# Patient Record
Sex: MALE | Race: WHITE | NOT HISPANIC OR LATINO | Employment: UNEMPLOYED | URBAN - METROPOLITAN AREA
[De-identification: names, ages, dates, MRNs, and addresses within clinical notes are randomized per-mention and may not be internally consistent; named-entity substitution may affect disease eponyms.]

---

## 2017-01-01 ENCOUNTER — HOSPITAL ENCOUNTER (EMERGENCY)
Facility: CLINIC | Age: 32
Discharge: HOME | End: 2017-01-01
Attending: EMERGENCY MEDICINE | Admitting: EMERGENCY MEDICINE
Payer: COMMERCIAL

## 2017-01-01 VITALS
WEIGHT: 250 LBS | SYSTOLIC BLOOD PRESSURE: 143 MMHG | BODY MASS INDEX: 33.86 KG/M2 | HEIGHT: 72 IN | DIASTOLIC BLOOD PRESSURE: 81 MMHG | TEMPERATURE: 97.9 F | OXYGEN SATURATION: 95 %

## 2017-01-01 VITALS
RESPIRATION RATE: 16 BRPM | TEMPERATURE: 98.6 F | OXYGEN SATURATION: 96 % | SYSTOLIC BLOOD PRESSURE: 121 MMHG | HEART RATE: 71 BPM | DIASTOLIC BLOOD PRESSURE: 79 MMHG

## 2017-01-01 DIAGNOSIS — F25.0 SCHIZOAFFECTIVE DISORDER, BIPOLAR TYPE (H): ICD-10-CM

## 2017-01-01 DIAGNOSIS — Z00.00 ROUTINE GENERAL MEDICAL EXAMINATION AT A HEALTH CARE FACILITY: ICD-10-CM

## 2017-01-01 PROCEDURE — 99283 EMERGENCY DEPT VISIT LOW MDM: CPT | Mod: Z6 | Performed by: EMERGENCY MEDICINE

## 2017-01-01 PROCEDURE — 99283 EMERGENCY DEPT VISIT LOW MDM: CPT | Mod: 27

## 2017-01-01 PROCEDURE — 25000132 ZZH RX MED GY IP 250 OP 250 PS 637: Performed by: EMERGENCY MEDICINE

## 2017-01-01 PROCEDURE — 99207 ZZC APP CREDIT; MD BILLING SHARED VISIT: CPT | Mod: Z6 | Performed by: EMERGENCY MEDICINE

## 2017-01-01 RX ORDER — ACETAMINOPHEN 500 MG
1000 TABLET ORAL ONCE
Status: COMPLETED | OUTPATIENT
Start: 2017-01-01 | End: 2017-01-01

## 2017-01-01 RX ORDER — MULTIVITAMIN WITH FOLIC ACID 400 MCG
1 TABLET ORAL
COMMUNITY
Start: 2016-10-25 | End: 2017-12-31

## 2017-01-01 RX ORDER — HALOPERIDOL 10 MG/1
10 TABLET ORAL
COMMUNITY
Start: 2016-12-31 | End: 2017-12-31

## 2017-01-01 RX ADMIN — Medication 1000 MG: at 16:27

## 2017-01-01 ASSESSMENT — ENCOUNTER SYMPTOMS
SHORTNESS OF BREATH: 0
ABDOMINAL PAIN: 0
HEADACHES: 0
HALLUCINATIONS: 0
FEVER: 0
CONFUSION: 0
DIFFICULTY URINATING: 0
EYE REDNESS: 0
ARTHRALGIAS: 0
COLOR CHANGE: 0
NECK STIFFNESS: 0

## 2017-01-01 NOTE — ED AVS SNAPSHOT
[facility], Emergency Department    [address] [facility] [address]    Phone:  [phone]                                       Carlos Alberto Garcia   MRN: [id number]    Department:  [facility], Emergency Department   Date of Visit:  1/1/2017           Patient Information     Date Of Birth          1985        Your diagnoses for this visit were:     Routine general medical examination at a health care facility        You were seen by Wilder Whitman MD.        Discharge Instructions       Please make an appointment to follow up with your psychiatrist and Your Primary Care Provider this week.    Return to the emergency Department for any problems.      24 Hour Appointment Hotline       To make an appointment at any [facility] clinic, call [phone] ([phone]). If you don't have a family doctor or clinic, we will help you find one. [facility] clinics are conveniently located to serve the needs of you and your family.             Review of your medicines      Our records show that you are taking the medicines listed below. If these are incorrect, please call your family doctor or clinic.        Dose / Directions Last dose taken    INVEGA SUSTENNA IM   Dose:  156 mg        Inject 156 mg into the muscle Qmonthly injection.   Refills:  0        MELATONIN PO   Dose:  5 mg   Indication:  pt unsure of dose        Take 5 mg by mouth nightly as needed   Refills:  0                Orders Needing Specimen Collection     None      Pending Results     No orders found from 12/31/2016 to 1/2/2017.            Thank you for choosing [facility]       Thank you for choosing [facility] for your care. Our goal is always to provide you with excellent care. Hearing back from our patients is one way we can continue to improve our services. Please take a few minutes to complete the written survey that you may receive in the mail after you visit with us. Thank you!        MyChart Information     [employer] lets you  "send messages to your doctor, view your test results, renew your prescriptions, schedule appointments and more. To sign up, go to [email or web address] . Click on \"Log in\" on the left side of the screen, which will take you to the Welcome page. Then click on \"Sign up Now\" on the right side of the page.     You will be asked to enter the access code listed below, as well as some personal information. Please follow the directions to create your username and password.     Your access code is: [email or web address]  Expires: 2017  4:20 PM     Your access code will  in 90 days. If you need help or a new code, please call your [place] clinic or [phone].        After Visit Summary       This is your record. Keep this with you and show to your community pharmacist(s) and doctor(s) at your next visit.                  "

## 2017-01-01 NOTE — ED AVS SNAPSHOT
[facility], Emergency Department    [address]    [place] [address]    Phone:  [phone]    Fax:  [phone]                                       Carlos Alberto Garcia   MRN: [id number]    Department:  [facility], Emergency Department   Date of Visit:  1/1/2017           Patient Information     Date Of Birth          1985        Your diagnoses for this visit were:     Schizoaffective disorder, bipolar type (H)        You were seen by Sandra Damon MD.        Discharge Instructions       Follow up with your outpatient providers.     Please make an appointment to follow up with [facility] (phone: [phone])    Resources offered and declined.       24 Hour Appointment Hotline       To make an appointment at any [facility] clinic, call [phone] ([phone]). If you don't have a family doctor or clinic, we will help you find one. [facility] clinics are conveniently located to serve the needs of you and your family.             Review of your medicines      Our records show that you are taking the medicines listed below. If these are incorrect, please call your family doctor or clinic.        Dose / Directions Last dose taken    haloperidol 10 MG tablet   Commonly known as:  HALDOL   Dose:  10 mg        Take 10 mg by mouth   Refills:  0        INVEGA SUSTENNA IM   Dose:  156 mg        Inject 156 mg into the muscle Qmonthly injection.   Refills:  0        MELATONIN PO   Dose:  5 mg   Indication:  pt unsure of dose        Take 5 mg by mouth nightly as needed   Refills:  0        TAB-A-FAZAL Tabs   Dose:  1 tablet        Take 1 tablet by mouth   Refills:  0        VITAMIN D-1000 MAX ST 1000 UNITS Tabs   Dose:  1000 Units   Generic drug:  cholecalciferol        Take 1,000 Units by mouth   Refills:  0                Orders Needing Specimen Collection     Ordered          01/01/17 1908  Drug abuse screen 6 urine (chem dep) - STAT, [facility]: STAT, Needs to be Collected     Scheduled Task Status  "  17 1909 Collect Drug abuse screen 6 urine (chem dep) Open   Order Class:  PCU Collect                  Pending Results     No orders found from 2016 to 2017.            Thank you for choosing [facility]       Thank you for choosing [facility] for your care. Our goal is always to provide you with excellent care. Hearing back from our patients is one way we can continue to improve our services. Please take a few minutes to complete the written survey that you may receive in the mail after you visit with us. Thank you!        [employer] Information     [employer] lets you send messages to your doctor, view your test results, renew your prescriptions, schedule appointments and more. To sign up, go to [email or web address] . Click on \"Log in\" on the left side of the screen, which will take you to the Welcome page. Then click on \"Sign up Now\" on the right side of the page.     You will be asked to enter the access code listed below, as well as some personal information. Please follow the directions to create your username and password.     Your access code is: [email or web address]  Expires: 2017  4:20 PM     Your access code will  in 90 days. If you need help or a new code, please call your [facility] clinic or [phone].        After Visit Summary       This is your record. Keep this with you and show to your community pharmacist(s) and doctor(s) at your next visit.                  "

## 2017-01-01 NOTE — ED AVS SNAPSHOT
[facility], [place], Emergency Department    [address] [place] [address]    Phone:  [phone]                                       Carlos Alberto Garcia   MRN: [id number]    Department:  [facility], Emergency Department   Date of Visit:  1/1/2017           After Visit Summary Signature Page     I have received my discharge instructions, and my questions have been answered. I have discussed any challenges I see with this plan with the nurse or doctor.    ..........................................................................................................................................  Patient/Patient Representative Signature      ..........................................................................................................................................  Patient Representative Print Name and Relationship to Patient    ..................................................               ................................................  Date                                            Time    ..........................................................................................................................................  Reviewed by Signature/Title    ...................................................              ..............................................  Date                                                            Time

## 2017-01-01 NOTE — ED PROVIDER NOTES
"  History     Chief Complaint   Patient presents with     Altered Mental Status     HPI  Carlos Alberto Garcia is a 31 year old male with a history of schizophrenia, bipolar 2 disorder, hypertension, hypothyroidism, substance abuse, recurrent headaches and hepatitis C who presents for evaluation following a medication injection. Patient reports he received an Invega injection approximately one hour prior to arrival at [facility], and came here to the emergency department to \"make sure everything is going okay\" as he is concerned whether it is working appropriately because \"I'm hellen serious about the brain\". No other symptoms or complaints at this time, however the patient did request a Tylenol.     I have reviewed the Medications, Allergies, Past Medical and Surgical History, and Social History in the [facility] system.  Past Medical History   Diagnosis Date     Schizoaffective disorder      Bipolar 2 disorder (H)      Unspecified essential hypertension      Unspecified hypothyroidism      Chemical abuse      cocaine, meth, cambis     Dyslipidemia      Patient overweight      Hep C w/ coma, chronic (H)        Past Surgical History   Procedure Laterality Date     Hand surgery       L       No family history on file.    Social History   Substance Use Topics     Smoking status: Current Every Day Smoker -- 1.00 packs/day     Types: Cigarettes     Smokeless tobacco: Former User     Alcohol Use: Yes      Comment: rarely. Last drink about 4 month ago     Current Facility-Administered Medications   Medication     acetaminophen (TYLENOL) tablet 1,000 mg     Current Outpatient Prescriptions   Medication     MELATONIN PO     Paliperidone Palmitate (INVEGA SUSTENNA IM)     Facility-Administered Medications Ordered in Other Encounters   Medication     ibuprofen (ADVIL,MOTRIN) 600 MG tablet      No Known Allergies    Review of Systems   Constitutional: Negative for fever.   HENT: Negative for congestion.    Eyes: Negative for " redness.   Respiratory: Negative for shortness of breath.    Cardiovascular: Negative for chest pain.   Gastrointestinal: Negative for abdominal pain.   Genitourinary: Negative for difficulty urinating.   Musculoskeletal: Negative for arthralgias and neck stiffness.   Skin: Negative for color change.   Neurological: Negative for headaches.   Psychiatric/Behavioral: Negative for confusion.       Physical Exam   BP: 143/81 mmHg  Heart Rate: 93  Temp: 97.9  F (36.6  C)  Height: 182.9 cm (6')  Weight: 113.399 kg (250 lb)  SpO2: 95 %  Physical Exam   Constitutional: He is oriented to person, place, and time. He appears well-developed and well-nourished.  Non-toxic appearance. He does not appear ill. No distress.   Patient is awake and alert, lying comfortably in the bed, no acute distress.   HENT:   Head: Normocephalic and atraumatic.   Eyes: EOM are normal. Pupils are equal, round, and reactive to light. No scleral icterus.   Neck: Normal range of motion. Neck supple.   Cardiovascular: Normal rate, regular rhythm and normal heart sounds.    Pulmonary/Chest: Effort normal and breath sounds normal. No respiratory distress.   Neurological: He is alert and oriented to person, place, and time. He has normal strength. No sensory deficit.   No pronator drift bilaterally.  Normal finger to nose bilaterally.  No tremor noted.  Normal muscle tone without clonus bilaterally.  Normal rapid alternating movements bilaterally.  No aphasia or dysarthria.   Skin: Skin is warm and dry. No rash noted. No pallor.   Psychiatric: He has a normal mood and affect. His behavior is normal.   Nursing note and vitals reviewed.      ED Course   Procedures       3:48 PM  The patient was seen and examined by Dr. Whitman in Room 14.         Assessments & Plan (with Medical Decision Making)   This patient presented to the emergency department to make sure that his medication was not affecting his brain.  Patient has a history of schizophrenia and does  have a somewhat odd affect but does not appear to be a danger to himself or others.  There is no evidence to suggest neuroleptic malignant syndrome or severe extrapyramidal symptoms.  At this point I am comfortable discharging him home.    I have reviewed the nursing notes.    I have reviewed the findings, diagnosis, plan and need for follow up with the patient.    New Prescriptions    No medications on file       Final diagnoses:   Routine general medical examination at a [facility] care facility   I, Pastora Hammer, am serving as a trained medical scribe to document services personally performed by Ld Whitman MD, based on the provider's statements to me.   ILd MD, was physically present and have reviewed and verified the accuracy of this note documented by Pastora Hammer.      1/1/2017   [facility], [place], EMERGENCY DEPARTMENT      Wilder Whitman MD  01/01/17 1731

## 2017-01-01 NOTE — DISCHARGE INSTRUCTIONS
Please make an appointment to follow up with your psychiatrist and Your Primary Care Provider this week.    Return to the emergency Department for any problems.

## 2017-01-01 NOTE — ED AVS SNAPSHOT
[facility], Emergency Department    [address]    [facility] [address]    Phone:  [phone]    Fax:  [phone]                                       Carlos Alberto Garcia   MRN: [id number]    Department:  [facility], Emergency Department   Date of Visit:  1/1/2017           After Visit Summary Signature Page     I have received my discharge instructions, and my questions have been answered. I have discussed any challenges I see with this plan with the nurse or doctor.    ..........................................................................................................................................  Patient/Patient Representative Signature      ..........................................................................................................................................  Patient Representative Print Name and Relationship to Patient    ..................................................               ................................................  Date                                            Time    ..........................................................................................................................................  Reviewed by Signature/Title    ...................................................              ..............................................  Date                                                            Time

## 2017-01-01 NOTE — ED NOTES
"Carlos Alberto is ambulatory to triage, he states he can't sleep and he \"can't see in his brain\". He wants to be seen to \"make sure everything is ok\". Carlos Alberto states he is currently homeless, but feels safe, he has been in a treatment center but had a cigarette in the bathroom, \"then they told me I was going to the hospital, very nicely, but I didn't know I was actually getting kicked out\". He is requesting social work involvement as well. He does c/o headache.   "

## 2017-01-02 ENCOUNTER — HOSPITAL ENCOUNTER (EMERGENCY)
Facility: CLINIC | Age: 32
Discharge: HOME | End: 2017-01-02
Attending: EMERGENCY MEDICINE | Admitting: EMERGENCY MEDICINE
Payer: COMMERCIAL

## 2017-01-02 VITALS
TEMPERATURE: 97.3 F | DIASTOLIC BLOOD PRESSURE: 79 MMHG | HEART RATE: 114 BPM | BODY MASS INDEX: 34.27 KG/M2 | RESPIRATION RATE: 16 BRPM | HEIGHT: 72 IN | OXYGEN SATURATION: 100 % | WEIGHT: 253 LBS | SYSTOLIC BLOOD PRESSURE: 128 MMHG

## 2017-01-02 DIAGNOSIS — R51.9 ACUTE NONINTRACTABLE HEADACHE, UNSPECIFIED HEADACHE TYPE: ICD-10-CM

## 2017-01-02 DIAGNOSIS — M94.0 COSTOCHONDRITIS: ICD-10-CM

## 2017-01-02 PROCEDURE — 99283 EMERGENCY DEPT VISIT LOW MDM: CPT

## 2017-01-02 PROCEDURE — 25000132 ZZH RX MED GY IP 250 OP 250 PS 637: Performed by: EMERGENCY MEDICINE

## 2017-01-02 PROCEDURE — 99283 EMERGENCY DEPT VISIT LOW MDM: CPT | Mod: Z6 | Performed by: EMERGENCY MEDICINE

## 2017-01-02 RX ORDER — ACETAMINOPHEN 325 MG/1
650 TABLET ORAL ONCE
Status: COMPLETED | OUTPATIENT
Start: 2017-01-02 | End: 2017-01-02

## 2017-01-02 RX ORDER — IBUPROFEN 200 MG
600 TABLET ORAL 3 TIMES DAILY
Qty: 63 TABLET | Refills: 0 | Status: SHIPPED | OUTPATIENT
Start: 2017-01-02 | End: 2017-01-09

## 2017-01-02 RX ADMIN — ACETAMINOPHEN 650 MG: 325 TABLET, FILM COATED ORAL at 15:25

## 2017-01-02 NOTE — ED AVS SNAPSHOT
[facility], Emergency Department    [address]    [place] [address]    Phone:  [phone]    Fax:  [phone]                                       Carlos Alberto Garcia   MRN: [id number]    Department:  [facility], Emergency Department   Date of Visit:  1/2/2017           Patient Information     Date Of Birth          1985        Your diagnoses for this visit were:     Acute nonintractable headache, unspecified headache type     Costochondritis        You were seen by Fabrice Fraser MD.        Discharge Instructions       No lifting more than 20 pounds for one week.    Please make an appointment to follow up with Your Primary Care Provider or our [facility] (phone: [phone]) in one week for recheck.    Discharge References/Attachments     COSTOCHONDRITIS (ENGLISH)    HEADACHE, UNSPECIFIED (ENGLISH)      24 Hour Appointment Hotline       To make an appointment at any [facility], call [phone] ([phone]). If you don't have a family doctor or clinic, we will help you find one. [facility] clinics are conveniently located to serve the needs of you and your family.             Review of your medicines      START taking        Dose / Directions Last dose taken    ibuprofen 200 MG tablet   Commonly known as:  ADVIL/MOTRIN   Dose:  600 mg   Quantity:  63 tablet        Take 3 tablets (600 mg) by mouth 3 times daily for 7 days   Refills:  0          Our records show that you are taking the medicines listed below. If these are incorrect, please call your family doctor or clinic.        Dose / Directions Last dose taken    haloperidol 10 MG tablet   Commonly known as:  HALDOL   Dose:  10 mg        Take 10 mg by mouth   Refills:  0        INVEGA SUSTENNA IM   Dose:  156 mg        Inject 156 mg into the muscle Qmonthly injection.   Refills:  0        MELATONIN PO   Dose:  5 mg   Indication:  pt unsure of dose        Take 5 mg by mouth nightly as needed  "  Refills:  0        TAB-A-FAZAL Tabs   Dose:  1 tablet        Take 1 tablet by mouth   Refills:  0        VITAMIN D-1000 MAX ST 1000 UNITS Tabs   Dose:  1000 Units   Generic drug:  cholecalciferol        Take 1,000 Units by mouth   Refills:  0                Prescriptions were sent or printed at these locations (1 Prescription)                   Other Prescriptions                Printed at Department/Unit printer (1 of 1)         ibuprofen (ADVIL/MOTRIN) 200 MG tablet                Orders Needing Specimen Collection     None      Pending Results     No orders found from 2017 to 1/3/2017.            Thank you for choosing [facility]       Thank you for choosing [facility] for your care. Our goal is always to provide you with excellent care. Hearing back from our patients is one way we can continue to improve our services. Please take a few minutes to complete the written survey that you may receive in the mail after you visit with us. Thank you!        [employer] Information     [employer] lets you send messages to your doctor, view your test results, renew your prescriptions, schedule appointments and more. To sign up, go to [email or web address] . Click on \"Log in\" on the left side of the screen, which will take you to the Welcome page. Then click on \"Sign up Now\" on the right side of the page.     You will be asked to enter the access code listed below, as well as some personal information. Please follow the directions to create your username and password.     Your access code is: [email or web address]  Expires: 2017  4:20 PM     Your access code will  in 90 days. If you need help or a new code, please call your [place] clinic or [phone].        After Visit Summary       This is your record. Keep this with you and show to your community pharmacist(s) and doctor(s) at your next visit.                  "

## 2017-01-02 NOTE — ED PROVIDER NOTES
History     Chief Complaint   Patient presents with     Medication Reaction     [facility] and not feeling right     HPI  Carlos Alberto Garcia is a 31 year old male with schizoaffective disorder, bipolar type who presents stating his invega shot isn't working.  He is homeless.  He was suppose to f/u with [facility] but missed that appointment.  He was seen earlier today and received invega but it hasn't started to work yet.  He was seen at [facility] yesterday and received haldol.  He says he has family in town but does not see them.  He has not been good about taking his meds as prescribed.  He is suppose to take invega montly.  He has not had adverse reactions to it in the past.  He tells the  that he is good and is ready to leave.     I have reviewed the Medications, Allergies, Past Medical and Surgical History, and Social History in the [facility] system.    Review of Systems   Psychiatric/Behavioral: Negative for suicidal ideas and hallucinations.   All other systems reviewed and are negative.      Physical Exam   BP: 136/88 mmHg  Pulse: 78  Temp: 98.6  F (37  C)  Resp: 16  SpO2: 96 %  Physical Exam   Constitutional: He is oriented to person, place, and time. He appears well-developed and well-nourished. No distress.   Disheveled appearance.     HENT:   Head: Normocephalic and atraumatic.   Right Ear: External ear normal.   Left Ear: External ear normal.   Nose: Nose normal.   Mouth/Throat: Oropharynx is clear and moist.   Eyes: EOM are normal. Pupils are equal, round, and reactive to light.   Neck: Normal range of motion. Neck supple.   Cardiovascular: Normal rate, regular rhythm and normal heart sounds.    Pulmonary/Chest: Effort normal and breath sounds normal.   Abdominal: Soft. There is no tenderness.   Musculoskeletal: Normal range of motion.   Neurological: He is alert and oriented to person, place, and time.   Skin: Skin is warm and dry. He is not diaphoretic.    Psychiatric: He has a normal mood and affect. His speech is normal and behavior is normal. Judgment and thought content normal. Cognition and memory are normal.   Nursing note and vitals reviewed.      ED Course   Procedures             Labs Ordered and Resulted from Time of ED Arrival Up to the Time of Departure from the ED - No data to display    Assessments & Plan (with Medical Decision Making)   Upon arrival, the the patient immediately turned on tv remote to watch tv and asked for a sandwich.  No distress by their evaluation.     He says he has not been taking his meds and got invega a few hours ago and it isn't working yet.  He also states he was suppose to follow up with [facility] but missed the appointment.  We discussed that it takes time for the shot to work.  He is stable.  Calm and cooperative.  He would like a sandwich which was given.  He was offered resources and declined.  No si/hi.  He was d/c.       I have reviewed the nursing notes.    I have reviewed the findings, diagnosis, plan and need for follow up with the patient.    New Prescriptions    No medications on file       Final diagnoses:   Schizoaffective disorder, bipolar type (H)       1/1/2017   [facility], [place], EMERGENCY DEPARTMENT      Sandra Damon MD  01/01/17 4879

## 2017-01-02 NOTE — ED NOTES
Patient arrives to [facility]. Psych Associate explains process, gives patient urine cup and questionnaire. Patient told about meeting with Mental Health  and Psychiatrist. Patient told about 2-5 hour time frame for complete evaluation.

## 2017-01-02 NOTE — ED NOTES
"Patient biba with c/o \"invega issues\".  Pt will not elaborate.  Per EMS he was at [facility] 3 hours ago and received an invega injection.  He called 911 and wanted to be transported to [facility] and became angry when they wouldn't take him there.  Pt is evasive and not answering questions.  Upon arrival he grabs remote and turns on TV, inattentive to questions being asked.  After leaving room he comes out and demands food.    "

## 2017-01-02 NOTE — DISCHARGE INSTRUCTIONS
Follow up with your outpatient providers.     Please make an appointment to follow up with [facility] (phone: [phone])    Resources offered and declined.

## 2017-01-02 NOTE — ED NOTES
Bed: ED07  Expected date: 1/1/17  Expected time: 6:21 PM  Means of arrival: Ambulance  Comments:  kwadwo [id number] not feeling well

## 2017-01-02 NOTE — DISCHARGE INSTRUCTIONS
No lifting more than 20 pounds for one week.    Please make an appointment to follow up with Your Primary Care Provider or our [facility] (phone: [phone]) in one week for recheck.

## 2017-01-02 NOTE — ED AVS SNAPSHOT
[facility], Emergency Department    [address]    [facility] [address]    Phone:  [phone]    Fax:  [phone]                                       Carlos Alberto Garcia   MRN: [id number]    Department:  [facility], Emergency Department   Date of Visit:  1/2/2017           After Visit Summary Signature Page     I have received my discharge instructions, and my questions have been answered. I have discussed any challenges I see with this plan with the nurse or doctor.    ..........................................................................................................................................  Patient/Patient Representative Signature      ..........................................................................................................................................  Patient Representative Print Name and Relationship to Patient    ..................................................               ................................................  Date                                            Time    ..........................................................................................................................................  Reviewed by Signature/Title    ...................................................              ..............................................  Date                                                            Time

## 2017-01-02 NOTE — ED PROVIDER NOTES
"  History     Chief Complaint   Patient presents with     Headache     Headache off/on for past 3 mos. Pt reports it's \"not as bad today but I wanted to get it checked out.\" Reports he has not taken anything for the headache & here for tylenol     Chest Pain     Pt adds that he has R sided CP that started today     HPI  Carlos Alberto Garcia is a 31 year old male with a chemical abuse and psychiatric history including malingering who presents to the ER with complaints of a headache that he has had intermittently for the last 3 weeks.  Patient states that the headache is global in nature but does not cause him any fevers or visual complaints focal numbness or weakness or vomiting.  Patient states he has not taken anything for his headache over the last 3 weeks and that he came to the ER for some Tylenol.    Patient also complains of some right anterior non-radiating chest pain that hurts with movement and with palpation.  Patient states he's had no fevers cough and shortness of breath or diaphoresis.    I have reviewed the Medications, Allergies, Past Medical and Surgical History, and Social History in the [employer] system.  Past Medical History   Diagnosis Date     Schizoaffective disorder      Bipolar 2 disorder (H)      Unspecified essential hypertension      Unspecified hypothyroidism      Chemical abuse      cocaine, meth, cambis     Dyslipidemia      Patient overweight      Hep C w/ coma, chronic (H)      Past Surgical History   Procedure Laterality Date     Hand surgery       L        Social History     Social History     Marital Status: Single     Spouse Name: N/A     Number of Children: N/A     Years of Education: N/A     Occupational History     Not on file.     Social History Main Topics     Smoking status: Current Every Day Smoker -- 1.00 packs/day     Types: Cigarettes     Smokeless tobacco: Former User     Alcohol Use: Yes      Comment: rarely. Last drink about 4 month ago     Drug Use: 3.00 per week     " Special: Marijuana, Methamphetamines      Comment: Pt smokes meth and marijuana     Sexual Activity:     Partners: Female     Birth Control/ Protection: None     Other Topics Concern     Parent/Sibling W/ Cabg, Mi Or Angioplasty Before 65f 55m? No     Social History Narrative      No Known Allergies      Review of Systems    ROS: 10 point ROS neg other than the symptoms noted above in the HPI.    Physical Exam   BP: 128/79 mmHg  Pulse: 103  Temp: 97.3  F (36.3  C)  Resp: 16  Height: 182.9 cm (6')  Weight: 114.76 kg (253 lb)  SpO2: 96 %  Physical Exam   Constitutional: He is oriented to person, place, and time. No distress.   Awake alert and laying comfortably on the bed   HENT:   Head: Atraumatic.   Mouth/Throat: Oropharynx is clear and moist.   Eyes: EOM are normal. Pupils are equal, round, and reactive to light.   Neck: Neck supple.   Cardiovascular: Normal heart sounds.  Exam reveals no friction rub.    No murmur heard.  Pulmonary/Chest: Breath sounds normal. No stridor. He has no wheezes. He has no rales. He exhibits tenderness (reproducible right sternal border).   Abdominal: Soft. There is no tenderness.   Musculoskeletal: He exhibits no edema or tenderness.   Neurological: He is alert and oriented to person, place, and time. No cranial nerve deficit.   Grossly intact and symmetric   Skin: Skin is warm.   Psychiatric:   Slightly pressured speech and poor eye contact but no overt evidence of hallucinations or delusions.  Calm.  No agitation.       ED Course   Procedures            Assessments & Plan (with Medical Decision Making)     I have reviewed the nursing notes.    Patient will be given some Tylenol for his headache and will be sent home on ibuprofen.    I have reviewed the findings, diagnosis, plan and need for follow up with the patient.    New Prescriptions    IBUPROFEN (ADVIL/MOTRIN) 200 MG TABLET    Take 3 tablets (600 mg) by mouth 3 times daily for 7 days       Final diagnoses:   Acute  nonintractable headache, unspecified headache type   Costochondritis     No lifting more than 20 pounds for one week.    Please make an appointment to follow up with Your Primary Care Provider or our [facility] (phone: [phone]) in one week for recheck.    Fabrice Fraser MD    1/2/2017   [facility], EMERGENCY DEPARTMENT      Fabrice Fraser MD  01/02/17 1814

## 2017-01-02 NOTE — ED NOTES
Safety screen completed on patient. All belongings locked up in cabinet. Patient aware of policy.

## 2017-01-03 ENCOUNTER — HOSPITAL ENCOUNTER (EMERGENCY)
Facility: CLINIC | Age: 32
Discharge: LEFT BEFORE DISCHARGE | End: 2017-01-03
Payer: COMMERCIAL

## 2017-01-03 VITALS
DIASTOLIC BLOOD PRESSURE: 104 MMHG | HEART RATE: 102 BPM | WEIGHT: 250 LBS | BODY MASS INDEX: 33.86 KG/M2 | OXYGEN SATURATION: 99 % | SYSTOLIC BLOOD PRESSURE: 163 MMHG | HEIGHT: 72 IN

## 2017-01-03 NOTE — ED NOTES
Patient came in to the lobby with his shoes off for foot pain and a headache. He states he is sick and needs a tylenol in his triage form. He is alert, oriented. Vitally stable.

## 2017-01-03 NOTE — ED NOTES
Pt called x 1. Pt not in lobby. Security states pt left ED. Pt also not outside ED/hospital entrance.

## 2017-01-04 ENCOUNTER — HOSPITAL ENCOUNTER (EMERGENCY)
Facility: CLINIC | Age: 32
Discharge: LEFT BEFORE DISCHARGE | End: 2017-01-04
Admitting: EMERGENCY MEDICINE
Payer: COMMERCIAL

## 2017-01-04 ENCOUNTER — HOSPITAL ENCOUNTER (EMERGENCY)
Facility: CLINIC | Age: 32
Discharge: HOME | End: 2017-01-04
Attending: EMERGENCY MEDICINE | Admitting: EMERGENCY MEDICINE
Payer: COMMERCIAL

## 2017-01-04 VITALS
HEART RATE: 110 BPM | SYSTOLIC BLOOD PRESSURE: 150 MMHG | DIASTOLIC BLOOD PRESSURE: 83 MMHG | TEMPERATURE: 97.4 F | RESPIRATION RATE: 14 BRPM | OXYGEN SATURATION: 100 %

## 2017-01-04 VITALS
OXYGEN SATURATION: 100 % | SYSTOLIC BLOOD PRESSURE: 118 MMHG | DIASTOLIC BLOOD PRESSURE: 61 MMHG | HEIGHT: 72 IN | TEMPERATURE: 98.2 F

## 2017-01-04 DIAGNOSIS — Z59.00 HOMELESSNESS: ICD-10-CM

## 2017-01-04 DIAGNOSIS — F25.9 SCHIZOAFFECTIVE DISORDER, UNSPECIFIED TYPE (H): ICD-10-CM

## 2017-01-04 PROCEDURE — 99282 EMERGENCY DEPT VISIT SF MDM: CPT

## 2017-01-04 PROCEDURE — 25000132 ZZH RX MED GY IP 250 OP 250 PS 637

## 2017-01-04 PROCEDURE — 40000268 ZZH STATISTIC NO CHARGES

## 2017-01-04 RX ORDER — ACETAMINOPHEN 325 MG/1
650 TABLET ORAL ONCE
Status: COMPLETED | OUTPATIENT
Start: 2017-01-04 | End: 2017-01-04

## 2017-01-04 RX ORDER — ACETAMINOPHEN 325 MG/1
TABLET ORAL
Status: COMPLETED
Start: 2017-01-04 | End: 2017-01-04

## 2017-01-04 RX ADMIN — ACETAMINOPHEN 650 MG: 325 TABLET, FILM COATED ORAL at 06:17

## 2017-01-04 RX ADMIN — ACETAMINOPHEN 650 MG: 325 TABLET ORAL at 06:17

## 2017-01-04 SDOH — ECONOMIC STABILITY - HOUSING INSECURITY: HOMELESSNESS UNSPECIFIED: Z59.00

## 2017-01-04 ASSESSMENT — ENCOUNTER SYMPTOMS: HALLUCINATIONS: 1

## 2017-01-04 NOTE — ED NOTES
"Pt presents ambulatory to triage and states \"I can't get my mind activated.\" Pt states Tylenol may help  "

## 2017-01-04 NOTE — ED AVS SNAPSHOT
Emergency Department    [address] [place] [address]    Phone:  [phone]    Fax:  [phone]                                       Carlos Alberto Garcia   MRN: [id number]    Department:   Emergency Department   Date of Visit:  1/4/2017           Patient Information     Date Of Birth          1985        Your diagnoses for this visit were:     Schizoaffective disorder, unspecified type (H)     Homelessness        You were seen by Frandy Shelton MD.      Follow-up Information     Please follow up.    Why:  follow up with your doctors as needed        Discharge Instructions         Schizoaffective Disorder  Schizoaffective disorder is an illness in which a psychotic person also has symptoms of a mood disorder such as depression, or bipolar disorder.  Schizophrenia is a chronic, often disabling mental health disorder that makes functioning in work and society difficult. It is a type of psychosis, and involves perceiving reality differently from those around you. The difference been reality and what you think become blurred in your mind. The cause of schizophrenia is not yet known. It is believed to be a result of genetic and biological factors like brain chemistry and structure. Symptoms of schizophrenia include:    Hallucinations (seeing or hearing things that are not there)    Delusions (false beliefs)    Disorganized thinking and speech    Social withdrawal    Severe anxiety    Feeling unreal    Paranoia    Insomnia    Trouble thinking or concentrating clearly    Depression, feeling suicidal    Withdrawal from those around you  Depression is one type of mood disorder that is related to brain chemistry. It is not just a state of unhappiness or sadness but a true disease. You may feel a lack of interest in normal activities. Sometimes there is sadness or guilt without any clear reason. Thinking may become slow and there can be a lack energy or feeling of hopelessness. Some people  have thoughts of harming themselves at this stage. Thoughts can even turn to suicide.  Bipolar Disorder (also called  Manic Depression ) is the other major mood disorder. It is an illness that causes strong mood swings between depression and  dale.  In the manic phase you may think fast and do things quickly. It may seem like you are getting a lot done. At first, this may feel very good; but in the extreme this can lead to a life style that is disorganized, chaotic and includes risky behavior (spending sprees, sexual acting out or drug use). In later stages, it may affect eating (no interest in food) and sleeping (unable to sleep for days at a time). Speech may speed up and become difficult for others to understand. You may appear to others as if you are in your own world.  The exact cause of schizoaffective disorder is unknown. However, a person is more likely to get this illness if a family member has it. Use of drugs such as amphetamines (speed) and cocaine increase the risk of getting this disorder. People with this illness will generally have to treat it long-term. Medicine and psychotherapy can help.  Home care    >On-going care and support helps people manage this illness.  Find a health care provider and therapist who meet your needs.    Be sure to take your prescribed medicine as directed, even if you think you don t need it.    Get the required lab work to check youroverall health and certain you are getting the right amount of medication    Seek support from trusted friends or family by talking about your feelings and thoughts.Ask them to help you recognize behavior changes early so you can get help and, if needed,  medications can be adjusted.    Tell each of your healthcare providers about all of the prescription drugs, over-the-counter medicines, and supplements you take.   Certain supplements interact with medicines and can cause dangerous side effects.  Ask your pharmacist when you have questions  about drug interactions.    If you are having trouble managing workplace issues, or caring for yourself because of this illness, contact your local Americans with Disabilities (ADA) office to see if they can help.  The [place] Department  of Justice operates a toll-free ADA information line at: 661.215.2412 (Voice); or 771-523-3333 (TTY).  They can help you locate a local office.    Avoid the use of amphetamines, cocaine and related drugs. These will only make your condition worse.  Follow-up care  Follow up with your healthcare provider, or as advised.  Call 911  Call 911 if you:    Have suicidal thoughts, a suicide plan, and the means to carry out the plan    Have trouble breathing    Are very confused    Are very drowsy or have trouble awakening    Faint or lose of consciousness    Have a rapid heart rate, very low heart rate, or a new irregular heart rate    Have a seizure  When to seek medical advice  Call your healthcare provider right away if any of these occur:    Feeling like your symptoms are getting worse    Feeling out of control or that you are being controlled by others    Feeling like you want to harm yourself or another    Unable to care for yourself    Worsening hallucinations (hearing voices)    Worsening depression or anxiety    0315-9621 [employer] [address] [place] [address]. All rights reserved. This information is not intended as a substitute for professional medical care. Always follow your healthcare professional's instructions.          24 Hour Appointment Hotline       To make an appointment at any [facility], call [phone] ([phone]). If you don't have a family doctor or clinic, we will help you find one. [facility] [place] are conveniently located to serve the needs of you and your family.             Review of your medicines      Our records show that you are taking the medicines listed below. If these are incorrect, please call your family doctor  or clinic.        Dose / Directions Last dose taken    haloperidol 10 MG tablet   Commonly known as:  HALDOL   Dose:  10 mg        Take 10 mg by mouth   Refills:  0        ibuprofen 200 MG tablet   Commonly known as:  ADVIL/MOTRIN   Dose:  600 mg   Quantity:  63 tablet        Take 3 tablets (600 mg) by mouth 3 times daily for 7 days   Refills:  0        INVEGA SUSTENNA IM   Dose:  156 mg        Inject 156 mg into the muscle Qmonthly injection.   Refills:  0        MELATONIN PO   Dose:  5 mg   Indication:  pt unsure of dose        Take 5 mg by mouth nightly as needed   Refills:  0        TAB-A-FAZAL Tabs   Dose:  1 tablet        Take 1 tablet by mouth   Refills:  0        VITAMIN D-1000 MAX ST 1000 UNITS Tabs   Dose:  1000 Units   Generic drug:  cholecalciferol        Take 1,000 Units by mouth   Refills:  0                Orders Needing Specimen Collection     None      Pending Results     No orders found from 1/3/2017 to 1/5/2017.            Pending Culture Results     No orders found from 1/3/2017 to 1/5/2017.             Test Results from your hospital stay            Clinical Quality Measure: Blood Pressure Screening     Your blood pressure was checked while you were in the emergency department today. The last reading we obtained was  BP: 118/61 mmHg . Please read the guidelines below about what these numbers mean and what you should do about them.  If your systolic blood pressure (the top number) is less than 120 and your diastolic blood pressure (the bottom number) is less than 80, then your blood pressure is normal. There is nothing more that you need to do about it.  If your systolic blood pressure (the top number) is 120-139 or your diastolic blood pressure (the bottom number) is 80-89, your blood pressure may be higher than it should be. You should have your blood pressure rechecked within a year by a primary care provider.  If your systolic blood pressure (the top number) is 140 or greater or your diastolic  "blood pressure (the bottom number) is 90 or greater, you may have high blood pressure. High blood pressure is treatable, but if left untreated over time it can put you at risk for heart attack, stroke, or kidney failure. You should have your blood pressure rechecked by a primary care provider within the next 4 weeks.  If your provider in the emergency department today gave you specific instructions to follow-up with your doctor or provider even sooner than that, you should follow that instruction and not wait for up to 4 weeks for your follow-up visit.        Thank you for choosing [facility]       Thank you for choosing [facility] for your care. Our goal is always to provide you with excellent care. Hearing back from our patients is one way we can continue to improve our services. Please take a few minutes to complete the written survey that you may receive in the mail after you visit with us. Thank you!        [employer] Information     [employer] lets you send messages to your doctor, view your test results, renew your prescriptions, schedule appointments and more. To sign up, go to [email or web address] . Click on \"Log in\" on the left side of the screen, which will take you to the Welcome page. Then click on \"Sign up Now\" on the right side of the page.     You will be asked to enter the access code listed below, as well as some personal information. Please follow the directions to create your username and password.     Your access code is: [email or web address]  Expires: 2017  4:20 PM     Your access code will  in 90 days. If you need help or a new code, please call your [facility] clinic or [phone].        Care EveryWhere ID     This is your Care EveryWhere ID. This could be used by other organizations to access your [facility] medical records  [id number]        After Visit Summary       This is your record. Keep this with you and show to your community pharmacist(s) and doctor(s) at your next visit.                "

## 2017-01-04 NOTE — ED AVS SNAPSHOT
Emergency Department    [address] [place] [address]    Phone:  [phone]    Fax:  [phone]                                       Carlos Alberto Garcia   MRN: [id number]    Department:   Emergency Department   Date of Visit:  1/4/2017           After Visit Summary Signature Page     I have received my discharge instructions, and my questions have been answered. I have discussed any challenges I see with this plan with the nurse or doctor.    ..........................................................................................................................................  Patient/Patient Representative Signature      ..........................................................................................................................................  Patient Representative Print Name and Relationship to Patient    ..................................................               ................................................  Date                                            Time    ..........................................................................................................................................  Reviewed by Signature/Title    ...................................................              ..............................................  Date                                                            Time

## 2017-01-05 ENCOUNTER — HOSPITAL ENCOUNTER (EMERGENCY)
Facility: CLINIC | Age: 32
Discharge: HOME | End: 2017-01-05
Attending: EMERGENCY MEDICINE | Admitting: EMERGENCY MEDICINE
Payer: COMMERCIAL

## 2017-01-05 VITALS — WEIGHT: 238 LBS | HEIGHT: 72 IN | BODY MASS INDEX: 32.23 KG/M2

## 2017-01-05 DIAGNOSIS — Z76.5 MALINGERING: ICD-10-CM

## 2017-01-05 DIAGNOSIS — E78.5 HYPERLIPIDEMIA LDL GOAL <130: ICD-10-CM

## 2017-01-05 PROCEDURE — 99282 EMERGENCY DEPT VISIT SF MDM: CPT

## 2017-01-05 ASSESSMENT — ENCOUNTER SYMPTOMS: HEADACHES: 1

## 2017-01-05 NOTE — ED PROVIDER NOTES
History     Chief Complaint:    Hallucinations      HPI   Carlos Alberto Garcia is a 31 year old male with a history of schizoaffective disorder and auditory hallucinations for the past several years who presents via EMS with auditory hallucinations. The patient was at [facility] and asked someone to call EMS. He states he has been hearing voices for years and that they talk about his family. He denies suicidal or homicidal ideation. He reports he gets Invega injections and last received this in the ER at [facility] 5 days ago. The patient is homeless. He states his mother lives nearby but they have a poor relationship so he does not stay with her. He does not have a psychiatrist. He admits to smoking marijuana a few days ago and denies other drug or alcohol use.     Allergies:  No known drug allergies.       Medications:    Ibuprofen  Melatonin  Haldol    Past Medical History:    Chemical dependency  Schizoaffective disorder  Depression  Hyperlipidemia  Hypertension  GERD  Tobacco dependency  Chemical abuse (meth, cocaine, cannabis)  Bipolar 2      Past Surgical History:    Left hand      Family History:    History reviewed. No pertinent family history.      Social History:  Marital Status: single  Presents to the ED via EMS  Tobacco Use: Positive(1.00 pack/day)  Alcohol Use: Rarely     Review of Systems   Psychiatric/Behavioral: Positive for hallucinations.   All other systems reviewed and are negative.      Physical Exam   First Vitals:  BP: 118/61 mmHg  Heart Rate: 76  Temp: 98.2  F (36.8  C)  Height: 182.9 cm (6')  SpO2: 100 %    Physical Exam  SKIN:  Warm, dry.  HEMATOLOGIC/IMMUNOLOGIC/LYMPHATIC:  No pallor.  EYES:  Conjunctivae normal.  CARDIOVASCULAR:  Regular rate and rhythm.  RESPIRATORY:  No respiratory distress.  GASTROINTESTINAL:  Soft, nontender abdomen.  MUSCULOSKELETAL:  Comfortably lying on left side.  NEUROLOGIC:  Alert, conversant.  PSYCHIATRIC:  Normal mood and bland affect.  No psychotic  features.  Poor eye contact.  Reticent/taciturn.    Emergency Department Course     Emergency Department Course:  Nursing notes and vitals reviewed.  I performed an exam of the patient as documented above.   Findings and plan explained to the patient. Patient discharged home with instructions regarding supportive care, medications, and reasons to return. The importance of close follow-up was reviewed.     Impression & Plan      Medical Decision Making:  This patient presents complaining of chronic auditory hallucinations but after my evaluation of the patient I did not think he required further mental health investigation, certainly not admission. He is homeless and I think part of the impetus for his visit given the very cold weather outside is to escape the weather and find a place to stay. We organized a shelter for the patient and he is quite happy with that plan. Advised follow up with his physicians and clinics as needed.     Diagnosis:    ICD-10-CM    1. Schizoaffective disorder, unspecified type (H) F25.9    2. Homelessness Z59.0        Disposition:  Discharge to shelter.     Monik Mann  1/4/2017    EMERGENCY DEPARTMENT    I, Monik Mann, am serving as a scribe on 1/4/2017 at 9:02 PM to personally document services performed by Dr. Shelton based on my observations and the provider's statements to me.      Frandy Shelton MD  01/06/17 1145

## 2017-01-05 NOTE — ED NOTES
Bed: BH3  Expected date: 1/4/17  Expected time: 8:29 PM  Means of arrival: Ambulance  Comments:  [age] male /hallucination

## 2017-01-05 NOTE — ED NOTES
"Pt states he is hearing voices. Not telling him to harm self or others. States, \"They're just talking.\" Had friend call from [place]. Voluntary.   "

## 2017-01-05 NOTE — DISCHARGE INSTRUCTIONS
Schizoaffective Disorder  Schizoaffective disorder is an illness in which a psychotic person also has symptoms of a mood disorder such as depression, or bipolar disorder.  Schizophrenia is a chronic, often disabling mental health disorder that makes functioning in work and society difficult. It is a type of psychosis, and involves perceiving reality differently from those around you. The difference been reality and what you think become blurred in your mind. The cause of schizophrenia is not yet known. It is believed to be a result of genetic and biological factors like brain chemistry and structure. Symptoms of schizophrenia include:    Hallucinations (seeing or hearing things that are not there)    Delusions (false beliefs)    Disorganized thinking and speech    Social withdrawal    Severe anxiety    Feeling unreal    Paranoia    Insomnia    Trouble thinking or concentrating clearly    Depression, feeling suicidal    Withdrawal from those around you  Depression is one type of mood disorder that is related to brain chemistry. It is not just a state of unhappiness or sadness but a true disease. You may feel a lack of interest in normal activities. Sometimes there is sadness or guilt without any clear reason. Thinking may become slow and there can be a lack energy or feeling of hopelessness. Some people have thoughts of harming themselves at this stage. Thoughts can even turn to suicide.  Bipolar Disorder (also called  Manic Depression ) is the other major mood disorder. It is an illness that causes strong mood swings between depression and  dale.  In the manic phase you may think fast and do things quickly. It may seem like you are getting a lot done. At first, this may feel very good; but in the extreme this can lead to a life style that is disorganized, chaotic and includes risky behavior (spending sprees, sexual acting out or drug use). In later stages, it may affect eating (no interest in food) and sleeping  (unable to sleep for days at a time). Speech may speed up and become difficult for others to understand. You may appear to others as if you are in your own world.  The exact cause of schizoaffective disorder is unknown. However, a person is more likely to get this illness if a family member has it. Use of drugs such as amphetamines (speed) and cocaine increase the risk of getting this disorder. People with this illness will generally have to treat it long-term. Medicine and psychotherapy can help.  Home care    >On-going care and support helps people manage this illness.  Find a health care provider and therapist who meet your needs.    Be sure to take your prescribed medicine as directed, even if you think you don t need it.    Get the required lab work to check youroverall health and certain you are getting the right amount of medication    Seek support from trusted friends or family by talking about your feelings and thoughts.Ask them to help you recognize behavior changes early so you can get help and, if needed,  medications can be adjusted.    Tell each of your healthcare providers about all of the prescription drugs, over-the-counter medicines, and supplements you take.   Certain supplements interact with medicines and can cause dangerous side effects.  Ask your pharmacist when you have questions about drug interactions.    If you are having trouble managing workplace issues, or caring for yourself because of this illness, contact your local Americans with Disabilities (ADA) office to see if they can help.  The [place] Department  of Justice operates a toll-free ADA information line at: 215.452.6899 (Voice); or 382-735-7266 (TTY).  They can help you locate a local office.    Avoid the use of amphetamines, cocaine and related drugs. These will only make your condition worse.  Follow-up care  Follow up with your healthcare provider, or as advised.  Call 911  Call 911 if you:    Have suicidal thoughts, a suicide  plan, and the means to carry out the plan    Have trouble breathing    Are very confused    Are very drowsy or have trouble awakening    Faint or lose of consciousness    Have a rapid heart rate, very low heart rate, or a new irregular heart rate    Have a seizure  When to seek medical advice  Call your healthcare provider right away if any of these occur:    Feeling like your symptoms are getting worse    Feeling out of control or that you are being controlled by others    Feeling like you want to harm yourself or another    Unable to care for yourself    Worsening hallucinations (hearing voices)    Worsening depression or anxiety    8627-4969 [employer]. [address] [place] [address]. All rights reserved. This information is not intended as a substitute for professional medical care. Always follow your healthcare professional's instructions.

## 2017-01-05 NOTE — ED PROVIDER NOTES
"  History   Chief Complaint:  Headache     HPI   Carlos Alberto Garcia is a 31 year old male with a history of schizoaffective disorder and auditory hallucinations for the past several years who seen here in the ED last night and discharged presents back to the ED for evaluation of headaches. The patient was given a bus token when he was discharged and was supposed to go to a [employer] to sleep but the patient did not want to go and slept in the stairway of the hospital. Here he is requesting saline and states, \"I have hepatitis C and need saline\". He denies any suicidal or homicidal ideations. Denies new hallucinations.     Allergies:  NKDA    Medications:    Ibuprofen  Melatonin  Multiple vitamin  Haldol  Vitamin D  Invega      Past Medical History:    Schizoaffective disorder  Bipolar 2 disorder  Unspecified essential hypertension  Unspecified hypothyroidism  Chemical abuse  Dyslipidemia  Overnight  Hep C with coma      Past Surgical History:    Left hand surgery     Family History:    History reviewed. No pertinent family history.    Social History:  Marital Status:  Single   Smoking status: 1.00 packs/day  Alcohol use: Yes     Review of Systems   Neurological: Positive for headaches.   Psychiatric/Behavioral: Negative for suicidal ideas.   All other systems reviewed and are negative.    Physical Exam   First Vitals:  Height: 182.9 cm (6')  Weight: 107.956 kg (238 lb)     Physical Exam  General: Resting comfortably on the gurney  Eyes:  The pupils are equal and round  ENT:    Atraumatic  Neck:  Normal range of motion  CV:  Skin warm and well perfused   Resp:  Non labored breathing  MS:  Normal muscular tone    Ambulatory with steady gait  Skin:  No rash or acute skin lesions noted  Neuro:   Awake, alert.      Speech is normal and fluent.    Face is symmetric.     Moves all extremities equally    Gait stable  Psych:  Flat affect.  Appropriate interactions.    Emergency Department Course   Emergency Department " Course:  Nursing notes and vitals reviewed. I performed an exam of the patient as documented above.     Patient left the emergency department before discharge paperwork could be provided to him.     Impression & Plan    Medical Decision Making:  Carlos Alberto Garcia is a 31 year old male who presents to the ED with headache. Patient was just here and discharged and supposed to go a [employer] as he is homeless. He did not go to this place and said he fell asleep. Was seen in hospital sleeping shortly before arrival to ED again. He does not talk about any headache or confusion when he is talking to me. He is clearly alert, oriented and ambulatory in the room. He is requesting normal saline which I declined to give him. He does not have any active hallucinations and does not appear to be responding to internal stimuli and denies suicidal or homicidal ideations. I do not think he needs psychiatric admission or assessment at this time. I was going to check if [facility] had a bed still available for him but before this could be done or before vital signs could be done he left the emergency department and wanted to leave. Discharge paperwork were not given to him because he left before these things could be done.     Diagnosis:    ICD-10-CM    1. Malingering Z76.5    2. Hyperlipidemia LDL goal <130 E78.5        IBrandy Said, am serving as a scribe on 1/5/2017 at 2:38 AM to personally document services performed by Karen Salgado, based on my observations and the provider's statements to me.         Karen Salgado MD  01/05/17 0717

## 2017-01-15 ENCOUNTER — HOSPITAL ENCOUNTER (EMERGENCY)
Facility: CLINIC | Age: 32
Discharge: HOME | End: 2017-01-15
Attending: EMERGENCY MEDICINE | Admitting: EMERGENCY MEDICINE
Payer: COMMERCIAL

## 2017-01-15 VITALS
OXYGEN SATURATION: 99 % | HEART RATE: 95 BPM | DIASTOLIC BLOOD PRESSURE: 89 MMHG | SYSTOLIC BLOOD PRESSURE: 136 MMHG | TEMPERATURE: 98 F | RESPIRATION RATE: 16 BRPM

## 2017-01-15 DIAGNOSIS — S09.90XA CLOSED HEAD INJURY, INITIAL ENCOUNTER: ICD-10-CM

## 2017-01-15 PROCEDURE — 99282 EMERGENCY DEPT VISIT SF MDM: CPT | Performed by: EMERGENCY MEDICINE

## 2017-01-15 PROCEDURE — 99283 EMERGENCY DEPT VISIT LOW MDM: CPT | Mod: Z6 | Performed by: EMERGENCY MEDICINE

## 2017-01-15 NOTE — ED AVS SNAPSHOT
[facility], Emergency Department    [address]    [place] [address]    Phone:  [phone]    Fax:  [phone]                                       Carlos Alberto Garcia   MRN: [id number]    Department:  [facility], Emergency Department   Date of Visit:  1/15/2017           Patient Information     Date Of Birth          1985        Your diagnoses for this visit were:     Closed head injury, initial encounter        You were seen by Merrill Peña MD.        Discharge Instructions          * HEAD INJURY, no wake-up (Adult)    You have had a head injury. It does not appear serious at this time. Sometimes symptoms of a more serious problem (concussion, bruising or bleeding in the brain) may appear later. Therefore, watch for the warning signs listed below.  HOME CARE:    During the next 24 hours someone must stay with you to check for the signs below. It is not necessary to stay awake or be awakened during the night.    If you have swelling of the face or scalp, apply an ice pack (ice cubes in a plastic bag, wrapped in a towel) for 20 minutes. Do this every 1-2 hours until the swelling starts to go down.    You may use acetaminophen (Tylenol) 650-1000 mg every 6 hours or ibuprofen (Motrin, Advil) 600 mg every 6-8 hours with food to control pain, if you are able to take these medicines. [NOTE: If you have chronic liver or kidney disease or ever had a stomach ulcer or GI bleeding, talk with your doctor before using these medicines.] Do not take aspirin after a head injury.    For the next 24 hours:    Do not take alcohol, sedatives or medicines that make you sleepy.    Do not drive or operate machinery.    Avoid strenuous activities. No lifting or straining.    If you have had any symptoms of a concussion today (nausea, vomiting, dizziness, confusion, headache, memory loss or if you were knocked out), do not return to sports or any activity that could result in another head injury until 2  full weeks after all symptoms are gone and you have been cleared by your doctor. A second head injury before fully recovering from the first one can lead to serious brain injury.  FOLLOW UP with your doctor if symptoms are not improving after 24 hours, or as directed.  GET PROMPT MEDICAL ATTENTION if any of the following warning signs occur:    Repeated vomiting    Severe or worsening headache or dizziness    Unusual drowsiness, or unable to awaken as usual    Confusion or change in behavior or speech, memory loss, blurred vision    Convulsion (seizure)    Increasing scalp or face swelling    Redness, warmth or pus from the swollen area    Fluid drainage or bleeding from the nose or ears    6977-9615 [facility], [address] [place] [address], [place] [address]. All rights reserved. This information is not intended as a substitute for professional medical care. Always follow your healthcare professional's instructions.      24 Hour Appointment Hotline       To make an appointment at any [facility], call [phone] ([phone]). If you don't have a family doctor or clinic, we will help you find one. [facility] are conveniently located to serve the needs of you and your family.             Review of your medicines      Our records show that you are taking the medicines listed below. If these are incorrect, please call your family doctor or clinic.        Dose / Directions Last dose taken    haloperidol 10 MG tablet   Commonly known as:  HALDOL   Dose:  10 mg        Take 10 mg by mouth   Refills:  0        INVEGA SUSTENNA IM   Dose:  156 mg        Inject 156 mg into the muscle Qmonthly injection.   Refills:  0        MELATONIN PO   Dose:  5 mg   Indication:  pt unsure of dose        Take 5 mg by mouth nightly as needed   Refills:  0        TAB-A-FAZAL Tabs   Dose:  1 tablet        Take 1 tablet by mouth   Refills:  0        VITAMIN D-1000 MAX ST 1000 UNITS Tabs   Dose:  1000 Units   Generic drug:   "cholecalciferol        Take 1,000 Units by mouth   Refills:  0                Orders Needing Specimen Collection     None      Pending Results     No orders found from 2017 to 2017.            Pending Culture Results     No orders found from 2017 to 2017.            Thank you for choosing [facility]       Thank you for choosing [facility] for your care. Our goal is always to provide you with excellent care. Hearing back from our patients is one way we can continue to improve our services. Please take a few minutes to complete the written survey that you may receive in the mail after you visit with us. Thank you!        [employer] Information     [employer] lets you send messages to your doctor, view your test results, renew your prescriptions, schedule appointments and more. To sign up, go to [email or web address] . Click on \"Log in\" on the left side of the screen, which will take you to the Welcome page. Then click on \"Sign up Now\" on the right side of the page.     You will be asked to enter the access code listed below, as well as some personal information. Please follow the directions to create your username and password.     Your access code is: [email or web address]  Expires: 2017  4:20 PM     Your access code will  in 90 days. If you need help or a new code, please call your [facility] clinic or [phone].        Care EveryWhere ID     This is your Care EveryWhere ID. This could be used by other organizations to access your [facility] medical records  [id number]        After Visit Summary       This is your record. Keep this with you and show to your community pharmacist(s) and doctor(s) at your next visit.                  "

## 2017-01-15 NOTE — ED AVS SNAPSHOT
[facility], Emergency Department    [address]    [facility] [address]    Phone:  [phone]    Fax:  [phone]                                       Carlos Alberto Garcia   MRN: [id number]    Department:  [facility], Emergency Department   Date of Visit:  1/15/2017           After Visit Summary Signature Page     I have received my discharge instructions, and my questions have been answered. I have discussed any challenges I see with this plan with the nurse or doctor.    ..........................................................................................................................................  Patient/Patient Representative Signature      ..........................................................................................................................................  Patient Representative Print Name and Relationship to Patient    ..................................................               ................................................  Date                                            Time    ..........................................................................................................................................  Reviewed by Signature/Title    ...................................................              ..............................................  Date                                                            Time

## 2017-01-15 NOTE — DISCHARGE INSTRUCTIONS
* HEAD INJURY, no wake-up (Adult)    You have had a head injury. It does not appear serious at this time. Sometimes symptoms of a more serious problem (concussion, bruising or bleeding in the brain) may appear later. Therefore, watch for the warning signs listed below.  HOME CARE:    During the next 24 hours someone must stay with you to check for the signs below. It is not necessary to stay awake or be awakened during the night.    If you have swelling of the face or scalp, apply an ice pack (ice cubes in a plastic bag, wrapped in a towel) for 20 minutes. Do this every 1-2 hours until the swelling starts to go down.    You may use acetaminophen (Tylenol) 650-1000 mg every 6 hours or ibuprofen (Motrin, Advil) 600 mg every 6-8 hours with food to control pain, if you are able to take these medicines. [NOTE: If you have chronic liver or kidney disease or ever had a stomach ulcer or GI bleeding, talk with your doctor before using these medicines.] Do not take aspirin after a head injury.    For the next 24 hours:    Do not take alcohol, sedatives or medicines that make you sleepy.    Do not drive or operate machinery.    Avoid strenuous activities. No lifting or straining.    If you have had any symptoms of a concussion today (nausea, vomiting, dizziness, confusion, headache, memory loss or if you were knocked out), do not return to sports or any activity that could result in another head injury until 2 full weeks after all symptoms are gone and you have been cleared by your doctor. A second head injury before fully recovering from the first one can lead to serious brain injury.  FOLLOW UP with your doctor if symptoms are not improving after 24 hours, or as directed.  GET PROMPT MEDICAL ATTENTION if any of the following warning signs occur:    Repeated vomiting    Severe or worsening headache or dizziness    Unusual drowsiness, or unable to awaken as usual    Confusion or change in behavior or speech, memory loss,  blurred vision    Convulsion (seizure)    Increasing scalp or face swelling    Redness, warmth or pus from the swollen area    Fluid drainage or bleeding from the nose or ears    1814-8680 [facility], [address] [place] [address], [place] [address]. All rights reserved. This information is not intended as a substitute for professional medical care. Always follow your healthcare professional's instructions.
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
## 2017-01-18 ENCOUNTER — HOSPITAL ENCOUNTER (EMERGENCY)
Facility: CLINIC | Age: 32
Discharge: HOME | End: 2017-01-18
Attending: EMERGENCY MEDICINE | Admitting: EMERGENCY MEDICINE
Payer: COMMERCIAL

## 2017-01-18 VITALS
DIASTOLIC BLOOD PRESSURE: 88 MMHG | RESPIRATION RATE: 14 BRPM | HEIGHT: 72 IN | SYSTOLIC BLOOD PRESSURE: 126 MMHG | HEART RATE: 73 BPM | TEMPERATURE: 97.5 F | OXYGEN SATURATION: 100 %

## 2017-01-18 VITALS
SYSTOLIC BLOOD PRESSURE: 139 MMHG | RESPIRATION RATE: 16 BRPM | TEMPERATURE: 97.9 F | DIASTOLIC BLOOD PRESSURE: 76 MMHG | HEART RATE: 88 BPM | OXYGEN SATURATION: 98 %

## 2017-01-18 DIAGNOSIS — G44.219 EPISODIC TENSION-TYPE HEADACHE, NOT INTRACTABLE: ICD-10-CM

## 2017-01-18 DIAGNOSIS — R51.9 NONINTRACTABLE EPISODIC HEADACHE, UNSPECIFIED HEADACHE TYPE: ICD-10-CM

## 2017-01-18 PROCEDURE — 99283 EMERGENCY DEPT VISIT LOW MDM: CPT | Mod: Z6 | Performed by: EMERGENCY MEDICINE

## 2017-01-18 PROCEDURE — 99283 EMERGENCY DEPT VISIT LOW MDM: CPT | Performed by: EMERGENCY MEDICINE

## 2017-01-18 PROCEDURE — 99207 ZZC APP CREDIT; MD BILLING SHARED VISIT: CPT | Mod: Z6 | Performed by: EMERGENCY MEDICINE

## 2017-01-18 PROCEDURE — 25000132 ZZH RX MED GY IP 250 OP 250 PS 637: Performed by: EMERGENCY MEDICINE

## 2017-01-18 RX ORDER — ACETAMINOPHEN 500 MG
1000 TABLET ORAL ONCE
Status: COMPLETED | OUTPATIENT
Start: 2017-01-18 | End: 2017-01-18

## 2017-01-18 RX ORDER — ACETAMINOPHEN 325 MG/1
TABLET ORAL
Status: DISCONTINUED
Start: 2017-01-18 | End: 2017-01-18 | Stop reason: HOSPADM

## 2017-01-18 RX ORDER — ACETAMINOPHEN 325 MG/1
650 TABLET ORAL ONCE
Status: COMPLETED | OUTPATIENT
Start: 2017-01-18 | End: 2017-01-18

## 2017-01-18 RX ORDER — ACETAMINOPHEN 500 MG
500-1000 TABLET ORAL EVERY 6 HOURS PRN
Qty: 20 TABLET | Refills: 0 | Status: SHIPPED | OUTPATIENT
Start: 2017-01-18 | End: 2017-12-31

## 2017-01-18 RX ADMIN — ACETAMINOPHEN 1000 MG: 500 TABLET, FILM COATED ORAL at 18:41

## 2017-01-18 RX ADMIN — ACETAMINOPHEN 650 MG: 325 TABLET, FILM COATED ORAL at 04:59

## 2017-01-18 ASSESSMENT — ENCOUNTER SYMPTOMS
NAUSEA: 0
RHINORRHEA: 1
WEAKNESS: 0
ABDOMINAL PAIN: 0
NUMBNESS: 0
DIARRHEA: 0
HEADACHES: 1
HEADACHES: 1
HALLUCINATIONS: 0
SORE THROAT: 1
SHORTNESS OF BREATH: 0
VOMITING: 0

## 2017-01-18 NOTE — ED PROVIDER NOTES
History     Chief Complaint   Patient presents with     Headache     HPI  Carlos Alberto Garcia is a 31 year old male with a history of schizoaffective disorder and substance abuse who presents here for headache.  Patient reports that he's had headache since earlier tonight.  He states is similar to others hours feels that his head is somewhat empty.  He denies any hallucinations, no delusions, no suicidal or homicidal ideation.  He reports no blurry vision, noted the walking, no difficulty with speech, no neck pain, no fevers or chills, no nausea vomiting.    I have reviewed the Medications, Allergies, Past Medical and Surgical History, and Social History in the [employer] system.  Past Medical History   Diagnosis Date     Schizoaffective disorder      Bipolar 2 disorder (H)      Unspecified essential hypertension      Unspecified hypothyroidism      Chemical abuse      cocaine, meth, cambis     Dyslipidemia      Patient overweight      Hep C w/ coma, chronic (H)        Past Surgical History   Procedure Laterality Date     Hand surgery       L       No family history on file.    Social History   Substance Use Topics     Smoking status: Current Every Day Smoker -- 1.00 packs/day     Types: Cigarettes     Smokeless tobacco: Former User     Alcohol Use: Yes      Comment: pt states no alcohol tonight      No Known Allergies  No current facility-administered medications for this encounter.     Current Outpatient Prescriptions   Medication     MELATONIN PO     Multiple Vitamin (TAB-A-FAZAL) TABS     haloperidol (HALDOL) 10 MG tablet     cholecalciferol (VITAMIN D-1000 MAX ST) 1000 UNITS TABS     Paliperidone Palmitate (INVEGA SUSTENNA IM)     Facility-Administered Medications Ordered in Other Encounters   Medication     ibuprofen (ADVIL,MOTRIN) 600 MG tablet     Review of Systems   Neurological: Positive for headaches.   All other systems reviewed and are negative.      Physical Exam   BP: 132/80 mmHg  Pulse: 73  Temp: 97.5  F  (36.4  C)  Resp: 14  Height: 182.9 cm (6')  SpO2: 98 %  Physical Exam   Constitutional: He appears well-developed and well-nourished. No distress.   HENT:   Head: Normocephalic and atraumatic.   Eyes: Conjunctivae and EOM are normal. Pupils are equal, round, and reactive to light.   Neck: Normal range of motion. Neck supple.   Cardiovascular: Normal rate, regular rhythm and normal heart sounds.    Pulmonary/Chest: Effort normal and breath sounds normal. No respiratory distress.   Musculoskeletal: Normal range of motion.   Neurological: He is alert. No cranial nerve deficit. He exhibits normal muscle tone. Coordination normal.   Skin: Skin is warm and dry. He is not diaphoretic.   Psychiatric: His speech is normal. His affect is blunt. He is withdrawn. He is not agitated, not aggressive, not hyperactive and not combative. Thought content is not paranoid and not delusional. He expresses no homicidal and no suicidal ideation.   Nursing note and vitals reviewed.    ED Course     [unfilled]  Procedures             Critical Care time:  none               Labs Ordered and Resulted from Time of ED Arrival Up to the Time of Departure from the ED - No data to display    Assessments & Plan (with Medical Decision Making)   I was physically present and have reviewed and verified the accuracy of this note documented by (myself).     Disclaimer: This note consists of symbols derived from keyboarding, dictation, and/or voice recognition software. As a result, there may be errors in the script that have gone undetected.  Please consider this when interpreting information found in the chart.These sections of the chart were reviewed for accuracy to the best of my knowledge and ability.    Patient was clinically assessed and consented to treatment. After assessment, medical decision making and workup were discussed with the patient. The patient was agreeable to plan for testing, workup, and treatment.  Carlos Alberto Garcia is a 31 year  old male who presents today for headache.  Patient well-known to the ER for frequent presentations in the middle and especially for headache.  He states is similar to previous headaches.  Patient often comes here with headaches and after receiving food walks out without being seen or after evaluation before discharge.  He was given Tylenol from triage for the headache and after my discussion wished something knee.  This was provided and shortly after which patient wished to leave.  Patient had no neurologic signs or trauma noted on exam and I do not worry about intracranial hemorrhage or traumatic injury at this time.  Patient likely with a stress headache.  He does not wish any discussion of his mental health and denies any suicidal or homicidal ideations.  Patient has plan to care for him to be discharged at this time.      I have reviewed the nursing notes.    I have reviewed the findings, diagnosis, plan and need for follow up with the patient.    Discharge Medication List as of 1/18/2017  5:39 AM          Final diagnoses:   Episodic tension-type headache, not intractable       1/18/2017   [facility], [place], EMERGENCY DEPARTMENT      Alex Velázquez MD  01/18/17 0610

## 2017-01-18 NOTE — ED AVS SNAPSHOT
[facility], [place], Emergency Department    [address] [place] [address]    Phone:  [phone]                                       Carols Alberto Garcia   MRN: [id number]    Department:  [facility], Emergency Department   Date of Visit:  1/18/2017           After Visit Summary Signature Page     I have received my discharge instructions, and my questions have been answered. I have discussed any challenges I see with this plan with the nurse or doctor.    ..........................................................................................................................................  Patient/Patient Representative Signature      ..........................................................................................................................................  Patient Representative Print Name and Relationship to Patient    ..................................................               ................................................  Date                                            Time    ..........................................................................................................................................  Reviewed by Signature/Title    ...................................................              ..............................................  Date                                                            Time

## 2017-01-18 NOTE — ED AVS SNAPSHOT
[facility], Emergency Department    [address] [facility] [address]    Phone:  [phone]                                       Carlos Alberto Garcia   MRN: [id number]    Department:  [facility], Emergency Department   Date of Visit:  1/18/2017           Patient Information     Date Of Birth          1985        Your diagnoses for this visit were:     Nonintractable episodic headache, unspecified headache type        You were seen by Morenita Acuna MD.        Discharge Instructions         Self-Care for Headaches  Most headaches aren't serious and can be relieved with self-care. But some headaches may be a sign of another health problem like eye trouble or high blood pressure. To find the best treatment, learn what kind of headaches you get. For tension headaches, self-care will usually help. To treat migraines, ask your healthcare provider for advice. It is also possible to get both tension and migraine headaches. Self-care involves relieving the pain and avoiding headache  triggers  if you can.    Ways to reduce pain and tension  Try these steps:    Apply a cold compress or ice pack to the pain site.    Drink fluids. If nausea makes it hard to drink, try sucking on ice.    Rest. Protect yourself from bright light and loud noises.    Calm your emotions by imagining a peaceful scene.    Massage tight neck, shoulder, and head muscles.    To relax muscles, soak in a hot bath or use a hot shower.  Use medicines  Aspirin or aspirin substitutes, such as ibuprofen and acetaminophen, can relieve headache. Remember: Never give aspirin to anyone 18 years old or younger because of the risk of developing Reye syndrome. Use pain medicines only when necessary.  Track your headaches  Keeping a headache diary can help you and your healthcare provider identify what's causing your headaches:    Note when each headache happens.    Identify your activities and the foods you've eaten 6 to 8 hours before the  "headache began.    Look for any trends or \"triggers.\"  Signs of tension headache  Any of the following can be signs:    Dull pain or feeling of pressure in a tight band around your head    Pain in your neck or shoulders    Headache without a definite beginning or end    Headache after an activity such as driving or working on a computer  Signs of migraine  Any of the following can be signs:    Throbbing pain on one or both sides of your head    Nausea or vomiting    Extreme sensitivity to light, sound, and smells    Bright spots, flashes, or other visual changes    Pain or nausea so severe that you can't continue your daily activities  Call your healthcare provider   If you have any of the following symptoms, contact your healthcare provider:    A headache that lingers after a recent injury or bump to the head.    A fever with a stiff neck or pain when you bend your head toward your chest.    A headache along with slurred speech, changes in your vision, or numbness or weakness in your arms or legs.    A headache for longer than 3 days.    Frequent headaches, especially in the morning.    Headaches with seizures     Seek immediate medical attention if you have a headache that you would call \"the worst headache you have ever had.\"     0136-4128 [employer]. [address] [place] [address]. All rights reserved. This information is not intended as a substitute for professional medical care. Always follow your healthcare professional's instructions.      Please make an appointment to follow up with Your Primary Care Provider in 2 days if not improving.      24 Hour Appointment Hotline       To make an appointment at any [facility], call [phone] ([phone]). If you don't have a family doctor or clinic, we will help you find one. [facility] are conveniently located to serve the needs of you and your family.             Review of your medicines      START taking        Dose / " Directions Last dose taken    acetaminophen 500 MG tablet   Commonly known as:  TYLENOL   Dose:  500-1000 mg   Quantity:  20 tablet        Take 1-2 tablets (500-1,000 mg) by mouth every 6 hours as needed for mild pain   Refills:  0        melatonin 1 MG Caps   Dose:  2 mg   Quantity:  40 capsule        Take 2 mg by mouth nightly as needed   Refills:  0          Our records show that you are taking the medicines listed below. If these are incorrect, please call your family doctor or clinic.        Dose / Directions Last dose taken    haloperidol 10 MG tablet   Commonly known as:  HALDOL   Dose:  10 mg        Take 10 mg by mouth   Refills:  0        INVEGA SUSTENNA IM   Dose:  156 mg        Inject 156 mg into the muscle Qmonthly injection.   Refills:  0        MELATONIN PO   Dose:  5 mg   Indication:  pt unsure of dose        Take 5 mg by mouth nightly as needed   Refills:  0        TAB-A-FAZAL Tabs   Dose:  1 tablet        Take 1 tablet by mouth   Refills:  0        VITAMIN D-1000 MAX ST 1000 UNITS Tabs   Dose:  1000 Units   Generic drug:  cholecalciferol        Take 1,000 Units by mouth   Refills:  0                Prescriptions were sent or printed at these locations (2 Prescriptions)                   Other Prescriptions                Printed at Department/Unit printer (2 of 2)         melatonin 1 MG CAPS               acetaminophen (TYLENOL) 500 MG tablet                Orders Needing Specimen Collection     None      Pending Results     No orders found from 1/17/2017 to 1/19/2017.            Pending Culture Results     No orders found from 1/17/2017 to 1/19/2017.            Thank you for choosing [facility]       Thank you for choosing [facility] for your care. Our goal is always to provide you with excellent care. Hearing back from our patients is one way we can continue to improve our services. Please take a few minutes to complete the written survey that you may receive in the mail after you visit with us. Thank  "you!        [employer] Information     [employer] lets you send messages to your doctor, view your test results, renew your prescriptions, schedule appointments and more. To sign up, go to [email or web address] . Click on \"Log in\" on the left side of the screen, which will take you to the Welcome page. Then click on \"Sign up Now\" on the right side of the page.     You will be asked to enter the access code listed below, as well as some personal information. Please follow the directions to create your username and password.     Your access code is: [email or web address]  Expires: 2017  4:20 PM     Your access code will  in 90 days. If you need help or a new code, please call your [facility] clinic or [phone].        Care EveryWhere ID     This is your Care EveryWhere ID. This could be used by other organizations to access your [facility] medical records  [id number]        After Visit Summary       This is your record. Keep this with you and show to your community pharmacist(s) and doctor(s) at your next visit.                  "

## 2017-01-18 NOTE — DISCHARGE INSTRUCTIONS
-Please make an appointment to follow up with Your Primary Care Provider as soon as possible for further care.       * HEADACHE [unspecified]    The cause of your headache today is not clear, but it does not appear to be the sign of any serious illness.  Under stress, some people tense the muscles of their shoulder, neck and scalp without knowing it. If this condition lasts long enough, a TENSION HEADACHE can occur.  A MIGRAINE HEADACHE is caused by changes in blood flow to the brain. It can be mild or severe. A migraine attack may be triggered by emotional stress, hormone changes during the menstrual cycle, oral contraceptives, alcohol use, certain foods containing tyramine, eye strain, weather changes, missing meals, lack of sleep or oversleeping.  Other causes of headache include a viral illness, sinus, ear or throat infection, dental pain and TMJ (jaw joint) pain.  HOME CARE:    If you were given pain medicine for this headache, do not drive yourself home. Arrange for a ride, instead. When you get home, try to sleep. You should feel much better when you wake up.    If you are having nausea or vomiting, follow a light diet until your headache is relieved.    If you have a migraine type headache, use sunglasses when in the daylight or around bright indoor lighting until symptoms improve. Bright glaring light can worsen this kind of headache.  FOLLOW UP with your doctor if the headache is not better within the next 24 hours. If you have frequent headaches you should discuss a treatment plan with your primary care doctor. By being aware of the earliest signs of headache, and starting treatment right away, you may be able to stop the pain yourself.  GET PROMPT MEDICAL ATTENTION if any of the following occur:    Worsening of your head pain or no improvement within 24 hours    Repeated vomiting (unable to keep liquids down)    Fever over 101 F (38.3 C)    Stiff neck    Extreme drowsiness, confusion or  fainting    Weakness of an arm or leg or one side of the face    Difficulty with speech or vision    7836-2920 [facility], [address] [place] [address], [place] [address]. All rights reserved. This information is not intended as a substitute for professional medical care. Always follow your healthcare professional's instructions.

## 2017-01-18 NOTE — ED NOTES
"Pt arrived in the ED with reports of wanting \"headache medicine\" and a \"programming error.\"  Pt states he just needs a prescription for tylenol due to an insurance issue.  Pt is awake, alert, and oriented x4; pt is ambulatory with a steady gait.  Pt denies SI/HI.  "

## 2017-01-19 ENCOUNTER — HOSPITAL ENCOUNTER (EMERGENCY)
Facility: CLINIC | Age: 32
Discharge: HOME | End: 2017-01-20
Attending: EMERGENCY MEDICINE | Admitting: EMERGENCY MEDICINE
Payer: COMMERCIAL

## 2017-01-19 VITALS
BODY MASS INDEX: 33.86 KG/M2 | HEART RATE: 98 BPM | HEIGHT: 72 IN | WEIGHT: 250 LBS | TEMPERATURE: 97.9 F | SYSTOLIC BLOOD PRESSURE: 130 MMHG | OXYGEN SATURATION: 98 % | DIASTOLIC BLOOD PRESSURE: 84 MMHG | RESPIRATION RATE: 17 BRPM

## 2017-01-19 DIAGNOSIS — J06.9 VIRAL UPPER RESPIRATORY TRACT INFECTION: ICD-10-CM

## 2017-01-19 PROCEDURE — 99282 EMERGENCY DEPT VISIT SF MDM: CPT | Mod: Z6 | Performed by: EMERGENCY MEDICINE

## 2017-01-19 PROCEDURE — 99281 EMR DPT VST MAYX REQ PHY/QHP: CPT | Performed by: EMERGENCY MEDICINE

## 2017-01-19 NOTE — DISCHARGE INSTRUCTIONS
"  Self-Care for Headaches  Most headaches aren't serious and can be relieved with self-care. But some headaches may be a sign of another health problem like eye trouble or high blood pressure. To find the best treatment, learn what kind of headaches you get. For tension headaches, self-care will usually help. To treat migraines, ask your healthcare provider for advice. It is also possible to get both tension and migraine headaches. Self-care involves relieving the pain and avoiding headache  triggers  if you can.    Ways to reduce pain and tension  Try these steps:    Apply a cold compress or ice pack to the pain site.    Drink fluids. If nausea makes it hard to drink, try sucking on ice.    Rest. Protect yourself from bright light and loud noises.    Calm your emotions by imagining a peaceful scene.    Massage tight neck, shoulder, and head muscles.    To relax muscles, soak in a hot bath or use a hot shower.  Use medicines  Aspirin or aspirin substitutes, such as ibuprofen and acetaminophen, can relieve headache. Remember: Never give aspirin to anyone 18 years old or younger because of the risk of developing Reye syndrome. Use pain medicines only when necessary.  Track your headaches  Keeping a headache diary can help you and your healthcare provider identify what's causing your headaches:    Note when each headache happens.    Identify your activities and the foods you've eaten 6 to 8 hours before the headache began.    Look for any trends or \"triggers.\"  Signs of tension headache  Any of the following can be signs:    Dull pain or feeling of pressure in a tight band around your head    Pain in your neck or shoulders    Headache without a definite beginning or end    Headache after an activity such as driving or working on a computer  Signs of migraine  Any of the following can be signs:    Throbbing pain on one or both sides of your head    Nausea or vomiting    Extreme sensitivity to light, sound, and " "smells    Bright spots, flashes, or other visual changes    Pain or nausea so severe that you can't continue your daily activities  Call your healthcare provider   If you have any of the following symptoms, contact your healthcare provider:    A headache that lingers after a recent injury or bump to the head.    A fever with a stiff neck or pain when you bend your head toward your chest.    A headache along with slurred speech, changes in your vision, or numbness or weakness in your arms or legs.    A headache for longer than 3 days.    Frequent headaches, especially in the morning.    Headaches with seizures     Seek immediate medical attention if you have a headache that you would call \"the worst headache you have ever had.\"     4159-0961 [employer]. [address] [place] [address], [place] [address]. All rights reserved. This information is not intended as a substitute for professional medical care. Always follow your healthcare professional's instructions.      Please make an appointment to follow up with Your Primary Care Provider in 2 days if not improving.    "

## 2017-01-19 NOTE — ED AVS SNAPSHOT
[facility], Emergency Department    [address] [facility] [address]    Phone:  [phone]                                       Carlos Alberto Garcia   MRN: [id number]    Department:  [facility], Emergency Department   Date of Visit:  1/19/2017           Patient Information     Date Of Birth          1985        Your diagnoses for this visit were:     Viral upper respiratory tract infection        You were seen by Alex Velázquez MD.        Discharge Instructions         Viral Upper Respiratory Illness (Adult)  You have a viral upper respiratory illness (URI), which is another term for the common cold. This illness is contagious during the first few days. It is spread through the air by coughing and sneezing. It may also be spread by direct contact (touching the sick person and then touching your own eyes, nose, or mouth). Frequent handwashing will decrease risk of spread. Most viral illnesses go away within 7 to 10 days with rest and simple home remedies. Sometimes the illness may last for several weeks. Antibiotics will not kill a virus, and they are generally not prescribed for this condition.    Home care    If symptoms are severe, rest at home for the first 2 to 3 days. When you resume activity, don't let yourself get too tired.    Avoid being exposed to cigarette smoke (yours or others ).    You may use acetaminophen or ibuprofen to control pain and fever, unless another medicine was prescribed. (Note: If you have chronic liver or kidney disease, have ever had a stomach ulcer or gastrointestinal bleeding, or are taking blood-thinning medicines, talk with your healthcare provider before using these medicines.) Aspirin should never be given to anyone under 18 years of age who is ill with a viral infection or fever. It may cause severe liver or brain damage.    Your appetite may be poor, so a light diet is fine. Avoid dehydration by drinking 6 to 8 glasses of fluids per day (water,  soft drinks, juices, tea, or soup). Extra fluids will help loosen secretions in the nose and lungs.    Over-the-counter cold medicines will not shorten the length of time you re sick, but they may be helpful for the following symptoms: cough, sore throat, and nasal and sinus congestion. (Note: Do not use decongestants if you have high blood pressure.)  Follow-up care  Follow up with your healthcare provider, or as advised.  When to seek medical advice  Call your healthcare provider right away if any of these occur:    Cough with lots of colored sputum (mucus)    Severe headache; face, neck, or ear pain    Difficulty swallowing due to throat pain    Fever of 100.4 F (38 C)  Call 911, or get immediate medical care  Call emergency services right away if any of these occur:    Chest pain, shortness of breath, wheezing, or difficulty breathing    Coughing up blood    Inability to swallow due to throat pain    4449-0139 [employer]. [address] [place] [address]. All rights reserved. This information is not intended as a substitute for professional medical care. Always follow your healthcare professional's instructions.          24 Hour Appointment Hotline       To make an appointment at any [facility], call [phone] ([phone]). If you don't have a family doctor or clinic, we will help you find one. [facility] clinics are conveniently located to serve the needs of you and your family.             Review of your medicines      Our records show that you are taking the medicines listed below. If these are incorrect, please call your family doctor or clinic.        Dose / Directions Last dose taken    acetaminophen 500 MG tablet   Commonly known as:  TYLENOL   Dose:  500-1000 mg   Quantity:  20 tablet        Take 1-2 tablets (500-1,000 mg) by mouth every 6 hours as needed for mild pain   Refills:  0        haloperidol 10 MG tablet   Commonly known as:  HALDOL   Dose:  10 mg        Take  "10 mg by mouth   Refills:  0        INVEGA SUSTENNA IM   Dose:  156 mg        Inject 156 mg into the muscle Qmonthly injection.   Refills:  0        melatonin 1 MG Caps   Dose:  2 mg   Quantity:  40 capsule        Take 2 mg by mouth nightly as needed   Refills:  0        MELATONIN PO   Dose:  5 mg   Indication:  pt unsure of dose        Take 5 mg by mouth nightly as needed   Refills:  0        TAB-A-FAZAL Tabs   Dose:  1 tablet        Take 1 tablet by mouth   Refills:  0        VITAMIN D-1000 MAX ST 1000 UNITS Tabs   Dose:  1000 Units   Generic drug:  cholecalciferol        Take 1,000 Units by mouth   Refills:  0                Orders Needing Specimen Collection     None      Pending Results     No orders found for last 2 day(s).            Pending Culture Results     No orders found for last 2 day(s).            Thank you for choosing [facility]       Thank you for choosing [facility] for your care. Our goal is always to provide you with excellent care. Hearing back from our patients is one way we can continue to improve our services. Please take a few minutes to complete the written survey that you may receive in the mail after you visit with us. Thank you!        [email or web address] Information     [email or web address] lets you send messages to your doctor, view your test results, renew your prescriptions, schedule appointments and more. To sign up, go to [email or web address] . Click on \"Log in\" on the left side of the screen, which will take you to the Welcome page. Then click on \"Sign up Now\" on the right side of the page.     You will be asked to enter the access code listed below, as well as some personal information. Please follow the directions to create your username and password.     Your access code is: [email or web address]  Expires: 2017  4:20 PM     Your access code will  in 90 days. If you need help or a new code, please call your [facility] clinic or [phone].        Care EveryWhere ID     This is your Care EveryWhere ID. " This could be used by other organizations to access your [facility] medical records  [id number]        After Visit Summary       This is your record. Keep this with you and show to your community pharmacist(s) and doctor(s) at your next visit.

## 2017-01-19 NOTE — ED AVS SNAPSHOT
[facility], [place], Emergency Department    [address] [place] [address]    Phone:  [phone]                                       Carlos Alberto Garcia   MRN: [id number]    Department:  [facility], Emergency Department   Date of Visit:  1/19/2017           After Visit Summary Signature Page     I have received my discharge instructions, and my questions have been answered. I have discussed any challenges I see with this plan with the nurse or doctor.    ..........................................................................................................................................  Patient/Patient Representative Signature      ..........................................................................................................................................  Patient Representative Print Name and Relationship to Patient    ..................................................               ................................................  Date                                            Time    ..........................................................................................................................................  Reviewed by Signature/Title    ...................................................              ..............................................  Date                                                            Time

## 2017-01-20 VITALS
OXYGEN SATURATION: 97 % | SYSTOLIC BLOOD PRESSURE: 126 MMHG | RESPIRATION RATE: 16 BRPM | WEIGHT: 250 LBS | DIASTOLIC BLOOD PRESSURE: 73 MMHG | HEIGHT: 72 IN | TEMPERATURE: 97.7 F | BODY MASS INDEX: 33.86 KG/M2 | HEART RATE: 81 BPM

## 2017-01-20 DIAGNOSIS — J06.9 UPPER RESPIRATORY TRACT INFECTION, UNSPECIFIED TYPE: ICD-10-CM

## 2017-01-20 LAB
FLUAV+FLUBV AG SPEC QL: NEGATIVE
FLUAV+FLUBV AG SPEC QL: NORMAL
SPECIMEN SOURCE: NORMAL

## 2017-01-20 PROCEDURE — 99283 EMERGENCY DEPT VISIT LOW MDM: CPT | Mod: Z6 | Performed by: EMERGENCY MEDICINE

## 2017-01-20 RX ORDER — ACETAMINOPHEN 325 MG/1
650 TABLET ORAL ONCE
Status: COMPLETED | OUTPATIENT
Start: 2017-01-20 | End: 2017-01-20

## 2017-01-20 RX ADMIN — ACETAMINOPHEN 650 MG: 325 TABLET ORAL at 19:58

## 2017-01-20 ASSESSMENT — ENCOUNTER SYMPTOMS
ABDOMINAL PAIN: 0
HEADACHES: 0
ARTHRALGIAS: 0
COUGH: 1
DIFFICULTY URINATING: 0
COLOR CHANGE: 0
RHINORRHEA: 1
COUGH: 1
SHORTNESS OF BREATH: 0
SORE THROAT: 1
FEVER: 0
NECK STIFFNESS: 0
EYE REDNESS: 0
MYALGIAS: 1
CONFUSION: 0

## 2017-01-20 NOTE — ED PROVIDER NOTES
History     Chief Complaint   Patient presents with     Cold Symptoms     HPI  Carlos Alberto Garcia is a 31 year old male with a history of hypertension, hyperlipidemia, bipolar 2 disorder, and depression who presents to the Emergency Department with congestion, cough, cold symptoms.  Patient reports yesterday with a range he has developed cold-like symptoms with congestion, cough, and aches.  He was previously seen in the ER for headache and reports this is better but does feel some aches related to his cold.  He denies any sore throat, no chest pain, no lightheadedness, no fevers or chills.  Patient requests something to eat and states he just wants some Tylenol.  This had been offered from triage.    I have reviewed the Medications, Allergies, Past Medical and Surgical History, and Social History in the [employer] system.  Past Medical History   Diagnosis Date     Schizoaffective disorder      Bipolar 2 disorder (H)      Unspecified essential hypertension      Unspecified hypothyroidism      Chemical abuse      cocaine, meth, cambis     Dyslipidemia      Patient overweight      Hep C w/ coma, chronic (H)        Past Surgical History   Procedure Laterality Date     Hand surgery       L       No family history on file.    Social History   Substance Use Topics     Smoking status: Current Every Day Smoker -- 1.00 packs/day     Types: Cigarettes     Smokeless tobacco: Former User     Alcohol Use: No      No Known Allergies  No current facility-administered medications for this encounter.     Current Outpatient Prescriptions   Medication     melatonin 1 MG CAPS     acetaminophen (TYLENOL) 500 MG tablet     MELATONIN PO     Multiple Vitamin (TAB-A-FAZAL) TABS     haloperidol (HALDOL) 10 MG tablet     cholecalciferol (VITAMIN D-1000 MAX ST) 1000 UNITS TABS     Paliperidone Palmitate (INVEGA SUSTENNA IM)     Facility-Administered Medications Ordered in Other Encounters   Medication     ibuprofen (ADVIL,MOTRIN) 600 MG tablet      Review of Systems   HENT: Positive for congestion.    Respiratory: Positive for cough.    Musculoskeletal: Positive for myalgias.   All other systems reviewed and are negative.      Physical Exam   BP: 130/84 mmHg  Pulse: 98  Temp: 97.9  F (36.6  C)  Resp: 17  Height: 182.9 cm (6')  Weight: 113.399 kg (250 lb)  SpO2: 98 %  Physical Exam   Constitutional: He appears well-developed and well-nourished. No distress.   HENT:   Head: Normocephalic and atraumatic.   Right Ear: Tympanic membrane and external ear normal.   Left Ear: Tympanic membrane and external ear normal.   Nose: Mucosal edema and rhinorrhea present.   Mouth/Throat: No oropharyngeal exudate, posterior oropharyngeal edema, posterior oropharyngeal erythema or tonsillar abscesses.   Eyes: Conjunctivae and EOM are normal. Pupils are equal, round, and reactive to light.   Cardiovascular: Normal rate, regular rhythm and normal heart sounds.    Pulmonary/Chest: Effort normal and breath sounds normal. No respiratory distress. He has no wheezes. He has no rales.   Musculoskeletal: Normal range of motion.   Lymphadenopathy:     He has cervical adenopathy.   Neurological: He is alert.   Skin: Skin is warm and dry. He is not diaphoretic.   Psychiatric: His affect is blunt. Thought content is not paranoid. He expresses no homicidal and no suicidal ideation.   Nursing note and vitals reviewed.    ED Course     [unfilled]  Procedures             Critical Care time:  none               Labs Ordered and Resulted from Time of ED Arrival Up to the Time of Departure from the ED - No data to display    Assessments & Plan (with Medical Decision Making)   I was physically present and have reviewed and verified the accuracy of this note documented by (myself).     Disclaimer: This note consists of symbols derived from keyboarding, dictation, and/or voice recognition software. As a result, there may be errors in the script that have gone undetected.  Please consider this when  interpreting information found in the chart.These sections of the chart were reviewed for accuracy to the best of my knowledge and ability.    Patient was clinically assessed and consented to treatment. After assessment, medical decision making and workup were discussed with the patient. The patient was agreeable to plan for testing, workup, and treatment.  Carlos Alberto Garcia is a 31 year old male who presents today for cold-like symptoms.  Patient's findings are consistent with cold like symptoms and he is requesting Tylenol and something to eat.  Patient was given oral hydration wants in the ER.  Patient fell asleep and was monitored overnight.  Patient is homeless and does not have a shelter to go to tonight.  After observing the patient had no unstable vital signs or other complaints.  I discussed the patient going home and he will be discharged the morning now following treatment for his cold.  He is recommended for Over-the-counter treatments and will be discharged.      I have reviewed the nursing notes.    I have reviewed the findings, diagnosis, plan and need for follow up with the patient.    Discharge Medication List as of 1/20/2017  4:39 AM          Final diagnoses:   Viral upper respiratory tract infection       1/19/2017   [facility], [place], EMERGENCY DEPARTMENT      Alex Velázquez MD  01/20/17 0932

## 2017-01-20 NOTE — DISCHARGE INSTRUCTIONS

## 2017-01-20 NOTE — ED AVS SNAPSHOT
[facility], [place], Emergency Department    [address] [place] [address]    Phone:  [phone]                                       Carlos Alberto Garcia   MRN: [id number]    Department:  [facility], Emergency Department   Date of Visit:  1/20/2017           After Visit Summary Signature Page     I have received my discharge instructions, and my questions have been answered. I have discussed any challenges I see with this plan with the nurse or doctor.    ..........................................................................................................................................  Patient/Patient Representative Signature      ..........................................................................................................................................  Patient Representative Print Name and Relationship to Patient    ..................................................               ................................................  Date                                            Time    ..........................................................................................................................................  Reviewed by Signature/Title    ...................................................              ..............................................  Date                                                            Time

## 2017-01-20 NOTE — ED AVS SNAPSHOT
[facility], Emergency Department    [address] [facility] [address]    Phone:  [phone]                                       Carlos Alberto Garcia   MRN: [id number]    Department:  [facility], Emergency Department   Date of Visit:  1/20/2017           Patient Information     Date Of Birth          1985        Your diagnoses for this visit were:     Upper respiratory tract infection, unspecified type        You were seen by Nelson Pyle MD.      Follow-up Information     Follow up with Your doctor.        Discharge Instructions         Viral Upper Respiratory Illness (Adult)  You have a viral upper respiratory illness (URI), which is another term for the common cold. This illness is contagious during the first few days. It is spread through the air by coughing and sneezing. It may also be spread by direct contact (touching the sick person and then touching your own eyes, nose, or mouth). Frequent handwashing will decrease risk of spread. Most viral illnesses go away within 7 to 10 days with rest and simple home remedies. Sometimes the illness may last for several weeks. Antibiotics will not kill a virus, and they are generally not prescribed for this condition.    Home care    If symptoms are severe, rest at home for the first 2 to 3 days. When you resume activity, don't let yourself get too tired.    Avoid being exposed to cigarette smoke (yours or others ).    You may use acetaminophen or ibuprofen to control pain and fever, unless another medicine was prescribed. (Note: If you have chronic liver or kidney disease, have ever had a stomach ulcer or gastrointestinal bleeding, or are taking blood-thinning medicines, talk with your healthcare provider before using these medicines.) Aspirin should never be given to anyone under 18 years of age who is ill with a viral infection or fever. It may cause severe liver or brain damage.    Your appetite may be poor, so a light diet is fine. Avoid  dehydration by drinking 6 to 8 glasses of fluids per day (water, soft drinks, juices, tea, or soup). Extra fluids will help loosen secretions in the nose and lungs.    Over-the-counter cold medicines will not shorten the length of time you re sick, but they may be helpful for the following symptoms: cough, sore throat, and nasal and sinus congestion. (Note: Do not use decongestants if you have high blood pressure.)  Follow-up care  Follow up with your healthcare provider, or as advised.  When to seek medical advice  Call your healthcare provider right away if any of these occur:    Cough with lots of colored sputum (mucus)    Severe headache; face, neck, or ear pain    Difficulty swallowing due to throat pain    Fever of 100.4 F (38 C)  Call 911, or get immediate medical care  Call emergency services right away if any of these occur:    Chest pain, shortness of breath, wheezing, or difficulty breathing    Coughing up blood    Inability to swallow due to throat pain    9896-5914 [employer]. [address] [place] [address]. All rights reserved. This information is not intended as a substitute for professional medical care. Always follow your healthcare professional's instructions.          Discharge References/Attachments     SMOKING CESSATION (ENGLISH)      24 Hour Appointment Hotline       To make an appointment at any [facility] clinic, call [phone] ([phone]). If you don't have a family doctor or clinic, we will help you find one. [facility] clinics are conveniently located to serve the needs of you and your family.             Review of your medicines      Our records show that you are taking the medicines listed below. If these are incorrect, please call your family doctor or clinic.        Dose / Directions Last dose taken    acetaminophen 500 MG tablet   Commonly known as:  TYLENOL   Dose:  500-1000 mg   Quantity:  20 tablet        Take 1-2 tablets (500-1,000 mg) by mouth  "every 6 hours as needed for mild pain   Refills:  0        haloperidol 10 MG tablet   Commonly known as:  HALDOL   Dose:  10 mg        Take 10 mg by mouth   Refills:  0        INVEGA SUSTENNA IM   Dose:  156 mg        Inject 156 mg into the muscle Qmonthly injection.   Refills:  0        melatonin 1 MG Caps   Dose:  2 mg   Quantity:  40 capsule        Take 2 mg by mouth nightly as needed   Refills:  0        MELATONIN PO   Dose:  5 mg   Indication:  pt unsure of dose        Take 5 mg by mouth nightly as needed   Refills:  0        TAB-A-FAZAL Tabs   Dose:  1 tablet        Take 1 tablet by mouth   Refills:  0        VITAMIN D-1000 MAX ST 1000 UNITS Tabs   Dose:  1000 Units   Generic drug:  cholecalciferol        Take 1,000 Units by mouth   Refills:  0                Procedures and tests performed during your visit     Influenza A/B antigen      Orders Needing Specimen Collection     None      Pending Results     No orders found from 1/19/2017 to 1/21/2017.            Pending Culture Results     No orders found from 1/19/2017 to 1/21/2017.            Thank you for choosing [facility]       Thank you for choosing [facility] for your care. Our goal is always to provide you with excellent care. Hearing back from our patients is one way we can continue to improve our services. Please take a few minutes to complete the written survey that you may receive in the mail after you visit with us. Thank you!        [employer] Information     [employer] lets you send messages to your doctor, view your test results, renew your prescriptions, schedule appointments and more. To sign up, go to [email or web address] . Click on \"Log in\" on the left side of the screen, which will take you to the Welcome page. Then click on \"Sign up Now\" on the right side of the page.     You will be asked to enter the access code listed below, as well as some personal information. Please follow the directions to create your username and password.     Your access " code is: [email or web address]  Expires: 2017  4:20 PM     Your access code will  in 90 days. If you need help or a new code, please call your [facility] clinic or [phone].        Care EveryWhere ID     This is your Care EveryWhere ID. This could be used by other organizations to access your [facility] medical records  [id number]        After Visit Summary       This is your record. Keep this with you and show to your community pharmacist(s) and doctor(s) at your next visit.

## 2017-01-21 NOTE — DISCHARGE INSTRUCTIONS

## 2017-01-21 NOTE — ED PROVIDER NOTES
History     Chief Complaint   Patient presents with     Flu Symptoms     HPI  Carlos Alberto Garcia is a 31 year old male With a one-week history of runny nose, nonproductive cough, sneezing and mild sore throat.  The patient has been seen for similar conditions in the past week several times.  He denies any acute change.    I have reviewed the Medications, Allergies, Past Medical and Surgical History, and Social History in the [employer] system.    Review of Systems   Constitutional: Negative for fever.   HENT: Positive for rhinorrhea and sore throat. Negative for congestion.    Eyes: Negative for redness.   Respiratory: Positive for cough. Negative for shortness of breath.    Cardiovascular: Negative for chest pain.   Gastrointestinal: Negative for abdominal pain.   Genitourinary: Negative for difficulty urinating.   Musculoskeletal: Negative for arthralgias and neck stiffness.   Skin: Negative for color change.   Neurological: Negative for headaches.   Psychiatric/Behavioral: Negative for confusion.       Physical Exam   BP: 138/75 mmHg  Heart Rate: 88  Temp: 98.2  F (36.8  C)  Height: 182.9 cm (6')  Weight: 113.399 kg (250 lb)  SpO2: 98 %  Physical Exam   Constitutional: No distress.   HENT:   Head: Atraumatic.   Nose: Rhinorrhea present.   Mouth/Throat: Oropharynx is clear and moist. No oropharyngeal exudate.   Eyes: Pupils are equal, round, and reactive to light. No scleral icterus.   Cardiovascular: Normal heart sounds and intact distal pulses.    Pulmonary/Chest: Breath sounds normal. No respiratory distress.   Abdominal: Soft. Bowel sounds are normal. There is no tenderness.   Musculoskeletal: He exhibits no edema or tenderness.   Skin: Skin is warm. No rash noted. He is not diaphoretic.       ED Course     [unfilled]  Procedures               Labs Ordered and Resulted from Time of ED Arrival Up to the Time of Departure from the ED   INFLUENZA A/B ANTIGEN       Assessments & Plan (with Medical Decision Making)    31-year-old male presents with one-week history of URI-type symptoms.  Exam reveals rhinorrhea but is otherwise unremarkable.  He was given a number of suggestions on symptomatic treatment of his presumed viral upper respiratory infection.  I've also recommended smoking cessation as well.    I have reviewed the nursing notes.    I have reviewed the findings, diagnosis, plan and need for follow up with the patient.    New Prescriptions    No medications on file       Final diagnoses:   Upper respiratory tract infection, unspecified type       1/20/2017   [facility], [place], EMERGENCY DEPARTMENT      Nelson Pyle MD  01/20/17 1958
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
## 2017-01-24 ENCOUNTER — HOSPITAL ENCOUNTER (EMERGENCY)
Facility: CLINIC | Age: 32
Discharge: HOME | End: 2017-01-24
Attending: EMERGENCY MEDICINE | Admitting: EMERGENCY MEDICINE
Payer: COMMERCIAL

## 2017-01-24 VITALS
OXYGEN SATURATION: 96 % | HEIGHT: 72 IN | BODY MASS INDEX: 31.56 KG/M2 | RESPIRATION RATE: 16 BRPM | TEMPERATURE: 97.8 F | HEART RATE: 93 BPM | WEIGHT: 233 LBS | SYSTOLIC BLOOD PRESSURE: 131 MMHG | DIASTOLIC BLOOD PRESSURE: 74 MMHG

## 2017-01-24 DIAGNOSIS — S81.801A LEG WOUND, RIGHT, INITIAL ENCOUNTER: ICD-10-CM

## 2017-01-24 PROCEDURE — 99283 EMERGENCY DEPT VISIT LOW MDM: CPT | Mod: Z6 | Performed by: EMERGENCY MEDICINE

## 2017-01-24 PROCEDURE — 99283 EMERGENCY DEPT VISIT LOW MDM: CPT | Performed by: EMERGENCY MEDICINE

## 2017-01-24 ASSESSMENT — ENCOUNTER SYMPTOMS
WOUND: 1
CHILLS: 0
FEVER: 0
WEAKNESS: 0
NUMBNESS: 0
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
## 2017-01-24 NOTE — PROGRESS NOTES
"  Care Coordinator Progress Note     Admission Date/Time:  1/24/2017  Attending MD:  Madelaine Blank MD     Data  Chart reviewed, discussed with interdisciplinary team.   Patient was admitted for: Leg wound, right, initial encounter.    Concerns with insurance coverage for discharge needs: unable to meet basic needs.  Current Living Situation: Patient is homeless.  Support System: Uninvolved  Services Involved:   Transportation: None  Barriers to Discharge: homeless    Coordination of Care and Referrals: Provided patient/family with options for appointment to establish primary care.        Assessment   At bedside to talk with pt about frequent ER visits per MD Blank request. Pt states he is currently homeless and does not have a primary care provider. [facility] called and appointment made for pt on Jan 30 at 320 pm with MD Jasbir Irizarry. Pt informed of this appointment; encouraged pt to have phone on to receive phone calls and reminders of this appointment. He states his phone # is [phone]; clinic informed of his phone number. Pt also given bus token and [employer] \"[employer]\" pamphlet.      Plan  Anticipated Discharge Date: 01/24/2017  Anticipated Discharge Plan: DC with provider recommendations     Suzie Portillo RN [facility] ED/6D Obs  [phone]          "

## 2017-01-25 ENCOUNTER — HOSPITAL ENCOUNTER (EMERGENCY)
Facility: CLINIC | Age: 32
Discharge: HOME | End: 2017-01-25
Attending: EMERGENCY MEDICINE | Admitting: EMERGENCY MEDICINE
Payer: COMMERCIAL

## 2017-01-25 VITALS
DIASTOLIC BLOOD PRESSURE: 80 MMHG | HEART RATE: 85 BPM | TEMPERATURE: 98 F | RESPIRATION RATE: 18 BRPM | SYSTOLIC BLOOD PRESSURE: 132 MMHG

## 2017-01-25 DIAGNOSIS — M79.605 PAIN IN BOTH LOWER EXTREMITIES: ICD-10-CM

## 2017-01-25 DIAGNOSIS — M79.604 PAIN IN BOTH LOWER EXTREMITIES: ICD-10-CM

## 2017-01-25 PROCEDURE — 99282 EMERGENCY DEPT VISIT SF MDM: CPT | Mod: Z6 | Performed by: EMERGENCY MEDICINE

## 2017-01-25 PROCEDURE — 99281 EMR DPT VST MAYX REQ PHY/QHP: CPT | Performed by: EMERGENCY MEDICINE

## 2017-01-25 ASSESSMENT — ENCOUNTER SYMPTOMS
NECK STIFFNESS: 0
HEADACHES: 0
ARTHRALGIAS: 0
CONFUSION: 0
ABDOMINAL PAIN: 0
SHORTNESS OF BREATH: 0
EYE REDNESS: 0
FEVER: 0
DIFFICULTY URINATING: 0
COLOR CHANGE: 0

## 2017-01-25 NOTE — ED AVS SNAPSHOT
[facility], Emergency Department    [address] [address]    Phone:  [phone]                                       Carlos Alberto Garcia   MRN: [id number]    Department:  [facility], Emergency Department   Date of Visit:  1/25/2017           Patient Information     Date Of Birth          1985        Your diagnoses for this visit were:     Pain in both lower extremities        You were seen by Nelson Pyle MD.        Discharge Instructions       Attempt to keep your feet warm and dry and followup at your next appointment.     Future Appointments        Provider Department Dept Phone Center    1/30/2017 3:20 PM Jasbir Irizarry MD [facility] [phone] [facility]      24 Hour Appointment Hotline       To make an appointment at any [facility], call [phone] ([phone]). If you don't have a family doctor or clinic, we will help you find one. [facility] clinics are conveniently located to serve the needs of you and your family.             Review of your medicines      Our records show that you are taking the medicines listed below. If these are incorrect, please call your family doctor or clinic.        Dose / Directions Last dose taken    acetaminophen 500 MG tablet   Commonly known as:  TYLENOL   Dose:  500-1000 mg   Quantity:  20 tablet        Take 1-2 tablets (500-1,000 mg) by mouth every 6 hours as needed for mild pain   Refills:  0        haloperidol 10 MG tablet   Commonly known as:  HALDOL   Dose:  10 mg        Take 10 mg by mouth   Refills:  0        INVEGA SUSTENNA IM   Dose:  156 mg        Inject 156 mg into the muscle Qmonthly injection.   Refills:  0        melatonin 1 MG Caps   Dose:  2 mg   Quantity:  40 capsule        Take 2 mg by mouth nightly as needed   Refills:  0        MELATONIN PO   Dose:  5 mg   Indication:  pt unsure of dose        Take 5 mg by mouth nightly as needed   Refills:  0        TAB-A-FAZAL Tabs   Dose:  1 tablet         "Take 1 tablet by mouth   Refills:  0        VITAMIN D-1000 MAX ST 1000 UNITS Tabs   Dose:  1000 Units   Generic drug:  cholecalciferol        Take 1,000 Units by mouth   Refills:  0                Orders Needing Specimen Collection     None      Pending Results     No orders found from 2017 to 2017.            Pending Culture Results     No orders found from 2017 to 2017.            Thank you for choosing [facility]       Thank you for choosing [facility] for your care. Our goal is always to provide you with excellent care. Hearing back from our patients is one way we can continue to improve our services. Please take a few minutes to complete the written survey that you may receive in the mail after you visit with us. Thank you!        [facility] Information     [facility] lets you send messages to your doctor, view your test results, renew your prescriptions, schedule appointments and more. To sign up, go to [email or web address] . Click on \"Log in\" on the left side of the screen, which will take you to the Welcome page. Then click on \"Sign up Now\" on the right side of the page.     You will be asked to enter the access code listed below, as well as some personal information. Please follow the directions to create your username and password.     Your access code is: [email or web address]  Expires: 2017  4:20 PM     Your access code will  in 90 days. If you need help or a new code, please call your [facility] clinic or [phone].        Care EveryWhere ID     This is your Care EveryWhere ID. This could be used by other organizations to access your [facility] medical records  [id number]        After Visit Summary       This is your record. Keep this with you and show to your community pharmacist(s) and doctor(s) at your next visit.                  "

## 2017-01-25 NOTE — ED PROVIDER NOTES
"  History     Chief Complaint   Patient presents with     Frostbite     HPI  Carlos Alberto Garcia is a 31 year old male with a history of schizoaffective disorder, bipolar 2 disorder, chemical abuse and chronic hepatitis C as well as frequent emergency room visits who presents with concerns regarding possible frostbite to his feet.  He states that since it started snowing approximately 4-5 hours ago, his feet have been wet and cold.  It should be noted that the patient is homeless and he states that his shoes\" are in tatters\".  She is concerned regarding walking outside without appropriate footwear.  I have reviewed the Medications, Allergies, Past Medical and Surgical History, and Social History in the [facility] system.    Review of Systems   Constitutional: Negative for fever.   HENT: Negative for congestion.    Eyes: Negative for redness.   Respiratory: Negative for shortness of breath.    Cardiovascular: Negative for chest pain.   Gastrointestinal: Negative for abdominal pain.   Genitourinary: Negative for difficulty urinating.   Musculoskeletal: Negative for arthralgias and neck stiffness.   Skin: Negative for color change.   Neurological: Negative for headaches.   Psychiatric/Behavioral: Negative for confusion.       Physical Exam   BP: 132/80 mmHg  Pulse: 85  Temp: 98  F (36.7  C)  Resp: 18  Physical Exam   Constitutional: No distress.   HENT:   Head: Atraumatic.   Mouth/Throat: Oropharynx is clear and moist. No oropharyngeal exudate.   Eyes: Pupils are equal, round, and reactive to light. No scleral icterus.   Cardiovascular: Normal heart sounds and intact distal pulses.    Pulmonary/Chest: Breath sounds normal. No respiratory distress.   Abdominal: Soft. Bowel sounds are normal. There is no tenderness.   Musculoskeletal: He exhibits no edema or tenderness.        Right foot: There is normal range of motion and no tenderness.        Left foot: There is normal range of motion and no tenderness.   Skin: Skin is warm. " No rash noted. He is not diaphoretic.       ED Course     Procedures               Labs Ordered and Resulted from Time of ED Arrival Up to the Time of Departure from the ED - No data to display    Assessments & Plan (with Medical Decision Making)   31-year-old male with known mental illness and multiple emergency room visits-this is his 12th visit this month -  who presents for evaluation of cold, painful and wet feet with concerns regarding frostbite.  His exam is unremarkable other than scattered areas of superficial erythema.  We helped the client problem solve a way to keep his feet dry using Saran wrap.  He is well versed in the area shelters.  He will be discharged to follow-up with previously scheduled appointment in primary care on January 30.    I have reviewed the nursing notes.    I have reviewed the findings, diagnosis, plan and need for follow up with the patient.    Discharge Medication List as of 1/25/2017  8:27 AM          Final diagnoses:   Pain in both lower extremities       1/25/2017   [facility], [place], EMERGENCY DEPARTMENT      Nelson Pyle MD  01/25/17 6473

## 2017-01-25 NOTE — ED NOTES
Feet placed in warm, soapy water for foot soak and warming.  Patient given courtesy meal.  No other needs or concerns at this time.

## 2017-01-25 NOTE — ED AVS SNAPSHOT
[facility], [place], Emergency Department    [address] [place] [address]    Phone:  [phone]                                       Carlos Alberto Garcia   MRN: [id number]    Department:  [facility], Emergency Department   Date of Visit:  1/25/2017           After Visit Summary Signature Page     I have received my discharge instructions, and my questions have been answered. I have discussed any challenges I see with this plan with the nurse or doctor.    ..........................................................................................................................................  Patient/Patient Representative Signature      ..........................................................................................................................................  Patient Representative Print Name and Relationship to Patient    ..................................................               ................................................  Date                                            Time    ..........................................................................................................................................  Reviewed by Signature/Title    ...................................................              ..............................................  Date                                                            Time

## 2017-01-26 ENCOUNTER — HOSPITAL ENCOUNTER (EMERGENCY)
Facility: CLINIC | Age: 32
Discharge: HOME | End: 2017-01-26
Attending: EMERGENCY MEDICINE | Admitting: EMERGENCY MEDICINE
Payer: COMMERCIAL

## 2017-01-26 VITALS
HEART RATE: 68 BPM | HEIGHT: 72 IN | OXYGEN SATURATION: 99 % | TEMPERATURE: 97.2 F | WEIGHT: 250 LBS | RESPIRATION RATE: 18 BRPM | BODY MASS INDEX: 33.86 KG/M2 | SYSTOLIC BLOOD PRESSURE: 105 MMHG | DIASTOLIC BLOOD PRESSURE: 66 MMHG

## 2017-01-26 DIAGNOSIS — R51.9 NONINTRACTABLE EPISODIC HEADACHE, UNSPECIFIED HEADACHE TYPE: ICD-10-CM

## 2017-01-26 PROCEDURE — 99283 EMERGENCY DEPT VISIT LOW MDM: CPT | Performed by: EMERGENCY MEDICINE

## 2017-01-26 PROCEDURE — 99283 EMERGENCY DEPT VISIT LOW MDM: CPT | Mod: Z6 | Performed by: EMERGENCY MEDICINE

## 2017-01-26 PROCEDURE — 25000132 ZZH RX MED GY IP 250 OP 250 PS 637: Performed by: EMERGENCY MEDICINE

## 2017-01-26 RX ORDER — IBUPROFEN 600 MG/1
600 TABLET, FILM COATED ORAL ONCE
Status: COMPLETED | OUTPATIENT
Start: 2017-01-26 | End: 2017-01-26

## 2017-01-26 RX ADMIN — IBUPROFEN 600 MG: 600 TABLET ORAL at 03:29

## 2017-01-26 ASSESSMENT — ENCOUNTER SYMPTOMS
SHORTNESS OF BREATH: 0
HEADACHES: 1
WEAKNESS: 0
FEVER: 0
NUMBNESS: 0
ABDOMINAL PAIN: 0
ARTHRALGIAS: 0

## 2017-01-26 NOTE — ED PROVIDER NOTES
History     Chief Complaint   Patient presents with     Headache     HPI  Carlos Alberto Garcia is a 31 year old male with history of schizoaffective disorder who presents with headache.  States this started today.  This is his typical headache.  His throat is had.  No weakness or numbness.  Has had similar headaches in the past.  Patient is homeless.  Denies any recent drug use.  Has been getting his Invega shots.  Patient was seen here previously for similar symptoms.  No fevers or chills.    I have reviewed the Medications, Allergies, Past Medical and Surgical History, and Social History in the [facility] system.  Past Medical History   Diagnosis Date     Schizoaffective disorder      Bipolar 2 disorder (H)      Unspecified essential hypertension      Unspecified hypothyroidism      Chemical abuse      cocaine, meth, cambis     Dyslipidemia      Patient overweight      Hep C w/ coma, chronic (H)        Past Surgical History   Procedure Laterality Date     Hand surgery       L       No family history on file.    Social History   Substance Use Topics     Smoking status: Current Every Day Smoker -- 1.00 packs/day     Types: Cigarettes     Smokeless tobacco: Current User     Alcohol Use: No       Review of Systems   Constitutional: Negative for fever.   Respiratory: Negative for shortness of breath.    Cardiovascular: Negative for chest pain.   Gastrointestinal: Negative for abdominal pain.   Neurological: Positive for headaches. Negative for weakness and numbness.   All other systems reviewed and are negative.      Physical Exam   BP: 105/66 mmHg  Pulse: 68  Temp: 97.2  F (36.2  C)  Resp: 16  Height: 182.9 cm (6')  Weight: 113.399 kg (250 lb)  SpO2: 99 %  Physical Exam   Vital Signs and Nursing Notes Reviewed.  General:  NAD  HEENT: Oropharynx clear.  No obvious signs of trauma.  PERRL.  EOMI  Neck: Supple.  No lymphadenopathy.  Cardiac: RRR.  No murmurs, rubs or gallops  Lungs: Clear bilaterally.  Normal respiratory rate.     Abdomen:  Soft, Nontender, no rebound or guarding.  Back: No CVA tenderness.  Skin:  No rash.  No diaphoresis  Extremities:  No cyanosis, clubbing, or edema.  Neurological:  CN II-XII intact, Strength intact, Sensation intact, No pronator drift, Normal gait.  Pulse:  Symmetric in bilateral upper and lower extremities.      ED Course     Procedures             Critical Care time:  none               Labs Ordered and Resulted from Time of ED Arrival Up to the Time of Departure from the ED - No data to display    Assessments & Plan (with Medical Decision Making)  31 year old who presents with headache.  Seems like his typical tension headache.  No focal deficits.  Has been seen for this multiple times.  Given ibuprofen with improvement.  No signs of intracranial hemorrhage or other need for imaging.  She did sleep in the ER for a couple of hours.  After this he was feeling better and is discharged home.  He will return for worsening symptoms.         I have reviewed the nursing notes.    I have reviewed the findings, diagnosis, plan and need for follow up with the patient.    New Prescriptions    No medications on file       Final diagnoses:   Nonintractable episodic headache, unspecified headache type       1/26/2017   [facility], [place], EMERGENCY DEPARTMENT      Alphonse Lilly MD  01/26/17 0421

## 2017-01-26 NOTE — ED AVS SNAPSHOT
[facility], [place], Emergency Department    [address] [place] [address]    Phone:  [phone]                                       Carlos Alberto Garcia   MRN: [id number]    Department:  [facility], Emergency Department   Date of Visit:  1/26/2017           After Visit Summary Signature Page     I have received my discharge instructions, and my questions have been answered. I have discussed any challenges I see with this plan with the nurse or doctor.    ..........................................................................................................................................  Patient/Patient Representative Signature      ..........................................................................................................................................  Patient Representative Print Name and Relationship to Patient    ..................................................               ................................................  Date                                            Time    ..........................................................................................................................................  Reviewed by Signature/Title    ...................................................              ..............................................  Date                                                            Time

## 2017-01-26 NOTE — DISCHARGE INSTRUCTIONS
"  Self-Care for Headaches  Most headaches aren't serious and can be relieved with self-care. But some headaches may be a sign of another health problem like eye trouble or high blood pressure. To find the best treatment, learn what kind of headaches you get. For tension headaches, self-care will usually help. To treat migraines, ask your healthcare provider for advice. It is also possible to get both tension and migraine headaches. Self-care involves relieving the pain and avoiding headache  triggers  if you can.    Ways to reduce pain and tension  Try these steps:    Apply a cold compress or ice pack to the pain site.    Drink fluids. If nausea makes it hard to drink, try sucking on ice.    Rest. Protect yourself from bright light and loud noises.    Calm your emotions by imagining a peaceful scene.    Massage tight neck, shoulder, and head muscles.    To relax muscles, soak in a hot bath or use a hot shower.  Use medicines  Aspirin or aspirin substitutes, such as ibuprofen and acetaminophen, can relieve headache. Remember: Never give aspirin to anyone 18 years old or younger because of the risk of developing Reye syndrome. Use pain medicines only when necessary.  Track your headaches  Keeping a headache diary can help you and your healthcare provider identify what's causing your headaches:    Note when each headache happens.    Identify your activities and the foods you've eaten 6 to 8 hours before the headache began.    Look for any trends or \"triggers.\"  Signs of tension headache  Any of the following can be signs:    Dull pain or feeling of pressure in a tight band around your head    Pain in your neck or shoulders    Headache without a definite beginning or end    Headache after an activity such as driving or working on a computer  Signs of migraine  Any of the following can be signs:    Throbbing pain on one or both sides of your head    Nausea or vomiting    Extreme sensitivity to light, sound, and " "smells    Bright spots, flashes, or other visual changes    Pain or nausea so severe that you can't continue your daily activities  Call your healthcare provider   If you have any of the following symptoms, contact your healthcare provider:    A headache that lingers after a recent injury or bump to the head.    A fever with a stiff neck or pain when you bend your head toward your chest.    A headache along with slurred speech, changes in your vision, or numbness or weakness in your arms or legs.    A headache for longer than 3 days.    Frequent headaches, especially in the morning.    Headaches with seizures     Seek immediate medical attention if you have a headache that you would call \"the worst headache you have ever had.\"     1892-8745 [employer]. [address] [place] [address], [place] [address]. All rights reserved. This information is not intended as a substitute for professional medical care. Always follow your healthcare professional's instructions.        "

## 2017-01-26 NOTE — ED AVS SNAPSHOT
[facility], Emergency Department    [address] [address]    Phone:  [phone]                                       Carlos Alberto Garcia   MRN: [id number]    Department:  [facility], Emergency Department   Date of Visit:  1/26/2017           Patient Information     Date Of Birth          1985        Your diagnoses for this visit were:     Nonintractable episodic headache, unspecified headache type        You were seen by Alphonse Lilly MD.        Discharge Instructions         Self-Care for Headaches  Most headaches aren't serious and can be relieved with self-care. But some headaches may be a sign of another health problem like eye trouble or high blood pressure. To find the best treatment, learn what kind of headaches you get. For tension headaches, self-care will usually help. To treat migraines, ask your healthcare provider for advice. It is also possible to get both tension and migraine headaches. Self-care involves relieving the pain and avoiding headache  triggers  if you can.    Ways to reduce pain and tension  Try these steps:    Apply a cold compress or ice pack to the pain site.    Drink fluids. If nausea makes it hard to drink, try sucking on ice.    Rest. Protect yourself from bright light and loud noises.    Calm your emotions by imagining a peaceful scene.    Massage tight neck, shoulder, and head muscles.    To relax muscles, soak in a hot bath or use a hot shower.  Use medicines  Aspirin or aspirin substitutes, such as ibuprofen and acetaminophen, can relieve headache. Remember: Never give aspirin to anyone 18 years old or younger because of the risk of developing Reye syndrome. Use pain medicines only when necessary.  Track your headaches  Keeping a headache diary can help you and your healthcare provider identify what's causing your headaches:    Note when each headache happens.    Identify your activities and the foods you've eaten 6 to 8 hours before the  "headache began.    Look for any trends or \"triggers.\"  Signs of tension headache  Any of the following can be signs:    Dull pain or feeling of pressure in a tight band around your head    Pain in your neck or shoulders    Headache without a definite beginning or end    Headache after an activity such as driving or working on a computer  Signs of migraine  Any of the following can be signs:    Throbbing pain on one or both sides of your head    Nausea or vomiting    Extreme sensitivity to light, sound, and smells    Bright spots, flashes, or other visual changes    Pain or nausea so severe that you can't continue your daily activities  Call your healthcare provider   If you have any of the following symptoms, contact your healthcare provider:    A headache that lingers after a recent injury or bump to the head.    A fever with a stiff neck or pain when you bend your head toward your chest.    A headache along with slurred speech, changes in your vision, or numbness or weakness in your arms or legs.    A headache for longer than 3 days.    Frequent headaches, especially in the morning.    Headaches with seizures     Seek immediate medical attention if you have a headache that you would call \"the worst headache you have ever had.\"     7914-5940 [employer]. [address] [place] [address]. All rights reserved. This information is not intended as a substitute for professional medical care. Always follow your healthcare professional's instructions.          Future Appointments        Provider Department Dept Phone Center    1/30/2017 3:20 PM Jasbir Irizarry MD [facility] [phone] [facility]      24 Hour Appointment Hotline       To make an appointment at any [facility], call [phone] ([phone]). If you don't have a family doctor or clinic, we will help you find one. [facility] clinics are conveniently located to serve the needs of you and your family.           " "  Review of your medicines      Our records show that you are taking the medicines listed below. If these are incorrect, please call your family doctor or clinic.        Dose / Directions Last dose taken    acetaminophen 500 MG tablet   Commonly known as:  TYLENOL   Dose:  500-1000 mg   Quantity:  20 tablet        Take 1-2 tablets (500-1,000 mg) by mouth every 6 hours as needed for mild pain   Refills:  0        haloperidol 10 MG tablet   Commonly known as:  HALDOL   Dose:  10 mg        Take 10 mg by mouth   Refills:  0        INVEGA SUSTENNA IM   Dose:  156 mg        Inject 156 mg into the muscle Qmonthly injection.   Refills:  0        melatonin 1 MG Caps   Dose:  2 mg   Quantity:  40 capsule        Take 2 mg by mouth nightly as needed   Refills:  0        MELATONIN PO   Dose:  5 mg   Indication:  pt unsure of dose        Take 5 mg by mouth nightly as needed   Refills:  0        TAB-A-FAZAL Tabs   Dose:  1 tablet        Take 1 tablet by mouth   Refills:  0        VITAMIN D-1000 MAX ST 1000 UNITS Tabs   Dose:  1000 Units   Generic drug:  cholecalciferol        Take 1,000 Units by mouth   Refills:  0                Orders Needing Specimen Collection     None      Pending Results     No orders found from 1/25/2017 to 1/27/2017.            Pending Culture Results     No orders found from 1/25/2017 to 1/27/2017.            Thank you for choosing [facility]       Thank you for choosing [facility] for your care. Our goal is always to provide you with excellent care. Hearing back from our patients is one way we can continue to improve our services. Please take a few minutes to complete the written survey that you may receive in the mail after you visit with us. Thank you!        [employer] Information     [employer] lets you send messages to your doctor, view your test results, renew your prescriptions, schedule appointments and more. To sign up, go to [email or web address] . Click on \"Log in\" on the left side of the screen, " "which will take you to the Welcome page. Then click on \"Sign up Now\" on the right side of the page.     You will be asked to enter the access code listed below, as well as some personal information. Please follow the directions to create your username and password.     Your access code is: [email or web address]  Expires: 2017  4:20 PM     Your access code will  in 90 days. If you need help or a new code, please call your [facility] or [phone].        Care EveryWhere ID     This is your Care EveryWhere ID. This could be used by other organizations to access your [facility] medical records  [id number]        After Visit Summary       This is your record. Keep this with you and show to your community pharmacist(s) and doctor(s) at your next visit.                  "

## 2017-01-27 ENCOUNTER — HOSPITAL ENCOUNTER (EMERGENCY)
Facility: CLINIC | Age: 32
Discharge: LEFT BEFORE DISCHARGE | End: 2017-01-27
Payer: COMMERCIAL

## 2017-01-27 ENCOUNTER — HOSPITAL ENCOUNTER (EMERGENCY)
Facility: CLINIC | Age: 32
Discharge: HOME | End: 2017-01-27
Attending: EMERGENCY MEDICINE | Admitting: EMERGENCY MEDICINE
Payer: COMMERCIAL

## 2017-01-27 VITALS
BODY MASS INDEX: 33.86 KG/M2 | SYSTOLIC BLOOD PRESSURE: 147 MMHG | TEMPERATURE: 97.7 F | HEART RATE: 94 BPM | DIASTOLIC BLOOD PRESSURE: 90 MMHG | RESPIRATION RATE: 16 BRPM | HEIGHT: 72 IN | WEIGHT: 250 LBS | OXYGEN SATURATION: 99 %

## 2017-01-27 VITALS
WEIGHT: 253 LBS | DIASTOLIC BLOOD PRESSURE: 76 MMHG | SYSTOLIC BLOOD PRESSURE: 136 MMHG | TEMPERATURE: 97.8 F | BODY MASS INDEX: 34.27 KG/M2 | RESPIRATION RATE: 18 BRPM | OXYGEN SATURATION: 98 % | HEIGHT: 72 IN | HEART RATE: 70 BPM

## 2017-01-27 DIAGNOSIS — G44.219 EPISODIC TENSION-TYPE HEADACHE, NOT INTRACTABLE: ICD-10-CM

## 2017-01-27 PROCEDURE — 99284 EMERGENCY DEPT VISIT MOD MDM: CPT | Mod: Z6 | Performed by: EMERGENCY MEDICINE

## 2017-01-27 PROCEDURE — 25000132 ZZH RX MED GY IP 250 OP 250 PS 637: Performed by: EMERGENCY MEDICINE

## 2017-01-27 PROCEDURE — 99283 EMERGENCY DEPT VISIT LOW MDM: CPT | Performed by: EMERGENCY MEDICINE

## 2017-01-27 RX ORDER — IBUPROFEN 600 MG/1
600 TABLET, FILM COATED ORAL ONCE
Status: COMPLETED | OUTPATIENT
Start: 2017-01-27 | End: 2017-01-27

## 2017-01-27 RX ADMIN — IBUPROFEN 600 MG: 600 TABLET ORAL at 02:29

## 2017-01-27 ASSESSMENT — ENCOUNTER SYMPTOMS
WEAKNESS: 0
SHORTNESS OF BREATH: 0
NUMBNESS: 0
FEVER: 0
HEADACHES: 1
ABDOMINAL PAIN: 0

## 2017-01-27 NOTE — ED AVS SNAPSHOT
[facility], Emergency Department    [address] [facility] [address]    Phone:  [phone]                                       Carlos Alberto Garcia   MRN: [id number]    Department:  [facility], Emergency Department   Date of Visit:  1/27/2017           Patient Information     Date Of Birth          1985        Your diagnoses for this visit were:     Episodic tension-type headache, not intractable        You were seen by Alphonse Lilly MD.        Discharge Instructions          * HEADACHE [unspecified]    The cause of your headache today is not clear, but it does not appear to be the sign of any serious illness.  Under stress, some people tense the muscles of their shoulder, neck and scalp without knowing it. If this condition lasts long enough, a TENSION HEADACHE can occur.  A MIGRAINE HEADACHE is caused by changes in blood flow to the brain. It can be mild or severe. A migraine attack may be triggered by emotional stress, hormone changes during the menstrual cycle, oral contraceptives, alcohol use, certain foods containing tyramine, eye strain, weather changes, missing meals, lack of sleep or oversleeping.  Other causes of headache include a viral illness, sinus, ear or throat infection, dental pain and TMJ (jaw joint) pain.  HOME CARE:    If you were given pain medicine for this headache, do not drive yourself home. Arrange for a ride, instead. When you get home, try to sleep. You should feel much better when you wake up.    If you are having nausea or vomiting, follow a light diet until your headache is relieved.    If you have a migraine type headache, use sunglasses when in the daylight or around bright indoor lighting until symptoms improve. Bright glaring light can worsen this kind of headache.  FOLLOW UP with your doctor if the headache is not better within the next 24 hours. If you have frequent headaches you should discuss a treatment plan with your primary care doctor. By being  aware of the earliest signs of headache, and starting treatment right away, you may be able to stop the pain yourself.  GET PROMPT MEDICAL ATTENTION if any of the following occur:    Worsening of your head pain or no improvement within 24 hours    Repeated vomiting (unable to keep liquids down)    Fever over 101 F (38.3 C)    Stiff neck    Extreme drowsiness, confusion or fainting    Weakness of an arm or leg or one side of the face    Difficulty with speech or vision    9183-2798 [employer], [address] [place] [address]. All rights reserved. This information is not intended as a substitute for professional medical care. Always follow your healthcare professional's instructions.      Future Appointments        Provider Department Dept Phone Center    1/30/2017 3:20 PM Jasbir Irizarry MD [facility] [phone] [facility]      24 Hour Appointment Hotline       To make an appointment at any [facility], call [phone] ([phone]). If you don't have a family doctor or clinic, we will help you find one. [facility] clinics are conveniently located to serve the needs of you and your family.             Review of your medicines      Our records show that you are taking the medicines listed below. If these are incorrect, please call your family doctor or clinic.        Dose / Directions Last dose taken    acetaminophen 500 MG tablet   Commonly known as:  TYLENOL   Dose:  500-1000 mg   Quantity:  20 tablet        Take 1-2 tablets (500-1,000 mg) by mouth every 6 hours as needed for mild pain   Refills:  0        haloperidol 10 MG tablet   Commonly known as:  HALDOL   Dose:  10 mg        Take 10 mg by mouth   Refills:  0        INVEGA SUSTENNA IM   Dose:  156 mg        Inject 156 mg into the muscle Qmonthly injection.   Refills:  0        melatonin 1 MG Caps   Dose:  2 mg   Quantity:  40 capsule        Take 2 mg by mouth nightly as needed   Refills:  0        MELATONIN PO   Dose:   "5 mg   Indication:  pt unsure of dose        Take 5 mg by mouth nightly as needed   Refills:  0        TAB-A-FAZAL Tabs   Dose:  1 tablet        Take 1 tablet by mouth   Refills:  0        VITAMIN D-1000 MAX ST 1000 UNITS Tabs   Dose:  1000 Units   Generic drug:  cholecalciferol        Take 1,000 Units by mouth   Refills:  0                Orders Needing Specimen Collection     None      Pending Results     No orders found from 2017 to 2017.            Pending Culture Results     No orders found from 2017 to 2017.            Thank you for choosing [facility]       Thank you for choosing [facility] for your care. Our goal is always to provide you with excellent care. Hearing back from our patients is one way we can continue to improve our services. Please take a few minutes to complete the written survey that you may receive in the mail after you visit with us. Thank you!        [employer] Information     [employer] lets you send messages to your doctor, view your test results, renew your prescriptions, schedule appointments and more. To sign up, go to [email or web address] . Click on \"Log in\" on the left side of the screen, which will take you to the Welcome page. Then click on \"Sign up Now\" on the right side of the page.     You will be asked to enter the access code listed below, as well as some personal information. Please follow the directions to create your username and password.     Your access code is: [email or web address]  Expires: 2017  4:20 PM     Your access code will  in 90 days. If you need help or a new code, please call your [facility] clinic or [phone].        Care EveryWhere ID     This is your Care EveryWhere ID. This could be used by other organizations to access your [facility] medical records  [id number]        After Visit Summary       This is your record. Keep this with you and show to your community pharmacist(s) and doctor(s) at your next visit.                  "

## 2017-01-27 NOTE — DISCHARGE INSTRUCTIONS
* HEADACHE [unspecified]    The cause of your headache today is not clear, but it does not appear to be the sign of any serious illness.  Under stress, some people tense the muscles of their shoulder, neck and scalp without knowing it. If this condition lasts long enough, a TENSION HEADACHE can occur.  A MIGRAINE HEADACHE is caused by changes in blood flow to the brain. It can be mild or severe. A migraine attack may be triggered by emotional stress, hormone changes during the menstrual cycle, oral contraceptives, alcohol use, certain foods containing tyramine, eye strain, weather changes, missing meals, lack of sleep or oversleeping.  Other causes of headache include a viral illness, sinus, ear or throat infection, dental pain and TMJ (jaw joint) pain.  HOME CARE:    If you were given pain medicine for this headache, do not drive yourself home. Arrange for a ride, instead. When you get home, try to sleep. You should feel much better when you wake up.    If you are having nausea or vomiting, follow a light diet until your headache is relieved.    If you have a migraine type headache, use sunglasses when in the daylight or around bright indoor lighting until symptoms improve. Bright glaring light can worsen this kind of headache.  FOLLOW UP with your doctor if the headache is not better within the next 24 hours. If you have frequent headaches you should discuss a treatment plan with your primary care doctor. By being aware of the earliest signs of headache, and starting treatment right away, you may be able to stop the pain yourself.  GET PROMPT MEDICAL ATTENTION if any of the following occur:    Worsening of your head pain or no improvement within 24 hours    Repeated vomiting (unable to keep liquids down)    Fever over 101 F (38.3 C)    Stiff neck    Extreme drowsiness, confusion or fainting    Weakness of an arm or leg or one side of the face    Difficulty with speech or vision    8947-6565 [employer], [address]  [place] [address]. All rights reserved. This information is not intended as a substitute for professional medical care. Always follow your healthcare professional's instructions.

## 2017-01-27 NOTE — ED PROVIDER NOTES
History     Chief Complaint   Patient presents with     Headache     HPI  Carlos Alberto Garcia is a 31 year old male with history of schizoaffective disorder who presents with headache.  States this started earlier today.  Was seen here yesterday for similar symptoms.  Denies any focal deficits.  Patient is homeless.  Does get Invega injections.   Denies any other mental health concerns currently.  Patient denies drug use.  No fevers or chills.  No recent trauma.    I have reviewed the Medications, Allergies, Past Medical and Surgical History, and Social History in the [employer] system.  Past Medical History   Diagnosis Date     Schizoaffective disorder      Bipolar 2 disorder (H)      Unspecified essential hypertension      Unspecified hypothyroidism      Chemical abuse      cocaine, meth, cambis     Dyslipidemia      Patient overweight      Hep C w/ coma, chronic (H)        Past Surgical History   Procedure Laterality Date     Hand surgery       L       No family history on file.    Social History   Substance Use Topics     Smoking status: Current Every Day Smoker -- 1.00 packs/day     Types: Cigarettes     Smokeless tobacco: Current User     Alcohol Use: No      Review of Systems   Constitutional: Negative for fever.   Respiratory: Negative for shortness of breath.    Cardiovascular: Negative for chest pain.   Gastrointestinal: Negative for abdominal pain.   Neurological: Positive for headaches. Negative for weakness and numbness.   All other systems reviewed and are negative.      Physical Exam   BP: 124/67 mmHg  Pulse: 70  Temp: 97.8  F (36.6  C)  Resp: 18  Height: 182.9 cm (6')  Weight: 114.76 kg (253 lb)  SpO2: 100 %  Physical Exam   Vital Signs and Nursing Notes Reviewed.  General:  NAD  HEENT: Oropharynx clear.  No obvious signs of trauma.  PERRL.  EOMI  Neck: Supple.  No lymphadenopathy.  Cardiac: RRR.  No murmurs, rubs or gallops  Lungs: Clear bilaterally.  Normal respiratory rate.    Abdomen:  Soft,  Nontender, no rebound or guarding.  Back: No CVA tenderness.  Skin:  No rash.  No diaphoresis  Extremities:  No cyanosis, clubbing, or edema.  Neurological:  CN II-XII intact, Strength intact, Sensation intact, No pronator drift, Normal gait.  Pulse:  Symmetric in bilateral upper and lower extremities.      ED Course     Procedures             Critical Care time:  none               Labs Ordered and Resulted from Time of ED Arrival Up to the Time of Departure from the ED - No data to display    Assessments & Plan (with Medical Decision Making)  31 year old who presents with frequent headaches.  This is his typical presentation.   No focal deficits.  Headache improved with ibuprofen.   Patient feeling better.  Will discharge.         I have reviewed the nursing notes.    I have reviewed the findings, diagnosis, plan and need for follow up with the patient.    New Prescriptions    No medications on file       Final diagnoses:   Episodic tension-type headache, not intractable       1/27/2017   [facility], [place], EMERGENCY DEPARTMENT      Alphonse Lilly MD  01/27/17 0425

## 2017-01-27 NOTE — ED AVS SNAPSHOT
[facility], [place], Emergency Department    [address] [place] [address]    Phone:  [phone]                                       Carlos Alberto Garcia   MRN: [id number]    Department:  [facility], Emergency Department   Date of Visit:  1/27/2017           After Visit Summary Signature Page     I have received my discharge instructions, and my questions have been answered. I have discussed any challenges I see with this plan with the nurse or doctor.    ..........................................................................................................................................  Patient/Patient Representative Signature      ..........................................................................................................................................  Patient Representative Print Name and Relationship to Patient    ..................................................               ................................................  Date                                            Time    ..........................................................................................................................................  Reviewed by Signature/Title    ...................................................              ..............................................  Date                                                            Time

## 2017-01-27 NOTE — ED NOTES
Pt is here with a severe headache and  would like a pain medication. ( Tylenol). Has also expressed an interest in taking a Psychiatric medication.

## 2017-01-28 ENCOUNTER — HOSPITAL ENCOUNTER (EMERGENCY)
Facility: CLINIC | Age: 32
Discharge: HOME | End: 2017-01-29
Attending: EMERGENCY MEDICINE | Admitting: EMERGENCY MEDICINE
Payer: COMMERCIAL

## 2017-01-28 ENCOUNTER — HOSPITAL ENCOUNTER (EMERGENCY)
Facility: CLINIC | Age: 32
Discharge: HOME | End: 2017-01-28
Attending: EMERGENCY MEDICINE | Admitting: EMERGENCY MEDICINE
Payer: COMMERCIAL

## 2017-01-28 VITALS
OXYGEN SATURATION: 100 % | TEMPERATURE: 98.1 F | DIASTOLIC BLOOD PRESSURE: 75 MMHG | RESPIRATION RATE: 18 BRPM | SYSTOLIC BLOOD PRESSURE: 125 MMHG | BODY MASS INDEX: 33.9 KG/M2 | WEIGHT: 250 LBS

## 2017-01-28 DIAGNOSIS — G44.229 CHRONIC TENSION-TYPE HEADACHE, NOT INTRACTABLE: ICD-10-CM

## 2017-01-28 DIAGNOSIS — F25.9 SCHIZOAFFECTIVE DISORDER, UNSPECIFIED TYPE (H): ICD-10-CM

## 2017-01-28 DIAGNOSIS — Z59.00 HOMELESS: ICD-10-CM

## 2017-01-28 PROCEDURE — 99282 EMERGENCY DEPT VISIT SF MDM: CPT | Mod: Z6 | Performed by: EMERGENCY MEDICINE

## 2017-01-28 PROCEDURE — 25000132 ZZH RX MED GY IP 250 OP 250 PS 637: Performed by: EMERGENCY MEDICINE

## 2017-01-28 PROCEDURE — 99283 EMERGENCY DEPT VISIT LOW MDM: CPT | Performed by: EMERGENCY MEDICINE

## 2017-01-28 PROCEDURE — 99283 EMERGENCY DEPT VISIT LOW MDM: CPT | Mod: Z6 | Performed by: EMERGENCY MEDICINE

## 2017-01-28 RX ORDER — ACETAMINOPHEN 500 MG
1000 TABLET ORAL ONCE
Status: COMPLETED | OUTPATIENT
Start: 2017-01-28 | End: 2017-01-28

## 2017-01-28 RX ADMIN — ACETAMINOPHEN 1000 MG: 500 TABLET, FILM COATED ORAL at 10:27

## 2017-01-28 SDOH — ECONOMIC STABILITY - HOUSING INSECURITY: HOMELESSNESS UNSPECIFIED: Z59.00

## 2017-01-28 ASSESSMENT — ENCOUNTER SYMPTOMS
ABDOMINAL PAIN: 0
FEVER: 0
HEADACHES: 1
VOMITING: 0
SHORTNESS OF BREATH: 0

## 2017-01-28 NOTE — ED AVS SNAPSHOT
[facility], [place], Emergency Department    [address] [place] [address]    Phone:  [phone]                                       Carlos Alberto Garcia   MRN: [id number]    Department:  [facility], Emergency Department   Date of Visit:  1/28/2017           After Visit Summary Signature Page     I have received my discharge instructions, and my questions have been answered. I have discussed any challenges I see with this plan with the nurse or doctor.    ..........................................................................................................................................  Patient/Patient Representative Signature      ..........................................................................................................................................  Patient Representative Print Name and Relationship to Patient    ..................................................               ................................................  Date                                            Time    ..........................................................................................................................................  Reviewed by Signature/Title    ...................................................              ..............................................  Date                                                            Time

## 2017-01-28 NOTE — ED AVS SNAPSHOT
[facility], Emergency Department    [address]    [facility] [address]    Phone:  [phone]    Fax:  [phone]                                       Carlos Alberto Garcia   MRN: [id number]    Department:  [facility], Emergency Department   Date of Visit:  1/28/2017           After Visit Summary Signature Page     I have received my discharge instructions, and my questions have been answered. I have discussed any challenges I see with this plan with the nurse or doctor.    ..........................................................................................................................................  Patient/Patient Representative Signature      ..........................................................................................................................................  Patient Representative Print Name and Relationship to Patient    ..................................................               ................................................  Date                                            Time    ..........................................................................................................................................  Reviewed by Signature/Title    ...................................................              ..............................................  Date                                                            Time

## 2017-01-28 NOTE — ED NOTES
"Carlos Alberto presents with c/o a numb feeling in his head that has been going on for \"a while.\" He says this happens frequently and he is requesting tylenol and a refill of his haldol Rx. History of schizophrenia, Bipolar disorder and HTN. Denies other concerns today.   "

## 2017-01-28 NOTE — ED AVS SNAPSHOT
[facility], Emergency Department    [address]    [place] [address]    Phone:  [phone]    Fax:  [phone]                                       Carlos Alberto Garcia   MRN: [id number]    Department:  [facility], Emergency Department   Date of Visit:  1/28/2017           Patient Information     Date Of Birth          1985        Your diagnoses for this visit were:     Homeless        You were seen by Andreina Garrett MD.        Discharge Instructions       Please use the handbook given to you to find a shelter to stay.       Future Appointments        Provider Department Dept Phone Center    1/30/2017 3:20 PM Jasbir Irizarry MD [facility] [phone] [facility]      24 Hour Appointment Hotline       To make an appointment at any [facility], call [phone] ([phone]). If you don't have a family doctor or clinic, we will help you find one. [facility] clinics are conveniently located to serve the needs of you and your family.             Review of your medicines      Our records show that you are taking the medicines listed below. If these are incorrect, please call your family doctor or clinic.        Dose / Directions Last dose taken    acetaminophen 500 MG tablet   Commonly known as:  TYLENOL   Dose:  500-1000 mg   Quantity:  20 tablet        Take 1-2 tablets (500-1,000 mg) by mouth every 6 hours as needed for mild pain   Refills:  0        haloperidol 10 MG tablet   Commonly known as:  HALDOL   Dose:  10 mg        Take 10 mg by mouth   Refills:  0        INVEGA SUSTENNA IM   Dose:  156 mg        Inject 156 mg into the muscle Qmonthly injection.   Refills:  0        melatonin 1 MG Caps   Dose:  2 mg   Quantity:  40 capsule        Take 2 mg by mouth nightly as needed   Refills:  0        MELATONIN PO   Dose:  5 mg   Indication:  pt unsure of dose        Take 5 mg by mouth nightly as needed   Refills:  0        TAB-A-FAZAL Tabs   Dose:  1 tablet         "Take 1 tablet by mouth   Refills:  0        VITAMIN D-1000 MAX ST 1000 UNITS Tabs   Dose:  1000 Units   Generic drug:  cholecalciferol        Take 1,000 Units by mouth   Refills:  0                Orders Needing Specimen Collection     None      Pending Results     No orders found for last 2 day(s).            Pending Culture Results     No orders found for last 2 day(s).            Thank you for choosing [facility]       Thank you for choosing [facility] for your care. Our goal is always to provide you with excellent care. Hearing back from our patients is one way we can continue to improve our services. Please take a few minutes to complete the written survey that you may receive in the mail after you visit with us. Thank you!        [employer] Information     [email or web address] lets you send messages to your doctor, view your test results, renew your prescriptions, schedule appointments and more. To sign up, go to [email or web address] . Click on \"Log in\" on the left side of the screen, which will take you to the Welcome page. Then click on \"Sign up Now\" on the right side of the page.     You will be asked to enter the access code listed below, as well as some personal information. Please follow the directions to create your username and password.     Your access code is: [email or web address]  Expires: 2017  4:20 PM     Your access code will  in 90 days. If you need help or a new code, please call your [facility] clinic or [phone].        Care EveryWhere ID     This is your Care EveryWhere ID. This could be used by other organizations to access your [facility] medical records  [id number]        After Visit Summary       This is your record. Keep this with you and show to your community pharmacist(s) and doctor(s) at your next visit.                  "

## 2017-01-28 NOTE — ED PROVIDER NOTES
[facility]ERGENCY DEPARTMENT ([facility])  January 28, 2017  ED 23 10:17 AM   History     Chief Complaint   Patient presents with     Headache     The history is provided by the patient and medical records.     Carlos Alberto Garcia is a 31 year old male who presents with headache. He has a history of chronic headache, schizoaffective disorder, bipolar disorder, chemical abuse and malingering. He is well known to the ED for multiple visits. He presents with headache to top right side of head. He states he gets headaches that start and go away. He states he has difficulty getting prescriptions for 30 tablets of Tylenol to treat this. He denies fevers or vomiting. He was just seen in the ED 6 hours ago for these symptoms, was treated with ibuprofen and discharged. He states that Tylenol will help treat his headache. He denies weakness or paresthesias.     He is restricted to [facility].    SOCIAL HX: He states that he is staying at [facility]. He states he will have a place to live soon. He is looking for work.  Past notes state the patient is homeless    I have reviewed the Medications, Allergies, Past Medical and Surgical History, and Social History in the [employer] Monitoring system.  PAST MEDICAL HISTORY:   Past Medical History   Diagnosis Date     Schizoaffective disorder      Bipolar 2 disorder (H)      Unspecified essential hypertension      Unspecified hypothyroidism      Chemical abuse      cocaine, meth, cambis     Dyslipidemia      Patient overweight      Hep C w/ coma, chronic (H)        PAST SURGICAL HISTORY:   Past Surgical History   Procedure Laterality Date     Hand surgery       L       FAMILY HISTORY: No family history on file.    SOCIAL HISTORY:   Social History   Substance Use Topics     Smoking status: Current Every Day Smoker -- 1.00 packs/day     Types: Cigarettes     Smokeless tobacco: Current User     Alcohol Use: No       Patient's Medications   New Prescriptions    No medications on file    Previous Medications    ACETAMINOPHEN (TYLENOL) 500 MG TABLET    Take 1-2 tablets (500-1,000 mg) by mouth every 6 hours as needed for mild pain    CHOLECALCIFEROL (VITAMIN D-1000 MAX ST) 1000 UNITS TABS    Take 1,000 Units by mouth    HALOPERIDOL (HALDOL) 10 MG TABLET    Take 10 mg by mouth    MELATONIN 1 MG CAPS    Take 2 mg by mouth nightly as needed    MELATONIN PO    Take 5 mg by mouth nightly as needed    MULTIPLE VITAMIN (TAB-A-FAZAL) TABS    Take 1 tablet by mouth    PALIPERIDONE PALMITATE (INVEGA SUSTENNA IM)    Inject 156 mg into the muscle Qmonthly injection.   Modified Medications    No medications on file   Discontinued Medications    No medications on file        No Known Allergies       Review of Systems   Constitutional: Negative for fever.   Respiratory: Negative for shortness of breath.    Cardiovascular: Negative for chest pain.   Gastrointestinal: Negative for vomiting and abdominal pain.   Neurological: Positive for headaches.   All other systems reviewed and are negative.      Physical Exam   BP: 125/75 mmHg  Heart Rate: 83  Temp: 98.1  F (36.7  C)  Resp: 18  Weight: 113.399 kg (250 lb)  SpO2: 100 %  Physical Exam  Physical Exam   Constitutional:   well nourished, well developed, lying flat, rouses to voice, disheveled and unkempt  HENT:   Head: Normocephalic and atraumatic.   Eyes: Conjunctivae are normal. Pupils are equal, round, and reactive to light. EOMI   pharynx has no erythema or exudate, mucous membranes are moist  Neck:   no adenopathy, no bony tenderness  Cardiovascular: regular rate and rhythm without murmurs or gallops  Pulmonary/Chest: Clear to auscultation bilaterally, with no wheezes or retractions. No respiratory distress.  GI: Soft with good bowel sounds.  Non-tender, non-distended, with no guarding, no rebound  Back:  No bony or CVA tenderness   Musculoskeletal:  no edema or clubbing   Skin: Skin is warm and dry. No rash noted.   Neurological: alert and oriented to person,  place, and time. Nonfocal exam, GCS is 15, AGUILAR, ambulates without difficulty  Psychiatric:  normal mood and affect.   ED Course     Procedures             Critical Care time:  none               Labs Ordered and Resulted from Time of ED Arrival Up to the Time of Departure from the ED - No data to display    Assessments & Plan (with Medical Decision Making)       I have reviewed the nursing notes.    Emergency Department course:  The patient was seen and examined at 1015 am. Chart review shows that this is his 18th visit to the ED largely for headache complaints since 01/01/2017.  Today is January 28, so in 28 days, the patient has had 18 ED visits.  He has been treated variously with ibuprofen, Tylenol, and Haldol for his headache.  The patient states that Tylenol seems to work best for his headaches.    The patient has a history of chronic and recurrent headaches that appear to be consistent with tension or rebound headache.  He has no fever and has no neurologic deficits.  He did have a head CT done in 06/04/2015 that was unremarkable.    I treated him with Tylenol by mouth.    I was informed at 10:30 am that the patient wanted to leave.  Apparently all he wanted was to sleep a while and have some Tylenol.  He received the Tylenol and wanted to be discharged home.    The patient appears to have mental illness along with chronic tension-type headache.  I suggested he obtain a primary care physician.  I also referred him to neurology for further management of his headache.  He may  have some element of rebound in his headaches given his chronic use of Tylenol or ibuprofen.    This part of the document was transcribed by Kamla Herrera, Medical Scribe.    I have reviewed the findings, diagnosis, plan and need for follow up with the patient.    New Prescriptions    No medications on file       Final diagnoses:   Chronic tension-type headache, not intractable   Schizoaffective disorder, unspecified type (H)     Kamla PARNELL  OTIS Herrera, am serving as a trained medical scribe to document services personally performed by Constanza Haque MD, based on the provider's statements to me.  This document has been checked and approved by Dr. Haque.    I, Constanza Haque MD, personally performed the services described in this documentation, as scribed by Kamla Herrera in my presence, and have reviewed and approve of this documentation.       1/28/2017   [facility], [place], EMERGENCY DEPARTMENT  This note was created in part by the use of Dragon voice recognition dictation system. Inadvertent grammatical errors and typographical errors may still exist.  MD Garland Olea Alda L, MD  01/28/17 7628

## 2017-01-28 NOTE — ED AVS SNAPSHOT
[facility], Emergency Department    [address] [facility] [address]    Phone:  [phone]                                       Carlos Alberto Garcia   MRN: [id number]    Department:  [facility], Emergency Department   Date of Visit:  1/28/2017           Patient Information     Date Of Birth          1985        Your diagnoses for this visit were:     Chronic tension-type headache, not intractable     Schizoaffective disorder, unspecified type (H)        You were seen by Constanza Haque MD.        Discharge Instructions       Please make an appointment to follow up with Neurology Clinic (phone: [phone]) in 7-10 days.  Please obtain a primary care physician for follow-up as well. Please make an appointment to follow up with Primary Care Center (phone: [phone] or [facility] (phone: [phone]) in 7 days.      Future Appointments        Provider Department Dept Phone Center    1/30/2017 3:20 PM Jasbir Irizarry MD [facility] [phone] [facility]      24 Hour Appointment Hotline       To make an appointment at any [facility], call [phone] ([phone]). If you don't have a family doctor or clinic, we will help you find one. [place] clinics are conveniently located to serve the needs of you and your family.             Review of your medicines      Our records show that you are taking the medicines listed below. If these are incorrect, please call your family doctor or clinic.        Dose / Directions Last dose taken    acetaminophen 500 MG tablet   Commonly known as:  TYLENOL   Dose:  500-1000 mg   Quantity:  20 tablet        Take 1-2 tablets (500-1,000 mg) by mouth every 6 hours as needed for mild pain   Refills:  0        haloperidol 10 MG tablet   Commonly known as:  HALDOL   Dose:  10 mg        Take 10 mg by mouth   Refills:  0        INVEGA SUSTENNA IM   Dose:  156 mg        Inject 156 mg into the muscle Qmonthly injection.  "  Refills:  0        melatonin 1 MG Caps   Dose:  2 mg   Quantity:  40 capsule        Take 2 mg by mouth nightly as needed   Refills:  0        MELATONIN PO   Dose:  5 mg   Indication:  pt unsure of dose        Take 5 mg by mouth nightly as needed   Refills:  0        TAB-A-FAZAL Tabs   Dose:  1 tablet        Take 1 tablet by mouth   Refills:  0        VITAMIN D-1000 MAX ST 1000 UNITS Tabs   Dose:  1000 Units   Generic drug:  cholecalciferol        Take 1,000 Units by mouth   Refills:  0                Orders Needing Specimen Collection     None      Pending Results     No orders found from 2017 to 2017.            Pending Culture Results     No orders found from 2017 to 2017.            Thank you for choosing [facility]       Thank you for choosing [facility] for your care. Our goal is always to provide you with excellent care. Hearing back from our patients is one way we can continue to improve our services. Please take a few minutes to complete the written survey that you may receive in the mail after you visit with us. Thank you!        [employer] Information     [employer] lets you send messages to your doctor, view your test results, renew your prescriptions, schedule appointments and more. To sign up, go to [email or web address] . Click on \"Log in\" on the left side of the screen, which will take you to the Welcome page. Then click on \"Sign up Now\" on the right side of the page.     You will be asked to enter the access code listed below, as well as some personal information. Please follow the directions to create your username and password.     Your access code is: [email or web address]  Expires: 2017  4:20 PM     Your access code will  in 90 days. If you need help or a new code, please call your [facility] clinic or [phone].        Care EveryWhere ID     This is your Care EveryWhere ID. This could be used by other organizations to access your [facility] medical records  [id number]        After " Visit Summary       This is your record. Keep this with you and show to your community pharmacist(s) and doctor(s) at your next visit.

## 2017-01-28 NOTE — DISCHARGE INSTRUCTIONS
Please make an appointment to follow up with Neurology Clinic (phone: [phone]) in 7-10 days.  Please obtain a primary care physician for follow-up as well. Please make an appointment to follow up with Primary Care Center (phone: [phone] or [facility] (phone: [phone]) in 7 days.

## 2017-01-29 VITALS
WEIGHT: 215 LBS | HEART RATE: 77 BPM | HEIGHT: 72 IN | OXYGEN SATURATION: 100 % | BODY MASS INDEX: 29.12 KG/M2 | TEMPERATURE: 97.7 F | RESPIRATION RATE: 20 BRPM | SYSTOLIC BLOOD PRESSURE: 149 MMHG | DIASTOLIC BLOOD PRESSURE: 85 MMHG

## 2017-01-29 ASSESSMENT — ENCOUNTER SYMPTOMS
CONFUSION: 0
SHORTNESS OF BREATH: 0
CHILLS: 0
FATIGUE: 0
HALLUCINATIONS: 0
FEVER: 0

## 2017-01-29 NOTE — ED PROVIDER NOTES
History     Chief Complaint   Patient presents with     Psychiatric Evaluation     Pt wanted someone to call 911 from [employer], states he 'hasn't felt the same since'      HPI  Carlos Alberto Garcia is a 31 year old male with a history of schizoaffective disorder, bipolar 2 disorder, polysubstance abuse, chronic Hepatitis C and hypertension who was brought in by ambulance for psychiatric evaluation. Pt says that he came to the ER because he had no where to go and needs to sleep. Pt says that he his from [place] but has been living in the [place] for some time.  He denies any physical complaint.  Denies any suicide or homicidal ideations.  Says that he wants to sleep in the leave in the morning.    Of note, this is patient's 19th visit to a  emergency department within the past month.     I have reviewed the Medications, Allergies, Past Medical and Surgical History, and Social History in the Epic system.    Review of Systems   Constitutional: Negative for fever, chills and fatigue.   Respiratory: Negative for shortness of breath.    Psychiatric/Behavioral: Negative for suicidal ideas, hallucinations, confusion and self-injury.   All other systems reviewed and are negative.      Physical Exam   BP: 149/85 mmHg  Pulse: 77  Temp: 97.7  F (36.5  C)  Resp: 20  Height: 182.9 cm (6')  Weight: 97.523 kg (215 lb)  SpO2: 100 %  Physical Exam   Constitutional: He is oriented to person, place, and time. He appears well-developed and well-nourished.   HENT:   Head: Normocephalic and atraumatic.   Neck: Normal range of motion. Neck supple.   Cardiovascular: Normal rate, regular rhythm and normal heart sounds.    Pulmonary/Chest: Effort normal. No respiratory distress. He has no wheezes.   Abdominal: Soft. He exhibits no distension. There is no tenderness. There is no rebound.   Neurological: He is alert and oriented to person, place, and time.   Skin: Skin is warm.   Psychiatric: He has a normal mood and affect. His  behavior is normal. Thought content normal.       ED Course     Procedures             Critical Care time:  none               Labs Ordered and Resulted from Time of ED Arrival Up to the Time of Departure from the ED - No data to display    Assessments & Plan (with Medical Decision Making) patient presents to the ER due to being homeless.  Normal exam.  No signs of injury.  We will allow to sleep in the ER overnight and will discharge in the morning.         I have reviewed the nursing notes.    I have reviewed the findings, diagnosis, plan and need for follow up with the patient.    New Prescriptions    No medications on file       Final diagnoses:   Homeless       1/28/2017   [facility], [place], EMERGENCY DEPARTMENT      Andreina Garrett MD  01/29/17 0282

## 2017-01-29 NOTE — ED NOTES
Bed: HW01  Expected date: 1/28/17  Expected time: 11:17 PM  Means of arrival: Ambulance  Comments:  [id number]:  32yo M  [facility] Mental Health Eval

## 2017-02-02 ENCOUNTER — HOSPITAL ENCOUNTER (EMERGENCY)
Facility: CLINIC | Age: 32
Discharge: HOME | End: 2017-02-02
Attending: EMERGENCY MEDICINE | Admitting: EMERGENCY MEDICINE
Payer: COMMERCIAL

## 2017-02-02 VITALS
HEIGHT: 72 IN | WEIGHT: 250 LBS | DIASTOLIC BLOOD PRESSURE: 101 MMHG | SYSTOLIC BLOOD PRESSURE: 170 MMHG | TEMPERATURE: 99 F | RESPIRATION RATE: 18 BRPM | HEART RATE: 127 BPM | OXYGEN SATURATION: 95 % | BODY MASS INDEX: 33.86 KG/M2

## 2017-02-02 DIAGNOSIS — R51.9 NONINTRACTABLE EPISODIC HEADACHE, UNSPECIFIED HEADACHE TYPE: ICD-10-CM

## 2017-02-02 DIAGNOSIS — F19.20 CHEMICAL DEPENDENCY (H): ICD-10-CM

## 2017-02-02 DIAGNOSIS — Z51.89 VISIT FOR WOUND CHECK: ICD-10-CM

## 2017-02-02 PROCEDURE — 25000132 ZZH RX MED GY IP 250 OP 250 PS 637: Performed by: EMERGENCY MEDICINE

## 2017-02-02 PROCEDURE — 99283 EMERGENCY DEPT VISIT LOW MDM: CPT | Performed by: EMERGENCY MEDICINE

## 2017-02-02 PROCEDURE — 99283 EMERGENCY DEPT VISIT LOW MDM: CPT | Mod: Z6 | Performed by: EMERGENCY MEDICINE

## 2017-02-02 RX ORDER — ACETAMINOPHEN 325 MG/1
650 TABLET ORAL ONCE
Status: COMPLETED | OUTPATIENT
Start: 2017-02-02 | End: 2017-02-02

## 2017-02-02 RX ADMIN — ACETAMINOPHEN 650 MG: 325 TABLET, FILM COATED ORAL at 22:56

## 2017-02-02 ASSESSMENT — ENCOUNTER SYMPTOMS
HEADACHES: 1
ABDOMINAL PAIN: 0
FEVER: 0
WOUND: 1
SHORTNESS OF BREATH: 0

## 2017-02-02 NOTE — ED AVS SNAPSHOT
[facility], Emergency Department    [address] [address]    Phone:  [phone]                                       Carlos Alberto Garcia   MRN: [id number]    Department:  [facility], Emergency Department   Date of Visit:  2/2/2017           Patient Information     Date Of Birth          1985        Your diagnoses for this visit were:     Visit for wound check     Nonintractable episodic headache, unspecified headache type     Chemical dependency (HCC)        You were seen by Ángela Mann MD.        Discharge Instructions           Keep wound clean and dry. Follow up with your clinic doctor next week. Call [phone] to inquire about an intake interview for treatment.    Discharge References/Attachments     ADDICTION, RECOVERING (ENGLISH)      24 Hour Appointment Hotline       To make an appointment at any [facility] clinic, call [phone] ([phone]). If you don't have a family doctor or clinic, we will help you find one. [facility] clinics are conveniently located to serve the needs of you and your family.             Review of your medicines      Our records show that you are taking the medicines listed below. If these are incorrect, please call your family doctor or clinic.        Dose / Directions Last dose taken    acetaminophen 500 MG tablet   Commonly known as:  TYLENOL   Dose:  500-1000 mg   Quantity:  20 tablet        Take 1-2 tablets (500-1,000 mg) by mouth every 6 hours as needed for mild pain   Refills:  0        haloperidol 10 MG tablet   Commonly known as:  HALDOL   Dose:  10 mg        Take 10 mg by mouth   Refills:  0        INVEGA SUSTENNA IM   Dose:  156 mg        Inject 156 mg into the muscle Qmonthly injection.   Refills:  0        melatonin 1 MG Caps   Dose:  2 mg   Quantity:  40 capsule        Take 2 mg by mouth nightly as needed   Refills:  0        MELATONIN PO   Dose:  5 mg   Indication:  pt unsure of dose        Take 5 mg by mouth nightly as  "needed   Refills:  0        TAB-A-FAZAL Tabs   Dose:  1 tablet        Take 1 tablet by mouth   Refills:  0        VITAMIN D-1000 MAX ST 1000 UNITS Tabs   Dose:  1000 Units   Generic drug:  cholecalciferol        Take 1,000 Units by mouth   Refills:  0                Orders Needing Specimen Collection     None      Pending Results     No orders found from 2017 to 2/3/2017.            Pending Culture Results     No orders found from 2017 to 2/3/2017.            Thank you for choosing [facility]       Thank you for choosing [facility] for your care. Our goal is always to provide you with excellent care. Hearing back from our patients is one way we can continue to improve our services. Please take a few minutes to complete the written survey that you may receive in the mail after you visit with us. Thank you!        [employer] Information     [employer] lets you send messages to your doctor, view your test results, renew your prescriptions, schedule appointments and more. To sign up, go to [email or web address] . Click on \"Log in\" on the left side of the screen, which will take you to the Welcome page. Then click on \"Sign up Now\" on the right side of the page.     You will be asked to enter the access code listed below, as well as some personal information. Please follow the directions to create your username and password.     Your access code is: [email or web address]  Expires: 2017  4:20 PM     Your access code will  in 90 days. If you need help or a new code, please call your [facility] clinic or [phone].        Care EveryWhere ID     This is your Care EveryWhere ID. This could be used by other organizations to access your [facility] medical records  [id number]        After Visit Summary       This is your record. Keep this with you and show to your community pharmacist(s) and doctor(s) at your next visit.                  "

## 2017-02-02 NOTE — ED AVS SNAPSHOT
[facility], [place], Emergency Department    [address] [place] [address]    Phone:  [phone]                                       Carlos Alberto Garcia   MRN: [id number]    Department:  [facility], Emergency Department   Date of Visit:  2/2/2017           After Visit Summary Signature Page     I have received my discharge instructions, and my questions have been answered. I have discussed any challenges I see with this plan with the nurse or doctor.    ..........................................................................................................................................  Patient/Patient Representative Signature      ..........................................................................................................................................  Patient Representative Print Name and Relationship to Patient    ..................................................               ................................................  Date                                            Time    ..........................................................................................................................................  Reviewed by Signature/Title    ...................................................              ..............................................  Date                                                            Time

## 2017-02-03 NOTE — DISCHARGE INSTRUCTIONS
Keep wound clean and dry. Follow up with your clinic doctor next week. Call [phone] to inquire about an intake interview for treatment.
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
## 2017-02-04 ENCOUNTER — HOSPITAL ENCOUNTER (EMERGENCY)
Facility: CLINIC | Age: 32
Discharge: HOME | End: 2017-02-04
Attending: EMERGENCY MEDICINE | Admitting: EMERGENCY MEDICINE
Payer: COMMERCIAL

## 2017-02-04 VITALS
HEART RATE: 100 BPM | RESPIRATION RATE: 16 BRPM | SYSTOLIC BLOOD PRESSURE: 124 MMHG | DIASTOLIC BLOOD PRESSURE: 75 MMHG | HEIGHT: 72 IN | TEMPERATURE: 97.6 F | OXYGEN SATURATION: 95 %

## 2017-02-04 DIAGNOSIS — G44.219 EPISODIC TENSION-TYPE HEADACHE, NOT INTRACTABLE: ICD-10-CM

## 2017-02-04 PROCEDURE — 25000132 ZZH RX MED GY IP 250 OP 250 PS 637: Performed by: EMERGENCY MEDICINE

## 2017-02-04 PROCEDURE — 99283 EMERGENCY DEPT VISIT LOW MDM: CPT | Performed by: EMERGENCY MEDICINE

## 2017-02-04 PROCEDURE — 99283 EMERGENCY DEPT VISIT LOW MDM: CPT | Mod: Z6 | Performed by: EMERGENCY MEDICINE

## 2017-02-04 RX ORDER — OLANZAPINE 5 MG/1
10 TABLET, ORALLY DISINTEGRATING ORAL ONCE
Status: COMPLETED | OUTPATIENT
Start: 2017-02-04 | End: 2017-02-04

## 2017-02-04 RX ORDER — IBUPROFEN 600 MG/1
600 TABLET, FILM COATED ORAL ONCE
Status: COMPLETED | OUTPATIENT
Start: 2017-02-04 | End: 2017-02-04

## 2017-02-04 RX ADMIN — OLANZAPINE 10 MG: 5 TABLET, ORALLY DISINTEGRATING ORAL at 15:41

## 2017-02-04 RX ADMIN — IBUPROFEN 600 MG: 600 TABLET ORAL at 15:41

## 2017-02-04 NOTE — DISCHARGE INSTRUCTIONS
Take ibuprofen (motrin) every 6 hours as needed for pain.     You can also take acetaminophen (tylenol) every 6 hours as needed for pain.     It is safe to take acetaminophen and ibuprofen together to get better pain relief.      Make an appointment with Dr Irizarry for your headache.  We previously planned on having you seen by them for your chronic problems on 1/30 but you missed the appointment.

## 2017-02-04 NOTE — ED AVS SNAPSHOT
[facility], Emergency Department    [address] [address]    Phone:  [phone]                                       Carlos Alberto Garcia   MRN: [id number]    Department:  [facility], Emergency Department   Date of Visit:  2/4/2017           Patient Information     Date Of Birth          1985        Your diagnoses for this visit were:     Episodic tension-type headache, not intractable        You were seen by Jacinto Fernández MD.      Follow-up Information     Schedule an appointment as soon as possible for a visit with Jasbir Irizarry MD.    Specialty:  Family Practice    Contact information:     [facility]  [address]  [facility] [address] [phone]          Discharge Instructions       Take ibuprofen (motrin) every 6 hours as needed for pain.     You can also take acetaminophen (tylenol) every 6 hours as needed for pain.     It is safe to take acetaminophen and ibuprofen together to get better pain relief.      Make an appointment with Dr Irizarry for your headache.  We previously planned on having you seen by them for your chronic problems on 1/30 but you missed the appointment.      Discharge References/Attachments     HEADACHE, REBOUND (ENGLISH)      24 Hour Appointment Hotline       To make an appointment at any [facility], call [phone] ([phone]). If you don't have a family doctor or clinic, we will help you find one. [facility] clinics are conveniently located to serve the needs of you and your family.             Review of your medicines      Our records show that you are taking the medicines listed below. If these are incorrect, please call your family doctor or clinic.        Dose / Directions Last dose taken    acetaminophen 500 MG tablet   Commonly known as:  TYLENOL   Dose:  500-1000 mg   Quantity:  20 tablet        Take 1-2 tablets (500-1,000 mg) by mouth every 6 hours as needed for mild pain   Refills:  0        haloperidol 10 MG  "tablet   Commonly known as:  HALDOL   Dose:  10 mg        Take 10 mg by mouth   Refills:  0        INVEGA SUSTENNA IM   Dose:  156 mg        Inject 156 mg into the muscle Qmonthly injection.   Refills:  0        melatonin 1 MG Caps   Dose:  2 mg   Quantity:  40 capsule        Take 2 mg by mouth nightly as needed   Refills:  0        MELATONIN PO   Dose:  5 mg   Indication:  pt unsure of dose        Take 5 mg by mouth nightly as needed   Refills:  0        TAB-A-FAZAL Tabs   Dose:  1 tablet        Take 1 tablet by mouth   Refills:  0        VITAMIN D-1000 MAX ST 1000 UNITS Tabs   Dose:  1000 Units   Generic drug:  cholecalciferol        Take 1,000 Units by mouth   Refills:  0                Orders Needing Specimen Collection     None      Pending Results     No orders found from 2/3/2017 to 2017.            Pending Culture Results     No orders found from 2/3/2017 to 2017.            Thank you for choosing [facility]       Thank you for choosing [facility] for your care. Our goal is always to provide you with excellent care. Hearing back from our patients is one way we can continue to improve our services. Please take a few minutes to complete the written survey that you may receive in the mail after you visit with us. Thank you!        [employer] Information     [email or web address] lets you send messages to your doctor, view your test results, renew your prescriptions, schedule appointments and more. To sign up, go to [email or web address] . Click on \"Log in\" on the left side of the screen, which will take you to the Welcome page. Then click on \"Sign up Now\" on the right side of the page.     You will be asked to enter the access code listed below, as well as some personal information. Please follow the directions to create your username and password.     Your access code is: [email or web address]  Expires: 2017  4:20 PM     Your access code will  in 90 days. If you need help or a new code, please call your [facility] clinic " or [phone].        Care EveryWhere ID     This is your Care EveryWhere ID. This could be used by other organizations to access your [facility] medical records  [id number]        After Visit Summary       This is your record. Keep this with you and show to your community pharmacist(s) and doctor(s) at your next visit.

## 2017-02-04 NOTE — ED AVS SNAPSHOT
[facility], [place], Emergency Department    [address] [place] [address]    Phone:  [phone]                                       Carlos Alberto Garcia   MRN: [id number]    Department:  [facility], Emergency Department   Date of Visit:  2/4/2017           After Visit Summary Signature Page     I have received my discharge instructions, and my questions have been answered. I have discussed any challenges I see with this plan with the nurse or doctor.    ..........................................................................................................................................  Patient/Patient Representative Signature      ..........................................................................................................................................  Patient Representative Print Name and Relationship to Patient    ..................................................               ................................................  Date                                            Time    ..........................................................................................................................................  Reviewed by Signature/Title    ...................................................              ..............................................  Date                                                            Time

## 2017-02-04 NOTE — ED PROVIDER NOTES
[place] EMERGENCY DEPARTMENT ([facility])  February 4, 2017    History     Chief Complaint   Patient presents with     Headache     HPI   Carlos Alberto Garcia is a 31 year old male with a history of bipolar II disorder, schizophrenia, depression, chemical abuse (cocaine, meth and marijuana), HTN, hepatitis C with coma who presents to the Emergency Department for evaluation of a headache. For the past couple of months, the patient has been experiencing daily headaches that develop in the morning and last into the evening with associated photophobia. He has not been taking anything for his discomfort and has not been evaluated for his headaches. Additionally, the patient reports smoking methamphetamines. He denies nausea, vomiting, numbness, tingling, weakness, fever, chills, suicidal ideation or any other concerns or complaints at this time. Of note, the patient is currently homeless staying in shelters. No history of diabetes.    Past Medical History   Diagnosis Date     Schizoaffective disorder      Bipolar 2 disorder (H)      Unspecified essential hypertension      Unspecified hypothyroidism      Chemical abuse      cocaine, meth, cambis     Dyslipidemia      Patient overweight      Hep C w/ coma, chronic (H)        Past Surgical History   Procedure Laterality Date     Hand surgery       L       No family history on file.    Social History   Substance Use Topics     Smoking status: Current Every Day Smoker -- 1.00 packs/day     Types: Cigarettes     Smokeless tobacco: Current User     Alcohol Use: No       No current facility-administered medications for this encounter.     Current Outpatient Prescriptions   Medication     melatonin 1 MG CAPS     acetaminophen (TYLENOL) 500 MG tablet     MELATONIN PO     Multiple Vitamin (TAB-A-FAZAL) TABS     haloperidol (HALDOL) 10 MG tablet     cholecalciferol (VITAMIN D-1000 MAX ST) 1000 UNITS TABS     Paliperidone Palmitate (INVEGA SUSTENNA IM)      Facility-Administered Medications Ordered in Other Encounters   Medication     ibuprofen (ADVIL,MOTRIN) 600 MG tablet      No Known Allergies    I have reviewed the Medications, Allergies, Past Medical and Surgical History, and Social History in the Epic system.    Review of Systems   Constitutional: Negative for fever.   HENT: Negative for congestion.    Eyes: Positive for photophobia. Negative for redness and visual disturbance.   Respiratory: Negative for shortness of breath.    Cardiovascular: Negative for chest pain.   Gastrointestinal: Negative for abdominal pain.   Genitourinary: Negative for difficulty urinating.   Musculoskeletal: Negative for arthralgias and neck stiffness.   Skin: Negative for color change.   Neurological: Positive for headaches.   Psychiatric/Behavioral: Negative for suicidal ideas and confusion.     ROS: 10 point ROS neg other than the symptoms noted above in the HPI.    Physical Exam   BP: 135/78 mmHg  Heart Rate: 91  Temp: 97.6  F (36.4  C)  Resp: 18  Height: 182.9 cm (6')  SpO2: 96 %  Physical Exam   Constitutional: He is oriented to person, place, and time. He appears well-developed and well-nourished. No distress.   HENT:   Head: Normocephalic and atraumatic.   Mouth/Throat: Oropharynx is clear and moist. No oropharyngeal exudate.   Eyes: Conjunctivae and EOM are normal. Pupils are equal, round, and reactive to light.   Neck: Normal range of motion. Neck supple.   Cardiovascular: Normal rate, regular rhythm, normal heart sounds and intact distal pulses.    No murmur heard.  Pulmonary/Chest: Effort normal and breath sounds normal. No respiratory distress. He has no wheezes. He has no rales.   Abdominal: Soft. Bowel sounds are normal. He exhibits no distension. There is no tenderness. There is no rebound and no guarding.   Musculoskeletal: Normal range of motion. He exhibits no edema or tenderness.   Neurological: He is alert and oriented to person, place, and time. No cranial  nerve deficit. He exhibits normal muscle tone. Coordination normal.   No pronator drift, normal strength/sensation all extremities     Skin: Skin is warm and dry. He is not diaphoretic.   Psychiatric: He has a normal mood and affect. His speech is normal and behavior is normal. Judgment and thought content normal.   Flat affect   Nursing note and vitals reviewed.      ED Course     3:24 PM  The patient was seen and examined by Dr. Fernández in Room 12.     Procedures  Medications   OLANZapine zydis (zyPREXA) ODT tab 10 mg (10 mg Oral Given 2/4/17 1541)   ibuprofen (ADVIL/MOTRIN) tablet 600 mg (600 mg Oral Given 2/4/17 1541)      Assessments & Plan (with Medical Decision Making)   31 year old male with history of bipolar disorder, schizophrenia, depression, hypertension, hepatitis C, presents to the Emergency Department with a headache. He has normal neuro exam, appears awake, alert. He says he really just wants to sleep for a little while and also wants something for his headache. He s had over 20 Emergency Department visits to this Emergency Department within the past month. It looks like recently Emergency Department staff set him up with an appointment with Dr. Irizarry in the primary care clinic on January 30th and he was a no-show for that appointment. I have a low suspicion for subarachnoid hemorrhage or other concerning cause of his headache at this time. He either has tension headaches, headaches related to withdrawal from drug use, or rebound headache from Tylenol and Motrin use. He was given Zyprexa and ibuprofen for his headache with improvement. He was discharged in stable condition.     This part of the document was transcribed by Kamla Herrera, Medical Scribe.      I have reviewed the nursing notes.    I have reviewed the findings, diagnosis, plan and need for follow up with the patient.    Discharge Medication List as of 2/4/2017  5:07 PM          Final diagnoses:   Episodic tension-type headache, not  intractable     I, Sandra Medina, am serving as a trained medical scribe to document services personally performed by Jacinto Fernández MD, based on the provider's statements to me.      I, Jacinto Fernández MD, was physically present and have reviewed and verified the accuracy of this note documented by Sandra Medina.     2/4/2017   [facility], [place], EMERGENCY DEPARTMENT      Jacinto Fernández MD  02/05/17 0121

## 2017-02-05 ASSESSMENT — ENCOUNTER SYMPTOMS
EYE REDNESS: 0
HEADACHES: 1
CONFUSION: 0
COLOR CHANGE: 0
ABDOMINAL PAIN: 0
SHORTNESS OF BREATH: 0
FEVER: 0
NECK STIFFNESS: 0
PHOTOPHOBIA: 1
DIFFICULTY URINATING: 0
ARTHRALGIAS: 0

## 2017-02-18 ENCOUNTER — HOSPITAL ENCOUNTER (EMERGENCY)
Facility: CLINIC | Age: 32
Discharge: HOME | End: 2017-02-18
Admitting: EMERGENCY MEDICINE
Payer: COMMERCIAL

## 2017-02-18 VITALS
DIASTOLIC BLOOD PRESSURE: 104 MMHG | TEMPERATURE: 95.6 F | OXYGEN SATURATION: 97 % | HEIGHT: 72 IN | RESPIRATION RATE: 20 BRPM | WEIGHT: 250 LBS | HEART RATE: 77 BPM | BODY MASS INDEX: 33.86 KG/M2 | SYSTOLIC BLOOD PRESSURE: 155 MMHG

## 2017-02-18 PROCEDURE — 40000268 ZZH STATISTIC NO CHARGES

## 2017-02-18 PROCEDURE — 25000132 ZZH RX MED GY IP 250 OP 250 PS 637: Performed by: EMERGENCY MEDICINE

## 2017-02-18 RX ORDER — ACETAMINOPHEN 325 MG/1
650 TABLET ORAL EVERY 4 HOURS PRN
Status: DISCONTINUED | OUTPATIENT
Start: 2017-02-18 | End: 2017-02-18 | Stop reason: HOSPADM

## 2017-02-18 RX ADMIN — ACETAMINOPHEN 650 MG: 325 TABLET, FILM COATED ORAL at 04:55

## 2017-02-18 NOTE — ED NOTES
Discussed Pt's complaint with MD EARNEST familiar with Pt, approved tylenol order and RN only visit.

## 2017-03-01 ENCOUNTER — HOSPITAL ENCOUNTER (EMERGENCY)
Facility: CLINIC | Age: 32
Discharge: HOME | End: 2017-03-01
Attending: EMERGENCY MEDICINE | Admitting: EMERGENCY MEDICINE
Payer: COMMERCIAL

## 2017-03-01 VITALS
OXYGEN SATURATION: 99 % | DIASTOLIC BLOOD PRESSURE: 65 MMHG | HEART RATE: 70 BPM | TEMPERATURE: 97.7 F | SYSTOLIC BLOOD PRESSURE: 119 MMHG | RESPIRATION RATE: 12 BRPM

## 2017-03-01 DIAGNOSIS — F25.9 SCHIZOAFFECTIVE DISORDER, UNSPECIFIED TYPE (H): ICD-10-CM

## 2017-03-01 DIAGNOSIS — Z51.89 ENCOUNTER FOR WOUND RE-CHECK: ICD-10-CM

## 2017-03-01 DIAGNOSIS — R51.9 FACIAL PAIN: ICD-10-CM

## 2017-03-01 PROCEDURE — 25000132 ZZH RX MED GY IP 250 OP 250 PS 637: Performed by: EMERGENCY MEDICINE

## 2017-03-01 PROCEDURE — 99283 EMERGENCY DEPT VISIT LOW MDM: CPT | Mod: Z6 | Performed by: EMERGENCY MEDICINE

## 2017-03-01 PROCEDURE — 99283 EMERGENCY DEPT VISIT LOW MDM: CPT | Performed by: EMERGENCY MEDICINE

## 2017-03-01 RX ORDER — OLANZAPINE 10 MG/1
10 TABLET ORAL ONCE
Status: COMPLETED | OUTPATIENT
Start: 2017-03-01 | End: 2017-03-01

## 2017-03-01 RX ORDER — ACETAMINOPHEN 500 MG
1000 TABLET ORAL ONCE
Status: COMPLETED | OUTPATIENT
Start: 2017-03-01 | End: 2017-03-01

## 2017-03-01 RX ADMIN — OLANZAPINE 10 MG: 10 TABLET, FILM COATED ORAL at 06:50

## 2017-03-01 RX ADMIN — ACETAMINOPHEN 1000 MG: 500 TABLET, FILM COATED ORAL at 05:51

## 2017-03-01 ASSESSMENT — ENCOUNTER SYMPTOMS
HALLUCINATIONS: 0
BACK PAIN: 0
JOINT SWELLING: 0
RHINORRHEA: 0
SLEEP DISTURBANCE: 1
HEADACHES: 0
NECK PAIN: 0
MYALGIAS: 0
ACTIVITY CHANGE: 0
NAUSEA: 0
NERVOUS/ANXIOUS: 1
FATIGUE: 0
CONFUSION: 0
WEAKNESS: 0
ARTHRALGIAS: 0
DIARRHEA: 0
DIZZINESS: 0
BRUISES/BLEEDS EASILY: 0
NUMBNESS: 0
APPETITE CHANGE: 0
CHILLS: 0
WOUND: 1
CONSTITUTIONAL NEGATIVE: 1
DYSPHORIC MOOD: 1
FEVER: 0
ABDOMINAL PAIN: 0
VOMITING: 0
EYE PAIN: 0
LIGHT-HEADEDNESS: 0
BLOOD IN STOOL: 0
SORE THROAT: 0
DECREASED CONCENTRATION: 1

## 2017-03-01 NOTE — ED AVS SNAPSHOT
[facility], Emergency Department    [address]    [place] [address]    Phone:  [phone]                                       Carlos Alberto Garcia   MRN: [id number]    Department:  [facility], Emergency Department   Date of Visit:  3/1/2017           Patient Information     Date Of Birth          1985        Your diagnoses for this visit were:     Schizoaffective disorder, unspecified type (H)     Encounter for wound re-check        You were seen by Dashawn Bonilla MD.        Discharge Instructions       Apply topical antibiotic to wound daily-bacitracin.  Follow up with your primary care provider and therapist.  Return if persistent symptoms.    24 Hour Appointment Hotline       To make an appointment at any [facility] clinic, call [phone] ([phone]). If you don't have a family doctor or clinic, we will help you find one. [facility] clinics are conveniently located to serve the needs of you and your family.             Review of your medicines      Our records show that you are taking the medicines listed below. If these are incorrect, please call your family doctor or clinic.        Dose / Directions Last dose taken    acetaminophen 500 MG tablet   Commonly known as:  TYLENOL   Dose:  500-1000 mg   Quantity:  20 tablet        Take 1-2 tablets (500-1,000 mg) by mouth every 6 hours as needed for mild pain   Refills:  0        haloperidol 10 MG tablet   Commonly known as:  HALDOL   Dose:  10 mg        Take 10 mg by mouth   Refills:  0        INVEGA SUSTENNA IM   Dose:  156 mg        Inject 156 mg into the muscle Qmonthly injection.   Refills:  0        MELATONIN PO   Dose:  5 mg   Indication:  pt unsure of dose        Take 5 mg by mouth nightly as needed   Refills:  0        TAB-A-FAZAL Tabs   Dose:  1 tablet        Take 1 tablet by mouth   Refills:  0        VITAMIN D-1000 MAX ST 1000 UNITS Tabs   Dose:  1000 Units   Generic drug:  cholecalciferol        Take 1,000 Units by mouth  "  Refills:  0                Orders Needing Specimen Collection     None      Pending Results     No orders found from 2017 to 3/2/2017.            Pending Culture Results     No orders found from 2017 to 3/2/2017.            Thank you for choosing [facility]       Thank you for choosing [facility] for your care. Our goal is always to provide you with excellent care. Hearing back from our patients is one way we can continue to improve our services. Please take a few minutes to complete the written survey that you may receive in the mail after you visit with us. Thank you!        [email or web address] Information     [email or web address] lets you send messages to your doctor, view your test results, renew your prescriptions, schedule appointments and more. To sign up, go to [email or web address] . Click on \"Log in\" on the left side of the screen, which will take you to the Welcome page. Then click on \"Sign up Now\" on the right side of the page.     You will be asked to enter the access code listed below, as well as some personal information. Please follow the directions to create your username and password.     Your access code is: [email or web address]  Expires: 2017  4:20 PM     Your access code will  in 90 days. If you need help or a new code, please call your [facility] clinic or [phone].        Care EveryWhere ID     This is your Care EveryWhere ID. This could be used by other organizations to access your [facility] medical records  [id number]        After Visit Summary       This is your record. Keep this with you and show to your community pharmacist(s) and doctor(s) at your next visit.                  "
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
## 2017-03-01 NOTE — DISCHARGE INSTRUCTIONS
Apply topical antibiotic to wound daily-bacitracin.  Follow up with your primary care provider and therapist.  Return if persistent symptoms.
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
## 2017-03-01 NOTE — ED AVS SNAPSHOT
[facility], [place], Emergency Department    [address] [place] [address]    Phone:  [phone]                                       Carlos Alberto Garcia   MRN: [id number]    Department:  [facility], Emergency Department   Date of Visit:  3/1/2017           After Visit Summary Signature Page     I have received my discharge instructions, and my questions have been answered. I have discussed any challenges I see with this plan with the nurse or doctor.    ..........................................................................................................................................  Patient/Patient Representative Signature      ..........................................................................................................................................  Patient Representative Print Name and Relationship to Patient    ..................................................               ................................................  Date                                            Time    ..........................................................................................................................................  Reviewed by Signature/Title    ...................................................              ..............................................  Date                                                            Time

## 2017-03-02 ENCOUNTER — HOSPITAL ENCOUNTER (EMERGENCY)
Facility: CLINIC | Age: 32
Discharge: HOME | End: 2017-03-02
Attending: EMERGENCY MEDICINE | Admitting: EMERGENCY MEDICINE
Payer: COMMERCIAL

## 2017-03-02 VITALS
OXYGEN SATURATION: 95 % | WEIGHT: 250 LBS | BODY MASS INDEX: 33.86 KG/M2 | HEIGHT: 72 IN | TEMPERATURE: 97.6 F | DIASTOLIC BLOOD PRESSURE: 77 MMHG | RESPIRATION RATE: 18 BRPM | SYSTOLIC BLOOD PRESSURE: 129 MMHG

## 2017-03-02 DIAGNOSIS — G44.219 EPISODIC TENSION-TYPE HEADACHE, NOT INTRACTABLE: ICD-10-CM

## 2017-03-02 PROCEDURE — 99282 EMERGENCY DEPT VISIT SF MDM: CPT | Mod: Z6 | Performed by: EMERGENCY MEDICINE

## 2017-03-02 PROCEDURE — 25000132 ZZH RX MED GY IP 250 OP 250 PS 637: Performed by: EMERGENCY MEDICINE

## 2017-03-02 PROCEDURE — 99283 EMERGENCY DEPT VISIT LOW MDM: CPT | Performed by: EMERGENCY MEDICINE

## 2017-03-02 RX ORDER — ACETAMINOPHEN 325 MG/1
975 TABLET ORAL ONCE
Status: COMPLETED | OUTPATIENT
Start: 2017-03-02 | End: 2017-03-02

## 2017-03-02 RX ADMIN — ACETAMINOPHEN 975 MG: 325 TABLET ORAL at 10:55

## 2017-03-02 ASSESSMENT — ENCOUNTER SYMPTOMS
ABDOMINAL PAIN: 0
FEVER: 0
SHORTNESS OF BREATH: 0

## 2017-03-02 NOTE — ED NOTES
Pt arrives to the ED complaining of a headache and stating that he needs his Ainvega injection. Pt was seen last night for a similar complaint and states he got tylenol and zyprexa which helped.

## 2017-03-02 NOTE — ED PROVIDER NOTES
"  History     Chief Complaint   Patient presents with     Headache     HPI  Carlos Alberto Garcia is a 31 year old male with a history of chronic headaches, bipolar II disorder, schizophrenia, depression, polysubstance abuse (cocaine, meth and marijuana), hypertension and hepatitis C who presents with a headache.  Of note, the patient has been seen here in the ED 7 times this year with similar complaints of headache, most recently yesterday at which time he was given acetaminophen and olanzapine 10 mg PO. Today, the patient reports that his headache came back this morning prompting his return to the ED. He notes that the headache is diffuse and feels similar to his chronic recurrent headaches. He states that Zyprexa generally helps the headaches. He complains of associated lightheadedness. He has been eating and drinking normally. The patient believes his headaches are caused by his meth and marijuana use; he uses about twice per week. The patient also notes that he is suppoed to be on Invega injections, and has been receiving these at a clinic in [place]; though he states that he can no longer go to this clinic, as he was discharged from this clinic because \"someone lied and told them something about me.\" He denies any suicidal or homicidal ideations.       Past Medical History   Diagnosis Date     Bipolar 2 disorder (H)      Chemical abuse      cocaine, meth, cambis     Dyslipidemia      Hep C w/ coma, chronic (H)      Patient overweight      Schizoaffective disorder      Unspecified essential hypertension      Unspecified hypothyroidism        Past Surgical History   Procedure Laterality Date     Hand surgery       L       No family history on file.    Social History   Substance Use Topics     Smoking status: Current Every Day Smoker     Packs/day: 1.00     Types: Cigarettes     Smokeless tobacco: Current User     Alcohol use No       No current facility-administered medications for this encounter.      Current " Outpatient Prescriptions   Medication     acetaminophen (TYLENOL) 500 MG tablet     MELATONIN PO     Multiple Vitamin (TAB-A-FAZAL) TABS     haloperidol (HALDOL) 10 MG tablet     cholecalciferol (VITAMIN D-1000 MAX ST) 1000 UNITS TABS     Paliperidone Palmitate (INVEGA SUSTENNA IM)     Facility-Administered Medications Ordered in Other Encounters   Medication     ibuprofen (ADVIL,MOTRIN) 600 MG tablet      No Known Allergies     I have reviewed the Medications, Allergies, Past Medical and Surgical History, and Social History in the [employer] system.    Review of Systems   Constitutional: Negative for fever.   Respiratory: Negative for shortness of breath.    Cardiovascular: Negative for chest pain.   Gastrointestinal: Negative for abdominal pain.   All other systems reviewed and are negative.      Physical Exam   BP: 129/77  Heart Rate: 91  Temp: 97.6  F (36.4  C)  Resp: 18  Height: 182.9 cm (6')  Weight: 113.4 kg (250 lb)  SpO2: 95 %  Physical Exam   Constitutional: No distress.   HENT:   Head: Atraumatic.   Mouth/Throat: Oropharynx is clear and moist. No oropharyngeal exudate.   Eyes: Pupils are equal, round, and reactive to light. No scleral icterus.   Neck: Neck supple.   Cardiovascular: Normal heart sounds and intact distal pulses.    Pulmonary/Chest: Breath sounds normal. No respiratory distress.   Abdominal: Soft. Bowel sounds are normal. There is no tenderness.   Musculoskeletal: He exhibits no edema or tenderness.   Skin: Skin is warm. No rash noted. He is not diaphoretic.       ED Course       ED Course     Procedures     10:40 AM  The patient was seen and examined by Dr. Florentino in Room [facility].               Critical Care time:  none               Labs Ordered and Resulted from Time of ED Arrival Up to the Time of Departure from the ED - No data to display    Assessments & Plan (with Medical Decision Making)   The patient has his usual headache which was treated with Tylenol.  He was requesting Zyprexa as he  has not been able to get his monthly shot.  I consulted the  to help arrange for better follow-up than frequent ER visits however she eloped.  He denied any suicidal or homicidal thoughts.  He had mildly disorganized thinking but no signs of impairment that would lead to self-harm.  He is otherwise medically stable with a supple neck and no sign of meningismus.    I have reviewed the nursing notes.    I have reviewed the findings, diagnosis, plan and need for follow up with the patient.    Discharge Medication List as of 3/2/2017  1:31 PM          Final diagnoses:   Episodic tension-type headache, not intractable         I, Steffi Alvarenga, am serving as a trained medical scribe to document services personally performed by Bry Florentino MD, based on the provider's statements to me.   IBry MD, was physically present and have reviewed and verified the accuracy of this note documented by Steffi Alvarenga.      3/2/2017   [facility], [place], EMERGENCY DEPARTMENT     Bry Florentino MD  03/02/17 2608

## 2017-03-13 ENCOUNTER — HOSPITAL ENCOUNTER (EMERGENCY)
Facility: CLINIC | Age: 32
Discharge: HOME | End: 2017-03-14
Attending: EMERGENCY MEDICINE | Admitting: EMERGENCY MEDICINE
Payer: COMMERCIAL

## 2017-03-13 VITALS
RESPIRATION RATE: 18 BRPM | OXYGEN SATURATION: 97 % | SYSTOLIC BLOOD PRESSURE: 147 MMHG | HEART RATE: 72 BPM | TEMPERATURE: 98.7 F | DIASTOLIC BLOOD PRESSURE: 88 MMHG

## 2017-03-13 DIAGNOSIS — Z59.00 LACK OF HOUSING: ICD-10-CM

## 2017-03-13 DIAGNOSIS — F25.9 SCHIZOAFFECTIVE DISORDER, UNSPECIFIED TYPE (H): ICD-10-CM

## 2017-03-13 DIAGNOSIS — F31.81 BIPOLAR 2 DISORDER (H): ICD-10-CM

## 2017-03-13 PROCEDURE — 99282 EMERGENCY DEPT VISIT SF MDM: CPT | Mod: Z6 | Performed by: EMERGENCY MEDICINE

## 2017-03-13 PROCEDURE — 99282 EMERGENCY DEPT VISIT SF MDM: CPT | Performed by: EMERGENCY MEDICINE

## 2017-03-13 SDOH — ECONOMIC STABILITY - HOUSING INSECURITY: HOMELESSNESS UNSPECIFIED: Z59.00

## 2017-03-14 ASSESSMENT — ENCOUNTER SYMPTOMS
HALLUCINATIONS: 0
SHORTNESS OF BREATH: 0
FEVER: 0

## 2017-03-14 NOTE — DISCHARGE INSTRUCTIONS
Please make an appointment to follow up with [facility]   Understanding Schizoaffective Disorder  Schizoaffective disorder is a serious and puzzling brain disorder. It combines symptoms of two other disorders--bipolar disorder and schizophrenia. The symptoms are often severe and ongoing. They can disrupt lives and cause great emotional pain. Yet there is reason for hope. Talk to your doctor or mental health professional.    What are the symptoms?  The symptoms of schizoaffective disorder can vary greatly. People with this disorder may see or hear things that aren t there (hallucinations). Or they may hold false, fixed beliefs (delusions). These can occur without any mood changes. At times, people with this disorder may seem withdrawn, listless, and remote. They may also have extreme mood swings. They may feel intensely happy for a time. Later, they may be very depressed. In some cases, they might have only lows without the highs. They might also have problems with sleep or a change in appetite. They may become more or less talkative, lose focus in their thinking, or even have thoughts of suicide.  What causes it?  The causes of schizoaffective disorder aren t fully understood. It is known that this disorder runs in families. Certain chemicals in the brain also play a role. In some people, abuse or neglect may trigger the disorder.  Finding help  Although there is no cure at present, treatment with medications and therapy may be helpful. In addition, many support services exist for people with schizoaffective disorders and their families.  Medications  Medications may relieve many symptoms of schizoaffective disorder. These may include antipsychotic medications, antidepressant medications, or mood stabilizers. If your loved one is troubled by medication side effects, tell his or her doctor. Changing the dose or type of medication may help. Encourage your loved one to keep taking his or her  medications. Stopping them may cause symptoms to come back.  Supportive therapy  A counselor can offer your loved one advice and support. Social workers may help with work, money, or housing issues. Friends and family members may also need support. Learning more about schizoaffective disorder can help you cope. Learn how best to help with your loved one s care.    3518-5979 [employer]. [address] [place] [address], [place] [address]. All rights reserved. This information is not intended as a substitute for professional medical care. Always follow your healthcare professional's instructions.

## 2017-03-14 NOTE — ED NOTES
30 yo male presents to ED with increasing anxiety,  Takes haldol at home and gets monthly shots of INVEGA.  Patient is homeless.

## 2017-03-14 NOTE — ED PROVIDER NOTES
History     Chief Complaint   Patient presents with     Panic Attack     HPI  Carlos Alberto Garcia is a 31 year old male with a history of bipolar 2 disorder, schizoaffective disorder, chemical dependency, hypertension and hyperlipidemia who presents for a medication concern. The patient reports that he was kicked out of his clinic, [facility]. He is unsure why they did this. As a result, he reports that he has been without his Invega for a few months and would like to get this medication administered today. The patient reports that he has no new symptoms, simply would like to keep up with this medication. He particularly denies any suicidal or homicidal ideation. No hallucinations.  She does have a history of malingering and trying to find a place to stay as he is homeless.    I have reviewed the Medications, Allergies, Past Medical and Surgical History, and Social History in the [employer] system.    No current facility-administered medications for this encounter.      Current Outpatient Prescriptions   Medication     acetaminophen (TYLENOL) 500 MG tablet     MELATONIN PO     Multiple Vitamin (TAB-A-FAZAL) TABS     haloperidol (HALDOL) 10 MG tablet     cholecalciferol (VITAMIN D-1000 MAX ST) 1000 UNITS TABS     Paliperidone Palmitate (INVEGA SUSTENNA IM)     Facility-Administered Medications Ordered in Other Encounters   Medication     ibuprofen (ADVIL,MOTRIN) 600 MG tablet     Past Medical History   Diagnosis Date     Bipolar 2 disorder (H)      Chemical abuse      cocaine, meth, cambis     Dyslipidemia      Hep C w/ coma, chronic (H)      Patient overweight      Schizoaffective disorder      Unspecified essential hypertension      Unspecified hypothyroidism        Past Surgical History   Procedure Laterality Date     Hand surgery       L       No family history on file.    Social History   Substance Use Topics     Smoking status: Current Every Day Smoker     Packs/day: 1.00     Types:  Cigarettes     Smokeless tobacco: Current User     Alcohol use No      No Known Allergies    Review of Systems   Constitutional: Negative for fever.   Respiratory: Negative for shortness of breath.    Cardiovascular: Negative for chest pain.   Psychiatric/Behavioral: Negative for hallucinations, self-injury and suicidal ideas.   All other systems reviewed and are negative.      Physical Exam   BP: 147/88  Pulse: 72  Temp: 98.7  F (37.1  C)  Resp: 18  SpO2: 97 %  Physical Exam  Vital Signs and Nursing Notes Reviewed.  General:  NAD  HEENT: Oropharynx clear.  No obvious signs of trauma.  PERRL.  EOMI  Neck: Supple.  No lymphadenopathy.  Cardiac: RRR.  No murmurs, rubs or gallops  Lungs: Clear bilaterally.  Normal respiratory rate.    Abdomen:  Soft, Nontender, no rebound or guarding.  Back: No CVA tenderness.  Skin:  No rash.  No diaphoresis  Extremities:  No cyanosis, clubbing, or edema.  Neurological:  CN II-XII intact, Strength intact, Sensation intact, No pronator drift, Normal gait.  Pulse:  Symmetric in bilateral upper and lower extremities.    ED Course     ED Course     Procedures             Critical Care time:  none               Labs Ordered and Resulted from Time of ED Arrival Up to the Time of Departure from the ED - No data to display    Assessments & Plan (with Medical Decision Making)  31 year old who presents with a request for medication.  Denies any suicidal or homicidal ideation.  Seems to be at his baseline.  I am unable to get him his integument here.  Did advise him to follow up with the clinic.  Patient left before could give him resources for this.         I have reviewed the nursing notes.    I have reviewed the findings, diagnosis, plan and need for follow up with the patient.    New Prescriptions    No medications on file       Final diagnoses:   None     I, Tirso Cerna, am serving as a trained medical scribe to document services personally performed by Alphonse Lilly MD, based on the  provider's statements to me.      I, Alphonse Lilly MD, was physically present and have reviewed and verified the accuracy of this note documented by Tirso Cerna.    3/13/2017   [facility], [place], EMERGENCY DEPARTMENT     Alphonse Lilly MD  03/14/17 0101

## 2017-06-18 ENCOUNTER — HOSPITAL ENCOUNTER (EMERGENCY)
Facility: CLINIC | Age: 32
Discharge: HOME | End: 2017-06-18
Attending: EMERGENCY MEDICINE | Admitting: EMERGENCY MEDICINE
Payer: MEDICAID

## 2017-06-18 VITALS
TEMPERATURE: 97.5 F | OXYGEN SATURATION: 99 % | RESPIRATION RATE: 16 BRPM | WEIGHT: 230 LBS | SYSTOLIC BLOOD PRESSURE: 137 MMHG | HEART RATE: 61 BPM | DIASTOLIC BLOOD PRESSURE: 89 MMHG | BODY MASS INDEX: 31.19 KG/M2

## 2017-06-18 DIAGNOSIS — R51.9 NONINTRACTABLE HEADACHE, UNSPECIFIED CHRONICITY PATTERN, UNSPECIFIED HEADACHE TYPE: ICD-10-CM

## 2017-06-18 DIAGNOSIS — Z76.5 MALINGERING: ICD-10-CM

## 2017-06-18 PROCEDURE — 99283 EMERGENCY DEPT VISIT LOW MDM: CPT | Performed by: EMERGENCY MEDICINE

## 2017-06-18 PROCEDURE — 25000132 ZZH RX MED GY IP 250 OP 250 PS 637: Performed by: EMERGENCY MEDICINE

## 2017-06-18 PROCEDURE — 99283 EMERGENCY DEPT VISIT LOW MDM: CPT | Mod: Z6 | Performed by: EMERGENCY MEDICINE

## 2017-06-18 RX ORDER — ACETAMINOPHEN 325 MG/1
650 TABLET ORAL EVERY 4 HOURS PRN
Status: DISCONTINUED | OUTPATIENT
Start: 2017-06-18 | End: 2017-06-18 | Stop reason: HOSPADM

## 2017-06-18 RX ORDER — ACETAMINOPHEN 500 MG
1000 TABLET ORAL EVERY 6 HOURS PRN
Qty: 60 TABLET | Refills: 0 | Status: SHIPPED | OUTPATIENT
Start: 2017-06-18 | End: 2017-06-25

## 2017-06-18 RX ADMIN — ACETAMINOPHEN 650 MG: 325 TABLET, FILM COATED ORAL at 05:44

## 2017-06-18 ASSESSMENT — ENCOUNTER SYMPTOMS
HEADACHES: 1
NECK PAIN: 0
FEVER: 0

## 2017-06-18 NOTE — ED AVS SNAPSHOT
[facility], Emergency Department    [address] [facility] [address]    Phone:  [phone]                                       Carlos Alberto Garcia   MRN: [id number]    Department:  [facility], Emergency Department   Date of Visit:  6/18/2017           Patient Information     Date Of Birth          1985        Your diagnoses for this visit were:     Malingering     Nonintractable headache, unspecified chronicity pattern, unspecified headache type        You were seen by Charles Mitchell MD.        Discharge Instructions       Try Tylenol or ibuprofen for your headaches    24 Hour Appointment Hotline       To make an appointment at any [facility] clinic, call [phone] ([phone]). If you don't have a family doctor or clinic, we will help you find one. [facility] clinics are conveniently located to serve the needs of you and your family.             Review of your medicines      CONTINUE these medicines which may have CHANGED, or have new prescriptions. If we are uncertain of the size of tablets/capsules you have at home, strength may be listed as something that might have changed.        Dose / Directions Last dose taken    * acetaminophen 500 MG tablet   Commonly known as:  TYLENOL   Dose:  500-1000 mg   What changed:  Another medication with the same name was added. Make sure you understand how and when to take each.   Quantity:  20 tablet        Take 1-2 tablets (500-1,000 mg) by mouth every 6 hours as needed for mild pain   Refills:  0        * acetaminophen 500 MG tablet   Commonly known as:  TYLENOL   Dose:  1000 mg   What changed:  You were already taking a medication with the same name, and this prescription was added. Make sure you understand how and when to take each.   Quantity:  60 tablet        Take 2 tablets (1,000 mg) by mouth every 6 hours as needed for pain or fever   Refills:  0        * Notice:  This list has 2 medication(s) that are the same as other medications  prescribed for you. Read the directions carefully, and ask your doctor or other care provider to review them with you.      Our records show that you are taking the medicines listed below. If these are incorrect, please call your family doctor or clinic.        Dose / Directions Last dose taken    haloperidol 10 MG tablet   Commonly known as:  HALDOL   Dose:  10 mg        Take 10 mg by mouth   Refills:  0        INVEGA SUSTENNA IM   Dose:  156 mg        Inject 156 mg into the muscle Qmonthly injection.   Refills:  0        MELATONIN PO   Dose:  5 mg   Indication:  pt unsure of dose        Take 5 mg by mouth nightly as needed   Refills:  0        TAB-A-FAZAL Tabs   Dose:  1 tablet        Take 1 tablet by mouth   Refills:  0        VITAMIN D-1000 MAX ST 1000 UNITS Tabs   Dose:  1000 Units   Generic drug:  cholecalciferol        Take 1,000 Units by mouth   Refills:  0                Prescriptions were sent or printed at these locations (1 Prescription)                   Other Prescriptions                Printed at Department/Unit printer (1 of 1)         acetaminophen (TYLENOL) 500 MG tablet                Orders Needing Specimen Collection     None      Pending Results     No orders found from 6/16/2017 to 6/19/2017.            Pending Culture Results     No orders found from 6/16/2017 to 6/19/2017.            Pending Results Instructions     If you had any lab results that were not finalized at the time of your Discharge, you can call the ED Lab Result RN at [phone]. You will be contacted by this team for any positive Lab results or changes in treatment. The nurses are available 7 days a week from 10A to 6:30P.  You can leave a message 24 hours per day and they will return your call.        Thank you for choosing [facility]       Thank you for choosing [facility] for your care. Our goal is always to provide you with excellent care. Hearing back from our patients is one way we can continue to improve our services.  "Please take a few minutes to complete the written survey that you may receive in the mail after you visit with us. Thank you!        [facility] Information     [employer] lets you send messages to your doctor, view your test results, renew your prescriptions, schedule appointments and more. To sign up, go to [email or web address] . Click on \"Log in\" on the left side of the screen, which will take you to the Welcome page. Then click on \"Sign up Now\" on the right side of the page.     You will be asked to enter the access code listed below, as well as some personal information. Please follow the directions to create your username and password.     Your access code is: [email or web address]  Expires: 2017  6:30 AM     Your access code will  in 90 days. If you need help or a new code, please call your [facility] clinic or [phone].        Care EveryWhere ID     This is your Care EveryWhere ID. This could be used by other organizations to access your [facility] medical records  [id number]        After Visit Summary       This is your record. Keep this with you and show to your community pharmacist(s) and doctor(s) at your next visit.                  "

## 2017-06-18 NOTE — ED NOTES
Pt reports HA that started last night. Pt states he has chronic headaches. Pt states he did not try anything to relieve pain prior to arrival. Denies any other symptoms

## 2017-06-18 NOTE — ED PROVIDER NOTES
History     Chief Complaint   Patient presents with     Headache     HPI  Carlos Alberto Garcia is a 31 year old male who is homeless whiskeys or affective disorder and well-known to the emergency department.  He is frequently in the department malingering.  He comes in now saying that he has a headache that started while he was sleeping.  Has no history of trauma no other neurologic complaints no other concerning symptoms such as fever or rash.  He has normal vital signs.    I have reviewed the Medications, Allergies, Past Medical and Surgical History, and Social History in the [facility] system.  Past Medical History:   Diagnosis Date     Bipolar 2 disorder (H)      Chemical abuse     cocaine, meth, cambis     Dyslipidemia      Hep C w/ coma, chronic (H)      Patient overweight      Schizoaffective disorder      Unspecified essential hypertension      Unspecified hypothyroidism        Review of Systems   Constitutional: Negative for fever.   Musculoskeletal: Negative for neck pain.   Skin: Negative for rash.   Neurological: Positive for headaches.   Psychiatric/Behavioral: Negative for suicidal ideas.   All other systems reviewed and are negative.      Physical Exam   BP: 137/89  Pulse: 61  Temp: 97.5  F (36.4  C)  Resp: 16  Weight: 104.3 kg (230 lb)  SpO2: 99 %  Physical Exam   Constitutional: He is oriented to person, place, and time. No distress.   Unkempt, disheveled   HENT:   Head: Atraumatic.   Mouth/Throat: Oropharynx is clear and moist.   Eyes: EOM are normal. Pupils are equal, round, and reactive to light.   Neck: Neck supple.   Cardiovascular: Normal rate and normal heart sounds.    Pulmonary/Chest: Breath sounds normal.   Lymphadenopathy:     He has no cervical adenopathy.   Neurological: He is oriented to person, place, and time.   Somnolent   Psychiatric: His affect is inappropriate. His speech is delayed and slurred. He is withdrawn.   Nursing note and vitals reviewed.      ED Course     ED Course      Procedures          Medications   acetaminophen (TYLENOL) tablet 650 mg (650 mg Oral Given 6/18/17 0544)            Labs Ordered and Resulted from Time of ED Arrival Up to the Time of Departure from the ED - No data to display         Assessments & Plan (with Medical Decision Making)   31-year-old male with schizoaffective disorder who is homeless well-known to the emergency department here with headache and looking for a place to sleep.  He has a history of malingering.  Don't think he has an acute neurologic problem he was given a dose of Tylenol for his headache and will be discharged.    I have reviewed the nursing notes.    I have reviewed the findings, diagnosis, plan and need for follow up with the patient.    New Prescriptions    ACETAMINOPHEN (TYLENOL) 500 MG TABLET    Take 2 tablets (1,000 mg) by mouth every 6 hours as needed for pain or fever       Final diagnoses:   Malingering   Nonintractable headache, unspecified chronicity pattern, unspecified headache type       6/18/2017   [facility], [place], EMERGENCY DEPARTMENT     Charles Mitchell MD  06/18/17 0613

## 2017-06-18 NOTE — ED NOTES
When this writer returned to pts room, pt had pants down and genitals exposed. Pt instructed to keep himself covered while in the emergency department.

## 2017-06-18 NOTE — ED AVS SNAPSHOT
[facility], [place], Emergency Department    [address] [place] [address]    Phone:  [phone]                                       Carlos Alberto Garcia   MRN: [id number]    Department:  [facility], Emergency Department   Date of Visit:  6/18/2017           After Visit Summary Signature Page     I have received my discharge instructions, and my questions have been answered. I have discussed any challenges I see with this plan with the nurse or doctor.    ..........................................................................................................................................  Patient/Patient Representative Signature      ..........................................................................................................................................  Patient Representative Print Name and Relationship to Patient    ..................................................               ................................................  Date                                            Time    ..........................................................................................................................................  Reviewed by Signature/Title    ...................................................              ..............................................  Date                                                            Time

## 2017-06-19 ENCOUNTER — HOSPITAL ENCOUNTER (EMERGENCY)
Facility: CLINIC | Age: 32
Discharge: LEFT BEFORE DISCHARGE | End: 2017-06-19
Payer: MEDICAID

## 2017-06-19 ENCOUNTER — HOSPITAL ENCOUNTER (EMERGENCY)
Facility: CLINIC | Age: 32
Discharge: HOME | End: 2017-06-19
Admitting: EMERGENCY MEDICINE

## 2017-06-19 VITALS
WEIGHT: 230 LBS | SYSTOLIC BLOOD PRESSURE: 131 MMHG | HEIGHT: 72 IN | BODY MASS INDEX: 31.15 KG/M2 | TEMPERATURE: 98.1 F | RESPIRATION RATE: 16 BRPM | HEART RATE: 65 BPM | DIASTOLIC BLOOD PRESSURE: 82 MMHG | OXYGEN SATURATION: 100 %

## 2017-06-19 DIAGNOSIS — M79.676 PAIN OF TOE, UNSPECIFIED LATERALITY: ICD-10-CM

## 2017-06-19 PROCEDURE — 40000268 ZZH STATISTIC NO CHARGES

## 2017-06-19 NOTE — ED NOTES
PT left AMA, without being seen by MD. PT left without singing any forms. Pt ambulated out the main ER entrance without difficulty.

## 2017-06-19 NOTE — ED NOTES
Triage Assessment & Note:    /82  Pulse 65  Temp 98.1  F (36.7  C) (Oral)  Resp 16  Ht 1.829 m (6')  Wt 104.3 kg (230 lb)  SpO2 100%  BMI 31.19 kg/m2    Patient presents with: c/o bilateral foot pain. PT is homeless. PT was able to ambulate without difficulty to room 22.     Home Treatments/Remedies: none    Febrile / Afebrile? afebrile    Duration of C/o:  20 days    Marvin Palumbo  June 19, 2017

## 2017-06-20 ENCOUNTER — HOSPITAL ENCOUNTER (EMERGENCY)
Facility: CLINIC | Age: 32
Discharge: HOME | End: 2017-06-20
Attending: EMERGENCY MEDICINE
Payer: MEDICAID

## 2017-06-20 ENCOUNTER — HOSPITAL ENCOUNTER (EMERGENCY)
Facility: CLINIC | Age: 32
Discharge: HOME | End: 2017-06-20
Attending: EMERGENCY MEDICINE | Admitting: EMERGENCY MEDICINE
Payer: MEDICAID

## 2017-06-20 VITALS
SYSTOLIC BLOOD PRESSURE: 147 MMHG | OXYGEN SATURATION: 97 % | RESPIRATION RATE: 15 BRPM | TEMPERATURE: 98 F | DIASTOLIC BLOOD PRESSURE: 93 MMHG

## 2017-06-20 VITALS
OXYGEN SATURATION: 98 % | RESPIRATION RATE: 14 BRPM | SYSTOLIC BLOOD PRESSURE: 134 MMHG | WEIGHT: 230 LBS | HEIGHT: 72 IN | BODY MASS INDEX: 31.15 KG/M2 | TEMPERATURE: 97.4 F | DIASTOLIC BLOOD PRESSURE: 83 MMHG

## 2017-06-20 VITALS
TEMPERATURE: 98.2 F | SYSTOLIC BLOOD PRESSURE: 127 MMHG | RESPIRATION RATE: 16 BRPM | DIASTOLIC BLOOD PRESSURE: 82 MMHG | OXYGEN SATURATION: 97 % | WEIGHT: 229.94 LBS | BODY MASS INDEX: 31.14 KG/M2 | HEIGHT: 72 IN

## 2017-06-20 DIAGNOSIS — F25.9 SCHIZOAFFECTIVE DISORDER, UNSPECIFIED TYPE (H): ICD-10-CM

## 2017-06-20 DIAGNOSIS — Z76.5 MALINGERING: ICD-10-CM

## 2017-06-20 PROCEDURE — 99207 ZZC APP CREDIT; MD BILLING SHARED VISIT: CPT | Mod: Z6 | Performed by: EMERGENCY MEDICINE

## 2017-06-20 PROCEDURE — 99283 EMERGENCY DEPT VISIT LOW MDM: CPT | Performed by: EMERGENCY MEDICINE

## 2017-06-20 PROCEDURE — 25000132 ZZH RX MED GY IP 250 OP 250 PS 637: Performed by: EMERGENCY MEDICINE

## 2017-06-20 PROCEDURE — 99282 EMERGENCY DEPT VISIT SF MDM: CPT | Mod: Z6 | Performed by: EMERGENCY MEDICINE

## 2017-06-20 RX ORDER — NAPROXEN 500 MG/1
500 TABLET ORAL ONCE
Status: COMPLETED | OUTPATIENT
Start: 2017-06-20 | End: 2017-06-20

## 2017-06-20 RX ORDER — HALOPERIDOL 10 MG/1
10 TABLET ORAL ONCE
Status: COMPLETED | OUTPATIENT
Start: 2017-06-20 | End: 2017-06-20

## 2017-06-20 RX ORDER — ACETAMINOPHEN 500 MG
1000 TABLET ORAL ONCE
Status: COMPLETED | OUTPATIENT
Start: 2017-06-20 | End: 2017-06-20

## 2017-06-20 RX ADMIN — HALOPERIDOL 10 MG: 10 TABLET ORAL at 02:26

## 2017-06-20 RX ADMIN — ACETAMINOPHEN 1000 MG: 500 TABLET, FILM COATED ORAL at 02:26

## 2017-06-20 RX ADMIN — NAPROXEN 500 MG: 500 TABLET ORAL at 19:49

## 2017-06-20 ASSESSMENT — ENCOUNTER SYMPTOMS
HEADACHES: 0
ARTHRALGIAS: 0
COLOR CHANGE: 0
FEVER: 0
SLEEP DISTURBANCE: 1
CONFUSION: 1
DYSPHORIC MOOD: 0
EYE REDNESS: 0
SHORTNESS OF BREATH: 0
CONFUSION: 0
DIFFICULTY URINATING: 0
ABDOMINAL PAIN: 0
NECK STIFFNESS: 0
FEVER: 0
DYSPHORIC MOOD: 1
SHORTNESS OF BREATH: 0
HALLUCINATIONS: 1
ABDOMINAL PAIN: 0

## 2017-06-20 NOTE — DISCHARGE INSTRUCTIONS
Follow-up in Clinic for outpatient services- see [facility] for location and details.

## 2017-06-20 NOTE — ED AVS SNAPSHOT
[facility], Emergency Department    [address] [facility] [address]    Phone:  [phone]                                       Carlos Alberto Garcia   MRN: [id number]    Department:  [facility], Emergency Department   Date of Visit:  6/20/2017           Patient Information     Date Of Birth          1985        Your diagnoses for this visit were:     Schizoaffective disorder, unspecified type (H)        You were seen by Alphonse Lilly MD.        Discharge Instructions       Please make an appointment to follow up with Your Primary Care Provider as soon as possible for Invega injection.      Understanding Schizoaffective Disorder  Schizoaffective disorder is a serious and puzzling brain disorder. It combines symptoms of two other disorders--bipolar disorder and schizophrenia. The symptoms are often severe and ongoing. They can disrupt lives and cause great emotional pain. Yet there is reason for hope. Talk to your doctor or mental health professional.    What are the symptoms?  The symptoms of schizoaffective disorder can vary greatly. People with this disorder may see or hear things that aren t there (hallucinations). Or they may hold false, fixed beliefs (delusions). These can occur without any mood changes. At times, people with this disorder may seem withdrawn, listless, and remote. They may also have extreme mood swings. They may feel intensely happy for a time. Later, they may be very depressed. In some cases, they might have only lows without the highs. They might also have problems with sleep or a change in appetite. They may become more or less talkative, lose focus in their thinking, or even have thoughts of suicide.  What causes it?  The causes of schizoaffective disorder aren t fully understood. It is known that this disorder runs in families. Certain chemicals in the brain also play a role. In some people, abuse or neglect may trigger the disorder.  Finding help  Although there is  no cure at present, treatment with medications and therapy may be helpful. In addition, many support services exist for people with schizoaffective disorders and their families.  Medications  Medications may relieve many symptoms of schizoaffective disorder. These may include antipsychotic medications, antidepressant medications, or mood stabilizers. If your loved one is troubled by medication side effects, tell his or her doctor. Changing the dose or type of medication may help. Encourage your loved one to keep taking his or her medications. Stopping them may cause symptoms to come back.  Supportive therapy  A counselor can offer your loved one advice and support. Social workers may help with work, money, or housing issues. Friends and family members may also need support. Learning more about schizoaffective disorder can help you cope. Learn how best to help with your loved one s care.  Date Last Reviewed: 2/27/2015 2000-2017 [employer]. [address] [place] [address]. All rights reserved. This information is not intended as a substitute for professional medical care. Always follow your healthcare professional's instructions.          24 Hour Appointment Hotline       To make an appointment at any [facility], call [phone] ([phone]). If you don't have a family doctor or clinic, we will help you find one. [facility] clinics are conveniently located to serve the needs of you and your family.             Review of your medicines      Our records show that you are taking the medicines listed below. If these are incorrect, please call your family doctor or clinic.        Dose / Directions Last dose taken    * acetaminophen 500 MG tablet   Commonly known as:  TYLENOL   Dose:  500-1000 mg   Quantity:  20 tablet        Take 1-2 tablets (500-1,000 mg) by mouth every 6 hours as needed for mild pain   Refills:  0        * acetaminophen 500 MG tablet   Commonly known as:  TYLENOL    Dose:  1000 mg   Quantity:  60 tablet        Take 2 tablets (1,000 mg) by mouth every 6 hours as needed for pain or fever   Refills:  0        haloperidol 10 MG tablet   Commonly known as:  HALDOL   Dose:  10 mg        Take 10 mg by mouth   Refills:  0        INVEGA SUSTENNA IM   Dose:  156 mg        Inject 156 mg into the muscle Qmonthly injection.   Refills:  0        MELATONIN PO   Dose:  5 mg   Indication:  pt unsure of dose        Take 5 mg by mouth nightly as needed   Refills:  0        TAB-A-FAZAL Tabs   Dose:  1 tablet        Take 1 tablet by mouth   Refills:  0        VITAMIN D-1000 MAX ST 1000 UNITS Tabs   Dose:  1000 Units   Generic drug:  cholecalciferol        Take 1,000 Units by mouth   Refills:  0        * Notice:  This list has 2 medication(s) that are the same as other medications prescribed for you. Read the directions carefully, and ask your doctor or other care provider to review them with you.            Orders Needing Specimen Collection     None      Pending Results     No orders found from 6/18/2017 to 6/21/2017.            Pending Culture Results     No orders found from 6/18/2017 to 6/21/2017.            Pending Results Instructions     If you had any lab results that were not finalized at the time of your Discharge, you can call the ED Lab Result RN at [phone]. You will be contacted by this team for any positive Lab results or changes in treatment. The nurses are available 7 days a week from 10A to 6:30P.  You can leave a message 24 hours per day and they will return your call.        Thank you for choosing [facility]       Thank you for choosing [facility] for your care. Our goal is always to provide you with excellent care. Hearing back from our patients is one way we can continue to improve our services. Please take a few minutes to complete the written survey that you may receive in the mail after you visit with us. Thank you!        [facility] Information     [employer] lets you send  "messages to your doctor, view your test results, renew your prescriptions, schedule appointments and more. To sign up, go to [email or web address] . Click on \"Log in\" on the left side of the screen, which will take you to the Welcome page. Then click on \"Sign up Now\" on the right side of the page.     You will be asked to enter the access code listed below, as well as some personal information. Please follow the directions to create your username and password.     Your access code is: [email or web address]  Expires: 2017  6:30 AM     Your access code will  in 90 days. If you need help or a new code, please call your [place] clinic or [phone].        Care EveryWhere ID     This is your Care EveryWhere ID. This could be used by other organizations to access your [facility] medical records  [id number]        After Visit Summary       This is your record. Keep this with you and show to your community pharmacist(s) and doctor(s) at your next visit.                  "

## 2017-06-20 NOTE — ED PROVIDER NOTES
History     Chief Complaint   Patient presents with     Medication Refill     HPI  Carlos Alberto Garcia is a 31 year old male in the ER with history of schizoaffective disorder who presents for concerns of medication refill.  This is actually his 3rd visit today.  Patient notes that he has missed his Invega injection.  Is not sure last time he had it.  Had been getting at the [facility] but has been kicked out of there.  Doesn't remember where he supposed ago now.  Has been having some increasing hallucinations.  Denies any suicidal ideation.  Denies any drug use.  Does not feel depressed currently.  [place] [address]  Denies any other symptoms are some mild foot pain.  States he walks around a lot.  Is requesting Tylenol for this.    I have reviewed the Medications, Allergies, Past Medical and Surgical History, and Social History in the [facility] system.  Past Medical History:   Diagnosis Date     Bipolar 2 disorder (H)      Chemical abuse     cocaine, meth, cambis     Dyslipidemia      Hep C w/ coma, chronic (H)      Patient overweight      Schizoaffective disorder      Unspecified essential hypertension      Unspecified hypothyroidism        Past Surgical History:   Procedure Laterality Date     HAND SURGERY      L       No family history on file.    Social History   Substance Use Topics     Smoking status: Current Every Day Smoker     Packs/day: 1.00     Types: Cigarettes     Smokeless tobacco: Current User     Alcohol use No       Review of Systems   Constitutional: Negative for fever.   Respiratory: Negative for shortness of breath.    Cardiovascular: Negative for chest pain.   Gastrointestinal: Negative for abdominal pain.   Psychiatric/Behavioral: Positive for confusion and hallucinations. Negative for dysphoric mood and suicidal ideas.   All other systems reviewed and are negative.      Physical Exam   BP: (!) 147/93  Heart Rate: 93  Temp: 98  F (36.7  C)  Resp: 15  SpO2: 97 %  Physical Exam  Vital  Signs and Nursing Notes Reviewed.  General:  NAD  HEENT: Oropharynx clear.  No obvious signs of trauma.  PERRL.  EOMI  Neck: Supple.  No lymphadenopathy.  Cardiac: RRR.  No murmurs, rubs or gallops  Lungs: Clear bilaterally.  Normal respiratory rate.    Abdomen:  Soft, Nontender, no rebound or guarding.  Back: No CVA tenderness.  Skin:  No rash.  No diaphoresis  Extremities:  No cyanosis, clubbing, or edema.  Neurological:  CN II-XII intact, Strength intact, Sensation intact, No pronator drift, Normal gait.  Pulse:  Symmetric in bilateral upper and lower extremities.    ED Course     ED Course     Procedures             Critical Care time:  none               Labs Ordered and Resulted from Time of ED Arrival Up to the Time of Departure from the ED - No data to display         Assessments & Plan (with Medical Decision Making)     31 year old with history of schizoaffective disorder well-known to the ER.  I cannot get him his index shot here but will give him a dose of Haldol which she's had in the past.  He needs to follow-up with his  in his clinic to get his shots restarted.  Denies any suicidal ideation.  No need for acute mental health admission.  Patient is discharged home.    I have reviewed the nursing notes.    I have reviewed the findings, diagnosis, plan and need for follow up with the patient.    New Prescriptions    No medications on file       Final diagnoses:   Schizoaffective disorder, unspecified type (H)       6/20/2017   [facility], [place], EMERGENCY DEPARTMENT     Alphonse Lilly MD  06/20/17 0219

## 2017-06-20 NOTE — ED AVS SNAPSHOT
[facility], Emergency Department    [address] [facility] [address]    Phone:  [phone]                                       Carlos Alberto Garcia   MRN: [id number]    Department:  [facility], Emergency Department   Date of Visit:  6/20/2017           Patient Information     Date Of Birth          1985        Your diagnoses for this visit were:     Malingering        You were seen by Jose Peralta MD.        Discharge Instructions       Please make an appointment to follow up with Your Primary Care Provider as soon as possible if you have any concerns.      24 Hour Appointment Hotline       To make an appointment at any [facility] clinic, call [phone] ([phone]). If you don't have a family doctor or clinic, we will help you find one. [facility] clinics are conveniently located to serve the needs of you and your family.             Review of your medicines      Our records show that you are taking the medicines listed below. If these are incorrect, please call your family doctor or clinic.        Dose / Directions Last dose taken    * acetaminophen 500 MG tablet   Commonly known as:  TYLENOL   Dose:  500-1000 mg   Quantity:  20 tablet        Take 1-2 tablets (500-1,000 mg) by mouth every 6 hours as needed for mild pain   Refills:  0        * acetaminophen 500 MG tablet   Commonly known as:  TYLENOL   Dose:  1000 mg   Quantity:  60 tablet        Take 2 tablets (1,000 mg) by mouth every 6 hours as needed for pain or fever   Refills:  0        haloperidol 10 MG tablet   Commonly known as:  HALDOL   Dose:  10 mg        Take 10 mg by mouth   Refills:  0        INVEGA SUSTENNA IM   Dose:  156 mg        Inject 156 mg into the muscle Qmonthly injection.   Refills:  0        MELATONIN PO   Dose:  5 mg   Indication:  pt unsure of dose        Take 5 mg by mouth nightly as needed   Refills:  0        TAB-A-FAZAL Tabs   Dose:  1 tablet        Take 1 tablet by mouth   Refills:  0         "VITAMIN D-1000 MAX ST 1000 UNITS Tabs   Dose:  1000 Units   Generic drug:  cholecalciferol        Take 1,000 Units by mouth   Refills:  0        * Notice:  This list has 2 medication(s) that are the same as other medications prescribed for you. Read the directions carefully, and ask your doctor or other care provider to review them with you.            Orders Needing Specimen Collection     None      Pending Results     No orders found from 2017 to 2017.            Pending Culture Results     No orders found from 2017 to 2017.            Pending Results Instructions     If you had any lab results that were not finalized at the time of your Discharge, you can call the ED Lab Result RN at [phone]. You will be contacted by this team for any positive Lab results or changes in treatment. The nurses are available 7 days a week from 10A to 6:30P.  You can leave a message 24 hours per day and they will return your call.        Thank you for choosing [facility]       Thank you for choosing [facility] for your care. Our goal is always to provide you with excellent care. Hearing back from our patients is one way we can continue to improve our services. Please take a few minutes to complete the written survey that you may receive in the mail after you visit with us. Thank you!        [facility] Information     [employer] lets you send messages to your doctor, view your test results, renew your prescriptions, schedule appointments and more. To sign up, go to [email or web address] . Click on \"Log in\" on the left side of the screen, which will take you to the Welcome page. Then click on \"Sign up Now\" on the right side of the page.     You will be asked to enter the access code listed below, as well as some personal information. Please follow the directions to create your username and password.     Your access code is: [email or web address]  Expires: 2017  6:30 AM     Your access code will  in 90 days. If you " need help or a new code, please call your [facility] clinic or [phone].        Care EveryWhere ID     This is your Care EveryWhere ID. This could be used by other organizations to access your [facility] medical records  [id number]        After Visit Summary       This is your record. Keep this with you and show to your community pharmacist(s) and doctor(s) at your next visit.

## 2017-06-20 NOTE — PROGRESS NOTES
Care Coordinator Progress Note     Admission Date/Time:  6/20/2017  Attending MD:  José Watson MD     Data  Chart reviewed, discussed with interdisciplinary team.   Patient was admitted for: Data Unavailable.    Concerns with insurance coverage for discharge needs: insurance coverage is inadequate and no insurance coverage.  Current Living Situation: Patient is homeless.  Support System: Uninvolved  Services Involved: none  Transportation: Public transportation  Barriers to Discharge: history of non-alliance, homeless and psychiatric illness    Coordination of Care and Referrals: Provided patient/family with options for handbook of the streets, [facility].      Assessment   At bedside to discuss shelters, available community resources, & Handbook of the Streets [facility] pamphlet with pt. He states he is familiar of where to go; encouraged pt to apply for medical assistance; # and address in pamphlet. Pt napping and requests blanket; agreeable to accept pamphlet and bus token from writer.       Plan  Anticipated Discharge Date: 06/20/17  Anticipated Discharge Plan:  DC with provider recommendations.    Suzie Portillo RN CC  [facility]/6D Obs  [phone]

## 2017-06-20 NOTE — ED AVS SNAPSHOT
[facility], Emergency Department    [address] [facility] [facility] [address]    Phone:  [phone]                                       Carlos Alberto Garcia   MRN: [id number]    Department:  [facility], Emergency Department   Date of Visit:  6/20/2017           Patient Information     Date Of Birth          1985        Your diagnoses for this visit were:     Schizoaffective disorder, unspecified type (H)        You were seen by José Watson MD.        Discharge Instructions       Follow-up in Clinic for outpatient services- see [facility] Stre[facility] for location and details.    24 Hour Appointment Hotline       To make an appointment at any [place] clinic, call [phone] ([phone]). If you don't have a family doctor or clinic, we will help you find one. [facility] clinics are conveniently located to serve the needs of you and your family.             Review of your medicines      Our records show that you are taking the medicines listed below. If these are incorrect, please call your family doctor or clinic.        Dose / Directions Last dose taken    * acetaminophen 500 MG tablet   Commonly known as:  TYLENOL   Dose:  500-1000 mg   Quantity:  20 tablet        Take 1-2 tablets (500-1,000 mg) by mouth every 6 hours as needed for mild pain   Refills:  0        * acetaminophen 500 MG tablet   Commonly known as:  TYLENOL   Dose:  1000 mg   Quantity:  60 tablet        Take 2 tablets (1,000 mg) by mouth every 6 hours as needed for pain or fever   Refills:  0        haloperidol 10 MG tablet   Commonly known as:  HALDOL   Dose:  10 mg        Take 10 mg by mouth   Refills:  0        INVEGA SUSTENNA IM   Dose:  156 mg        Inject 156 mg into the muscle Qmonthly injection.   Refills:  0        MELATONIN PO   Dose:  5 mg   Indication:  pt unsure of dose        Take 5 mg by mouth nightly as needed   Refills:  0        TAB-A-FAZAL Tabs   Dose:  1 tablet        Take 1 tablet by mouth   Refills:  0         "VITAMIN D-1000 MAX ST 1000 UNITS Tabs   Dose:  1000 Units   Generic drug:  cholecalciferol        Take 1,000 Units by mouth   Refills:  0        * Notice:  This list has 2 medication(s) that are the same as other medications prescribed for you. Read the directions carefully, and ask your doctor or other care provider to review them with you.            Orders Needing Specimen Collection     None      Pending Results     No orders found from 2017 to 2017.            Pending Culture Results     No orders found from 2017 to 2017.            Pending Results Instructions     If you had any lab results that were not finalized at the time of your Discharge, you can call the ED Lab Result RN at [phone]. You will be contacted by this team for any positive Lab results or changes in treatment. The nurses are available 7 days a week from 10A to 6:30P.  You can leave a message 24 hours per day and they will return your call.        Thank you for choosing [facility]       Thank you for choosing [facility] for your care. Our goal is always to provide you with excellent care. Hearing back from our patients is one way we can continue to improve our services. Please take a few minutes to complete the written survey that you may receive in the mail after you visit with us. Thank you!        [employer] Information     [email or web address] lets you send messages to your doctor, view your test results, renew your prescriptions, schedule appointments and more. To sign up, go to [email or web address] . Click on \"Log in\" on the left side of the screen, which will take you to the Welcome page. Then click on \"Sign up Now\" on the right side of the page.     You will be asked to enter the access code listed below, as well as some personal information. Please follow the directions to create your username and password.     Your access code is: [email or web address]  Expires: 2017  6:30 AM     Your access code will  in 90 days. If you " need help or a new code, please call your [facility] clinic or [phone].        Care EveryWhere ID     This is your Care EveryWhere ID. This could be used by other organizations to access your [facility] medical records  [id number]        After Visit Summary       This is your record. Keep this with you and show to your community pharmacist(s) and doctor(s) at your next visit.

## 2017-06-20 NOTE — ED AVS SNAPSHOT
[facility], [place], Emergency Department    [address] [place] [address]    Phone:  [phone]                                       Carlos Alberto Garcia   MRN: [id number]    Department:  [facility], Emergency Department   Date of Visit:  6/20/2017           After Visit Summary Signature Page     I have received my discharge instructions, and my questions have been answered. I have discussed any challenges I see with this plan with the nurse or doctor.    ..........................................................................................................................................  Patient/Patient Representative Signature      ..........................................................................................................................................  Patient Representative Print Name and Relationship to Patient    ..................................................               ................................................  Date                                            Time    ..........................................................................................................................................  Reviewed by Signature/Title    ...................................................              ..............................................  Date                                                            Time

## 2017-06-20 NOTE — ED AVS SNAPSHOT
[facility], [place], Emergency Department    [address] [place] [address]    Phone:  [phone]                                       Carlos Alberto Garcia   MRN: [id number]    Department:  [facility], Emergency Department   Date of Visit:  6/20/2017           After Visit Summary Signature Page     I have received my discharge instructions, and my questions have been answered. I have discussed any challenges I see with this plan with the nurse or doctor.    ..........................................................................................................................................  Patient/Patient Representative Signature      ..........................................................................................................................................  Patient Representative Print Name and Relationship to Patient    ..................................................               ................................................  Date                                            Time    ..........................................................................................................................................  Reviewed by Signature/Title    ...................................................              ..............................................  Date                                                            Time

## 2017-06-20 NOTE — ED AVS SNAPSHOT
[facility], [place], Emergency Department    [address] [place] [address]    Phone:  [phone]                                       Carlos Alberto Garcia   MRN: [id number]    Department:  [facility], Emergency Department   Date of Visit:  6/20/2017           After Visit Summary Signature Page     I have received my discharge instructions, and my questions have been answered. I have discussed any challenges I see with this plan with the nurse or doctor.    ..........................................................................................................................................  Patient/Patient Representative Signature      ..........................................................................................................................................  Patient Representative Print Name and Relationship to Patient    ..................................................               ................................................  Date                                            Time    ..........................................................................................................................................  Reviewed by Signature/Title    ...................................................              ..............................................  Date                                                            Time

## 2017-06-20 NOTE — ED PROVIDER NOTES
History     Chief Complaint   Patient presents with     Foot Pain     HPI  Carlos Alberto Garcia is a 31 year old male who has a history of schizoaffective disorder and homelessness who is well-known to the emergency department for multiple and frequent visits.  Patient was seen here earlier today seeking his Invega shot.  The patient was given a dose of Haldol which he has had previously.  Patient returns to the emergency department complaining of difficulty sleeping and depression.  He denies any suicide ideation.  He denies current hallucinations.  The patient denies any recent illness.  He denies any medical concerns.  He denies any area of pain including chest pain and abdominal pain.  He denies fevers.  Denies difficulty breathing.  Denies abdominal pain.  Denies nausea and vomiting.  The nursing note indicates the patient is complaining of some mild foot pain from walking too much.  He does not discuss that with me.  He denies any recent trauma.  Patient states that he was kicked out of the clinic that he was previously attending.  He states he does not know if he has a  or where he is supposed to follow-up.    I have reviewed the Medications, Allergies, Past Medical and Surgical History, and Social History in the Epic system.    Review of Systems   Constitutional: Negative for fever.   HENT: Negative for congestion.    Eyes: Negative for redness.   Respiratory: Negative for shortness of breath.    Cardiovascular: Negative for chest pain.   Gastrointestinal: Negative for abdominal pain.   Genitourinary: Negative for difficulty urinating.   Musculoskeletal: Negative for arthralgias and neck stiffness.   Skin: Negative for color change.   Neurological: Negative for headaches.   Psychiatric/Behavioral: Positive for dysphoric mood and sleep disturbance. Negative for confusion.   All other systems reviewed and are negative.      Physical Exam   BP: 127/82  Heart Rate: 85  Temp: 98.2  F (36.8  C)  Resp:  16  Height: 182.9 cm (6')  Weight: 104.3 kg (229 lb 15 oz)  SpO2: 97 %  Physical Exam   Constitutional: He appears well-developed and well-nourished. No distress.   Sleeping in bed.  Awakens easily and answers questions appropriately.  Unkempt   HENT:   Head: Normocephalic and atraumatic.   Mouth/Throat: Oropharynx is clear and moist. No oropharyngeal exudate.   Eyes: Pupils are equal, round, and reactive to light. No scleral icterus.   Cardiovascular: Normal rate, regular rhythm, normal heart sounds and intact distal pulses.    Pulmonary/Chest: Effort normal and breath sounds normal. No respiratory distress.   Abdominal: Soft. Bowel sounds are normal. There is no tenderness.   Musculoskeletal: Normal range of motion. He exhibits no edema or tenderness.        Right foot: There is no tenderness, no swelling and normal capillary refill.        Left foot: There is no tenderness, no swelling and normal capillary refill.   Neurological: He is alert. No cranial nerve deficit. Coordination normal.   Skin: Skin is warm and dry. No rash noted. He is not diaphoretic.   Nursing note and vitals reviewed.      ED Course     ED Course     Procedures            Critical Care time:    Given Handbook of the Stre[facility] by ED patient care coordinator.     Labs Ordered and Resulted from Time of ED Arrival Up to the Time of Departure from the ED - No data to display         Assessments & Plan (with Medical Decision Making)   31 year old male with history of schizoaffective disorder here to the emergency department complaining of insomnia and depression.  He has schizoaffective disorder and has not been receiving outpatient care or his medications.  He was seen here earlier today and given a dose of Haldol.  The patient does not have any suicide ideations or overt psychosis.  The patient does not have any medical concerns and has a normal exam.  The patient was given the Handbook of the streets by the ED care coordinator.  He should follow  up in that clinic for further mental health and medical care.    I have reviewed the nursing notes.    I have reviewed the findings, diagnosis, plan and need for follow up with the patient.    Discharge Medication List as of 6/20/2017  2:34 PM          Final diagnoses:   Schizoaffective disorder, unspecified type (H)       6/20/2017   [facility], [place], EMERGENCY DEPARTMENT     José Watson MD  06/20/17 5450

## 2017-06-20 NOTE — ED NOTES
Pt arrived to the ED with c/o bilateral foot pain. Vitals stable, afebrile. Pt reports he is homeless and has not taken any of his daily medications in a while.

## 2017-06-20 NOTE — DISCHARGE INSTRUCTIONS
Please make an appointment to follow up with Your Primary Care Provider as soon as possible for Invega injection.      Understanding Schizoaffective Disorder  Schizoaffective disorder is a serious and puzzling brain disorder. It combines symptoms of two other disorders--bipolar disorder and schizophrenia. The symptoms are often severe and ongoing. They can disrupt lives and cause great emotional pain. Yet there is reason for hope. Talk to your doctor or mental health professional.    What are the symptoms?  The symptoms of schizoaffective disorder can vary greatly. People with this disorder may see or hear things that aren t there (hallucinations). Or they may hold false, fixed beliefs (delusions). These can occur without any mood changes. At times, people with this disorder may seem withdrawn, listless, and remote. They may also have extreme mood swings. They may feel intensely happy for a time. Later, they may be very depressed. In some cases, they might have only lows without the highs. They might also have problems with sleep or a change in appetite. They may become more or less talkative, lose focus in their thinking, or even have thoughts of suicide.  What causes it?  The causes of schizoaffective disorder aren t fully understood. It is known that this disorder runs in families. Certain chemicals in the brain also play a role. In some people, abuse or neglect may trigger the disorder.  Finding help  Although there is no cure at present, treatment with medications and therapy may be helpful. In addition, many support services exist for people with schizoaffective disorders and their families.  Medications  Medications may relieve many symptoms of schizoaffective disorder. These may include antipsychotic medications, antidepressant medications, or mood stabilizers. If your loved one is troubled by medication side effects, tell his or her doctor. Changing the dose or type of medication may help. Encourage your  loved one to keep taking his or her medications. Stopping them may cause symptoms to come back.  Supportive therapy  A counselor can offer your loved one advice and support. Social workers may help with work, money, or housing issues. Friends and family members may also need support. Learning more about schizoaffective disorder can help you cope. Learn how best to help with your loved one s care.  Date Last Reviewed: 2/27/2015 2000-2017 [employer]. [address] [place] [address], [place] [address]. All rights reserved. This information is not intended as a substitute for professional medical care. Always follow your healthcare professional's instructions.

## 2017-06-21 NOTE — ED PROVIDER NOTES
History     Chief Complaint   Patient presents with     Foot Pain     HPI  Carlos Alberto Garcia is a 31 year old male who states he is having bilateral foot pain. He was just discharged from the ED a couple hours ago. He states he just wants us to let him stay in the ED, wrap his feet and allow him to rest.     I have reviewed the Medications, Allergies, Past Medical and Surgical History, and Social History in the Epic system.    Review of Systems   All other systems reviewed and are negative.      Physical Exam   BP: 134/83  Heart Rate: 84  Temp: 97.4  F (36.3  C)  Resp: 14  Height: 182.9 cm (6')  Weight: 104.3 kg (230 lb)  SpO2: 98 %  Physical Exam   Constitutional: He is oriented to person, place, and time. He appears well-developed and well-nourished. No distress.   HENT:   Head: Normocephalic and atraumatic.   Eyes: No scleral icterus.   Neck: Normal range of motion. Neck supple.   Cardiovascular: Normal rate.    Pulmonary/Chest: Effort normal.   Musculoskeletal:   Bilateral foot exam shows no signs of infection, trauma or other concerning pathology. He is disheveled.    Neurological: He is alert and oriented to person, place, and time.   Skin: Skin is warm and dry. No rash noted. He is not diaphoretic. No erythema. No pallor.   Psychiatric: His affect is angry.       ED Course     ED Course     Procedures             Critical Care time:  none               Labs Ordered and Resulted from Time of ED Arrival Up to the Time of Departure from the ED - No data to display         Assessments & Plan (with Medical Decision Making)   This is a 32 y/o male coming to the ED with concerns for bilateral foot pain. He is very well known for malingering. Today he was already seen twice for various complaints. His foot exam is reassuring. He will be discharged with naproxen. He is malingering stating he just wants to stay here to rest her feet.     I have reviewed the nursing notes.    I have reviewed the findings, diagnosis,  plan and need for follow up with the patient.    Discharge Medication List as of 6/20/2017  7:54 PM          Final diagnoses:   Malingering       6/20/2017   [facility], [place], EMERGENCY DEPARTMENT     Jose Peralta MD  06/20/17 2028

## 2017-06-23 ENCOUNTER — HOSPITAL ENCOUNTER (EMERGENCY)
Facility: CLINIC | Age: 32
Discharge: HOME | End: 2017-06-23
Attending: EMERGENCY MEDICINE | Admitting: EMERGENCY MEDICINE
Payer: MEDICAID

## 2017-06-23 VITALS
OXYGEN SATURATION: 99 % | BODY MASS INDEX: 31.87 KG/M2 | TEMPERATURE: 98.7 F | DIASTOLIC BLOOD PRESSURE: 98 MMHG | WEIGHT: 235.3 LBS | HEIGHT: 72 IN | SYSTOLIC BLOOD PRESSURE: 139 MMHG | RESPIRATION RATE: 14 BRPM | HEART RATE: 106 BPM

## 2017-06-23 DIAGNOSIS — S90.821A BLISTER OF RIGHT FOOT, INITIAL ENCOUNTER: ICD-10-CM
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
## 2017-06-23 PROCEDURE — 99282 EMERGENCY DEPT VISIT SF MDM: CPT | Performed by: EMERGENCY MEDICINE

## 2017-06-23 PROCEDURE — 99282 EMERGENCY DEPT VISIT SF MDM: CPT | Mod: Z6 | Performed by: EMERGENCY MEDICINE

## 2017-06-23 ASSESSMENT — ENCOUNTER SYMPTOMS
WOUND: 1
COLOR CHANGE: 0
EYE REDNESS: 0
NECK STIFFNESS: 0
ABDOMINAL PAIN: 0
HEADACHES: 0
CONFUSION: 0
FEVER: 0
ARTHRALGIAS: 0
DIFFICULTY URINATING: 0
SHORTNESS OF BREATH: 0

## 2017-06-23 NOTE — ED NOTES
"Pt presents ambulatory to triage from streets. Pt states has had wounds at bilateral foot for months states \"it's time to take care of theses. Denies fevers. Has ace wraps in place. Pt very agitated in triage. Asked Pt about split lip states \" it's fucking fine\". Bleeding controlled. Pt does not appear in acute distress.   "

## 2017-06-23 NOTE — DISCHARGE INSTRUCTIONS
Blister (Adult)  A blister is a raised area of skin with clear, watery fluid inside. A blister can occur when the skin is damaged. Blisters can hurt when they are pressed, or if they break open.  Blisters can be caused in many ways. This can happen if the skin is rubbed too hard or often. Or they can occur if the skin is hurt by the sun, a virus, or even a medicine.  Most blisters need little treatment. They often dry up and go away in a few days to weeks after the cause is stopped. A blister may need to be cleaned. A broken (open) blister may be bandaged to prevent infection. Blisters caused by insect bites or drug reactions may be more serious. These should be looked at by a healthcare provider.  Home care  Your healthcare provider may prescribe pain medicine. He or she may also prescribe an antibiotic cream or ointment to put on an open blister. Follow all instructions when using these medicines.  General care:    Follow all instructions on how to care for the blister. If a bandage was put on, change the bandage as instructed. .    If the blister breaks, the area will leak a clear fluid for a day or 2. Wash the area with soap and water every day or as advised by your healthcare provider.    You may use over-the-counter pain medicines to control pain, unless another medicine was prescribed. If you have chronic liver or kidney disease, talk with your healthcare provider before using these medicines. Also talk with your provider if you've had a stomach ulcer or gastrointestinal bleeding.  Follow-up care  Follow up with your healthcare provider, or as advised.  When to seek medical advice  Call your healthcare provider right away if any of these occur:    Fever of 100.4 F (38 C) or higher    Redness or swelling that is new or gets worse    Foul-smelling fluid leaking from the blister    Pain doesn t go away, or gets worse    Increase in size of the blister    Blister doesn t get better after several days  Date Last  Reviewed: 9/1/2016 2000-2017 [employer]. [address] [place] [address], [place] [address]. All rights reserved. This information is not intended as a substitute for professional medical care. Always follow your healthcare professional's instructions.

## 2017-06-23 NOTE — ED AVS SNAPSHOT
[facility], Emergency Department    [address] [address]    Phone:  [phone]                                       Carlos Alberto Garcia   MRN: [id number]    Department:  [facility], Emergency Department   Date of Visit:  6/23/2017           Patient Information     Date Of Birth          1985        Your diagnoses for this visit were:     Blister of right foot, initial encounter        You were seen by Nelson Pyle MD.      Follow-up Information     Follow up with Your primary clinic.        Discharge Instructions         Blister (Adult)  A blister is a raised area of skin with clear, watery fluid inside. A blister can occur when the skin is damaged. Blisters can hurt when they are pressed, or if they break open.  Blisters can be caused in many ways. This can happen if the skin is rubbed too hard or often. Or they can occur if the skin is hurt by the sun, a virus, or even a medicine.  Most blisters need little treatment. They often dry up and go away in a few days to weeks after the cause is stopped. A blister may need to be cleaned. A broken (open) blister may be bandaged to prevent infection. Blisters caused by insect bites or drug reactions may be more serious. These should be looked at by a healthcare provider.  Home care  Your healthcare provider may prescribe pain medicine. He or she may also prescribe an antibiotic cream or ointment to put on an open blister. Follow all instructions when using these medicines.  General care:    Follow all instructions on how to care for the blister. If a bandage was put on, change the bandage as instructed. .    If the blister breaks, the area will leak a clear fluid for a day or 2. Wash the area with soap and water every day or as advised by your healthcare provider.    You may use over-the-counter pain medicines to control pain, unless another medicine was prescribed. If you have chronic liver or kidney disease, talk with your healthcare  provider before using these medicines. Also talk with your provider if you've had a stomach ulcer or gastrointestinal bleeding.  Follow-up care  Follow up with your healthcare provider, or as advised.  When to seek medical advice  Call your healthcare provider right away if any of these occur:    Fever of 100.4 F (38 C) or higher    Redness or swelling that is new or gets worse    Foul-smelling fluid leaking from the blister    Pain doesn t go away, or gets worse    Increase in size of the blister    Blister doesn t get better after several days  Date Last Reviewed: 9/1/2016 2000-2017 [employer]. [address] [place] [address]. All rights reserved. This information is not intended as a substitute for professional medical care. Always follow your healthcare professional's instructions.          24 Hour Appointment Hotline       To make an appointment at any [facility], call [phone] ([phone]). If you don't have a family doctor or clinic, we will help you find one. [place] clinics are conveniently located to serve the needs of you and your family.             Review of your medicines      Our records show that you are taking the medicines listed below. If these are incorrect, please call your family doctor or clinic.        Dose / Directions Last dose taken    * acetaminophen 500 MG tablet   Commonly known as:  TYLENOL   Dose:  500-1000 mg   Quantity:  20 tablet        Take 1-2 tablets (500-1,000 mg) by mouth every 6 hours as needed for mild pain   Refills:  0        * acetaminophen 500 MG tablet   Commonly known as:  TYLENOL   Dose:  1000 mg   Quantity:  60 tablet        Take 2 tablets (1,000 mg) by mouth every 6 hours as needed for pain or fever   Refills:  0        haloperidol 10 MG tablet   Commonly known as:  HALDOL   Dose:  10 mg        Take 10 mg by mouth   Refills:  0        INVEGA SUSTENNA IM   Dose:  156 mg        Inject 156 mg into the muscle Qmonthly  "injection.   Refills:  0        MELATONIN PO   Dose:  5 mg   Indication:  pt unsure of dose        Take 5 mg by mouth nightly as needed   Refills:  0        TAB-A-FAZAL Tabs   Dose:  1 tablet        Take 1 tablet by mouth   Refills:  0        VITAMIN D-1000 MAX ST 1000 UNITS Tabs   Dose:  1000 Units   Generic drug:  cholecalciferol        Take 1,000 Units by mouth   Refills:  0        * Notice:  This list has 2 medication(s) that are the same as other medications prescribed for you. Read the directions carefully, and ask your doctor or other care provider to review them with you.            Orders Needing Specimen Collection     None      Pending Results     No orders found from 6/21/2017 to 6/24/2017.            Pending Culture Results     No orders found from 6/21/2017 to 6/24/2017.            Pending Results Instructions     If you had any lab results that were not finalized at the time of your Discharge, you can call the ED Lab Result RN at [phone]. You will be contacted by this team for any positive Lab results or changes in treatment. The nurses are available 7 days a week from 10A to 6:30P.  You can leave a message 24 hours per day and they will return your call.        Thank you for choosing [facility]       Thank you for choosing [facility] for your care. Our goal is always to provide you with excellent care. Hearing back from our patients is one way we can continue to improve our services. Please take a few minutes to complete the written survey that you may receive in the mail after you visit with us. Thank you!        [email or web address] Information     [email or web address] lets you send messages to your doctor, view your test results, renew your prescriptions, schedule appointments and more. To sign up, go to [email or web address] . Click on \"Log in\" on the left side of the screen, which will take you to the Welcome page. Then click on \"Sign up Now\" on the right side of the page.     You will be asked to enter the access " code listed below, as well as some personal information. Please follow the directions to create your username and password.     Your access code is: [email or web address]  Expires: 2017  6:30 AM     Your access code will  in 90 days. If you need help or a new code, please call your [place] clinic or [phone].        Care EveryWhere ID     This is your Care EveryWhere ID. This could be used by other organizations to access your [place] medical records  [id number]        Equal Access to Services     PINEDA MARISCAL : Carleen barroso Soisabel, warosario lumagyadaha, qayadira kaalmabentley bragg, fausto johnson . So [facility] [phone].    ATENCIÓN: Si habla español, tiene a weiner disposición servicios gratuitos de asistencia lingüística. Llame al [phone].    We comply with applicable federal civil rights laws and [place] laws. We do not discriminate on the basis of race, color, national origin, age, disability sex, sexual orientation or gender identity.            After Visit Summary       This is your record. Keep this with you and show to your community pharmacist(s) and doctor(s) at your next visit.

## 2017-06-23 NOTE — ED AVS SNAPSHOT
[facility], [place], Emergency Department    [address] [place] [address]    Phone:  [phone]                                       Carlos Alberto Garcia   MRN: [id number]    Department:  [facility], Emergency Department   Date of Visit:  6/23/2017           After Visit Summary Signature Page     I have received my discharge instructions, and my questions have been answered. I have discussed any challenges I see with this plan with the nurse or doctor.    ..........................................................................................................................................  Patient/Patient Representative Signature      ..........................................................................................................................................  Patient Representative Print Name and Relationship to Patient    ..................................................               ................................................  Date                                            Time    ..........................................................................................................................................  Reviewed by Signature/Title    ...................................................              ..............................................  Date                                                            Time

## 2017-06-23 NOTE — ED PROVIDER NOTES
History     Chief Complaint   Patient presents with     Wounds     HPI  Carlos Alberto Garcia is a 31 year old male with a history of schizoaffective disorder and homelessness, who is well-known to the emergency department for multiple and frequent visits, who presents today with complaints of bilateral foot pain. Patient states he has wounds on his bilateral feet from walking a lot and states he has had worsening pain over the past 3 days. Patient presents with ace wraps on his bilateral feet. He denies any noted fevers or other specific complaints. Patient states he follows with primary care at [facility] and was last seen there approximately 2 weeks ago.     I have reviewed the Medications, Allergies, Past Medical and Surgical History, and Social History in the [facility] system.    Past Medical History:   Diagnosis Date     Bipolar 2 disorder (H)      Chemical abuse     cocaine, meth, cambis     Dyslipidemia      Hep C w/ coma, chronic (H)      Patient overweight      Schizoaffective disorder      Unspecified essential hypertension      Unspecified hypothyroidism        Past Surgical History:   Procedure Laterality Date     HAND SURGERY      L       No family history on file.    Social History   Substance Use Topics     Smoking status: Current Every Day Smoker     Packs/day: 1.00     Types: Cigarettes     Smokeless tobacco: Current User     Alcohol use No     No current facility-administered medications for this encounter.      Current Outpatient Prescriptions   Medication     acetaminophen (TYLENOL) 500 MG tablet     acetaminophen (TYLENOL) 500 MG tablet     MELATONIN PO     Multiple Vitamin (TAB-A-FAZAL) TABS     haloperidol (HALDOL) 10 MG tablet     cholecalciferol (VITAMIN D-1000 MAX ST) 1000 UNITS TABS     Paliperidone Palmitate (INVEGA SUSTENNA IM)     Facility-Administered Medications Ordered in Other Encounters   Medication     ibuprofen (ADVIL,MOTRIN) 600 MG tablet      No Known Allergies    Review of  Systems   Constitutional: Negative for fever.   HENT: Negative for congestion.    Eyes: Negative for redness.   Respiratory: Negative for shortness of breath.    Cardiovascular: Negative for chest pain.   Gastrointestinal: Negative for abdominal pain.   Genitourinary: Negative for difficulty urinating.   Musculoskeletal: Negative for arthralgias and neck stiffness.   Skin: Positive for wound (bilateral feet). Negative for color change.   Neurological: Negative for headaches.   Psychiatric/Behavioral: Negative for confusion.   All other systems reviewed and are negative.      Physical Exam   BP: (!) 139/98  Pulse: 106  Temp: 98.7  F (37.1  C)  Resp: 18  Height: 182.9 cm (6')  Weight: 106.7 kg (235 lb 4.8 oz)  Physical Exam   Constitutional: No distress.   HENT:   Head: Atraumatic.   Mouth/Throat: Oropharynx is clear and moist. No oropharyngeal exudate.   Eyes: Pupils are equal, round, and reactive to light. No scleral icterus.   Cardiovascular: Normal heart sounds and intact distal pulses.    Pulmonary/Chest: Breath sounds normal. No respiratory distress.   Abdominal: Soft. Bowel sounds are normal. There is no tenderness.   Musculoskeletal: He exhibits no edema or tenderness.        Feet:    Skin: Skin is warm. No rash noted. He is not diaphoretic.   Psychiatric: He is withdrawn.   Nursing note and vitals reviewed.   patient is noted to be masturbating    ED Course     ED Course     Procedures   9:53 AM  The patient was seen and examined by Dr. Pyle in Room [facility].                Labs Ordered and Resulted from Time of ED Arrival Up to the Time of Departure from the ED - No data to display         Assessments & Plan (with Medical Decision Making)   31-year-old male with a history of schizoaffective disorder, chemical abuse, hepatitis C, malingering presents for evaluation of painful feet.  He is noted on exam to be masturbating upon my arrival.  His right foot reveals a blister over the insole.  This was dressed and the  patient will be discharged to follow up with his primary clinic.    I have reviewed the nursing notes.    I have reviewed the findings, diagnosis, plan and need for follow up with the patient.    New Prescriptions    No medications on file       Final diagnoses:   Blister of right foot, initial encounter   I, Lily Clifton, am serving as a trained medical scribe to document services personally performed by Harry Pyle MD, based on the provider's statements to me.      IHarry MD, was physically present and have reviewed and verified the accuracy of this note documented by Lily Clifton.       6/23/2017   [facility], EMERGENCY DEPARTMENT     Nelson Pyle MD  06/23/17 1010

## 2017-07-27 ENCOUNTER — APPOINTMENT (OUTPATIENT)
Dept: GENERAL RADIOLOGY | Facility: CLINIC | Age: 32
End: 2017-07-27
Attending: EMERGENCY MEDICINE
Payer: MEDICAID

## 2017-07-27 ENCOUNTER — HOSPITAL ENCOUNTER (EMERGENCY)
Facility: CLINIC | Age: 32
Discharge: HOME | End: 2017-07-27
Attending: EMERGENCY MEDICINE | Admitting: EMERGENCY MEDICINE
Payer: MEDICAID

## 2017-07-27 VITALS
RESPIRATION RATE: 14 BRPM | DIASTOLIC BLOOD PRESSURE: 84 MMHG | OXYGEN SATURATION: 98 % | HEIGHT: 72 IN | HEART RATE: 75 BPM | TEMPERATURE: 98.7 F | SYSTOLIC BLOOD PRESSURE: 125 MMHG

## 2017-07-27 DIAGNOSIS — M79.672 LEFT FOOT PAIN: ICD-10-CM

## 2017-07-27 LAB — RADIOLOGIST FLAGS: ABNORMAL

## 2017-07-27 PROCEDURE — 99283 EMERGENCY DEPT VISIT LOW MDM: CPT | Performed by: EMERGENCY MEDICINE

## 2017-07-27 PROCEDURE — 99282 EMERGENCY DEPT VISIT SF MDM: CPT | Mod: Z6 | Performed by: EMERGENCY MEDICINE

## 2017-07-27 PROCEDURE — 73630 X-RAY EXAM OF FOOT: CPT | Mod: LT

## 2017-07-27 NOTE — ED PROVIDER NOTES
History     Chief Complaint   Patient presents with     Foot Pain     left     HPI  Carlos Alberto Garcia is a 31 year old male with a history of schizoaffective disorder, bipolar 2 disorder, HTN, hypothyroidism, substance abuse, and hepatitis C who presents to the Emergency Department for evaluation of foot pain. Patient states that he injured the bottom of this left foot yesterday while he was running away from someone with only 1 shoe on. He notes that he ran for about 3-5 block.    Past Medical History:   Diagnosis Date     Bipolar 2 disorder (H)      Chemical abuse     cocaine, meth, cambis     Dyslipidemia      Hep C w/ coma, chronic (H)      Patient overweight      Schizoaffective disorder      Unspecified essential hypertension      Unspecified hypothyroidism        Past Surgical History:   Procedure Laterality Date     HAND SURGERY      L       No family history on file.    Social History   Substance Use Topics     Smoking status: Current Every Day Smoker     Packs/day: 1.00     Types: Cigarettes     Smokeless tobacco: Current User     Alcohol use No       No current facility-administered medications for this encounter.      Current Outpatient Prescriptions   Medication     acetaminophen (TYLENOL) 500 MG tablet     MELATONIN PO     Multiple Vitamin (TAB-A-FAZAL) TABS     haloperidol (HALDOL) 10 MG tablet     cholecalciferol (VITAMIN D-1000 MAX ST) 1000 UNITS TABS     Paliperidone Palmitate (INVEGA SUSTENNA IM)     Facility-Administered Medications Ordered in Other Encounters   Medication     ibuprofen (ADVIL,MOTRIN) 600 MG tablet      No Known Allergies    I have reviewed the Medications, Allergies, Past Medical and Surgical History, and Social History in the [facility] system.    Review of Systems    Physical Exam   BP: 125/84  Heart Rate: 75  Temp: 98.7  F (37.1  C)  Resp: 17  Height: 182.9 cm (6')  SpO2: 98 %  Physical Exam   Constitutional: He is oriented to person, place, and time. He appears well-developed  and well-nourished.  Non-toxic appearance. He does not appear ill. No distress.   HENT:   Head: Normocephalic and atraumatic.   Eyes: EOM are normal. Pupils are equal, round, and reactive to light. No scleral icterus.   Neck: Normal range of motion. Neck supple.   Cardiovascular: Normal rate.    Pulmonary/Chest: Effort normal. No respiratory distress.   Musculoskeletal:        Left foot: There is tenderness. There is normal range of motion, no bony tenderness, no swelling, normal capillary refill, no crepitus, no deformity and no laceration.        Feet:    Neurological: He is alert and oriented to person, place, and time. He has normal strength. No sensory deficit.   Skin: Skin is warm and dry. No rash noted. No pallor.   Psychiatric: He has a normal mood and affect. His behavior is normal.   Nursing note and vitals reviewed.      ED Course   10:12 AM  The patient was seen and examined by Ld Whitman MD in Room 22.     ED Course     Procedures        Results for orders placed or performed during the hospital encounter of 07/27/17   Foot XR, G/E 3 views, left   Result Value Ref Range    Radiologist flags (Urgent)      Two medial hallux sesamoid bones, possibly secondary    Narrative    EXAM: XR FOOT LT G/E 3 VW  7/27/2017 11:17 AM      HISTORY: trauma, eval for FB    COMPARISON: None    FINDINGS: AP, lateral, and oblique views of the left foot obtained.    Lisfranc articulation is congruent on these nonweight bearing films.  No substantial degenerative changes.    No radiodense foreign bodies appreciated. No soft tissue swelling.     Incidental note of a two medial hallux sesamoids.       Impression    IMPRESSION:   1. No radiodense foreign bodies in the foot.  2. Two medial hallux sesamoid bones noted, without clear margins or  cortication, suggesting fracture.  Alternatively this can be bipartite  sesamoid, an anatomic variant.    [Access Center: Two medial hallux sesamoid bones, possibly secondary  to new  fracture.]    This report will be copied to the [facility] to ensure a  provider acknowledges the finding. [facility] Center is available Monday  through Friday 8am-3:30 pm.     I have personally reviewed the examination and initial interpretation  and I agree with the findings.    JUTTA ELLERMANN, MD            Assessments & Plan (with Medical Decision Making)    This patient presented to the Emergency Department complaining of left foot pain.  He had evidence of a blister on the bottom of his foot.  X-ray demonstrated no evidence of fracture or retained foreign body.  I was going to go into the room to tell the patient this but he was already at the door to the Emergency Department lobby asking to leave.  The x-ray findings were conveyed to the patient before he walked out of the Emergency Department.  This part of the document was transcribed by Christine Flores Medical Scribe.       I have reviewed the nursing notes.    I have reviewed the findings, diagnosis, plan and need for follow up with the patient.    Discharge Medication List as of 7/27/2017 11:33 AM          Final diagnoses:   Left foot pain   IRoxana, am serving as a trained medical scribe to document services personally performed by Ld Whitman MD, based on the provider's statements to me.      I, Ld Whitman MD, was physically present and have reviewed and verified the accuracy of this note documented by Roxana Pemberton.       7/27/2017   [facility], [place], EMERGENCY DEPARTMENT     Wilder Whitman MD  07/27/17 2736

## 2017-12-18 ENCOUNTER — HOSPITAL ENCOUNTER (EMERGENCY)
Facility: CLINIC | Age: 32
Discharge: LEFT BEFORE DISCHARGE | End: 2017-12-18
Attending: EMERGENCY MEDICINE | Admitting: EMERGENCY MEDICINE
Payer: COMMERCIAL

## 2017-12-18 VITALS
RESPIRATION RATE: 15 BRPM | DIASTOLIC BLOOD PRESSURE: 95 MMHG | TEMPERATURE: 97.6 F | SYSTOLIC BLOOD PRESSURE: 149 MMHG | HEIGHT: 72 IN | OXYGEN SATURATION: 100 % | BODY MASS INDEX: 40.63 KG/M2 | WEIGHT: 300 LBS

## 2017-12-18 DIAGNOSIS — R51.9 NONINTRACTABLE EPISODIC HEADACHE, UNSPECIFIED HEADACHE TYPE: ICD-10-CM

## 2017-12-18 PROCEDURE — 99282 EMERGENCY DEPT VISIT SF MDM: CPT | Performed by: EMERGENCY MEDICINE

## 2017-12-18 PROCEDURE — 99283 EMERGENCY DEPT VISIT LOW MDM: CPT | Mod: Z6 | Performed by: EMERGENCY MEDICINE

## 2017-12-18 RX ORDER — ACETAMINOPHEN 500 MG
1000 TABLET ORAL ONCE
Status: DISCONTINUED | OUTPATIENT
Start: 2017-12-18 | End: 2017-12-18 | Stop reason: HOSPADM

## 2017-12-18 RX ORDER — OLANZAPINE 10 MG/2ML
2.5 INJECTION, POWDER, FOR SOLUTION INTRAMUSCULAR ONCE
Status: DISCONTINUED | OUTPATIENT
Start: 2017-12-18 | End: 2017-12-18 | Stop reason: HOSPADM

## 2017-12-18 NOTE — ED PROVIDER NOTES
History     Chief Complaint   Patient presents with     Headache     HPI  Carlos Alberto Garcia is a 32 year old male history of homelessness, bipolar, chemical dependency, chronic headache presenting for headache.  Patient reports he has a typical headache and is requesting Tylenol and Zyprexa for treatment.  Denied any fevers or chills head trauma chest pain or shortness of breath.  No weakness or numbness no visual changes.  He says his headache character is identical and he wishes to have improved even though he describes it as slight.    No other issues today.    I have reviewed the Medications, Allergies, Past Medical and Surgical History, and Social History in the Epic system.    Review of Systems   14 point review of symptoms was performed and is negative except as noted above.     Physical Exam   BP: (!) 149/95  Heart Rate: 94  Temp: 97.6  F (36.4  C)  Resp: 15  Height: 182.9 cm (6')  Weight: 136.1 kg (300 lb)  SpO2: 100 %      Physical Exam  GEN: Well appearing, non toxic, cooperative and conversant.   HEENT: The head is normocephalic and atraumatic. Pupils are equal round and reactive to light. Extraocular motions are intact. There is no facial swelling.   CV: Regular rate   PULM: Unlabored breathing     EXT: Full range of motion.  No edema.  NEURO: Cranial nerves II through XII are intact and symmetric. Bilateral upper and lower extremities grossly show full range of motion without any focal deficits.     PSYCH: Calm and cooperative, interactive.     ED Course     ED Course     Procedures               Labs Ordered and Resulted from Time of ED Arrival Up to the Time of Departure from the ED - No data to display         Assessments & Plan (with Medical Decision Making)   32-year-old male with medical history as noted including chronic tension type headaches, presenting with slight headache that he describes typical for character.  I evaluated the patient briefly as noted in my physical examination.  I  ordered Tylenol and Zyprexa soon after seeing him in order to treat his symptoms.  Overall my suspicion for an acute neurologic process such as intracranial hemorrhage, meningitis is quite low.  Soon afterwards, however, the patient received a phone call which he said he had to take and wanted to leave the emergency department immediately.  He was discharged AGAINST MEDICAL ADVICE as his evaluation was not complete.  At the time of discharge AGAINST MEDICAL ADVICE he was encouraged to return at any time.      I have reviewed the nursing notes.    I have reviewed the findings, diagnosis, plan and need for follow up with the patient.    Discharge Medication List as of 12/18/2017  4:44 AM          Final diagnoses:   Nonintractable episodic headache, unspecified headache type       12/18/2017   [facility], [place], EMERGENCY DEPARTMENT     Darnell Armenta MD  12/18/17 0449

## 2017-12-18 NOTE — ED NOTES
"Patient states \"I have a phone call I need to leave now\". Patient refused to stay for medication, per Dr. Armenta to sign AMA form.     Patient signed AMA form  "

## 2017-12-18 NOTE — ED NOTES
"Patient requesting a dose of tylenol and zyprexa.   Patient states he has a \"slight headache\".  "

## 2017-12-24 ENCOUNTER — HOSPITAL ENCOUNTER (EMERGENCY)
Facility: CLINIC | Age: 32
Discharge: HOME | End: 2017-12-24
Attending: EMERGENCY MEDICINE | Admitting: EMERGENCY MEDICINE
Payer: COMMERCIAL

## 2017-12-24 VITALS
OXYGEN SATURATION: 99 % | DIASTOLIC BLOOD PRESSURE: 69 MMHG | SYSTOLIC BLOOD PRESSURE: 101 MMHG | RESPIRATION RATE: 18 BRPM | TEMPERATURE: 97 F | HEART RATE: 60 BPM

## 2017-12-24 DIAGNOSIS — Z76.0 ENCOUNTER FOR MEDICATION REFILL: ICD-10-CM

## 2017-12-24 DIAGNOSIS — Z59.00 HOMELESS: ICD-10-CM

## 2017-12-24 PROCEDURE — 99281 EMR DPT VST MAYX REQ PHY/QHP: CPT

## 2017-12-24 PROCEDURE — 99282 EMERGENCY DEPT VISIT SF MDM: CPT | Mod: Z6 | Performed by: EMERGENCY MEDICINE

## 2017-12-24 SDOH — ECONOMIC STABILITY - HOUSING INSECURITY: HOMELESSNESS UNSPECIFIED: Z59.00

## 2017-12-24 ASSESSMENT — ENCOUNTER SYMPTOMS
FEVER: 0
CONFUSION: 0
FATIGUE: 0

## 2017-12-24 NOTE — ED NOTES
Patient left ED without being discharged by MD. Pt stopped and encouraged to return to ED to be discharged. Pt refused. Dr. Garrett notified.

## 2017-12-24 NOTE — ED NOTES
Pt presents to ED requesting an injection of Zyprexa for his anxiety. Pt state he has had this in the past and it was effective.

## 2017-12-24 NOTE — ED PROVIDER NOTES
History     Chief Complaint   Patient presents with     Medication Refill     Anxiety     HPI  Carlos Alberto Garcia is a 32 year old male who has a hx of bipolar and schizoaffective disorder who presents to the ER due to missing his invega injection.  Pt says that he usually goes to the clinic to get the injection but did not this time.  Pt says that was feeling mildly anxious. No SI or HI. No acute hallucinations. PT says that he wanted to sleep for a little while. Denies any physical pain.     I have reviewed the Medications, Allergies, Past Medical and Surgical History, and Social History in the Epic system.    Review of Systems   Constitutional: Negative for fatigue and fever.   Psychiatric/Behavioral: Negative for confusion, self-injury and suicidal ideas.   All other systems reviewed and are negative.      Physical Exam   BP: 101/69  Pulse: 60  Temp: 97  F (36.1  C)  Resp: 18  SpO2: 99 %      Physical Exam   Constitutional: He is oriented to person, place, and time. He appears well-developed and well-nourished.   Sleeping; calm, cooperative   HENT:   Head: Normocephalic and atraumatic.   Neck: Normal range of motion. Neck supple.   Cardiovascular: Normal rate, regular rhythm and normal heart sounds.    Pulmonary/Chest: Effort normal. No respiratory distress. He has no wheezes.   Abdominal: Soft. He exhibits no distension. There is no tenderness. There is no rebound.   Neurological: He is alert and oriented to person, place, and time.   Skin: Skin is warm.   Psychiatric: He has a normal mood and affect. His behavior is normal. Thought content normal.       ED Course     ED Course     Procedures             Critical Care time:  none             Labs Ordered and Resulted from Time of ED Arrival Up to the Time of Departure from the ED - No data to display         Assessments & Plan (with Medical Decision Making)   PT with no acute medical or psychiatric concerns.  Will allow to sleep in the ER for a few hours.  Will d/c home in the morning.     5:56 am: pt woke up and walked out of the ER. Pt says that he doesn't want to sign discharge paperwork. No acute concerns. Stable for d/c .     I have reviewed the nursing notes.    I have reviewed the findings, diagnosis, plan and need for follow up with the patient.    New Prescriptions    No medications on file       Final diagnoses:   Homeless   Encounter for medication refill       12/24/2017   [facility], [place], EMERGENCY DEPARTMENT     Andreina Garrett MD  12/24/17 0557

## 2017-12-27 ENCOUNTER — HOSPITAL ENCOUNTER (EMERGENCY)
Facility: CLINIC | Age: 32
Discharge: HOME | End: 2017-12-27
Attending: EMERGENCY MEDICINE | Admitting: EMERGENCY MEDICINE
Payer: COMMERCIAL

## 2017-12-27 VITALS
RESPIRATION RATE: 16 BRPM | DIASTOLIC BLOOD PRESSURE: 62 MMHG | TEMPERATURE: 97.4 F | HEIGHT: 72 IN | OXYGEN SATURATION: 99 % | HEART RATE: 64 BPM | SYSTOLIC BLOOD PRESSURE: 115 MMHG

## 2017-12-27 VITALS
WEIGHT: 240 LBS | OXYGEN SATURATION: 98 % | BODY MASS INDEX: 32.51 KG/M2 | TEMPERATURE: 97.3 F | HEIGHT: 72 IN | RESPIRATION RATE: 18 BRPM

## 2017-12-27 DIAGNOSIS — L85.3 DRY SKIN: ICD-10-CM

## 2017-12-27 DIAGNOSIS — F20.3 UNDIFFERENTIATED SCHIZOPHRENIA (H): ICD-10-CM

## 2017-12-27 PROCEDURE — 99283 EMERGENCY DEPT VISIT LOW MDM: CPT | Mod: Z6 | Performed by: EMERGENCY MEDICINE

## 2017-12-27 PROCEDURE — 99282 EMERGENCY DEPT VISIT SF MDM: CPT | Performed by: EMERGENCY MEDICINE

## 2017-12-27 RX ORDER — LANOLIN ALCOHOL/MO/W.PET/CERES
CREAM (GRAM) TOPICAL 3 TIMES DAILY
Qty: 1 BOTTLE | Refills: 0 | Status: SHIPPED | OUTPATIENT
Start: 2017-12-27 | End: 2017-12-31

## 2017-12-27 ASSESSMENT — ENCOUNTER SYMPTOMS
ABDOMINAL PAIN: 0
SHORTNESS OF BREATH: 0
FEVER: 0

## 2017-12-27 NOTE — ED PROVIDER NOTES
"  History     Chief Complaint   Patient presents with     Headache     HPI  Carlos Alberto Garcia is a 32 year old male with a past medical history notable for bipolar 2 disorder, schizophrenia, and schizoaffective disorder who presents to the ED seeking an injection of Invega . He states that he would normally get these injections through his group home, but he is no longer a tenant there. He states that he missed his last scheduled injection, so his most recent one was some time last month. The patient states that he does have a , but he \"lost his number.\" He denies any hallucinations or SI.  He reports methamphetamine use a couple days ago, but denies drugs in the last 24 hours.     PAST MEDICAL HISTORY:   Past Medical History:   Diagnosis Date     Bipolar 2 disorder (H)      Chemical abuse     cocaine, meth, cambis     Dyslipidemia      Hep C w/ coma, chronic (H)      Patient overweight      Schizoaffective disorder      Unspecified essential hypertension      Unspecified hypothyroidism        PAST SURGICAL HISTORY:   Past Surgical History:   Procedure Laterality Date     HAND SURGERY      L       FAMILY HISTORY: No family history on file.    SOCIAL HISTORY:   Social History   Substance Use Topics     Smoking status: Current Every Day Smoker     Packs/day: 1.00     Types: Cigarettes     Smokeless tobacco: Current User     Alcohol use No       Patient's Medications   New Prescriptions    No medications on file   Previous Medications    ACETAMINOPHEN (TYLENOL) 500 MG TABLET    Take 1-2 tablets (500-1,000 mg) by mouth every 6 hours as needed for mild pain    CHOLECALCIFEROL (VITAMIN D-1000 MAX ST) 1000 UNITS TABS    Take 1,000 Units by mouth    HALOPERIDOL (HALDOL) 10 MG TABLET    Take 10 mg by mouth    MULTIPLE VITAMIN (TAB-A-FAZAL) TABS    Take 1 tablet by mouth    PALIPERIDONE PALMITATE (INVEGA SUSTENNA IM)    Inject 156 mg into the muscle Qmonthly injection.   Modified Medications    No medications on " file   Discontinued Medications    No medications on file        No Known Allergies      I have reviewed the Medications, Allergies, Past Medical and Surgical History, and Social History in the Epic system.    Review of Systems   All other systems reviewed and are negative.    10 pt ROS completed and negative except as noted above in HPI    Physical Exam   BP: 135/79  Pulse: 54  Temp: 97.4  F (36.3  C)  Resp: 16  Height: 182.9 cm (6')  SpO2: 100 %      Physical Exam   Constitutional: He is oriented to person, place, and time. No distress.   HENT:   Head: Normocephalic and atraumatic.   Mouth/Throat: Oropharynx is clear and moist.   Eyes: Conjunctivae are normal. Pupils are equal, round, and reactive to light.   Neck: Normal range of motion. Neck supple.   Cardiovascular: Normal rate and intact distal pulses.    Pulmonary/Chest: Effort normal. No respiratory distress. He has no wheezes. He has no rales. He exhibits no tenderness.   Abdominal: Soft. There is no tenderness. There is no rebound and no guarding.   Musculoskeletal: Normal range of motion.   Neurological: He is alert and oriented to person, place, and time. No cranial nerve deficit. He exhibits normal muscle tone. Coordination normal.   Skin: Skin is warm and dry. No rash noted. He is not diaphoretic.   Psychiatric: He has a normal mood and affect. His behavior is normal. Thought content normal.   Nursing note and vitals reviewed.      ED Course     ED Course     Procedures             Critical Care time:  none             Labs Ordered and Resulted from Time of ED Arrival Up to the Time of Departure from the ED - No data to display         Assessments & Plan (with Medical Decision Making)   1.  Schizoaffective disorder    32-year-old male with history of schizoaffective disorder who presents requesting an injection of Invega.  I am unable to identify when he last received this injection but it appears he was followed by the ACT team in prior records.  He  is not a danger to himself or others.  He was told to follow up with the ACT team for further management.    I have reviewed the nursing notes.    I have reviewed the findings, diagnosis, plan and need for follow up with the patient.    New Prescriptions    No medications on file       Final diagnoses:   None   I, Willam Oro, am serving as a trained medical scribe to document services personally performed by Merrill Peña MD, based on the provider's statements to me.      IMerrill MD, was physically present and have reviewed and verified the accuracy of this note documented by Willam Oro.       12/27/2017   [facility], [place], EMERGENCY DEPARTMENT     Merrill Peña MD  12/28/17 0042

## 2017-12-27 NOTE — ED NOTES
Triage Assessment & Note:    Temp 97.3  F (36.3  C) (Oral)  Resp 18  Ht 1.829 m (6')  Wt 108.9 kg (240 lb)  SpO2 98%  BMI 32.55 kg/m2    Patient presents with: c/o bilateral rash on the back of his legs    Home Treatments/Remedies: none    Febrile / Afebrile? afebrile    Duration of C/o:  Unknown.   Marvin Palumbo  December 27, 2017

## 2017-12-27 NOTE — ED AVS SNAPSHOT
[facility], Emergency Department    [address] [address]    Phone:  [phone]                                       Carlos Alberto Garcia   MRN: [id number]    Department:  [facility], Emergency Department   Date of Visit:  12/27/2017           Patient Information     Date Of Birth          1985        Your diagnoses for this visit were:     Dry skin        You were seen by Chi Beck MD.        Discharge Instructions       Please make an appointment to follow up with Your Primary Care Provider as soon as possible.      24 Hour Appointment Hotline       To make an appointment at any [facility], call [phone] ([phone]). If you don't have a family doctor or clinic, we will help you find one. [place] clinics are conveniently located to serve the needs of you and your family.             Review of your medicines      START taking        Dose / Directions Last dose taken    EUCERIN Lotn   Quantity:  1 Bottle        Externally apply topically 3 times daily   Refills:  0          Our records show that you are taking the medicines listed below. If these are incorrect, please call your family doctor or clinic.        Dose / Directions Last dose taken    acetaminophen 500 MG tablet   Commonly known as:  TYLENOL   Dose:  500-1000 mg   Quantity:  20 tablet        Take 1-2 tablets (500-1,000 mg) by mouth every 6 hours as needed for mild pain   Refills:  0        haloperidol 10 MG tablet   Commonly known as:  HALDOL   Dose:  10 mg        Take 10 mg by mouth   Refills:  0        INVEGA SUSTENNA IM   Dose:  156 mg        Inject 156 mg into the muscle Qmonthly injection.   Refills:  0        TAB-A-FAZAL Tabs   Dose:  1 tablet        Take 1 tablet by mouth   Refills:  0        VITAMIN D-1000 MAX ST 1000 UNITS Tabs   Dose:  1000 Units   Generic drug:  cholecalciferol        Take 1,000 Units by mouth   Refills:  0                Prescriptions were sent or printed at these locations  "(1 Prescription)                   Other Prescriptions                Printed at Department/Unit printer (1 of 1)         Emollient (EUCERIN) LOTN                Orders Needing Specimen Collection     None      Pending Results     No orders found from 2017 to 2017.            Pending Culture Results     No orders found from 2017 to 2017.            Pending Results Instructions     If you had any lab results that were not finalized at the time of your Discharge, you can call the ED Lab Result RN at [phone]. You will be contacted by this team for any positive Lab results or changes in treatment. The nurses are available 7 days a week from 10A to 6:30P.  You can leave a message 24 hours per day and they will return your call.        Thank you for choosing [facility]       Thank you for choosing [facility] for your care. Our goal is always to provide you with excellent care. Hearing back from our patients is one way we can continue to improve our services. Please take a few minutes to complete the written survey that you may receive in the mail after you visit with us. Thank you!        [employer] Information     [employer] lets you send messages to your doctor, view your test results, renew your prescriptions, schedule appointments and more. To sign up, go to [email or web address] . Click on \"Log in\" on the left side of the screen, which will take you to the Welcome page. Then click on \"Sign up Now\" on the right side of the page.     You will be asked to enter the access code listed below, as well as some personal information. Please follow the directions to create your username and password.     Your access code is: [email or web address]  Expires: 3/27/2018  6:15 AM     Your access code will  in 90 days. If you need help or a new code, please call your [facility] clinic or [phone].        Care EveryWhere ID     This is your Care EveryWhere ID. This could be used by other organizations to access " your [facility] medical records  [id number]        Equal Access to Services     PINEDA MARISCAL : Carleen Stark, roque sesay, fausto james. So [facility] [phone].    ATENCIÓN: Si habla español, tiene a weiner disposición servicios gratuitos de asistencia lingüística. Llame al [phone].    We comply with applicable federal civil rights laws and [place] laws. We do not discriminate on the basis of race, color, national origin, age, disability, sex, sexual orientation, or gender identity.            After Visit Summary       This is your record. Keep this with you and show to your community pharmacist(s) and doctor(s) at your next visit.

## 2017-12-27 NOTE — ED AVS SNAPSHOT
[facility], [place], Emergency Department    [address] [place] [address]    Phone:  [phone]                                       Carlos Alberto Garcia   MRN: [id number]    Department:  [facility], Emergency Department   Date of Visit:  12/27/2017           After Visit Summary Signature Page     I have received my discharge instructions, and my questions have been answered. I have discussed any challenges I see with this plan with the nurse or doctor.    ..........................................................................................................................................  Patient/Patient Representative Signature      ..........................................................................................................................................  Patient Representative Print Name and Relationship to Patient    ..................................................               ................................................  Date                                            Time    ..........................................................................................................................................  Reviewed by Signature/Title    ...................................................              ..............................................  Date                                                            Time

## 2017-12-27 NOTE — ED AVS SNAPSHOT
[facility], [place], Emergency Department    [address] [place] [address]    Phone:  [phone]                                       Carlos Alberto Garcia   MRN: [id number]    Department:  [facility], Emergency Department   Date of Visit:  12/27/2017           After Visit Summary Signature Page     I have received my discharge instructions, and my questions have been answered. I have discussed any challenges I see with this plan with the nurse or doctor.    ..........................................................................................................................................  Patient/Patient Representative Signature      ..........................................................................................................................................  Patient Representative Print Name and Relationship to Patient    ..................................................               ................................................  Date                                            Time    ..........................................................................................................................................  Reviewed by Signature/Title    ...................................................              ..............................................  Date                                                            Time

## 2017-12-27 NOTE — ED AVS SNAPSHOT
[facility], Emergency Department    [address]    [place] [address]    Phone:  [phone]                                       Carlos Alberto Garcia   MRN: [id number]    Department:  [facility], Emergency Department   Date of Visit:  12/27/2017           Patient Information     Date Of Birth          1985        Your diagnoses for this visit were:     Undifferentiated schizophrenia (H)        You were seen by Merrill Peña MD.        Discharge Instructions       Please follow up with the ACT team for your injection.        24 Hour Appointment Hotline       To make an appointment at any [facility], call [phone] ([phone]). If you don't have a family doctor or clinic, we will help you find one. [facility] clinics are conveniently located to serve the needs of you and your family.             Review of your medicines      Our records show that you are taking the medicines listed below. If these are incorrect, please call your family doctor or clinic.        Dose / Directions Last dose taken    acetaminophen 500 MG tablet   Commonly known as:  TYLENOL   Dose:  500-1000 mg   Quantity:  20 tablet        Take 1-2 tablets (500-1,000 mg) by mouth every 6 hours as needed for mild pain   Refills:  0        haloperidol 10 MG tablet   Commonly known as:  HALDOL   Dose:  10 mg        Take 10 mg by mouth   Refills:  0        INVEGA SUSTENNA IM   Dose:  156 mg        Inject 156 mg into the muscle Qmonthly injection.   Refills:  0        TAB-A-FAZAL Tabs   Dose:  1 tablet        Take 1 tablet by mouth   Refills:  0        VITAMIN D-1000 MAX ST 1000 UNITS Tabs   Dose:  1000 Units   Generic drug:  cholecalciferol        Take 1,000 Units by mouth   Refills:  0                Orders Needing Specimen Collection     None      Pending Results     No orders found from 12/25/2017 to 12/28/2017.            Pending Culture Results     No orders found from 12/25/2017 to 12/28/2017.            Pending  "Results Instructions     If you had any lab results that were not finalized at the time of your Discharge, you can call the ED Lab Result RN at [phone]. You will be contacted by this team for any positive Lab results or changes in treatment. The nurses are available 7 days a week from 10A to 6:30P.  You can leave a message 24 hours per day and they will return your call.        Thank you for choosing [facility]       Thank you for choosing [facility] for your care. Our goal is always to provide you with excellent care. Hearing back from our patients is one way we can continue to improve our services. Please take a few minutes to complete the written survey that you may receive in the mail after you visit with us. Thank you!        [employer] Information     [employer] lets you send messages to your doctor, view your test results, renew your prescriptions, schedule appointments and more. To sign up, go to [email or web address] . Click on \"Log in\" on the left side of the screen, which will take you to the Welcome page. Then click on \"Sign up Now\" on the right side of the page.     You will be asked to enter the access code listed below, as well as some personal information. Please follow the directions to create your username and password.     Your access code is: [email or web address]  Expires: 3/27/2018  6:15 AM     Your access code will  in 90 days. If you need help or a new code, please call your [facility] clinic or [phone].        Care EveryWhere ID     This is your Care EveryWhere ID. This could be used by other organizations to access your [facility] medical records  [id number]        Equal Access to Services     [facility]: Hadii los Stark, waaxda luqadaha, qaybta kaalmabentley bragg, fausto cordero. So [facility] [phone].    ATENCIÓN: Si habla español, tiene a weiner disposición servicios gratuitos de asistencia lingüística. Llame al [phone].    We comply with " applicable federal civil rights laws and [place] laws. We do not discriminate on the basis of race, color, national origin, age, disability, sex, sexual orientation, or gender identity.            After Visit Summary       This is your record. Keep this with you and show to your community pharmacist(s) and doctor(s) at your next visit.

## 2017-12-28 ENCOUNTER — HOSPITAL ENCOUNTER (EMERGENCY)
Facility: CLINIC | Age: 32
Discharge: HOME | End: 2017-12-28
Attending: EMERGENCY MEDICINE | Admitting: EMERGENCY MEDICINE
Payer: COMMERCIAL

## 2017-12-28 VITALS
SYSTOLIC BLOOD PRESSURE: 151 MMHG | DIASTOLIC BLOOD PRESSURE: 84 MMHG | OXYGEN SATURATION: 97 % | HEART RATE: 78 BPM | TEMPERATURE: 97.8 F

## 2017-12-28 DIAGNOSIS — F25.9 SCHIZOAFFECTIVE DISORDER, UNSPECIFIED TYPE (H): ICD-10-CM

## 2017-12-28 PROCEDURE — 99283 EMERGENCY DEPT VISIT LOW MDM: CPT | Performed by: EMERGENCY MEDICINE

## 2017-12-28 PROCEDURE — 99283 EMERGENCY DEPT VISIT LOW MDM: CPT | Mod: Z6 | Performed by: EMERGENCY MEDICINE

## 2017-12-28 ASSESSMENT — ENCOUNTER SYMPTOMS: HALLUCINATIONS: 0

## 2017-12-28 NOTE — ED PROVIDER NOTES
History     Chief Complaint   Patient presents with     Rash     HPI  Carlos Alberto Garcia is a 32 year old male with a history of schizoaffective disorder, bipolar 2 disorder, polysubstance abuse, and frequent Emergency Department visits, most recently earlier this morning when he was requesting Zyprexa because he was no longer to get this through his group home as he was no longer a tenant there. The patient presents to the Emergency Department this evening with complaints of rash on bilateral calves which he first noticed today. He suspects it is from the cold, and he is requesting a lotion or aloe for his rash. No fevers. No itching. No other complaints at this time.       I have reviewed the Medications, Allergies, Past Medical and Surgical History, and Social History in the [employer] system.  Past Medical History:   Diagnosis Date     Bipolar 2 disorder (H)      Chemical abuse     cocaine, meth, cambis     Dyslipidemia      Hep C w/ coma, chronic (H)      Patient overweight      Schizoaffective disorder      Unspecified essential hypertension      Unspecified hypothyroidism        Past Surgical History:   Procedure Laterality Date     HAND SURGERY      L       No family history on file.    Social History   Substance Use Topics     Smoking status: Current Every Day Smoker     Packs/day: 1.00     Types: Cigarettes     Smokeless tobacco: Current User     Alcohol use No       No current facility-administered medications for this encounter.      Current Outpatient Prescriptions   Medication     Emollient (EUCERIN) LOTN     acetaminophen (TYLENOL) 500 MG tablet     Multiple Vitamin (TAB-A-FAZAL) TABS     haloperidol (HALDOL) 10 MG tablet     cholecalciferol (VITAMIN D-1000 MAX ST) 1000 UNITS TABS     Paliperidone Palmitate (INVEGA SUSTENNA IM)     Facility-Administered Medications Ordered in Other Encounters   Medication     ibuprofen (ADVIL,MOTRIN) 600 MG tablet        No Known Allergies   Review of Systems    Constitutional: Negative for fever.   Respiratory: Negative for shortness of breath.    Cardiovascular: Negative for chest pain.   Gastrointestinal: Negative for abdominal pain.   Skin: Positive for rash.   All other systems reviewed and are negative.      Physical Exam   BP:  (patient declined discharge vitals)  Temp: 97.3  F (36.3  C)  Resp: 18  Height: 182.9 cm (6')  Weight: 108.9 kg (240 lb)  SpO2: 98 %      Physical Exam  Gen: NAD, sitting on stretcher, conversant and pleasant, non-toxic appearing  HEENT: NCAT, PERRL, EOMI, MMM  Neck: trachea midline, supple with FROM  Cardio: normal rate regular rhythm, no R/M/G, normally perfused and warm  Pulm: steady, non-labored respirations, normal WOB, CTAB, no w/r/r  Abd: soft nt/nd, no organomegaly, no CVA tenderness  Ext: normal peripheral pulses, no edema, neurovascularly intact distally.  Skin: no rashes or signs of trauma, mild dry skin on posterior calves   Neuro: no focal deficits, 5/5 strength in all ext    ED Course     ED Course     Procedures           Labs Ordered and Resulted from Time of ED Arrival Up to the Time of Departure from the ED - No data to display         Assessments & Plan (with Medical Decision Making)   This is a 32-year-old male history of schizophrenia presents to the emergency department with complaints about rash on the back of both of his legs. On exam he has some dry skin on both of his calves but has no evidence of any traumatic, infectious or rheumatologic skin changes.  I reassured the patient, encouraged him to use Eucerin cream and encouraged him to follow-up with primary care. Discharged home this time in good condition with close primary care follow-up. He was given return Emergency Department precautions.    I have reviewed the nursing notes.    I have reviewed the findings, diagnosis, plan and need for follow up with the patient.    Discharge Medication List as of 12/27/2017  8:42 PM      START taking these medications     Details   Emollient (EUCERIN) LOTN Externally apply topically 3 times dailyDisp-1 Bottle, R-0Local Print             Final diagnoses:   Dry skin       12/27/2017   [facility], [place], EMERGENCY DEPARTMENT     Chi Beck MD  12/28/17 0045

## 2017-12-28 NOTE — ED PROVIDER NOTES
History   No chief complaint on file.    HPI  Carlos Alberto Garcia is a 32 year old male who has a past history of schizoaffective disorder.  He presented to the emergency department stating that he missed his last dose of Invega.  He is asking for an injection.  He has no other complaints at this time.    I have reviewed the Medications, Allergies, Past Medical and Surgical History, and Social History in the [employer] system.    PAST MEDICAL HISTORY:   Past Medical History:   Diagnosis Date     Bipolar 2 disorder (H)      Chemical abuse     cocaine, meth, cambis     Dyslipidemia      Hep C w/ coma, chronic (H)      Patient overweight      Schizoaffective disorder      Unspecified essential hypertension      Unspecified hypothyroidism        PAST SURGICAL HISTORY:   Past Surgical History:   Procedure Laterality Date     HAND SURGERY      L       FAMILY HISTORY: No family history on file.    SOCIAL HISTORY:   Social History   Substance Use Topics     Smoking status: Current Every Day Smoker     Packs/day: 1.00     Types: Cigarettes     Smokeless tobacco: Current User     Alcohol use No       Patient's Medications   New Prescriptions    No medications on file   Previous Medications    ACETAMINOPHEN (TYLENOL) 500 MG TABLET    Take 1-2 tablets (500-1,000 mg) by mouth every 6 hours as needed for mild pain    CHOLECALCIFEROL (VITAMIN D-1000 MAX ST) 1000 UNITS TABS    Take 1,000 Units by mouth    EMOLLIENT (EUCERIN) LOTN    Externally apply topically 3 times daily    HALOPERIDOL (HALDOL) 10 MG TABLET    Take 10 mg by mouth    MULTIPLE VITAMIN (TAB-A-FAZAL) TABS    Take 1 tablet by mouth    PALIPERIDONE PALMITATE (INVEGA SUSTENNA IM)    Inject 156 mg into the muscle Qmonthly injection.   Modified Medications    No medications on file   Discontinued Medications    No medications on file        No Known Allergies    Review of Systems   Psychiatric/Behavioral: Negative for hallucinations and suicidal ideas.   All other systems  reviewed and are negative.      Physical Exam   BP: 151/84  Pulse: 78  Temp: 97.8  F (36.6  C)  SpO2: 97 %      Physical Exam   Constitutional: He is oriented to person, place, and time. He appears well-developed and well-nourished.  Non-toxic appearance. He does not appear ill. No distress.   HENT:   Head: Normocephalic and atraumatic.   Eyes: EOM are normal. Pupils are equal, round, and reactive to light. No scleral icterus.   Neck: Normal range of motion. Neck supple.   Cardiovascular: Normal rate.    Pulmonary/Chest: Effort normal. No respiratory distress.   Neurological: He is alert and oriented to person, place, and time. He has normal strength. No sensory deficit.   Skin: Skin is warm and dry. No rash noted. No pallor.   Psychiatric: He has a normal mood and affect. His speech is normal and behavior is normal. He is not actively hallucinating. He expresses no homicidal and no suicidal ideation.   Nursing note and vitals reviewed.      ED Course     ED Course     Procedures               Assessments & Plan (with Medical Decision Making)     This patient presented to the emergency department seeking injection of his depo antipsychotic medication Invega.  We consulted with pharmacy, but they stated that we did not have this medication on either the [facility] or [facility].  Patient stated that he would go to his clinic where he usually gets the injection to get the injection and was discharged in good condition.  No acute psychiatric or medical conditions were present would necessitate further workup or admission.    I have reviewed the nursing notes.    I have reviewed the findings, diagnosis, plan and need for follow up with the patient.    New Prescriptions    No medications on file       Final diagnoses:   Schizoaffective disorder, unspecified type (H)       12/28/2017   [facility], [place], EMERGENCY DEPARTMENT     Wilder Whitman MD  12/28/17 3306

## 2017-12-28 NOTE — ED NOTES
Pt is here to ask for an injection of Zyprexa, has not had one in over one Month.  Goes to the [facility]. In addition pt is homeless.

## 2017-12-28 NOTE — DISCHARGE INSTRUCTIONS
Follow-up with your clinic as soon as possible to get injection.    Return to the emergency department for any problems.

## 2017-12-29 ENCOUNTER — HOSPITAL ENCOUNTER (EMERGENCY)
Facility: CLINIC | Age: 32
Discharge: HOME | End: 2017-12-29
Attending: EMERGENCY MEDICINE | Admitting: EMERGENCY MEDICINE
Payer: COMMERCIAL

## 2017-12-29 VITALS
OXYGEN SATURATION: 97 % | HEART RATE: 76 BPM | WEIGHT: 244.38 LBS | BODY MASS INDEX: 33.14 KG/M2 | SYSTOLIC BLOOD PRESSURE: 134 MMHG | RESPIRATION RATE: 16 BRPM | TEMPERATURE: 96.8 F | DIASTOLIC BLOOD PRESSURE: 83 MMHG

## 2017-12-29 DIAGNOSIS — Z76.0 ENCOUNTER FOR MEDICATION REFILL: ICD-10-CM

## 2017-12-29 DIAGNOSIS — F25.0 SCHIZOAFFECTIVE DISORDER, BIPOLAR TYPE (H): ICD-10-CM

## 2017-12-29 PROCEDURE — 99285 EMERGENCY DEPT VISIT HI MDM: CPT | Mod: 25

## 2017-12-29 PROCEDURE — 90791 PSYCH DIAGNOSTIC EVALUATION: CPT

## 2017-12-29 PROCEDURE — 99283 EMERGENCY DEPT VISIT LOW MDM: CPT | Mod: Z6 | Performed by: EMERGENCY MEDICINE

## 2017-12-29 ASSESSMENT — ENCOUNTER SYMPTOMS
EYE REDNESS: 0
ARTHRALGIAS: 0
ABDOMINAL PAIN: 0
FEVER: 0
NECK STIFFNESS: 0
SHORTNESS OF BREATH: 0
HEADACHES: 0
CONFUSION: 0
COLOR CHANGE: 0
DIFFICULTY URINATING: 0

## 2017-12-29 NOTE — ED AVS SNAPSHOT
[facility], Emergency Department    [address]    [place] [address]    Phone:  [phone]    Fax:  [phone]                                       Carlos Alberto Garcia   MRN: [id number]    Department:  [facility], Emergency Department   Date of Visit:  12/29/2017           Patient Information     Date Of Birth          1985        Your diagnoses for this visit were:     Schizoaffective disorder, bipolar type (H)     Encounter for medication refill        You were seen by Nelson Pyle MD.      Follow-up Information     Follow up with No Ref-Primary, Physician.        Discharge Instructions         Schizoaffective Disorder  Schizoaffective disorder is an illness in which a psychotic person also has symptoms of a mood disorder such as depression, or bipolar disorder.  Schizophrenia is a chronic, often disabling mental health disorder that makes functioning in work and society difficult. It is a type of psychosis, and involves perceiving reality differently from those around you. The difference been reality and what you think become blurred in your mind. The cause of schizophrenia is not yet known. It is believed to be a result of genetic and biological factors like brain chemistry and structure. Symptoms of schizophrenia include:    Hallucinations (seeing or hearing things that are not there)    Delusions (false beliefs)    Disorganized thinking and speech    Social withdrawal    Severe anxiety    Feeling unreal    Paranoia    Insomnia    Trouble thinking or concentrating clearly    Depression, feeling suicidal    Withdrawal from those around you  Depression is one type of mood disorder that is related to brain chemistry. It is not just a state of unhappiness or sadness but a true disease. You may feel a lack of interest in normal activities. Sometimes there is sadness or guilt without any clear reason. Thinking may become slow and there can be a lack energy or feeling of hopelessness. Some  people have thoughts of harming themselves at this stage. Thoughts can even turn to suicide.  Bipolar disorder (also called manic depression) is the other major mood disorder. It is an illness that causes strong mood swings between depression and dale. In the manic phase you may think fast and do things quickly. It may seem like you are getting a lot done. At first, this may feel very good; but in the extreme this can lead to a life style that is disorganized, chaotic and includes risky behavior (spending sprees, sexual acting out or drug use). In later stages, it may affect eating (no interest in food) and sleeping (unable to sleep for days at a time). Speech may speed up and become difficult for others to understand. You may appear to others as if you are in your own world.  The exact cause of schizoaffective disorder is unknown. However, a person is more likely to get this illness if a family member has it. Use of drugs such as amphetamines (speed) and cocaine increase the risk of getting this disorder. People with this illness will generally have to treat it long-term. Medicine and psychotherapy can help.  Home care    On-going care and support helps people manage this illness.  Find a health care provider and therapist who meet your needs.    Be sure to take your prescribed medicine as directed, even if you think you don t need it.    Get the required lab work to check youroverall health and certain you are getting the right amount of medication    Seek support from trusted friends or family by talking about your feelings and thoughts. Ask them to help you recognize behavior changes early so you can get help and, if needed, medications can be adjusted.    Tell each of your healthcare providers about all of the prescription drugs, over-the-counter medicines, and supplements you take.   Certain supplements interact with medicines and can cause dangerous side effects.  Ask your pharmacist when you have questions  about drug interactions.    If you are having trouble managing workplace issues, or caring for yourself because of this illness, contact your local Americans with Disabilities (ADA) office to see if they can help.  The U.[place]. Department  of Justice operates a toll-free ADA information line at: 445.324.3106 (Voice), or 541-819-3593 (TTY).  They can help you find a local office.    Avoid the use of amphetamines, cocaine and related drugs. These will only make your condition worse.  Follow-up care  Follow up with your healthcare provider, or as advised.  Call 911  Call 911 if you:    Have suicidal thoughts, a suicide plan, and the means to carry out the plan    Have trouble breathing    Are very confused    Are very drowsy or have trouble awakening    Faint or lose of consciousness    Have a rapid heart rate, very low heart rate, or a new irregular heart rate    Have a seizure  When to seek medical advice  Call your healthcare provider right away if any of these occur:    Feeling like your symptoms are getting worse    Feeling out of control or that you are being controlled by others    Feeling like you want to harm yourself or another    Unable to care for yourself    Worsening hallucinations (hearing voices)    Worsening depression or anxiety  Date Last Reviewed: 9/29/2015 2000-2017 [employer]. [address] [place] [address], [place] [id number]. All rights reserved. This information is not intended as a substitute for professional medical care. Always follow your healthcare professional's instructions.          Understanding Schizoaffective Disorder  Schizoaffective disorder is a serious and puzzling brain disorder. It combines symptoms of 2 other serious disorders--bipolar disorder and schizophrenia. The symptoms are often severe and ongoing. They can disrupt lives and cause great emotional pain for both the person with the condition and his or her family and friends. But there is reason for hope. Talk with  your healthcare provider or mental health professional.    What are the symptoms?  The symptoms of schizoaffective disorder can vary greatly. People with this disorder may see or hear things that aren t there (hallucinations). Or they may hold false, fixed beliefs (delusions). These can occur without any mood changes. At times, people with this disorder may seem withdrawn, listless, and remote. They may also have extreme mood swings. They may feel intensely happy for a time. Later, they may be very depressed. In some cases, they might have only lows without the highs. They might also have problems with sleep or a change in appetite. They may become more or less talkative, lose focus in their thinking, or even think about suicide.  What causes it?  The causes of schizoaffective disorder aren t fully understood. It is known that this disorder runs in families. Certain chemicals in the brain also play a role. In some people, abuse, neglect, or a major trauma may trigger the disorder.  Finding help  Right now there is no cure. But treatment with medicines and therapy may be helpful. There are also many support services for people with schizoaffective disorders and their families.  Medicines  Medicines may relieve many symptoms of schizoaffective disorder. These may include antipsychotic medicines, antidepressant medicines, or mood stabilizers. If your loved one is troubled by medicine side effects, tell his or her healthcare provider. Changing the dose or type of medicine may help. Encourage your loved one to keep taking his or her medicines. Not taking them may cause symptoms to come back.  Supportive therapy  A therapist (counselor) can offer your loved one advice and support. Social workers may help with work, money, or housing issues. Friends and family members may also need support. Learning more about schizoaffective disorder can help you cope. To learn how best to help with your loved one s care, ask your mental  healthcare providers for reliable community or online patient and family resources.   Date Last Reviewed: 5/1/2017 2000-2017 [employer]. [address] [place] [address], [place] [address]. All rights reserved. This information is not intended as a substitute for professional medical care. Always follow your healthcare professional's instructions.          24 Hour Appointment Hotline       To make an appointment at any [facility], call [phone] ([phone]). If you don't have a family doctor or clinic, we will help you find one. [facility] are conveniently located to serve the needs of you and your family.             Review of your medicines      Our records show that you are taking the medicines listed below. If these are incorrect, please call your family doctor or clinic.        Dose / Directions Last dose taken    acetaminophen 500 MG tablet   Commonly known as:  TYLENOL   Dose:  500-1000 mg   Quantity:  20 tablet        Take 1-2 tablets (500-1,000 mg) by mouth every 6 hours as needed for mild pain   Refills:  0        EUCERIN Lotn   Quantity:  1 Bottle        Externally apply topically 3 times daily   Refills:  0        haloperidol 10 MG tablet   Commonly known as:  HALDOL   Dose:  10 mg        Take 10 mg by mouth   Refills:  0        INVEGA SUSTENNA IM   Dose:  156 mg        Inject 156 mg into the muscle Qmonthly injection.   Refills:  0        TAB-A-FAZAL Tabs   Dose:  1 tablet        Take 1 tablet by mouth   Refills:  0        VITAMIN D-1000 MAX ST 1000 UNITS Tabs   Dose:  1000 Units   Generic drug:  cholecalciferol        Take 1,000 Units by mouth   Refills:  0                Orders Needing Specimen Collection     None      Pending Results     No orders found from 12/27/2017 to 12/30/2017.            Pending Culture Results     No orders found from 12/27/2017 to 12/30/2017.            Pending Results Instructions     If you had any lab results that were not finalized at the time  "of your Discharge, you can call the ED Lab Result RN at [phone]. You will be contacted by this team for any positive Lab results or changes in treatment. The nurses are available 7 days a week from 10A to 6:30P.  You can leave a message 24 hours per day and they will return your call.        Thank you for choosing [facility]       Thank you for choosing [facility] for your care. Our goal is always to provide you with excellent care. Hearing back from our patients is one way we can continue to improve our services. Please take a few minutes to complete the written survey that you may receive in the mail after you visit with us. Thank you!        [employer] Information     [employer] lets you send messages to your doctor, view your test results, renew your prescriptions, schedule appointments and more. To sign up, go to [email or web address] . Click on \"Log in\" on the left side of the screen, which will take you to the Welcome page. Then click on \"Sign up Now\" on the right side of the page.     You will be asked to enter the access code listed below, as well as some personal information. Please follow the directions to create your username and password.     Your access code is: [email or web address]  Expires: 3/27/2018  6:15 AM     Your access code will  in 90 days. If you need help or a new code, please call your [facility] clinic or [phone].        Care EveryWhere ID     This is your Care EveryWhere ID. This could be used by other organizations to access your [facility] medical records  [id number]        Equal Access to Services     [facility] HospitalJUAN A : Hadii los juarezo Soisabel, waaxda luqadaha, qaybta kaalmada mahsa, fausto johnson . So [facility] [phone].    ATENCIÓN: Si habla español, tiene a weiner disposición servicios gratuitos de asistencia lingüística. Llame al [phone].    We comply with applicable federal civil rights laws and [place] laws. We do not discriminate on the basis of " race, color, national origin, age, disability, sex, sexual orientation, or gender identity.            After Visit Summary       This is your record. Keep this with you and show to your community pharmacist(s) and doctor(s) at your next visit.

## 2017-12-29 NOTE — ED AVS SNAPSHOT
[facility], Emergency Department    [address]    [facility] [address]    Phone:  [phone]    Fax:  [phone]                                       Carlos Alberto Garcia   MRN: [id number]    Department:  [facility], Emergency Department   Date of Visit:  12/29/2017           After Visit Summary Signature Page     I have received my discharge instructions, and my questions have been answered. I have discussed any challenges I see with this plan with the nurse or doctor.    ..........................................................................................................................................  Patient/Patient Representative Signature      ..........................................................................................................................................  Patient Representative Print Name and Relationship to Patient    ..................................................               ................................................  Date                                            Time    ..........................................................................................................................................  Reviewed by Signature/Title    ...................................................              ..............................................  Date                                                            Time

## 2017-12-29 NOTE — ED PROVIDER NOTES
History     Chief Complaint   Patient presents with     med     Here for injuection of Invega.  Last one was over a month ago.  No SI or HI.     HPI  Carlos Alberto Garcia is a 32 year old male who presents requesting invading injection.  Patient has a history of schizoaffective disorder, bipolar, chemical abuse and hepatitis C.  He apparently was in a group home where he received his last injection of in [place] more than 1 month ago.  He is supposed to receive injections monthly and has been presenting regularly to emergency room's requesting the medication.  He was seen yesterday on the [place] and was told that we may carry the medication here.  He denies acute psychiatric concerns and denies specifically being suicidal or homicidal.  I have reviewed the Medications, Allergies, Past Medical and Surgical History, and Social History in the Epic system.    Review of Systems   Constitutional: Negative for fever.   HENT: Negative for congestion.    Eyes: Negative for redness.   Respiratory: Negative for shortness of breath.    Cardiovascular: Negative for chest pain.   Gastrointestinal: Negative for abdominal pain.   Genitourinary: Negative for difficulty urinating.   Musculoskeletal: Negative for arthralgias and neck stiffness.   Skin: Negative for color change.   Neurological: Negative for headaches.   Psychiatric/Behavioral: Negative for confusion.       Physical Exam   BP: 135/81  Pulse: 71  Temp: 96.7  F (35.9  C)  Resp: 16  Weight: 110.8 kg (244 lb 6 oz)  SpO2: 100 %      Physical Exam   Constitutional: No distress.   HENT:   Head: Atraumatic.   Mouth/Throat: Oropharynx is clear and moist. No oropharyngeal exudate.   Eyes: Pupils are equal, round, and reactive to light. No scleral icterus.   Cardiovascular: Normal heart sounds and intact distal pulses.    Pulmonary/Chest: Breath sounds normal. No respiratory distress.   Abdominal: Soft. Bowel sounds are normal. There is no tenderness.   Musculoskeletal: He  exhibits no edema or tenderness.   Skin: Skin is warm. No rash noted. He is not diaphoretic.       ED Course     ED Course     Procedures             Labs Ordered and Resulted from Time of ED Arrival Up to the Time of Departure from the ED - No data to display         Assessments & Plan (with Medical Decision Making)   32-year-old homeless male with history of schizoaffective disorder, bipolar disorder, chemical abuse who presents to the emergency room requesting injection of Invega.  He apparently has been on Invega for quite some time and usually receives this monthly.  It has been over a month since his last injection which he received while residing at a group home.  He is currently homeless.  The case was discussed with the behavioral emergency room  and a phone call was placed to the patient's .  Patient will be provided with a bus token to connect with either his ACT team or place where he can receive his injection.  It was explained to him that neither the [facility] or [facility] campuses carry this medication.    I have reviewed the nursing notes.    I have reviewed the findings, diagnosis, plan and need for follow up with the patient.    New Prescriptions    No medications on file       Final diagnoses:   Schizoaffective disorder, bipolar type (H)   Encounter for medication refill       12/29/2017   [facility], [place], EMERGENCY DEPARTMENT     Nelson Pyle MD  12/29/17 [id number]

## 2017-12-29 NOTE — DISCHARGE INSTRUCTIONS
Schizoaffective Disorder  Schizoaffective disorder is an illness in which a psychotic person also has symptoms of a mood disorder such as depression, or bipolar disorder.  Schizophrenia is a chronic, often disabling mental health disorder that makes functioning in work and society difficult. It is a type of psychosis, and involves perceiving reality differently from those around you. The difference been reality and what you think become blurred in your mind. The cause of schizophrenia is not yet known. It is believed to be a result of genetic and biological factors like brain chemistry and structure. Symptoms of schizophrenia include:    Hallucinations (seeing or hearing things that are not there)    Delusions (false beliefs)    Disorganized thinking and speech    Social withdrawal    Severe anxiety    Feeling unreal    Paranoia    Insomnia    Trouble thinking or concentrating clearly    Depression, feeling suicidal    Withdrawal from those around you  Depression is one type of mood disorder that is related to brain chemistry. It is not just a state of unhappiness or sadness but a true disease. You may feel a lack of interest in normal activities. Sometimes there is sadness or guilt without any clear reason. Thinking may become slow and there can be a lack energy or feeling of hopelessness. Some people have thoughts of harming themselves at this stage. Thoughts can even turn to suicide.  Bipolar disorder (also called manic depression) is the other major mood disorder. It is an illness that causes strong mood swings between depression and dale. In the manic phase you may think fast and do things quickly. It may seem like you are getting a lot done. At first, this may feel very good; but in the extreme this can lead to a life style that is disorganized, chaotic and includes risky behavior (spending sprees, sexual acting out or drug use). In later stages, it may affect eating (no interest in food) and sleeping  (unable to sleep for days at a time). Speech may speed up and become difficult for others to understand. You may appear to others as if you are in your own world.  The exact cause of schizoaffective disorder is unknown. However, a person is more likely to get this illness if a family member has it. Use of drugs such as amphetamines (speed) and cocaine increase the risk of getting this disorder. People with this illness will generally have to treat it long-term. Medicine and psychotherapy can help.  Home care    On-going care and support helps people manage this illness.  Find a health care provider and therapist who meet your needs.    Be sure to take your prescribed medicine as directed, even if you think you don t need it.    Get the required lab work to check youroverall health and certain you are getting the right amount of medication    Seek support from trusted friends or family by talking about your feelings and thoughts. Ask them to help you recognize behavior changes early so you can get help and, if needed, medications can be adjusted.    Tell each of your healthcare providers about all of the prescription drugs, over-the-counter medicines, and supplements you take.   Certain supplements interact with medicines and can cause dangerous side effects.  Ask your pharmacist when you have questions about drug interactions.    If you are having trouble managing workplace issues, or caring for yourself because of this illness, contact your local Americans with Disabilities (ADA) office to see if they can help.  The U.[place]. Department  of Justice operates a toll-free ADA information line at: 362.510.6517 (Voice), or 549-854-4664 (TTY).  They can help you find a local office.    Avoid the use of amphetamines, cocaine and related drugs. These will only make your condition worse.  Follow-up care  Follow up with your healthcare provider, or as advised.  Call 911  Call 911 if you:    Have suicidal thoughts, a suicide plan,  and the means to carry out the plan    Have trouble breathing    Are very confused    Are very drowsy or have trouble awakening    Faint or lose of consciousness    Have a rapid heart rate, very low heart rate, or a new irregular heart rate    Have a seizure  When to seek medical advice  Call your healthcare provider right away if any of these occur:    Feeling like your symptoms are getting worse    Feeling out of control or that you are being controlled by others    Feeling like you want to harm yourself or another    Unable to care for yourself    Worsening hallucinations (hearing voices)    Worsening depression or anxiety  Date Last Reviewed: 9/29/2015 2000-2017 [employer]. [address] [place] [address]. All rights reserved. This information is not intended as a substitute for professional medical care. Always follow your healthcare professional's instructions.          Understanding Schizoaffective Disorder  Schizoaffective disorder is a serious and puzzling brain disorder. It combines symptoms of 2 other serious disorders--bipolar disorder and schizophrenia. The symptoms are often severe and ongoing. They can disrupt lives and cause great emotional pain for both the person with the condition and his or her family and friends. But there is reason for hope. Talk with your healthcare provider or mental health professional.    What are the symptoms?  The symptoms of schizoaffective disorder can vary greatly. People with this disorder may see or hear things that aren t there (hallucinations). Or they may hold false, fixed beliefs (delusions). These can occur without any mood changes. At times, people with this disorder may seem withdrawn, listless, and remote. They may also have extreme mood swings. They may feel intensely happy for a time. Later, they may be very depressed. In some cases, they might have only lows without the highs. They might also have problems with sleep or a change  in appetite. They may become more or less talkative, lose focus in their thinking, or even think about suicide.  What causes it?  The causes of schizoaffective disorder aren t fully understood. It is known that this disorder runs in families. Certain chemicals in the brain also play a role. In some people, abuse, neglect, or a major trauma may trigger the disorder.  Finding help  Right now there is no cure. But treatment with medicines and therapy may be helpful. There are also many support services for people with schizoaffective disorders and their families.  Medicines  Medicines may relieve many symptoms of schizoaffective disorder. These may include antipsychotic medicines, antidepressant medicines, or mood stabilizers. If your loved one is troubled by medicine side effects, tell his or her healthcare provider. Changing the dose or type of medicine may help. Encourage your loved one to keep taking his or her medicines. Not taking them may cause symptoms to come back.  Supportive therapy  A therapist (counselor) can offer your loved one advice and support. Social workers may help with work, money, or housing issues. Friends and family members may also need support. Learning more about schizoaffective disorder can help you cope. To learn how best to help with your loved one s care, ask your mental healthcare providers for reliable community or online patient and family resources.   Date Last Reviewed: 5/1/2017 2000-2017 [employer]. [address] [place] [address], [place] [address]. All rights reserved. This information is not intended as a substitute for professional medical care. Always follow your healthcare professional's instructions.

## 2017-12-30 ENCOUNTER — HOSPITAL ENCOUNTER (EMERGENCY)
Facility: CLINIC | Age: 32
Discharge: HOME | End: 2017-12-30
Attending: EMERGENCY MEDICINE | Admitting: EMERGENCY MEDICINE
Payer: COMMERCIAL

## 2017-12-30 ENCOUNTER — APPOINTMENT (OUTPATIENT)
Dept: GENERAL RADIOLOGY | Facility: CLINIC | Age: 32
End: 2017-12-30
Attending: EMERGENCY MEDICINE
Payer: COMMERCIAL

## 2017-12-30 VITALS
OXYGEN SATURATION: 99 % | WEIGHT: 245.59 LBS | SYSTOLIC BLOOD PRESSURE: 140 MMHG | TEMPERATURE: 97.8 F | HEART RATE: 62 BPM | DIASTOLIC BLOOD PRESSURE: 72 MMHG | BODY MASS INDEX: 33.31 KG/M2 | RESPIRATION RATE: 18 BRPM

## 2017-12-30 DIAGNOSIS — R07.89 ATYPICAL CHEST PAIN: ICD-10-CM

## 2017-12-30 DIAGNOSIS — G44.209 TENSION HEADACHE: ICD-10-CM

## 2017-12-30 LAB
ANION GAP SERPL CALCULATED.3IONS-SCNC: 8 MMOL/L (ref 3–14)
BASOPHILS # BLD AUTO: 0 10E9/L (ref 0–0.2)
BASOPHILS NFR BLD AUTO: 0.5 %
BUN SERPL-MCNC: 13 MG/DL (ref 7–30)
CALCIUM SERPL-MCNC: 8.5 MG/DL (ref 8.5–10.1)
CHLORIDE SERPL-SCNC: 107 MMOL/L (ref 94–109)
CO2 SERPL-SCNC: 25 MMOL/L (ref 20–32)
CREAT SERPL-MCNC: 0.73 MG/DL (ref 0.66–1.25)
DIFFERENTIAL METHOD BLD: NORMAL
EOSINOPHIL # BLD AUTO: 0.2 10E9/L (ref 0–0.7)
EOSINOPHIL NFR BLD AUTO: 2.7 %
ERYTHROCYTE [DISTWIDTH] IN BLOOD BY AUTOMATED COUNT: 12.9 % (ref 10–15)
GFR SERPL CREATININE-BSD FRML MDRD: >90 ML/MIN/1.7M2
GLUCOSE SERPL-MCNC: 97 MG/DL (ref 70–99)
HCT VFR BLD AUTO: 43.9 % (ref 40–53)
HGB BLD-MCNC: 14.5 G/DL (ref 13.3–17.7)
IMM GRANULOCYTES # BLD: 0 10E9/L (ref 0–0.4)
IMM GRANULOCYTES NFR BLD: 0.3 %
LYMPHOCYTES # BLD AUTO: 2.6 10E9/L (ref 0.8–5.3)
LYMPHOCYTES NFR BLD AUTO: 41.6 %
MCH RBC QN AUTO: 29.5 PG (ref 26.5–33)
MCHC RBC AUTO-ENTMCNC: 33 G/DL (ref 31.5–36.5)
MCV RBC AUTO: 89 FL (ref 78–100)
MONOCYTES # BLD AUTO: 0.6 10E9/L (ref 0–1.3)
MONOCYTES NFR BLD AUTO: 9.3 %
NEUTROPHILS # BLD AUTO: 2.8 10E9/L (ref 1.6–8.3)
NEUTROPHILS NFR BLD AUTO: 45.6 %
NRBC # BLD AUTO: 0 10*3/UL
NRBC BLD AUTO-RTO: 0 /100
PLATELET # BLD AUTO: 197 10E9/L (ref 150–450)
POTASSIUM SERPL-SCNC: 3.8 MMOL/L (ref 3.4–5.3)
RBC # BLD AUTO: 4.92 10E12/L (ref 4.4–5.9)
SODIUM SERPL-SCNC: 140 MMOL/L (ref 133–144)
TROPONIN I SERPL-MCNC: <0.015 UG/L (ref 0–0.04)
WBC # BLD AUTO: 6.2 10E9/L (ref 4–11)

## 2017-12-30 PROCEDURE — 84484 ASSAY OF TROPONIN QUANT: CPT | Performed by: EMERGENCY MEDICINE

## 2017-12-30 PROCEDURE — 93005 ELECTROCARDIOGRAM TRACING: CPT | Performed by: EMERGENCY MEDICINE

## 2017-12-30 PROCEDURE — 36415 COLL VENOUS BLD VENIPUNCTURE: CPT | Performed by: EMERGENCY MEDICINE

## 2017-12-30 PROCEDURE — 99285 EMERGENCY DEPT VISIT HI MDM: CPT | Mod: 25 | Performed by: EMERGENCY MEDICINE

## 2017-12-30 PROCEDURE — 85025 COMPLETE CBC W/AUTO DIFF WBC: CPT | Performed by: EMERGENCY MEDICINE

## 2017-12-30 PROCEDURE — 71020 XR CHEST 2 VW: CPT

## 2017-12-30 PROCEDURE — 93010 ELECTROCARDIOGRAM REPORT: CPT | Mod: Z6 | Performed by: EMERGENCY MEDICINE

## 2017-12-30 PROCEDURE — 99284 EMERGENCY DEPT VISIT MOD MDM: CPT | Mod: 25 | Performed by: EMERGENCY MEDICINE

## 2017-12-30 PROCEDURE — 25000132 ZZH RX MED GY IP 250 OP 250 PS 637: Performed by: EMERGENCY MEDICINE

## 2017-12-30 PROCEDURE — 80048 BASIC METABOLIC PNL TOTAL CA: CPT | Performed by: EMERGENCY MEDICINE

## 2017-12-30 RX ORDER — ACETAMINOPHEN 500 MG
1000 TABLET ORAL ONCE
Status: COMPLETED | OUTPATIENT
Start: 2017-12-30 | End: 2017-12-30

## 2017-12-30 RX ADMIN — Medication 1000 MG: at 02:37

## 2017-12-30 ASSESSMENT — ENCOUNTER SYMPTOMS
CONSTITUTIONAL NEGATIVE: 1
WEAKNESS: 0
GASTROINTESTINAL NEGATIVE: 1
NUMBNESS: 0
SHORTNESS OF BREATH: 0
HEADACHES: 1

## 2017-12-30 NOTE — ED AVS SNAPSHOT
[facility], Emergency Department    [address]    [facility] [place] [place] [address]    Phone:  [phone]                                       Carlos Alberto Garcia   MRN: [id number]    Department:  [facility], Emergency Department   Date of Visit:  12/30/2017           Patient Information     Date Of Birth          1985        Your diagnoses for this visit were:     Atypical chest pain     Tension headache        You were seen by Annabella Gutierres MD.        Discharge Instructions       Thank you for coming to the [facility] Emergency Department.     OK to return to your usual activities.     Continue your medications as usual.     Follow up in your primary care clinic this week with any ongoing concerns.    24 Hour Appointment Hotline       To make an appointment at any [facility] clinic, call [phone] ([phone]). If you don't have a family doctor or clinic, we will help you find one. [facility] clinics are conveniently located to serve the needs of you and your family.             Review of your medicines      Our records show that you are taking the medicines listed below. If these are incorrect, please call your family doctor or clinic.        Dose / Directions Last dose taken    acetaminophen 500 MG tablet   Commonly known as:  TYLENOL   Dose:  500-1000 mg   Quantity:  20 tablet        Take 1-2 tablets (500-1,000 mg) by mouth every 6 hours as needed for mild pain   Refills:  0        EUCERIN Lotn   Quantity:  1 Bottle        Externally apply topically 3 times daily   Refills:  0        haloperidol 10 MG tablet   Commonly known as:  HALDOL   Dose:  10 mg        Take 10 mg by mouth   Refills:  0        INVEGA SUSTENNA IM   Dose:  156 mg        Inject 156 mg into the muscle Qmonthly injection.   Refills:  0        TAB-A-FAZAL Tabs   Dose:  1 tablet        Take 1 tablet by mouth   Refills:  0        VITAMIN D-1000 MAX ST 1000 UNITS Tabs   Dose:  1000 Units   Generic drug:   "cholecalciferol        Take 1,000 Units by mouth   Refills:  0                Procedures and tests performed during your visit     Basic metabolic panel    CBC with platelets differential    Chest XR,  PA & LAT    EKG 12 lead    Troponin I      Orders Needing Specimen Collection     None      Pending Results     Date and Time Order Name Status Description    2017 0219 Chest XR,  PA & LAT Preliminary             Pending Culture Results     No orders found from 2017 to 2017.            Pending Results Instructions     If you had any lab results that were not finalized at the time of your Discharge, you can call the ED Lab Result RN at [phone]. You will be contacted by this team for any positive Lab results or changes in treatment. The nurses are available 7 days a week from 10A to 6:30P.  You can leave a message 24 hours per day and they will return your call.        Thank you for choosing [facility]       Thank you for choosing [facility] for your care. Our goal is always to provide you with excellent care. Hearing back from our patients is one way we can continue to improve our services. Please take a few minutes to complete the written survey that you may receive in the mail after you visit with us. Thank you!        [employer] Information     [employer] lets you send messages to your doctor, view your test results, renew your prescriptions, schedule appointments and more. To sign up, go to [email or web address] . Click on \"Log in\" on the left side of the screen, which will take you to the Welcome page. Then click on \"Sign up Now\" on the right side of the page.     You will be asked to enter the access code listed below, as well as some personal information. Please follow the directions to create your username and password.     Your access code is: [email or web address]  Expires: 3/27/2018  6:15 AM     Your access code will  in 90 days. If you need help or a new code, please call your [facility] clinic or " [phone].        Care EveryWhere ID     This is your Care EveryWhere ID. This could be used by other organizations to access your [facility] medical records  [id number]        Equal Access to Services     PINEDA MARISCAL : Carleen Stark, roque sesay, qataylorta kachayitobentley bragg, fausto cordero. So [facility] [phone].    ATENCIÓN: Si habla español, tiene a weiner disposición servicios gratuitos de asistencia lingüística. Llame al [phone].    We comply with applicable federal civil rights laws and [place] laws. We do not discriminate on the basis of race, color, national origin, age, disability, sex, sexual orientation, or gender identity.            After Visit Summary       This is your record. Keep this with you and show to your community pharmacist(s) and doctor(s) at your next visit.

## 2017-12-30 NOTE — ED AVS SNAPSHOT
[facility], [place], Emergency Department    [address] [place] [address]    Phone:  [phone]                                       Carlos Alberto Garcia   MRN: [id number]    Department:  [facility], Emergency Department   Date of Visit:  12/30/2017           After Visit Summary Signature Page     I have received my discharge instructions, and my questions have been answered. I have discussed any challenges I see with this plan with the nurse or doctor.    ..........................................................................................................................................  Patient/Patient Representative Signature      ..........................................................................................................................................  Patient Representative Print Name and Relationship to Patient    ..................................................               ................................................  Date                                            Time    ..........................................................................................................................................  Reviewed by Signature/Title    ...................................................              ..............................................  Date                                                            Time

## 2017-12-30 NOTE — ED PROVIDER NOTES
History     Chief Complaint   Patient presents with     Chest Pain     Headache     HPI  Carlos Alberto Garcia is a 32 year old male who is here for presenting with headache and chest pain.  History of bipolar disorder and schizoaffective disorder.  Reports being homeless.  He was sleeping and at approximately midnight awoke from sleep with the onset of pain in his chest and head.  He attributes the pain to smoking today.  He denies any drug use today.  Not associated with any cough or URI symptoms.  Not clearly reproducible with palpation or movement.  No associated with radiation to the arms or back, or with diaphoresis.  No known past coronary disease.    Headache is diffuse.  No vision changes.  Improving without treatment.  No gait changes, no weakness, no new sensation changes.      I have reviewed the Medications, Allergies, Past Medical and Surgical History, and Social History in the [employer] system.    Past Medical History:   Diagnosis Date     Bipolar 2 disorder (H)      Chemical abuse     cocaine, meth, cambis     Dyslipidemia      Hep C w/ coma, chronic (H)      Patient overweight      Schizoaffective disorder      Unspecified essential hypertension      Unspecified hypothyroidism        Past Surgical History:   Procedure Laterality Date     HAND SURGERY      L       No family history on file.    Social History   Substance Use Topics     Smoking status: Current Every Day Smoker     Packs/day: 1.00     Types: Cigarettes     Smokeless tobacco: Current User     Alcohol use No     =    Review of Systems   Constitutional: Negative.    HENT: Negative.    Respiratory: Negative for shortness of breath.    Cardiovascular: Positive for chest pain.   Gastrointestinal: Negative.    Neurological: Positive for headaches. Negative for weakness and numbness.          Physical Exam   BP: 151/81  Pulse: 55  Temp: 97.8  F (36.6  C)  Weight: 111.4 kg (245 lb 9.5 oz)  SpO2: 99 %      Physical Exam   Gen:A&Ox3, no acute distress,  unclean  HEENT:PERRL, no facial tenderness or wounds, head atraumatic, oropharynx clear, mucous membranes moist, TMs clear bilaterally  Neck:no bony tenderness or step offs, no JVD, trachea midline  Back: no CVA tenderness, no midline bony tenderness  CV:RRR without murmurs  PULM:Clear to auscultation bilaterally  Abd:soft, nontender, nondistended. Bowel sounds present and normal  UE:No traumatic injuries, skin normal  LE:no traumatic injuries, skin normal, no LE edema  Neuro:CN II-XII intact, strength 5/5 throughout        ED Course     ED Course     Procedures                EKG Interpretation:      Interpreted by Annabella Gutierres  Time reviewed: 3:13AM  Symptoms at time of EKG: atypical chest pain  Rhythm: sinus bradycardia  Rate: 51  Axis: NORMAL  Ectopy: none  Conduction: normal  ST Segments/ T Waves: No ST-T wave changes  Q Waves: none  Comparison to prior: Unchanged from Feb. 17, 2016 other than slower heart rate    Clinical Impression: sinus bradycardia      Critical Care time:  none    Labs Ordered and Resulted from Time of ED Arrival Up to the Time of Departure from the ED   CBC WITH PLATELETS DIFFERENTIAL   BASIC METABOLIC PANEL   TROPONIN I       Assessments & Plan (with Medical Decision Making)   32-year-old male with a history of chemical dependency and mental illness presenting with atypical chest pain and headache.  Vital stable other than mild hypertension with blood pressure 151/81.  EKG unremarkable other than sinus bradycardia without ischemic changes.  CBC and basic metabolic panel unremarkable, and troponin negative.  His x-ray without rib injuries, pneumothorax, pleural effusions or infiltrates.  Treated with Tylenol for pain.  Monitored and improved on reassessment.  Discharged      I have reviewed the nursing notes.    I have reviewed the findings, diagnosis, plan and need for follow up with the patient.    New Prescriptions    No medications on file       Final diagnoses:   Atypical chest  pain       12/30/2017   [facility], [place], EMERGENCY DEPARTMENT    MD EVELIA Butler Katrina Anne, MD  12/30/17 0639

## 2017-12-30 NOTE — ED NOTES
"Patient arrived for \"severe chest pain, bad headache, and rash on his legs\". He thinks the chest pain is from smoking. He says he has had this pain \"only recently\".   "

## 2017-12-30 NOTE — DISCHARGE INSTRUCTIONS
Thank you for coming to the [facility] Emergency Department.     OK to return to your usual activities.     Continue your medications as usual.     Follow up in your primary care clinic this week with any ongoing concerns.

## 2017-12-31 ENCOUNTER — HOSPITAL ENCOUNTER (EMERGENCY)
Facility: CLINIC | Age: 32
Discharge: HOME | End: 2018-01-01
Attending: EMERGENCY MEDICINE | Admitting: EMERGENCY MEDICINE
Payer: COMMERCIAL

## 2017-12-31 ENCOUNTER — HOSPITAL ENCOUNTER (EMERGENCY)
Facility: CLINIC | Age: 32
Discharge: HOME | End: 2017-12-31
Attending: EMERGENCY MEDICINE | Admitting: EMERGENCY MEDICINE
Payer: COMMERCIAL

## 2017-12-31 VITALS
SYSTOLIC BLOOD PRESSURE: 150 MMHG | HEART RATE: 63 BPM | HEIGHT: 72 IN | DIASTOLIC BLOOD PRESSURE: 92 MMHG | TEMPERATURE: 97.8 F | WEIGHT: 240 LBS | OXYGEN SATURATION: 97 % | BODY MASS INDEX: 32.51 KG/M2 | RESPIRATION RATE: 18 BRPM

## 2017-12-31 DIAGNOSIS — S09.90XA CLOSED HEAD INJURY, INITIAL ENCOUNTER: ICD-10-CM

## 2017-12-31 DIAGNOSIS — F15.10 METHAMPHETAMINE ABUSE (H): ICD-10-CM

## 2017-12-31 DIAGNOSIS — Z76.5 MALINGERING: ICD-10-CM

## 2017-12-31 DIAGNOSIS — Z59.00 HOMELESS: ICD-10-CM

## 2017-12-31 LAB — INTERPRETATION ECG - MUSE: NORMAL

## 2017-12-31 PROCEDURE — 99281 EMR DPT VST MAYX REQ PHY/QHP: CPT | Mod: 25 | Performed by: EMERGENCY MEDICINE

## 2017-12-31 PROCEDURE — 99283 EMERGENCY DEPT VISIT LOW MDM: CPT

## 2017-12-31 PROCEDURE — 99283 EMERGENCY DEPT VISIT LOW MDM: CPT | Mod: Z6 | Performed by: EMERGENCY MEDICINE

## 2017-12-31 PROCEDURE — 25000132 ZZH RX MED GY IP 250 OP 250 PS 637: Performed by: EMERGENCY MEDICINE

## 2017-12-31 RX ORDER — ACETAMINOPHEN 500 MG
1000 TABLET ORAL ONCE
Status: COMPLETED | OUTPATIENT
Start: 2017-12-31 | End: 2017-12-31

## 2017-12-31 RX ADMIN — ACETAMINOPHEN 1000 MG: 500 TABLET, FILM COATED ORAL at 04:34

## 2017-12-31 SDOH — ECONOMIC STABILITY - HOUSING INSECURITY: HOMELESSNESS UNSPECIFIED: Z59.00

## 2017-12-31 ASSESSMENT — ENCOUNTER SYMPTOMS: NECK PAIN: 0

## 2017-12-31 NOTE — DISCHARGE INSTRUCTIONS
First Aid: Head Injuries  A strong blow to the head may cause swelling and bleeding inside the skull. The resulting pressure can injure the brain (concussion). If you have any doubts identifying a concussion, have a healthcare provider check the victim.  Seek medical help if any of the following is true:    The victim loses consciousness.    The victim has convulsions or seizures.    The victim has unequal pupil size (the black part in the center of th eye is bigger one one side than the other)    The victim shows any of the following signs of concussion:    confusion or inability to follow normal conversation    slurred speech    dizziness or vision problems    nausea or vomiting    muscle weakness or loss of mobility    memory loss    sensitivity to noise    fatigue  Call 911 immediately if the victim has any of the following:    Prolonged loss of consciousness    A depressed or spongy area in the skull, or visible bone fragments    Clear fluid draining out of  the ears or nose  While you wait for help:  1. Reassure the person.  2. Treat for shock by maintaining body temperature and keeping the victim calm.  3. Provide rescue breathing or CPR, if needed.  4. If the person has neck or back pain or is unconscious, he or she might have a spine fracture. They should only  be moved with great precaution and if it is absolutely necessary.   Step 1: Control bleeding    Apply direct pressure to control bleeding. (Wear gloves or use other protection to avoid contact with victim's blood.)    Wash a minor surface injury with soap and water after the bleeding stops or is reduced.    Cover the wound with a clean dressing and bandage.  Step 2: Ice bumps and bruises    Place a cold pack or ice on the injury to reduce swelling and pain. Placing a cloth between the injury and the ice pack helps prevent tissue damage from severe cold.  Step 3: Observe the victim    Watch for vomiting or changes in mood or alertness. If you notice  changes, call for medical help. Signs of concussion may not appear for up to 48 hours.    Tell the person's partner, parent, or roommate about the injury so he or she can continue to observe the victim.  Stitches  If a cut is deep or continues to bleed, or the edges of skin do not stay together evenly, the wound may need to be closed with stitches, tape, staples, or medical glue. All can help speed healing and reduce the risk of infection and the size of the scar. These may be especially important concerns with large wounds, and wounds on the head or other visible body parts.  If you think a wound may need medical care, visit a health care professional as soon as possible. If stitches are needed, they must be applied in the first few hours. A wound that is not properly closed is at risk of serious infection.  Date Last Reviewed: 10/19/2015    1768-4973 [employer]. [address] [place] [address], [place] [id number]. All rights reserved. This information is not intended as a substitute for professional medical care. Always follow your healthcare professional's instructions.      Take Tylenol if needed for pain.  Follow up in clinic one week.  Avoid using methamphetamine

## 2017-12-31 NOTE — ED AVS SNAPSHOT
[facility], [place], Emergency Department    [address] [place] [address]    Phone:  [phone]                                       Carlos Alberto Garcia   MRN: [id number]    Department:  [facility], Emergency Department   Date of Visit:  12/31/2017           After Visit Summary Signature Page     I have received my discharge instructions, and my questions have been answered. I have discussed any challenges I see with this plan with the nurse or doctor.    ..........................................................................................................................................  Patient/Patient Representative Signature      ..........................................................................................................................................  Patient Representative Print Name and Relationship to Patient    ..................................................               ................................................  Date                                            Time    ..........................................................................................................................................  Reviewed by Signature/Title    ...................................................              ..............................................  Date                                                            Time

## 2017-12-31 NOTE — ED NOTES
Pt reports that he fell on the ice approx 2 hours ago. Pt reports that he hit the back of his head on stairs, denies any LOC, blurry vision, or dizziness. Pt denies neck pain. Pt reports that he took Meth approx 1 hour ago.

## 2017-12-31 NOTE — ED AVS SNAPSHOT
[facility], Emergency Department    [address] [facility] [address]    Phone:  [phone]                                       Carlos Alberto Garcia   MRN: [id number]    Department:  [facility], Emergency Department   Date of Visit:  12/31/2017           Patient Information     Date Of Birth          1985        Your diagnoses for this visit were:     Closed head injury, initial encounter     Methamphetamine abuse        You were seen by Dashawn Bonilla MD.        Discharge Instructions         First Aid: Head Injuries  A strong blow to the head may cause swelling and bleeding inside the skull. The resulting pressure can injure the brain (concussion). If you have any doubts identifying a concussion, have a healthcare provider check the victim.  Seek medical help if any of the following is true:    The victim loses consciousness.    The victim has convulsions or seizures.    The victim has unequal pupil size (the black part in the center of th eye is bigger one one side than the other)    The victim shows any of the following signs of concussion:    confusion or inability to follow normal conversation    slurred speech    dizziness or vision problems    nausea or vomiting    muscle weakness or loss of mobility    memory loss    sensitivity to noise    fatigue  Call 911 immediately if the victim has any of the following:    Prolonged loss of consciousness    A depressed or spongy area in the skull, or visible bone fragments    Clear fluid draining out of  the ears or nose  While you wait for help:  1. Reassure the person.  2. Treat for shock by maintaining body temperature and keeping the victim calm.  3. Provide rescue breathing or CPR, if needed.  4. If the person has neck or back pain or is unconscious, he or she might have a spine fracture. They should only  be moved with great precaution and if it is absolutely necessary.   Step 1: Control bleeding    Apply direct pressure to control  bleeding. (Wear gloves or use other protection to avoid contact with victim's blood.)    Wash a minor surface injury with soap and water after the bleeding stops or is reduced.    Cover the wound with a clean dressing and bandage.  Step 2: Ice bumps and bruises    Place a cold pack or ice on the injury to reduce swelling and pain. Placing a cloth between the injury and the ice pack helps prevent tissue damage from severe cold.  Step 3: Observe the victim    Watch for vomiting or changes in mood or alertness. If you notice changes, call for medical help. Signs of concussion may not appear for up to 48 hours.    Tell the person's partner, parent, or roommate about the injury so he or she can continue to observe the victim.  Stitches  If a cut is deep or continues to bleed, or the edges of skin do not stay together evenly, the wound may need to be closed with stitches, tape, staples, or medical glue. All can help speed healing and reduce the risk of infection and the size of the scar. These may be especially important concerns with large wounds, and wounds on the head or other visible body parts.  If you think a wound may need medical care, visit a health care professional as soon as possible. If stitches are needed, they must be applied in the first few hours. A wound that is not properly closed is at risk of serious infection.  Date Last Reviewed: 10/19/2015    3250-6978 [employer]. [address] [place], [place] [id number]. All rights reserved. This information is not intended as a substitute for professional medical care. Always follow your healthcare professional's instructions.      Take Tylenol if needed for pain.  Follow up in clinic one week.  Avoid using methamphetamine    24 Hour Appointment Hotline       To make an appointment at any [facility], call [phone] ([phone]). If you don't have a family doctor or clinic, we will help you find one. [facility] are conveniently  "located to serve the needs of you and your family.             Review of your medicines      Our records show that you are taking the medicines listed below. If these are incorrect, please call your family doctor or clinic.        Dose / Directions Last dose taken    INVEGA SUSTENNA IM   Dose:  156 mg        Inject 156 mg into the muscle Qmonthly injection.   Refills:  0                Orders Needing Specimen Collection     None      Pending Results     Date and Time Order Name Status Description    12/30/2017 0127 EKG 12 lead Preliminary             Pending Culture Results     No orders found from 12/29/2017 to 1/1/2018.            Pending Results Instructions     If you had any lab results that were not finalized at the time of your Discharge, you can call the ED Lab Result RN at [phone]. You will be contacted by this team for any positive Lab results or changes in treatment. The nurses are available 7 days a week from 10A to 6:30P.  You can leave a message 24 hours per day and they will return your call.        Thank you for choosing [facility]       Thank you for choosing [facility] for your care. Our goal is always to provide you with excellent care. Hearing back from our patients is one way we can continue to improve our services. Please take a few minutes to complete the written survey that you may receive in the mail after you visit with us. Thank you!        [employer] Information     [employer] lets you send messages to your doctor, view your test results, renew your prescriptions, schedule appointments and more. To sign up, go to [email or web address] . Click on \"Log in\" on the left side of the screen, which will take you to the Welcome page. Then click on \"Sign up Now\" on the right side of the page.     You will be asked to enter the access code listed below, as well as some personal information. Please follow the directions to create your username and password.     Your access code is: " [email or web address]  Expires: 3/27/2018  6:15 AM     Your access code will  in 90 days. If you need help or a new code, please call your [facility] clinic or [phone].        Care EveryWhere ID     This is your Care EveryWhere ID. This could be used by other organizations to access your [facility] medical records  [id number]        Equal Access to Services     [facility] : Hadii los Stark, waaxda lumagyadaha, qaybta kaalmada mahsa, fausto johnson . So [facility] [phone].    ATENCIÓN: Si habla español, tiene a weiner disposición servicios gratuitos de asistencia lingüística. Llame al [phone].    We comply with applicable federal civil rights laws and [place] laws. We do not discriminate on the basis of race, color, national origin, age, disability, sex, sexual orientation, or gender identity.            After Visit Summary       This is your record. Keep this with you and show to your community pharmacist(s) and doctor(s) at your next visit.

## 2017-12-31 NOTE — ED AVS SNAPSHOT
[facility], Emergency Department    [address] [address]    Phone:  [phone]                                       Carlos Alberto Garcia   MRN: [id number]    Department:  [facility], Emergency Department   Date of Visit:  12/31/2017           Patient Information     Date Of Birth          1985        Your diagnoses for this visit were:     Homeless     Malingering        You were seen by Charles Mitchell MD.        Discharge Instructions       You should get into a shelter    24 Hour Appointment Hotline       To make an appointment at any [facility] clinic, call [phone] ([phone]). If you don't have a family doctor or clinic, we will help you find one. [facility] clinics are conveniently located to serve the needs of you and your family.             Review of your medicines      Our records show that you are taking the medicines listed below. If these are incorrect, please call your family doctor or clinic.        Dose / Directions Last dose taken    INVEGA SUSTENNA IM   Dose:  156 mg        Inject 156 mg into the muscle Qmonthly injection.   Refills:  0                Orders Needing Specimen Collection     None      Pending Results     No orders found for last 3 day(s).            Pending Culture Results     No orders found for last 3 day(s).            Pending Results Instructions     If you had any lab results that were not finalized at the time of your Discharge, you can call the ED Lab Result RN at [phone]. You will be contacted by this team for any positive Lab results or changes in treatment. The nurses are available 7 days a week from 10A to 6:30P.  You can leave a message 24 hours per day and they will return your call.        Thank you for choosing [facility]       Thank you for choosing [facility] for your care. Our goal is always to provide you with excellent care. Hearing back from our patients is one way we can continue to improve our services. Please  "take a few minutes to complete the written survey that you may receive in the mail after you visit with us. Thank you!        [employer] Information     [employer] lets you send messages to your doctor, view your test results, renew your prescriptions, schedule appointments and more. To sign up, go to [email or web address] . Click on \"Log in\" on the left side of the screen, which will take you to the Welcome page. Then click on \"Sign up Now\" on the right side of the page.     You will be asked to enter the access code listed below, as well as some personal information. Please follow the directions to create your username and password.     Your access code is: [email or web address]  Expires: 3/27/2018  6:15 AM     Your access code will  in 90 days. If you need help or a new code, please call your [facility] clinic or [phone].        Care EveryWhere ID     This is your Care EveryWhere ID. This could be used by other organizations to access your [facility] medical records  [id number]        Equal Access to Services     [facility]: Hadluc Stark, waaxda lázaro, qaybta kaalshikha bragg, fausto johnson . So [facility] [phone].    ATENCIÓN: Si habla español, tiene a weiner disposición servicios gratuitos de asistencia lingüística. Llame al [phone].    We comply with applicable federal civil rights laws and [place] laws. We do not discriminate on the basis of race, color, national origin, age, disability, sex, sexual orientation, or gender identity.            After Visit Summary       This is your record. Keep this with you and show to your community pharmacist(s) and doctor(s) at your next visit.                  "

## 2017-12-31 NOTE — ED PROVIDER NOTES
History     Chief Complaint   Patient presents with     Head Injury     HPI  Carlos Alberto Garcia is a 32 year old male with history of schizoaffective disorder, polysubstance abuse, homelessness and malingering who presents frequently to the ED. Used methamphetamine tonight. States he slipped and fell and hit back of head. No loss of consciousness. No other injuries. No neck pain or significant headache. No nausea or vomiting. Denies other drug use today. No suicidal thoughts or any thoughts of harming self or others.      I have reviewed the Medications, Allergies, Past Medical and Surgical History, and Social History in the [employer] system.  Past Medical History:   Diagnosis Date     Bipolar 2 disorder (H)      Chemical abuse     cocaine, meth, cambis     Dyslipidemia      Hep C w/ coma, chronic (H)      Patient overweight      Schizoaffective disorder      Unspecified essential hypertension      Unspecified hypothyroidism        Review of Systems   Constitutional: Negative.  Negative for appetite change, chills, fatigue and fever.   HENT: Negative for congestion, facial swelling, mouth sores, nosebleeds, rhinorrhea, sinus pain, sinus pressure and sore throat.    Eyes: Negative for pain and visual disturbance.   Respiratory: Negative for cough, chest tightness and shortness of breath.    Cardiovascular: Negative for chest pain and palpitations.   Gastrointestinal: Negative for abdominal pain, diarrhea, nausea and vomiting.   Genitourinary: Negative for flank pain and hematuria.   Musculoskeletal: Negative for arthralgias, back pain, gait problem and neck pain.   Skin: Negative for rash and wound.   Allergic/Immunologic: Negative for immunocompromised state.   Neurological: Negative for dizziness, seizures, syncope, weakness, light-headedness and headaches.   Hematological: Does not bruise/bleed easily.   Psychiatric/Behavioral: Negative for agitation, confusion, hallucinations, self-injury and suicidal ideas. The  patient is nervous/anxious.        Physical Exam   BP: (!) 156/97  Pulse: 92  Heart Rate: 92  Temp: 97.8  F (36.6  C)  Resp: 18  Height: 182.9 cm (6')  Weight: 108.9 kg (240 lb)  SpO2: 100 %      Physical Exam   Constitutional: He is oriented to person, place, and time. He appears well-developed and well-nourished. No distress.   HENT:   Head: Normocephalic and atraumatic. Head is without raccoon's eyes and without Lord's sign.   Right Ear: Tympanic membrane and ear canal normal. No hemotympanum.   Left Ear: Tympanic membrane and ear canal normal. No hemotympanum.   Nose: Nose normal.   Mouth/Throat: Uvula is midline, oropharynx is clear and moist and mucous membranes are normal. No oral lesions.   Eyes: Conjunctivae and EOM are normal. Pupils are equal, round, and reactive to light.   Neck: Normal range of motion. Neck supple.   No neck tenderness. NEXUS criteria negative.   Cardiovascular: Normal rate, regular rhythm, normal heart sounds and intact distal pulses.    Pulmonary/Chest: Effort normal and breath sounds normal. No respiratory distress.   Abdominal: Soft. There is no tenderness.   Musculoskeletal: Normal range of motion. He exhibits no edema or tenderness.   Neurological: He is alert and oriented to person, place, and time. He has normal strength and normal reflexes. No cranial nerve deficit or sensory deficit. Coordination and gait normal. GCS eye subscore is 4. GCS verbal subscore is 5. GCS motor subscore is 6.   Skin: Skin is warm and dry. No rash noted.   Psychiatric: Thought content normal. His affect is blunt. He is withdrawn. He is not agitated, not actively hallucinating and not combative. Thought content is not paranoid and not delusional. He expresses inappropriate judgment. He expresses no homicidal and no suicidal ideation. He is noncommunicative.   Nursing note and vitals reviewed.      ED Course     ED Course     Procedures             Critical Care time:  none             Labs Ordered  and Resulted from Time of ED Arrival Up to the Time of Departure from the ED - No data to display         Assessments & Plan (with Medical Decision Making)   Fall with minor head injury and methamphetamine abuse. Observed in ED. Discharged to outpatient follow up. Head injury instructions given. Return if any problems.    I have reviewed the nursing notes.    I have reviewed the findings, diagnosis, plan and need for follow up with the patient.    Discharge Medication List as of 12/31/2017  6:35 AM          Final diagnoses:   Closed head injury, initial encounter   Methamphetamine abuse       12/31/2017   [facility], [place], EMERGENCY DEPARTMENT     Dashawn Bonilla MD  01/22/18 0018

## 2017-12-31 NOTE — ED AVS SNAPSHOT
[facility], [place], Emergency Department    [address] [place] [address]    Phone:  [phone]                                       Carlos Alberto Garcia   MRN: [id number]    Department:  [facility], Emergency Department   Date of Visit:  12/31/2017           After Visit Summary Signature Page     I have received my discharge instructions, and my questions have been answered. I have discussed any challenges I see with this plan with the nurse or doctor.    ..........................................................................................................................................  Patient/Patient Representative Signature      ..........................................................................................................................................  Patient Representative Print Name and Relationship to Patient    ..................................................               ................................................  Date                                            Time    ..........................................................................................................................................  Reviewed by Signature/Title    ...................................................              ..............................................  Date                                                            Time

## 2018-01-01 VITALS
RESPIRATION RATE: 18 BRPM | SYSTOLIC BLOOD PRESSURE: 145 MMHG | BODY MASS INDEX: 32.55 KG/M2 | TEMPERATURE: 97.4 F | OXYGEN SATURATION: 94 % | HEIGHT: 72 IN | DIASTOLIC BLOOD PRESSURE: 92 MMHG | HEART RATE: 84 BPM

## 2018-01-01 NOTE — ED NOTES
"Patient presents to triage wanting to get his head \"checked out\" after a slip and fall on the ice two days ago. Patient was seen in this ED yesterday for this reason. Denies pain.  "

## 2018-01-01 NOTE — ED PROVIDER NOTES
History     Chief Complaint   Patient presents with     Fall     HPI  Carlos Alberto Garcia is a 32 year old homeless male who is well known to the Emergency Department as he frequently comes here with different complaints. Today, he presents to the Emergency Department for further evaluation after  bumping  the back of his head on cement  two days ago. Per his chart, he was here earlier today for the same complaint, but he denies it. He was also here multiple times this week. When asked why he comes here so often, he replied that the shelters fill up and that he was kicked out of an N building near the hospital prior.    I have reviewed the Medications, Allergies, Past Medical and Surgical History, and Social History in the [employer] system.  PAST MEDICAL HISTORY:   Past Medical History:   Diagnosis Date     Bipolar 2 disorder (H)      Chemical abuse     cocaine, meth, cambis     Dyslipidemia      Hep C w/ coma, chronic (H)      Patient overweight      Schizoaffective disorder      Unspecified essential hypertension      Unspecified hypothyroidism        PAST SURGICAL HISTORY:   Past Surgical History:   Procedure Laterality Date     HAND SURGERY      L       FAMILY HISTORY: No family history on file.    SOCIAL HISTORY:   Social History   Substance Use Topics     Smoking status: Current Every Day Smoker     Packs/day: 1.00     Types: Cigarettes     Smokeless tobacco: Current User     Alcohol use No     No current facility-administered medications for this encounter.      Current Outpatient Prescriptions   Medication     Paliperidone Palmitate (INVEGA SUSTENNA IM)     Facility-Administered Medications Ordered in Other Encounters   Medication     ibuprofen (ADVIL,MOTRIN) 600 MG tablet      No Known Allergies      Review of Systems   Musculoskeletal: Negative for neck pain.   Skin: Wound: bump on occiput.   All other systems reviewed and are negative.      Physical Exam   BP: (!) 145/92  Pulse: 84  Temp: 97.4  F (36.3   C)  Resp: 18  Height: 182.9 cm (6')  SpO2: 94 %      Physical Exam   Constitutional: He is oriented to person, place, and time. No distress.   Unkempt disheveled malodorous   HENT:   Head: Atraumatic.   Cardiovascular: Normal rate, regular rhythm and normal heart sounds.    Pulmonary/Chest: Breath sounds normal. No respiratory distress.   Neurological: He is alert and oriented to person, place, and time.   Psychiatric: He has a normal mood and affect.   Nursing note and vitals reviewed.      ED Course     ED Course     Procedures        [place] given       Labs Ordered and Resulted from Time of ED Arrival Up to the Time of Departure from the ED - No data to display         Assessments & Plan (with Medical Decision Making)     32-year-old male well known to the Emergency Department . He is homeless, he was here earlier today, he was here yesterday, and the day before, and the day before that. It is very cold out so I agreed to give him a sandwich and let him hang out for a little while but then he will be discharged. He is basically here because very cold out. I asked why he is not in a shelter and he said because they are all full. There is no indication for any medical evaluation at this time.  This part of the medical record was transcribed by Zoey Turner, Medical Scribe, from a dictation done by Charles Mitchell MD.     I have reviewed the nursing notes.    I have reviewed the findings, diagnosis, plan and need for follow up with the patient.    New Prescriptions    No medications on file       Final diagnoses:   Homeless   Malingering     I, Zoey Turner, am serving as a trained medical scribe to document services personally performed by Charles Mitchell MD, based on the provider's statements to me.   ICharles MD, was physically present and have reviewed and verified the accuracy of this note documented by Zoey Turner.    12/31/2017   [facility], [place], EMERGENCY DEPARTMENT     Stephen  Charles Wise MD  12/31/17 9096

## 2018-01-02 ENCOUNTER — HOSPITAL ENCOUNTER (EMERGENCY)
Facility: CLINIC | Age: 33
Discharge: HOME | End: 2018-01-02
Attending: EMERGENCY MEDICINE
Payer: COMMERCIAL

## 2018-01-02 ENCOUNTER — HOSPITAL ENCOUNTER (EMERGENCY)
Facility: CLINIC | Age: 33
Discharge: HOME | End: 2018-01-03
Attending: EMERGENCY MEDICINE | Admitting: EMERGENCY MEDICINE
Payer: COMMERCIAL

## 2018-01-02 VITALS
DIASTOLIC BLOOD PRESSURE: 80 MMHG | TEMPERATURE: 97.6 F | SYSTOLIC BLOOD PRESSURE: 115 MMHG | OXYGEN SATURATION: 100 % | HEART RATE: 70 BPM | RESPIRATION RATE: 18 BRPM

## 2018-01-02 VITALS
RESPIRATION RATE: 14 BRPM | DIASTOLIC BLOOD PRESSURE: 69 MMHG | SYSTOLIC BLOOD PRESSURE: 124 MMHG | HEART RATE: 73 BPM | TEMPERATURE: 97.3 F | OXYGEN SATURATION: 100 %

## 2018-01-02 DIAGNOSIS — Z76.5 MALINGERING: ICD-10-CM

## 2018-01-02 DIAGNOSIS — Z59.00 HOMELESSNESS: ICD-10-CM

## 2018-01-02 PROCEDURE — 99283 EMERGENCY DEPT VISIT LOW MDM: CPT | Mod: 27 | Performed by: EMERGENCY MEDICINE

## 2018-01-02 PROCEDURE — 25000132 ZZH RX MED GY IP 250 OP 250 PS 637: Performed by: EMERGENCY MEDICINE

## 2018-01-02 PROCEDURE — 99207 ZZC APP CREDIT; MD BILLING SHARED VISIT: CPT | Mod: Z6 | Performed by: EMERGENCY MEDICINE

## 2018-01-02 RX ORDER — IBUPROFEN 600 MG/1
600 TABLET, FILM COATED ORAL ONCE
Status: COMPLETED | OUTPATIENT
Start: 2018-01-02 | End: 2018-01-02

## 2018-01-02 RX ORDER — ACETAMINOPHEN 325 MG/1
975 TABLET ORAL ONCE
Status: COMPLETED | OUTPATIENT
Start: 2018-01-02 | End: 2018-01-02

## 2018-01-02 RX ADMIN — IBUPROFEN 600 MG: 600 TABLET ORAL at 23:55

## 2018-01-02 RX ADMIN — ACETAMINOPHEN 975 MG: 325 TABLET ORAL at 23:55

## 2018-01-02 SDOH — ECONOMIC STABILITY - HOUSING INSECURITY: HOMELESSNESS UNSPECIFIED: Z59.00

## 2018-01-02 NOTE — ED NOTES
"Pt signed into triage and then went straight to visitor bathroom where he stayed for 20 minutes. Pt finally emerged and when writer asks pt what brings him to the ED, pt is quiet for a while and then states \"I have a rash\". When asked where the rash was, the patient again hesitated before saying \"my foot\".   "

## 2018-01-03 VITALS
DIASTOLIC BLOOD PRESSURE: 74 MMHG | OXYGEN SATURATION: 100 % | HEIGHT: 72 IN | BODY MASS INDEX: 32.51 KG/M2 | WEIGHT: 240 LBS | TEMPERATURE: 98.9 F | RESPIRATION RATE: 16 BRPM | HEART RATE: 72 BPM | SYSTOLIC BLOOD PRESSURE: 132 MMHG

## 2018-01-03 VITALS
HEART RATE: 72 BPM | DIASTOLIC BLOOD PRESSURE: 75 MMHG | OXYGEN SATURATION: 100 % | SYSTOLIC BLOOD PRESSURE: 121 MMHG | RESPIRATION RATE: 16 BRPM | TEMPERATURE: 98 F

## 2018-01-03 DIAGNOSIS — R11.0 NAUSEA: ICD-10-CM

## 2018-01-03 DIAGNOSIS — Z71.6 ENCOUNTER FOR SMOKING CESSATION COUNSELING: ICD-10-CM

## 2018-01-03 DIAGNOSIS — Z76.5 MALINGERING: ICD-10-CM

## 2018-01-03 DIAGNOSIS — Z59.00 HOMELESSNESS: ICD-10-CM

## 2018-01-03 PROCEDURE — 99282 EMERGENCY DEPT VISIT SF MDM: CPT | Mod: 25,27 | Performed by: EMERGENCY MEDICINE

## 2018-01-03 PROCEDURE — 99284 EMERGENCY DEPT VISIT MOD MDM: CPT | Mod: 25 | Performed by: EMERGENCY MEDICINE

## 2018-01-03 PROCEDURE — 93308 TTE F-UP OR LMTD: CPT | Mod: 26 | Performed by: EMERGENCY MEDICINE

## 2018-01-03 PROCEDURE — 93010 ELECTROCARDIOGRAM REPORT: CPT | Mod: 59 | Performed by: EMERGENCY MEDICINE

## 2018-01-03 PROCEDURE — 99283 EMERGENCY DEPT VISIT LOW MDM: CPT | Mod: Z6 | Performed by: EMERGENCY MEDICINE

## 2018-01-03 PROCEDURE — 76604 US EXAM CHEST: CPT | Mod: 26 | Performed by: EMERGENCY MEDICINE

## 2018-01-03 SDOH — ECONOMIC STABILITY - HOUSING INSECURITY: HOMELESSNESS UNSPECIFIED: Z59.00

## 2018-01-03 ASSESSMENT — ENCOUNTER SYMPTOMS
WEAKNESS: 0
ABDOMINAL PAIN: 0
FREQUENCY: 0
DIZZINESS: 0
EYE PAIN: 0
HEMATURIA: 0
SORE THROAT: 0
LIGHT-HEADEDNESS: 0
POLYDIPSIA: 0
CONFUSION: 0
COUGH: 0
NECK PAIN: 0
ADENOPATHY: 0
BLOOD IN STOOL: 0
BACK PAIN: 0
CONSTIPATION: 0
NECK STIFFNESS: 0
EYE REDNESS: 0
BRUISES/BLEEDS EASILY: 0
DYSPHORIC MOOD: 0
NAUSEA: 0
FEVER: 0
DIAPHORESIS: 0
COLOR CHANGE: 0
DIFFICULTY URINATING: 0
TROUBLE SWALLOWING: 0
HEADACHES: 0
NUMBNESS: 0
ABDOMINAL PAIN: 0
SHORTNESS OF BREATH: 0
ARTHRALGIAS: 0
HEADACHES: 0
PALPITATIONS: 0
VOICE CHANGE: 0
VOMITING: 0
COLOR CHANGE: 0
SHORTNESS OF BREATH: 0
DYSURIA: 0
FEVER: 0
NERVOUS/ANXIOUS: 0
DIARRHEA: 0
CHILLS: 0

## 2018-01-03 NOTE — ED AVS SNAPSHOT
" [facility], Emergency Department    [address] [address]    Phone:  [phone]                                       Carlos Alberto Garcia   MRN: [id number]    Department:  [facility], Emergency Department   Date of Visit:  1/3/2018           Patient Information     Date Of Birth          1985        Your diagnoses for this visit were:     Homelessness     Nausea        You were seen by Madelaine Blank MD.        Discharge Instructions           FOLLOW-UP:  Please make an appointment to follow up with:  - [facility] (phone: [phone]) - or - [facility] (phone: [phone])       RETURN TO THE EMERGENCY DEPARTMENT  Return to the Emergency Department at any time for new/worsening symptoms.        * VOMITING [6yr-Adult]  Vomiting is a common symptom that may be due to different causes. These include gastroenteritis (\"stomach-flu\"), food poisoning and gastritis. There are other more serious causes of vomiting which may be hard to diagnose early in the illness. Therefore, it is important to watch for the warning signs listed below.  The main danger from repeated vomiting is \"dehydration\". This is due to excess loss of water and minerals from the body. When this occurs, body fluids must be replaced.`  HOME CARE:    If symptoms are severe, rest at home for the next 24 hours.    You may use acetaminophen (Tylenol) 650-1000 mg every 6 hours to control fever, unless another medicine was prescribed. [ NOTE : If you have chronic liver disease, talk with your doctor before using acetaminophen.] (Aspirin should never be used in anyone under 18 years of age who is ill with a fever. It may cause severe liver damage.)    Avoid tobacco and alcohol use, which may worsen your symptoms.    If medicines for vomiting were prescribed, take as directed.  DURING THE FIRST 12-24 HOURS follow the diet below. Try to take frequent small sips even if you vomit " occasionally:    FRUIT JUICES: Apple, grape juice, clear fruit drinks, electrolyte replacement and sports drinks.    BEVERAGES: Sport drinks such as Gatorade, soft drinks without caffeine; mineral water (plain or flavored), decaffeinated tea and coffee.    SOUPS: Clear broth, consommé and bouillon    DESSERTS: Plain gelatin (Jell-O), popsicles and fruit juice bars.  DURING THE NEXT 24 HOURS you may add the following to the above:    Hot cereal, plain toast, bread, rolls, crackers    Plain noodles, rice, mashed potatoes, chicken noodle or rice soup    Unsweetened canned fruit (avoid pineapple), bananas    Avoid dairy products    Limit caffeine and chocolate. No spices or seasonings except salt.  DURING THE NEXT 24 HOURS  Slowly go back to a normal diet, as you feel better and your symptoms lessen.  FOLLOW UP with your doctor as advised if you are not improving over the next 2-3 days.  GET PROMPT MEDICAL ATTENTION if any of the following occur:    Constant abdominal pain that stays in the same spot or gets worse    Continued vomiting (unable to keep liquids down) for 24 hours    Frequent diarrhea (more than 5 times a day); blood (red or black color) in diarrhea    No urine output for 12 hours or extreme thirst    Weakness, dizziness or fainting    Unusually drowsy or confused    Fever over 101.0  F (38.3  C) for more than 3 days    Yellow color of the eyes or skin    6730-2163 [employer]. [address] [place] [address]. All rights reserved. This information is not intended as a substitute for professional medical care. Always follow your healthcare professional's instructions.  This information has been modified by your health care provider with permission from the publisher.          24 Hour Appointment Hotline       To make an appointment at any [facility], call [phone] ([phone]). If you don't have a family doctor or clinic, we will help you find one. [facility] are  "conveniently located to serve the needs of you and your family.             Review of your medicines      Our records show that you are taking the medicines listed below. If these are incorrect, please call your family doctor or clinic.        Dose / Directions Last dose taken    INVEGA SUSTENNA IM   Dose:  156 mg        Inject 156 mg into the muscle Qmonthly injection.   Refills:  0                Procedures and tests performed during your visit     EKG 12-lead, tracing only      Orders Needing Specimen Collection     None      Pending Results     Date and Time Order Name Status Description    1/3/2018 2040 EKG 12-lead, tracing only Preliminary             Pending Culture Results     No orders found from 1/1/2018 to 1/4/2018.            Pending Results Instructions     If you had any lab results that were not finalized at the time of your Discharge, you can call the ED Lab Result RN at [phone]. You will be contacted by this team for any positive Lab results or changes in treatment. The nurses are available 7 days a week from 10A to 6:30P.  You can leave a message 24 hours per day and they will return your call.        Thank you for choosing [facility]       Thank you for choosing [facility] for your care. Our goal is always to provide you with excellent care. Hearing back from our patients is one way we can continue to improve our services. Please take a few minutes to complete the written survey that you may receive in the mail after you visit with us. Thank you!        [employer] Live Chat Information     [employer] lets you send messages to your doctor, view your test results, renew your prescriptions, schedule appointments and more. To sign up, go to [email or web address] . Click on \"Log in\" on the left side of the screen, which will take you to the Welcome page. Then click on \"Sign up Now\" on the right side of the page.     You will be asked to enter the access code listed below, as well as some personal information. " Please follow the directions to create your username and password.     Your access code is: [email or web address]  Expires: 3/27/2018  6:15 AM     Your access code will  in 90 days. If you need help or a new code, please call your [place] clinic or [phone].        Care EveryWhere ID     This is your Care EveryWhere ID. This could be used by other organizations to access your [place] medical records  [id number]        Equal Access to Services     HUGH MARISCAL : Hadii aad ku hadasho Soomaali, waaxda luqadaha, qaybta kaalmada adeghanshyam, fausto cordero. So [facility] [phone].    ATENCIÓN: Si habla español, tiene a weiner disposición servicios gratuitos de asistencia lingüística. Llame al [phone].    We comply with applicable federal civil rights laws and [place] laws. We do not discriminate on the basis of race, color, national origin, age, disability, sex, sexual orientation, or gender identity.            After Visit Summary       This is your record. Keep this with you and show to your community pharmacist(s) and doctor(s) at your next visit.

## 2018-01-03 NOTE — ED AVS SNAPSHOT
[facility], [place], Emergency Department    [address] [place] [address]    Phone:  [phone]                                       Carlos Alberto Garcia   MRN: [id number]    Department:  [facility], Emergency Department   Date of Visit:  1/3/2018           After Visit Summary Signature Page     I have received my discharge instructions, and my questions have been answered. I have discussed any challenges I see with this plan with the nurse or doctor.    ..........................................................................................................................................  Patient/Patient Representative Signature      ..........................................................................................................................................  Patient Representative Print Name and Relationship to Patient    ..................................................               ................................................  Date                                            Time    ..........................................................................................................................................  Reviewed by Signature/Title    ...................................................              ..............................................  Date                                                            Time

## 2018-01-03 NOTE — ED AVS SNAPSHOT
[facility], [place], Emergency Department    [address] [place] [address]    Phone:  [phone]                                       Carlos Alberto Garcia   MRN: [id number]    Department:  [facility], Emergency Department   Date of Visit:  1/3/2018           After Visit Summary Signature Page     I have received my discharge instructions, and my questions have been answered. I have discussed any challenges I see with this plan with the nurse or doctor.    ..........................................................................................................................................  Patient/Patient Representative Signature      ..........................................................................................................................................  Patient Representative Print Name and Relationship to Patient    ..................................................               ................................................  Date                                            Time    ..........................................................................................................................................  Reviewed by Signature/Title    ...................................................              ..............................................  Date                                                            Time

## 2018-01-03 NOTE — DISCHARGE INSTRUCTIONS
Kicking the Smoking Habit  If you smoke, quitting is one of the best changes you can make for your heart and your overall health. Your risk of heart attack goes down within one day of putting out that last cigarette. As you go longer without smoking, your risk goes down even more. Quitting isn t easy, but millions of people have done it. You can, too. It s never too late to quit.  Getting started  Boost your chances of success by deciding on your  quit plan.  Your health care provider and cardiac rehab team can help you develop this plan. Even if you ve already quit, it s easy to slip back into smoking.  Your plan can help you avoid and recover from relapse.  In any case, start by setting a date to quit within a month, and do it.    Keys to your quit plan    Talk to your healthcare provider about prescription medicines and nicotine replacement products that help stop the urge to smoke.     Join a support group or quit smoking program. Talking with others about the challenges of quitting can help you get through them.    Ask other smokers in your household to quit with you.    Look for the cues in your life that you associate with smoking and avoid them.  Track your triggers  What gives you that  X-vrqi-b-cigarette  feeling? List all the situations that make you want a cigarette. Then think of other ways to deal with these situations. Here are some examples:  Situation How I'll handle it   Finishing a meal Get up from the table and take a walk   Having an argument Find a quiet place and breathe deeply   Feeling lonely or bored Call a friend to talk         Tips for quitting successfully    List the benefits of quitting such as reducing heart risks and saving money. Keep this list and review it whenever you feel like smoking.    Get support. Let your friends know you may call them to chat when you have an urge to smoke.    If you ve tried to quit before without success, this time avoid the triggers that may cause  the relapse.    Make the most of slip-ups. Try to learn from them, and then get back on track.    Be accountable to your friends and your calendar so that you stay on track.  For family and friends    Be supportive and patient. Quitting smoking can be difficult and stressful.    If you smoke, now s a great time to quit. Even if you don t quit, never smoke around your loved one. Secondhand smoke is dangerous to his or her heart.    The best goals are accomplished in teams. Remember that when your loved one states he or she wants to stop smoking.  Date Last Reviewed: 7/1/2016 2000-2017 [employer]. [address] [place] [address], [place] [address]. All rights reserved. This information is not intended as a substitute for professional medical care. Always follow your healthcare professional's instructions.          How to Quit Smoking  Smoking is one of the hardest habits to break. About half of all people who have ever smoked have been able to quit. Most people who still smoke want to quit. Here are some of the best ways to stop smoking.    Keep trying  Most smokers make many attempts at quitting before they are successful. It s important not to give up.  Go cold turkey  Most former smokers quit cold turkey (all at once). Trying to cut back gradually doesn't seem to work as well, perhaps because it continues the smoking habit. Also, it is possible to inhale more while smoking fewer cigarettes. This results in the same amount of nicotine in your body.  Get support  Support programs can be a big help, especially for heavy smokers. These groups offer lectures, ways to change behavior, and peer support. Here are some ways to find a support program:    Free national quitline: 800-QUIT-NOW (575-216-7077).    Hospital quit-smoking programs.    American Lung Association: (508.789.3179).    American Cancer Society (573-039-5877).  Support at home is important too. Nonsmokers can offer praise and encouragement. If the  "smoker in your life finds it hard to quit, encourage them to keep trying.  Over-the-counter medicines  Nicotine replacement therapy may make quitting easier. Certain aids, such as the nicotine patch, gum, and lozenges, are available without a prescription. It is best to use these under a doctor s care, though. The skin patch provides a steady supply of nicotine. Nicotine gum and lozenges give temporary bursts of low levels of nicotine. Both methods reduce the craving for cigarettes. Warning: If you have nausea, vomiting, dizziness, weakness, or a fast heartbeat, stop using these products and see your doctor.  Prescription medicines  After reviewing your smoking patterns and past attempts to quit, your doctor may offer a prescription medicine such as bupropion, varenicline, a nicotine inhaler, or nasal spray. Each has advantages and side effects. Your doctor can review these with you.  Health benefits of quitting  The benefits of quitting start right away and keep improving the longer you go without smoking. These benefits occur at any age.  So whether you are 17 or 70, quitting is a good decision. Some of the benefits include:    20 minutes: Blood pressure and pulse return to normal.    8 hours: Oxygen levels return to normal.    2 days: Ability to smell and taste begin to improve as damaged nerves regrow.    2 to 3 weeks: Circulation and lung function improve.    1 to 9 months: Coughing, congestion, and shortness of breath decrease; tiredness decreases.    1 year: Risk of heart attack decreases by half.    5 years: Risk of lung cancer decreases by half; risk of stroke becomes the same as a nonsmoker s.  For more on how to quit smoking, try these online resources:     Smokefree.gov    \"Clearing the Air\" booklet from the National Cancer [employer]: smokefree.gov/sites/default/files/pdf/clearing-the-air-accessible.pdf  Date Last Reviewed: 3/1/2017    7516-1773 [employer]. [address], [place], " [place] [address]. All rights reserved. This information is not intended as a substitute for professional medical care. Always follow your healthcare professional's instructions.      It would benefit you greatly to stop smoking and to establish at a regular clinic.

## 2018-01-03 NOTE — ED PROVIDER NOTES
History     Chief Complaint   Patient presents with     Headache     HPI  Carlos Alberto Garcia is a 32 year old homeless male with a history of bipolar disorder, schizoaffective disorder, chemical abuse, HTN, hypothyroidism, and frequent visits to the ED presents to the ED today with headache.  This is his 9th ED visit in the past 6 days.  Patient states he had a bad headache that started 4 days ago has not improved.  He states that all the shelters are filled, but does not know where he is going tonight.    PAST MEDICAL HISTORY  Past Medical History:   Diagnosis Date     Bipolar 2 disorder (H)      Chemical abuse     cocaine, meth, cambis     Dyslipidemia      Hep C w/ coma, chronic (H)      Patient overweight      Schizoaffective disorder      Unspecified essential hypertension      Unspecified hypothyroidism      PAST SURGICAL HISTORY  Past Surgical History:   Procedure Laterality Date     HAND SURGERY      L     FAMILY HISTORY  No family history on file.  SOCIAL HISTORY  Social History   Substance Use Topics     Smoking status: Current Every Day Smoker     Packs/day: 1.00     Types: Cigarettes     Smokeless tobacco: Current User     Alcohol use No     MEDICATIONS  No current facility-administered medications for this encounter.      Current Outpatient Prescriptions   Medication     Paliperidone Palmitate (INVEGA SUSTENNA IM)     Facility-Administered Medications Ordered in Other Encounters   Medication     ibuprofen (ADVIL,MOTRIN) 600 MG tablet     ALLERGIES  No Known Allergies    I have reviewed the Medications, Allergies, Past Medical and Surgical History, and Social History in the Epic system.    Review of Systems   All other systems reviewed and are negative.      Physical Exam   BP: 115/80  Pulse: 70  Temp: 97.6  F (36.4  C)  Resp: 18  SpO2: 100 %      Physical Exam   Constitutional: He is oriented to person, place, and time. No distress.   Eyes: EOM are normal. Pupils are equal, round, and reactive to  light.   Neck: Neck supple.   Pulmonary/Chest: No respiratory distress.   Neurological: He is alert and oriented to person, place, and time.   Psychiatric: His speech is delayed. He is withdrawn. Thought content is not paranoid and not delusional. He expresses impulsivity and inappropriate judgment.   Nursing note and vitals reviewed.      ED Course     ED Course     Procedures   11:33 PM  The patient was seen and examined by Dr. Mitchell in Room 6.           Tylenol ibuprofen in the [place] were ordered       Labs Ordered and Resulted from Time of ED Arrival Up to the Time of Departure from the ED - No data to display     11:59 PM He left the Department    Assessments & Plan (with Medical Decision Making)   32-year-old male well-known to the emergency department he has been here every day for the last 8 or 9 days.  He comes in around midnight.  He is homeless does not go to shelters.  Frequently comes here and attempts to sleep and be fed.  He left the emergency department after being given Tylenol ibuprofen and a sandwich.    I have reviewed the nursing notes.    I have reviewed the findings, diagnosis, plan and need for follow up with the patient.    New Prescriptions    No medications on file       Final diagnoses:   Malingering   Homelessness     IÁngel, am serving as a trained medical scribe to document services personally performed by Charles Mitchell MD, based on the provider's statements to me.      Charles PARNELL MD, was physically present and have reviewed and verified the accuracy of this note documented by Ángel Herrera.     1/2/2018   [facility], [place], EMERGENCY DEPARTMENT     Charles Mitchell MD  01/03/18 0002

## 2018-01-03 NOTE — ED NOTES
Pt presents ambulatory to triage from streets as he is homeless and lives outside. Pt states all winter his hands have been hurting., Hands appear red, no open wounds noted. Pt denies other s/sx.

## 2018-01-03 NOTE — ED PROVIDER NOTES
[facility] EMERGENCY DEPARTMENT ([facility])  1/03/18 ED 23 10:23 AM   History     Chief Complaint   Patient presents with     Hand Pain     The history is provided by the patient and medical records.     Carlos Alberto Garcia is a 32 year old homeless male who presents with hand pain. He is very well known to the ED for multiple visits (10 visits in the past week alone). He has a history of bipolar disorder, schizoaffective disorder, chemical abuse, hypertension and hypothyroidism. He presented to the ER yesterday with a bad headache and noted that all the shelters were filled and he didn't have a place to go. He was given ibuprofen, tylenol and a sandwich and was discharged. He had similar presentation the day before as well. Both times he noted that shelters were full and he needed a place to stay out of the cold. Today is similarly a very cold day. Patient came in because the weather is very cold outside.  He has gloves but they aren t warm enough and his hands are so cold that it hurts.  He came in because he was cold.  He denies any numbness, weakness, or loss of range of motion in his fingers.  No blisters to his hands.    I have reviewed the Medications, Allergies, Past Medical and Surgical History, and Social History in the Epic system.    Review of Systems   Constitutional: Negative for fever.   HENT: Negative for congestion.    Eyes: Negative for redness.   Respiratory: Negative for shortness of breath.    Cardiovascular: Negative for chest pain.   Gastrointestinal: Negative for abdominal pain.   Genitourinary: Negative for difficulty urinating.   Musculoskeletal: Negative for arthralgias and neck stiffness.   Skin: Negative for color change.   Neurological: Negative for headaches.   Psychiatric/Behavioral: Negative for confusion.       Physical Exam   BP: (!) 152/91  Pulse: 72  Temp: 97.9  F (36.6  C)  Resp: 16  SpO2: 100 %      Physical Exam   Constitutional: No distress.   HENT:   Head:  Atraumatic.   Mouth/Throat: Oropharynx is clear and moist. No oropharyngeal exudate.   Eyes: Pupils are equal, round, and reactive to light. No scleral icterus.   Cardiovascular: Normal heart sounds and intact distal pulses.    Pulmonary/Chest: Breath sounds normal. No respiratory distress.   Abdominal: Soft. Bowel sounds are normal. There is no tenderness.   Musculoskeletal: He exhibits no edema or tenderness.   Skin: Skin is warm. No rash noted. He is not diaphoretic.       ED Course     ED Course     Procedures             Labs Ordered and Resulted from Time of ED Arrival Up to the Time of Departure from the ED - No data to display         Assessments & Plan (with Medical Decision Making)   32-year-old male with a history of schizoaffective disorder, bipolar, chemical abuse and frequent emergency room visits including 10 in the past week for nonemergent conditions presents with complaints of his hands being cold when he is outside in the cold weather.  Patient's hands are examined and reveal no evidence of obvious injury or pathology.  He was given a number of instructions to pursue in order to keep his hands warm including smoking cessation.  He will be discharged with instructions to establish primary care.    I have reviewed the nursing notes.    I have reviewed the findings, diagnosis, plan and need for follow up with the patient.    New Prescriptions    No medications on file       Final diagnoses:   Malingering   Encounter for smoking cessation counseling       1/3/2018   [facility], [place], EMERGENCY DEPARTMENT     Nelson Pyle MD  01/03/18 2652

## 2018-01-03 NOTE — ED AVS SNAPSHOT
[facility], Emergency Department    [address] [address]    Phone:  [phone]                                       Carlos Alberto Garcia   MRN: [id number]    Department:  [facility], Emergency Department   Date of Visit:  1/3/2018           Patient Information     Date Of Birth          1985        Your diagnoses for this visit were:     Malingering     Encounter for smoking cessation counseling        You were seen by Nelson Pyle MD.        Discharge Instructions           Kicking the Smoking Habit  If you smoke, quitting is one of the best changes you can make for your heart and your overall health. Your risk of heart attack goes down within one day of putting out that last cigarette. As you go longer without smoking, your risk goes down even more. Quitting isn t easy, but millions of people have done it. You can, too. It s never too late to quit.  Getting started  Boost your chances of success by deciding on your  quit plan.  Your health care provider and cardiac rehab team can help you develop this plan. Even if you ve already quit, it s easy to slip back into smoking.  Your plan can help you avoid and recover from relapse.  In any case, start by setting a date to quit within a month, and do it.    Keys to your quit plan    Talk to your healthcare provider about prescription medicines and nicotine replacement products that help stop the urge to smoke.     Join a support group or quit smoking program. Talking with others about the challenges of quitting can help you get through them.    Ask other smokers in your household to quit with you.    Look for the cues in your life that you associate with smoking and avoid them.  Track your triggers  What gives you that  T-zops-d-cigarette  feeling? List all the situations that make you want a cigarette. Then think of other ways to deal with these situations. Here are some examples:  Situation How I'll handle it   Finishing a meal Get up  from the table and take a walk   Having an argument Find a quiet place and breathe deeply   Feeling lonely or bored Call a friend to talk         Tips for quitting successfully    List the benefits of quitting such as reducing heart risks and saving money. Keep this list and review it whenever you feel like smoking.    Get support. Let your friends know you may call them to chat when you have an urge to smoke.    If you ve tried to quit before without success, this time avoid the triggers that may cause the relapse.    Make the most of slip-ups. Try to learn from them, and then get back on track.    Be accountable to your friends and your calendar so that you stay on track.  For family and friends    Be supportive and patient. Quitting smoking can be difficult and stressful.    If you smoke, now s a great time to quit. Even if you don t quit, never smoke around your loved one. Secondhand smoke is dangerous to his or her heart.    The best goals are accomplished in teams. Remember that when your loved one states he or she wants to stop smoking.  Date Last Reviewed: 7/1/2016 2000-2017 [employer]. [address] [place] [address]. All rights reserved. This information is not intended as a substitute for professional medical care. Always follow your healthcare professional's instructions.          How to Quit Smoking  Smoking is one of the hardest habits to break. About half of all people who have ever smoked have been able to quit. Most people who still smoke want to quit. Here are some of the best ways to stop smoking.    Keep trying  Most smokers make many attempts at quitting before they are successful. It s important not to give up.  Go cold turkey  Most former smokers quit cold turkey (all at once). Trying to cut back gradually doesn't seem to work as well, perhaps because it continues the smoking habit. Also, it is possible to inhale more while smoking fewer cigarettes. This results in  the same amount of nicotine in your body.  Get support  Support programs can be a big help, especially for heavy smokers. These groups offer lectures, ways to change behavior, and peer support. Here are some ways to find a support program:    Free national quitline: 800-QUIT-NOW (200-447-8536).    [facility] quit-smoking programs.    American Lung Association: (405.412.9748).    American Cancer Society (410-348-4661).  Support at home is important too. Nonsmokers can offer praise and encouragement. If the smoker in your life finds it hard to quit, encourage them to keep trying.  Over-the-counter medicines  Nicotine replacement therapy may make quitting easier. Certain aids, such as the nicotine patch, gum, and lozenges, are available without a prescription. It is best to use these under a doctor s care, though. The skin patch provides a steady supply of nicotine. Nicotine gum and lozenges give temporary bursts of low levels of nicotine. Both methods reduce the craving for cigarettes. Warning: If you have nausea, vomiting, dizziness, weakness, or a fast heartbeat, stop using these products and see your doctor.  Prescription medicines  After reviewing your smoking patterns and past attempts to quit, your doctor may offer a prescription medicine such as bupropion, varenicline, a nicotine inhaler, or nasal spray. Each has advantages and side effects. Your doctor can review these with you.  Health benefits of quitting  The benefits of quitting start right away and keep improving the longer you go without smoking. These benefits occur at any age.  So whether you are 17 or 70, quitting is a good decision. Some of the benefits include:    20 minutes: Blood pressure and pulse return to normal.    8 hours: Oxygen levels return to normal.    2 days: Ability to smell and taste begin to improve as damaged nerves regrow.    2 to 3 weeks: Circulation and lung function improve.    1 to 9 months: Coughing, congestion, and shortness of  "breath decrease; tiredness decreases.    1 year: Risk of heart attack decreases by half.    5 years: Risk of lung cancer decreases by half; risk of stroke becomes the same as a nonsmoker s.  For more on how to quit smoking, try these online resources:     Smokefree.gov    \"Clearing the Air\" booklet from the National Cancer [employer]: smokefree.gov/sites/default/files/pdf/clearing-the-air-accessible.pdf  Date Last Reviewed: 3/1/2017    7908-6610 [employer] (vitals.com). [address] [place] [address]. All rights reserved. This information is not intended as a substitute for professional medical care. Always follow your healthcare professional's instructions.      It would benefit you greatly to stop smoking and to establish at a regular clinic.    24 Hour Appointment Hotline       To make an appointment at any [facility] call [phone] ([phone]). If you don't have a family doctor or clinic, we will help you find one. [facility] are conveniently located to serve the needs of you and your family.             Review of your medicines      Our records show that you are taking the medicines listed below. If these are incorrect, please call your family doctor or clinic.        Dose / Directions Last dose taken    INVEGA SUSTENNA IM   Dose:  156 mg        Inject 156 mg into the muscle Qmonthly injection.   Refills:  0                Orders Needing Specimen Collection     None      Pending Results     No orders found from 1/1/2018 to 1/4/2018.            Pending Culture Results     No orders found from 1/1/2018 to 1/4/2018.            Pending Results Instructions     If you had any lab results that were not finalized at the time of your Discharge, you can call the ED Lab Result RN at [phone]. You will be contacted by this team for any positive Lab results or changes in treatment. The nurses are available 7 days a week from 10A to 6:30P.  You can leave a message 24 hours per day and " "they will return your call.        Thank you for choosing [facility]       Thank you for choosing [facility] for your care. Our goal is always to provide you with excellent care. Hearing back from our patients is one way we can continue to improve our services. Please take a few minutes to complete the written survey that you may receive in the mail after you visit with us. Thank you!        [employer] Information     [employer] lets you send messages to your doctor, view your test results, renew your prescriptions, schedule appointments and more. To sign up, go to [email or web address] . Click on \"Log in\" on the left side of the screen, which will take you to the Welcome page. Then click on \"Sign up Now\" on the right side of the page.     You will be asked to enter the access code listed below, as well as some personal information. Please follow the directions to create your username and password.     Your access code is: [email or web address]  Expires: 3/27/2018  6:15 AM     Your access code will  in 90 days. If you need help or a new code, please call your [facility] clinic or [phone].        Care EveryWhere ID     This is your Care EveryWhere ID. This could be used by other organizations to access your [facility] medical records  [id number]        Equal Access to Services     PINEDA MARISCAL : Carleen Stark, waaxda luqadaha, qaybta kaalmada adedayanayabentley, fausto cordero. So [facility] [phone].    ATENCIÓN: Si habla español, tiene a weiner disposición servicios gratuitos de asistencia lingüística. Llame al [phone].    We comply with applicable federal civil rights laws and [place] laws. We do not discriminate on the basis of race, color, national origin, age, disability, sex, sexual orientation, or gender identity.            After Visit Summary       This is your record. Keep this with you and show to your community pharmacist(s) and doctor(s) at your next visit.                  "

## 2018-01-04 ENCOUNTER — HOSPITAL ENCOUNTER (EMERGENCY)
Facility: CLINIC | Age: 33
Discharge: HOME | End: 2018-01-04
Attending: EMERGENCY MEDICINE | Admitting: EMERGENCY MEDICINE
Payer: COMMERCIAL

## 2018-01-04 VITALS
SYSTOLIC BLOOD PRESSURE: 169 MMHG | DIASTOLIC BLOOD PRESSURE: 100 MMHG | OXYGEN SATURATION: 99 % | WEIGHT: 240.08 LBS | HEART RATE: 90 BPM | RESPIRATION RATE: 16 BRPM | HEIGHT: 72 IN | BODY MASS INDEX: 32.52 KG/M2

## 2018-01-04 DIAGNOSIS — G44.219 EPISODIC TENSION-TYPE HEADACHE, NOT INTRACTABLE: ICD-10-CM

## 2018-01-04 LAB — INTERPRETATION ECG - MUSE: NORMAL

## 2018-01-04 PROCEDURE — 99283 EMERGENCY DEPT VISIT LOW MDM: CPT | Mod: Z6 | Performed by: EMERGENCY MEDICINE

## 2018-01-04 PROCEDURE — 25000132 ZZH RX MED GY IP 250 OP 250 PS 637: Performed by: EMERGENCY MEDICINE

## 2018-01-04 PROCEDURE — 99283 EMERGENCY DEPT VISIT LOW MDM: CPT

## 2018-01-04 RX ORDER — ACETAMINOPHEN 500 MG
1000 TABLET ORAL ONCE
Status: COMPLETED | OUTPATIENT
Start: 2018-01-04 | End: 2018-01-04

## 2018-01-04 RX ADMIN — Medication 1000 MG: at 23:07

## 2018-01-04 ASSESSMENT — ENCOUNTER SYMPTOMS
EYE REDNESS: 0
HEADACHES: 1
ABDOMINAL PAIN: 0
SHORTNESS OF BREATH: 0
DIFFICULTY URINATING: 0
NECK STIFFNESS: 0
ARTHRALGIAS: 0
COLOR CHANGE: 0
FEVER: 0
CONFUSION: 0

## 2018-01-04 NOTE — ED AVS SNAPSHOT
" [facility], Emergency Department    [address] [address]    Phone:  [phone]                                       Carlos Alberto Garcia   MRN: [id number]    Department:  [facility], Emergency Department   Date of Visit:  1/4/2018           Patient Information     Date Of Birth          1985        Your diagnoses for this visit were:     Episodic tension-type headache, not intractable        You were seen by Kevin Cleveland MD.      Follow-up Information     Follow up with No Ref-Primary, Physician.        Discharge Instructions         * Tension Headache    Muscle Tension Headache (also called \"stress headache\") is a very common cause of head pain. Under stress, some people tense the muscles of their shoulder, neck and scalp without knowing it. If this lasts long enough, a headache can occur. These headaches can be very painful and last for hours or even days.  Home Care:    If you were given pain medicine for this headache, do not drive yourself home. Arrange for a ride, instead. When you get home, try to sleep. You should feel much better when you wake up.    Heat to the back of your neck may relieve neck spasm.    Drink only clear liquids or eat a very light diet to avoid nausea/vomiting until symptoms improve.  Preventing Future Headaches    Identify the sources of stress in your life. These may not be obvious! Learn new ways to handle your stress, such as regular exercise, biofeedback, self-hypnosis and meditation. For more information about this, consult your doctor or go to a local bookstore and review the many books and tapes on this subject.    At the first sign of a tension headache, take time out if possible. Remove yourself from the stressful situation, find a quiet comfortable place to sit or lie down and let yourself relax. Heat and deep massage of the tight areas in the neck and shoulders may help reduce muscle spasm. Medicine, such as ibuprofen (Advil or Motrin) " or a prescribed muscle relaxant may be helpful at this point.  Follow Up with your doctor if the headache is not better within the next 24 hours. If you have frequent headaches you should discuss a treatment plan with your primary care doctor. Ask if you can have medicine to take at home the next time you get a bad headache. This may avoid the need for a visit to the emergency department in the future. Poorly controlled chronic headaches may require a referral to a neurologist (headache specialist).  Call your Doctor Or Get Prompt Medical Attention if any of the following occur:    Worsening of your head pain or no improvement within 24 hours    Repeated vomiting (unable to keep liquids down)    Fever of 100.4 F (38.0 C) or higher, or as directed by your healthcare provider    Stiff neck    Extreme drowsiness, confusion or fainting    Dizziness, vertigo (dizziness with spinning sensation)    Weakness of an arm or leg or one side of the face    Difficulty with speech or vision    4361-7534 [employer]. [address] [place] [address]. All rights reserved. This information is not intended as a substitute for professional medical care. Always follow your healthcare professional's instructions.  This information has been modified by your health care provider with permission from the publisher.          24 Hour Appointment Hotline       To make an appointment at any [facility], call [phone] ([phone]). If you don't have a family doctor or clinic, we will help you find one. [facility] are conveniently located to serve the needs of you and your family.             Review of your medicines      Our records show that you are taking the medicines listed below. If these are incorrect, please call your family doctor or clinic.        Dose / Directions Last dose taken    INVEGA SUSTENNA IM   Dose:  156 mg        Inject 156 mg into the muscle Qmonthly injection.   Refills:  0         "        Orders Needing Specimen Collection     None      Pending Results     No orders found from 2018 to 2018.            Pending Culture Results     No orders found from 2018 to 2018.            Pending Results Instructions     If you had any lab results that were not finalized at the time of your Discharge, you can call the ED Lab Result RN at [phone]. You will be contacted by this team for any positive Lab results or changes in treatment. The nurses are available 7 days a week from 10A to 6:30P.  You can leave a message 24 hours per day and they will return your call.        Thank you for choosing [facility]       Thank you for choosing [facility] for your care. Our goal is always to provide you with excellent care. Hearing back from our patients is one way we can continue to improve our services. Please take a few minutes to complete the written survey that you may receive in the mail after you visit with us. Thank you!        [employer] Information     [employer] lets you send messages to your doctor, view your test results, renew your prescriptions, schedule appointments and more. To sign up, go to [email or web address] . Click on \"Log in\" on the left side of the screen, which will take you to the Welcome page. Then click on \"Sign up Now\" on the right side of the page.     You will be asked to enter the access code listed below, as well as some personal information. Please follow the directions to create your username and password.     Your access code is: [email or web address]  Expires: 3/27/2018  6:15 AM     Your access code will  in 90 days. If you need help or a new code, please call your [facility] clinic or [phone].        Care EveryWhere ID     This is your Care EveryWhere ID. This could be used by other organizations to access your [facility] medical records  [id number]        Equal Access to Services     PINEDA MARISCAL AH: Carleen Stark, roque sesay, loreta cheney " fausto bragg ah. So [facility] [phone].    ATENCIÓN: Si habla español, tiene a weiner disposición servicios gratuitos de asistencia lingüística. Llame al [phone].    We comply with applicable federal civil rights laws and [place] laws. We do not discriminate on the basis of race, color, national origin, age, disability, sex, sexual orientation, or gender identity.            After Visit Summary       This is your record. Keep this with you and show to your community pharmacist(s) and doctor(s) at your next visit.

## 2018-01-04 NOTE — ED NOTES
ED TRIAGE    Medical / Trauma C/o:  32-yr male patient - presenting to ED for eval of N/V, since the afternoon; also states chest pain, when smoking.  Patient states he smokes cig's and marijuana; currently, also, is using meth,d aily.  Patient lives in a shelter daily; states he feels safe there.  CMS, intact; GCS=15.    Duration of C/o:  Today    Contributing Factors / Concerning HX:  Frequent visits to ED for same.    Significant Med's / Tx's:  See med's    Febrile / Afebrile:  Afebrile.    Patient Vitals for the past 24 hrs:   BP Temp Temp src Pulse Heart Rate Resp SpO2 Height Weight   01/03/18 1840 132/74 98.9  F (37.2  C) Oral 72 72 16 100 % 1.829 m (6') 108.9 kg (240 lb)       Artis Adams  January 3, 2018  6:55 PM

## 2018-01-04 NOTE — ED PROVIDER NOTES
History     Chief Complaint   Patient presents with     Nausea & Vomiting     Chest Pain     when smoking     HPI  Carlos Alberto Garcia is a 32 year old male with history of bipolar 2 disorder, HTN, schizoaffective disorder/schizophrenia, chronic homelessness and chemical dependency who presents to the ED for the evaluation of nausea, vomiting, and chest pain.     Patient reports that the chest pain and nausea he had earlier today that he reported to triage have resolved.  He currently has no symptoms. He is feeling much better and would like to eat.  He has not had any cough or shortness of breath per his report.  No syncopal or near syncope.  He reports the nausea has just been here recently and not an ongoing problem over years.  No history of ulcers to his knowledge or other GI conditions, though I see in review of EMR he has had a history of GIB, but denies any dark tarry stools, bloody stools.  No lightheadedness or dizziness. No shortness of breath. No syncope or near syncope.  His chest pain was just very brief and resolved on its own, and it is only when he smokes, especially if he is out in the cold. No palpitations. He has had symptoms like this when he has smoked in the winter previously as well.  Neither of these symptoms were symptoms that he has never had before.  No radiation of the chest discomfort, no numbness, tingling or focal weakness.  With the nausea and reports of emesis earlier, he has not had any ongoing vomiting; it is more so that he was going to dry heave.  No hemoptysis or hematemesis.  He had no belly pain at any point with this.  Denies any previous belly surgeries.  No traumas or falls. Denies feeling unsafe.    Of note, patient has had multiple visits here over the last month, some of which have been multiple visits in the same day, often with various type of physical complaints though also with multiple malingering disposition diagnoses in the setting of his homelessness.  Patient  is open to assistance with housing today and is willing to speak with Social Work.    This part of the document was transcribed by Willam Oro, Medical Scribe.           PAST MEDICAL HISTORY:   Past Medical History:   Diagnosis Date     Bipolar 2 disorder (H)      Chemical abuse     cocaine, meth, cambis     Dyslipidemia      Hep C w/ coma, chronic (H)      Patient overweight      Schizoaffective disorder      Unspecified essential hypertension      Unspecified hypothyroidism        PAST SURGICAL HISTORY:   Past Surgical History:   Procedure Laterality Date     HAND SURGERY      L       FAMILY HISTORY: No family history on file.    SOCIAL HISTORY:   Social History   Substance Use Topics     Smoking status: Current Every Day Smoker     Packs/day: 1.00     Types: Cigarettes     Smokeless tobacco: Current User     Alcohol use No       Patient's Medications   New Prescriptions    No medications on file   Previous Medications    PALIPERIDONE PALMITATE (INVEGA SUSTENNA IM)    Inject 156 mg into the muscle Qmonthly injection.   Modified Medications    No medications on file   Discontinued Medications    No medications on file        No Known Allergies      I have reviewed the Medications, Allergies, Past Medical and Surgical History, and Social History in the Epic system.    Review of Systems   Constitutional: Negative for chills, diaphoresis and fever.   HENT: Negative for ear pain, sore throat, tinnitus, trouble swallowing and voice change.    Eyes: Negative for pain and visual disturbance.   Respiratory: Negative for cough and shortness of breath.    Cardiovascular: Negative for chest pain, palpitations and leg swelling.   Gastrointestinal: Negative for abdominal pain, blood in stool, constipation, diarrhea, nausea and vomiting.   Endocrine: Negative for polydipsia and polyuria.   Genitourinary: Negative for dysuria, frequency, hematuria and urgency.   Musculoskeletal: Negative for back pain and neck pain.    Skin: Negative for color change and rash.   Allergic/Immunologic: Negative for immunocompromised state.   Neurological: Negative for dizziness, weakness, light-headedness, numbness and headaches.   Hematological: Negative for adenopathy. Does not bruise/bleed easily.   Psychiatric/Behavioral: Negative for dysphoric mood. The patient is not nervous/anxious.        Physical Exam   BP: 132/74  Pulse: 72  Heart Rate: 72  Temp: 98.9  F (37.2  C)  Resp: 16  Height: 182.9 cm (6')  Weight: 108.9 kg (240 lb)  SpO2: 100 %      Physical Exam  CONSTITUTIONAL: Well-developed and well-nourished. Awake and alert. Non-toxic appearance. No acute distress.   HENT:   - Head: Normocephalic and atraumatic.   - Ears: Hearing and external ear grossly normal.   - Nose: Nose normal. No rhinorrhea. No epistaxis.   - Mouth/Throat: Oropharynx is clear and MMM  EYES: Conjunctivae and lids are normal. No scleral icterus.   NECK: Normal range of motion and phonation normal. Neck supple.  No tracheal deviation, no stridor. No edema or erythema noted.  CARDIOVASCULAR: Normal rate, regular rhythm and no appreciable abnormal heart sounds.  PULMONARY/CHEST: Effort normal. No accessory muscle usage or stridor. No respiratory distress.  No appreciable abnormal breath sounds.  ABDOMEN: Soft, non-distended. No tenderness. No rigidity, rebound or guarding.   MUSCULOSKELETAL: Extremities warm and seemingly well perfused. No edema or calf tenderness.  NEUROLOGIC: Awake, alert. Not disoriented. He displays no atrophy and no tremor. Normal tone. No seizure activity. Coordination normal. GCS 15  SKIN: Skin is warm and dry. No rash noted. No diaphoresis. No pallor.   PSYCHIATRIC: Normal mood and affect. Speech and behavior normal. Thought processes linear. Cognition and memory are normal.     ED Course     ED Course     Patient was seen at 8:03 by Dr. Madelaine Blank MD.    Procedures             EKG Interpretation:      Interpreted by Madelaine Blank MD  Time  reviewed: 20:47  Symptoms at time of EKG: Chest Pain   Rhythm: normal sinus   Rate: 57  Axis: Normal  Ectopy: none  Conduction: normal  ST Segments/ T Waves: No acute ischemic changes  Comparison to prior: Unchanged from 30 Dec 2017  Clinical Impression: no acute changes               Labs Ordered and Resulted from Time of ED Arrival Up to the Time of Departure from the ED - No data to display         Assessments & Plan (with Medical Decision Making)   IMPRESSION: 32-year-old male, well-known to the emergency department, currently asymptomatic during my evaluation but reportedly voiced some nausea, vomiting and chest pain to the nurses previously.  Clinically, patient appears nontoxic, NAD.  Vitals WNL.  He has normal work of breathing, abdomen is soft, benign, no tenderness to palpation and no peritoneal findings, masses or pulsatility.  No obvious cardiopulmonary findings.  He is mentating properly and answering all questions properly.  He is not having any SI or HI, does not appear to be responding to any hallucinations.  He has had reports of lewd behavior in the past but is acting appropriately currently.    PLAN: I will perform a bedside ultrasound to evaluate for pneumothorax, limited echo to evaluate for any pericardial effusion, general contractility. We will have him speak with Social Work to see if they are able to arrange any additional type of housing for him.    RESULTS:  - ECG: No acute findings on ECG  - POCUS/Limited bedside US: The patient had no findings for pulmonary edema, and good lung sliding w/o any findings for pneumothorax.  Limited echocardiogram showed good contractility, no clear evidence for pericardial effusion.    INTERVENTIONS:   - Provided food. Tolerated well w/o recurrent nausea and no vomiting.     RE-EVALUATION:  See ED Course section above for particular pertinent findings and comments  - The patient remained asymptomatic. No concerning change in clinical appearance, vitals,  etc.    DISCUSSIONS:  - w/ Social Work: The Social Work team met with the patient and we do appreciate their assistance.  They were able to find a place where he can stay even up until next Friday, given the cold temperatures.  The patient was provided with address and contact information for such, provided a bus token and given instructions on how to be able to get at this location.  Patient was agreeable to this, did earnestly seem to look at the information that they provided and took in that information.  - w/ Patient: With the patient's general asystematic nature currently, good vital signs, benign exam and no acute findings on bedside ultrasound I think he can be safely discharged at this time. I had a thorough discussion with the patient regarding the importance of close outpatient Primary Care visits.  Especially given he has had multiple visits, I think it would be very good for him to establish a single unified provider to help care for him on his daily needs and then he can work with the clinic to have ongoing Social Work care coordination or additional assistance if needed to assist with housing, etc. And that the Emergency Department is not the ideal place to establish more of these long-term cares.    DISPOSITION/PLANNING:  - FINAL IMPRESSION: Reported resolved nausea and chest discomfort w/o acute findings here in the ED  - DISPOSITION: Discharge to self-care and close outpatient primary care follow-up (resources provided for patient to arrange)  --- Patient will use the token given to him from Social Work to go to the facility that they found where he can stay.    ______________________________________________________________________________    - I have reviewed the available nursing notes.    - I have reviewed the available findings, plan, need for close follow up, and strict return/safety instructions with the patient. He expressed understanding and agreement with this plan. All questions answered  to the best of our ability at this time.         New Prescriptions    No medications on file       Final diagnoses:   None   I, Willam Oro, am serving as a trained medical scribe to document services personally performed by Madelaine Blank MD, based on the provider's statements to me.      IMadelaine MD, was physically present and have reviewed and verified the accuracy of this note documented by Willam Oro.      1/3/2018   [facility], [place], EMERGENCY DEPARTMENT     Madelaine Blank MD  01/04/18 2004

## 2018-01-04 NOTE — DISCHARGE INSTRUCTIONS
"    FOLLOW-UP:  Please make an appointment to follow up with:  - [facility] (phone: [phone]) - or - [facility] (phone: [phone])       RETURN TO THE EMERGENCY DEPARTMENT  Return to the Emergency Department at any time for new/worsening symptoms.        * VOMITING [6yr-Adult]  Vomiting is a common symptom that may be due to different causes. These include gastroenteritis (\"stomach-flu\"), food poisoning and gastritis. There are other more serious causes of vomiting which may be hard to diagnose early in the illness. Therefore, it is important to watch for the warning signs listed below.  The main danger from repeated vomiting is \"dehydration\". This is due to excess loss of water and minerals from the body. When this occurs, body fluids must be replaced.`  HOME CARE:    If symptoms are severe, rest at home for the next 24 hours.    You may use acetaminophen (Tylenol) 650-1000 mg every 6 hours to control fever, unless another medicine was prescribed. [ NOTE : If you have chronic liver disease, talk with your doctor before using acetaminophen.] (Aspirin should never be used in anyone under 18 years of age who is ill with a fever. It may cause severe liver damage.)    Avoid tobacco and alcohol use, which may worsen your symptoms.    If medicines for vomiting were prescribed, take as directed.  DURING THE FIRST 12-24 HOURS follow the diet below. Try to take frequent small sips even if you vomit occasionally:    FRUIT JUICES: Apple, grape juice, clear fruit drinks, electrolyte replacement and sports drinks.    BEVERAGES: Sport drinks such as Gatorade, soft drinks without caffeine; mineral water (plain or flavored), decaffeinated tea and coffee.    SOUPS: Clear broth, consommé and bouillon    DESSERTS: Plain gelatin (Jell-O), popsicles and fruit juice bars.  DURING THE NEXT 24 HOURS you may add the following to the above:    Hot cereal, plain toast, bread, rolls, crackers    Plain " noodles, rice, mashed potatoes, chicken noodle or rice soup    Unsweetened canned fruit (avoid pineapple), bananas    Avoid dairy products    Limit caffeine and chocolate. No spices or seasonings except salt.  DURING THE NEXT 24 HOURS  Slowly go back to a normal diet, as you feel better and your symptoms lessen.  FOLLOW UP with your doctor as advised if you are not improving over the next 2-3 days.  GET PROMPT MEDICAL ATTENTION if any of the following occur:    Constant abdominal pain that stays in the same spot or gets worse    Continued vomiting (unable to keep liquids down) for 24 hours    Frequent diarrhea (more than 5 times a day); blood (red or black color) in diarrhea    No urine output for 12 hours or extreme thirst    Weakness, dizziness or fainting    Unusually drowsy or confused    Fever over 101.0  F (38.3  C) for more than 3 days    Yellow color of the eyes or skin    3754-7878 [employer]. [address] [place] [address], [place] [address]. All rights reserved. This information is not intended as a substitute for professional medical care. Always follow your healthcare professional's instructions.  This information has been modified by your health care provider with permission from the publisher.

## 2018-01-04 NOTE — PROGRESS NOTES
Social Work Services Progress Note    Hospital Day: 1  Last ED visit:  This morning 1/3/18  ED Care Plan:  Restricted to [facility]; would benefit from a team care plan  Collaborated with:  RICHARD Welch; Dr. Blank; triage RN      Data:  Pt presented to triage again today for the second time due to concerns from the cold weather.  Pt has a history of malingering and multiple ED visits.  Pt is homeless.      Intervention:  Chart reviewed.  Of note, in recent ED visit, DEC  found that pt no longer participates in an ACT team (intensive case management).      Writer called [facility] [phone] and found out that there are no adult male shelter beds, but [place] [facility] [place] [address] [place] that can stay until Friday 1/5.  Persons need to enter through the door on the [facility] side.  Writer and RICHARD Welch, met with pt and presented this information.  Pt appeared open and receptive to this plan, taking the written information.  Writer gave pt a bus token to get to the [facility] and he states familiarity with the bus system.  He asks writer for a sandwich.  SW updated Dr. Blank who needs to clear pt prior to getting a sandwich since pt presented with nausea.  Updated triage RN.    Assessment:  Homeless shelter resource and bus token given.    Plan:    Anticipated Disposition:  [facility] accepting homeless individuals    Barriers to d/c plan:  None.    Follow Up:  SW available as needed.    FLORENTIN Spann  Emergency Department  [phone] phone  [phone] pager

## 2018-01-04 NOTE — ED AVS SNAPSHOT
[facility], [place], Emergency Department    [address] [place] [id number]    Phone:  [phone]                                       Carlos Alberto Garcia   MRN: [id number]    Department:  [facility], Emergency Department   Date of Visit:  1/4/2018           After Visit Summary Signature Page     I have received my discharge instructions, and my questions have been answered. I have discussed any challenges I see with this plan with the nurse or doctor.    ..........................................................................................................................................  Patient/Patient Representative Signature      ..........................................................................................................................................  Patient Representative Print Name and Relationship to Patient    ..................................................               ................................................  Date                                            Time    ..........................................................................................................................................  Reviewed by Signature/Title    ...................................................              ..............................................  Date                                                            Time

## 2018-01-05 ENCOUNTER — HOSPITAL ENCOUNTER (EMERGENCY)
Facility: CLINIC | Age: 33
Discharge: HOME | End: 2018-01-05
Attending: EMERGENCY MEDICINE | Admitting: EMERGENCY MEDICINE
Payer: COMMERCIAL

## 2018-01-05 VITALS
OXYGEN SATURATION: 100 % | TEMPERATURE: 98 F | HEART RATE: 69 BPM | SYSTOLIC BLOOD PRESSURE: 145 MMHG | RESPIRATION RATE: 18 BRPM | DIASTOLIC BLOOD PRESSURE: 89 MMHG

## 2018-01-05 DIAGNOSIS — Z76.5 MALINGERING: ICD-10-CM

## 2018-01-05 DIAGNOSIS — F19.10 SUBSTANCE ABUSE (H): ICD-10-CM

## 2018-01-05 PROCEDURE — 99282 EMERGENCY DEPT VISIT SF MDM: CPT | Mod: Z6 | Performed by: EMERGENCY MEDICINE

## 2018-01-05 PROCEDURE — 99282 EMERGENCY DEPT VISIT SF MDM: CPT | Performed by: EMERGENCY MEDICINE

## 2018-01-05 ASSESSMENT — ENCOUNTER SYMPTOMS
FEVER: 0
ABDOMINAL PAIN: 0
SHORTNESS OF BREATH: 0
COUGH: 0
HEADACHES: 1

## 2018-01-05 NOTE — ED NOTES
"Pt presents with c/o \"weird\" headache. Unable to describe further. Inquired about plan that SW had set up when he was last in ED and reported that the shelter was full so he was unable to stay there.   "

## 2018-01-05 NOTE — ED AVS SNAPSHOT
[facility], Emergency Department    [address] [address]    Phone:  [phone]                                       Carlos Alberto Garcia   MRN: [id number]    Department:  [facility], Emergency Department   Date of Visit:  1/5/2018           Patient Information     Date Of Birth          1985        Your diagnoses for this visit were:     Malingering     Substance abuse        You were seen by Ángela Mann MD.        Discharge Instructions       Seek shelter. The ER is not a shelter. Follow up with your clinic doctor.    24 Hour Appointment Hotline       To make an appointment at any [facility] clinic, call [phone] ([phone]). If you don't have a family doctor or clinic, we will help you find one. [facility] clinics are conveniently located to serve the needs of you and your family.             Review of your medicines      Our records show that you are taking the medicines listed below. If these are incorrect, please call your family doctor or clinic.        Dose / Directions Last dose taken    INVEGA SUSTENNA IM   Dose:  156 mg        Inject 156 mg into the muscle Qmonthly injection.   Refills:  0                Orders Needing Specimen Collection     None      Pending Results     No orders found from 1/3/2018 to 1/6/2018.            Pending Culture Results     No orders found from 1/3/2018 to 1/6/2018.            Pending Results Instructions     If you had any lab results that were not finalized at the time of your Discharge, you can call the ED Lab Result RN at [phone]. You will be contacted by this team for any positive Lab results or changes in treatment. The nurses are available 7 days a week from 10A to 6:30P.  You can leave a message 24 hours per day and they will return your call.        Thank you for choosing [facility]       Thank you for choosing [facility] for your care. Our goal is always to provide you with excellent care. Hearing back from our patients  "is one way we can continue to improve our services. Please take a few minutes to complete the written survey that you may receive in the mail after you visit with us. Thank you!        [employer] Information     [employer] lets you send messages to your doctor, view your test results, renew your prescriptions, schedule appointments and more. To sign up, go to [email or web address] . Click on \"Log in\" on the left side of the screen, which will take you to the Welcome page. Then click on \"Sign up Now\" on the right side of the page.     You will be asked to enter the access code listed below, as well as some personal information. Please follow the directions to create your username and password.     Your access code is: [email or web address]  Expires: 3/27/2018  6:15 AM     Your access code will  in 90 days. If you need help or a new code, please call your [facility] clinic or [phone].        Care EveryWhere ID     This is your Care EveryWhere ID. This could be used by other organizations to access your [facility] medical records  [id number]        Equal Access to Services     PINEDA MARISCAL : Hadluc Stark, roque sesay, loreta bragg, fausto cordero. So [facility] [phone].    ATENCIÓN: Si habla español, tiene a weiner disposición servicios gratuitos de asistencia lingüística. Evert al [phone].    We comply with applicable federal civil rights laws and [place] laws. We do not discriminate on the basis of race, color, national origin, age, disability, sex, sexual orientation, or gender identity.            After Visit Summary       This is your record. Keep this with you and show to your community pharmacist(s) and doctor(s) at your next visit.                  "

## 2018-01-05 NOTE — DISCHARGE INSTRUCTIONS
"  * Tension Headache    Muscle Tension Headache (also called \"stress headache\") is a very common cause of head pain. Under stress, some people tense the muscles of their shoulder, neck and scalp without knowing it. If this lasts long enough, a headache can occur. These headaches can be very painful and last for hours or even days.  Home Care:    If you were given pain medicine for this headache, do not drive yourself home. Arrange for a ride, instead. When you get home, try to sleep. You should feel much better when you wake up.    Heat to the back of your neck may relieve neck spasm.    Drink only clear liquids or eat a very light diet to avoid nausea/vomiting until symptoms improve.  Preventing Future Headaches    Identify the sources of stress in your life. These may not be obvious! Learn new ways to handle your stress, such as regular exercise, biofeedback, self-hypnosis and meditation. For more information about this, consult your doctor or go to a local bookstore and review the many books and tapes on this subject.    At the first sign of a tension headache, take time out if possible. Remove yourself from the stressful situation, find a quiet comfortable place to sit or lie down and let yourself relax. Heat and deep massage of the tight areas in the neck and shoulders may help reduce muscle spasm. Medicine, such as ibuprofen (Advil or Motrin) or a prescribed muscle relaxant may be helpful at this point.  Follow Up with your doctor if the headache is not better within the next 24 hours. If you have frequent headaches you should discuss a treatment plan with your primary care doctor. Ask if you can have medicine to take at home the next time you get a bad headache. This may avoid the need for a visit to the emergency department in the future. Poorly controlled chronic headaches may require a referral to a neurologist (headache specialist).  Call your Doctor Or Get Prompt Medical Attention if any of the following " occur:    Worsening of your head pain or no improvement within 24 hours    Repeated vomiting (unable to keep liquids down)    Fever of 100.4 F (38.0 C) or higher, or as directed by your healthcare provider    Stiff neck    Extreme drowsiness, confusion or fainting    Dizziness, vertigo (dizziness with spinning sensation)    Weakness of an arm or leg or one side of the face    Difficulty with speech or vision    1580-7926 [employer]. [address] [place], [place] [address]. All rights reserved. This information is not intended as a substitute for professional medical care. Always follow your healthcare professional's instructions.  This information has been modified by your health care provider with permission from the publisher.

## 2018-01-05 NOTE — ED PROVIDER NOTES
History     Chief Complaint   Patient presents with     Headache     HPI  Carlos Alberto Garcia is a 32 year old male with a history of bipolar 2 disorder, schizoaffective disorder, HTN, and substance abuse who is well known to the Emergency Department presents for evaluation of a headache. Patient reports he smoked meth a couple of hours prior to arrival and subsequently developed a diffuse headache. He denies any other substance use this evening. He did not take any medications such as Tylenol or Advil for his headache. Denies fevers, chills, or rash.  No history of migraines or aneurysm.  He denies assault or head trauma.    I have reviewed the Medications, Allergies, Past Medical and Surgical History, and Social History in the [employer] system.  Past Medical History:   Diagnosis Date     Bipolar 2 disorder (H)      Chemical abuse     cocaine, meth, cambis     Dyslipidemia      Hep C w/ coma, chronic (H)      Patient overweight      Schizoaffective disorder      Unspecified essential hypertension      Unspecified hypothyroidism        Past Surgical History:   Procedure Laterality Date     HAND SURGERY      L       No family history on file.    Social History   Substance Use Topics     Smoking status: Current Every Day Smoker     Packs/day: 1.00     Types: Cigarettes     Smokeless tobacco: Current User     Alcohol use No       Current Facility-Administered Medications   Medication     acetaminophen (TYLENOL) tablet 1,000 mg     Current Outpatient Prescriptions   Medication     Paliperidone Palmitate (INVEGA SUSTENNA IM)     Facility-Administered Medications Ordered in Other Encounters   Medication     ibuprofen (ADVIL,MOTRIN) 600 MG tablet      No Known Allergies    Review of Systems   Constitutional: Negative for fever.   HENT: Negative for congestion.    Eyes: Negative for redness.   Respiratory: Negative for shortness of breath.    Cardiovascular: Negative for chest pain.   Gastrointestinal: Negative for abdominal  pain.   Genitourinary: Negative for difficulty urinating.   Musculoskeletal: Negative for arthralgias and neck stiffness.   Skin: Negative for color change.   Neurological: Positive for headaches.   Psychiatric/Behavioral: Negative for confusion.   All other systems reviewed and are negative.      Physical Exam   BP: (!) 169/100  Pulse: 91  Resp: 16  Height: 182.9 cm (6')  Weight: 108.9 kg (240 lb 1.3 oz)  SpO2: 99 %      Physical Exam   Constitutional: He is oriented to person, place, and time. He appears well-developed and well-nourished. No distress.   HENT:   Head: Normocephalic and atraumatic.   Mouth/Throat: Oropharynx is clear and moist.   Eyes: Conjunctivae are normal. Pupils are equal, round, and reactive to light.   Neck: Normal range of motion. No rigidity.   Cardiovascular: Normal rate, regular rhythm and normal heart sounds.    Pulmonary/Chest: Effort normal. No respiratory distress. He has no wheezes.   Abdominal: Soft. He exhibits no distension. There is no tenderness. There is no rebound.   Neurological: He is alert and oriented to person, place, and time. He has normal strength. No cranial nerve deficit or sensory deficit. GCS eye subscore is 4. GCS verbal subscore is 5. GCS motor subscore is 6.   Skin: Skin is warm and dry. No rash noted.   Psychiatric: He has a normal mood and affect.       ED Course     ED Course     Procedures       10:56 PM  The patient was seen and examined by Dr. Cleveland in Room HW.          Labs Ordered and Resulted from Time of ED Arrival Up to the Time of Departure from the ED - No data to display         Assessments & Plan (with Medical Decision Making)     32-year-old male well-known to this emergency department arriving to the emergency department with several hours of headache.  My evaluation is noted to be alert he is currently afebrile and hemodynamically stable.  On full neurologic examination is no evidence of neurologic deficit.  He has no signs of external trauma  to the head or neck.  He speaking in full sentences, GCS is 15.  There is no history of migraine this does not sound consistent with subarachnoid bleed.  Have a very low clinical suspicion for meningitis or encephalitis warranting emergent LP.  At this time the patient was given acetaminophen and dismissed to home with recommendation to follow-up with his primary care physician or call or return emergently as needed.    I have reviewed the nursing notes.    I have reviewed the findings, diagnosis, plan and need for follow up with the patient.    New Prescriptions    No medications on file       Final diagnoses:   Episodic tension-type headache, not intractable   I, Pastora Hammer, am serving as a trained medical scribe to document services personally performed by Kevin Cleveland MD, based on the provider's statements to me.   IKevin MD, was physically present and have reviewed and verified the accuracy of this note documented by Pastora Hammer.      1/4/2018   [facility], [place], EMERGENCY DEPARTMENT     Kevin Cleveland MD  01/04/18 7631

## 2018-01-05 NOTE — ED PROVIDER NOTES
"  History     Chief Complaint   Patient presents with     Chest Pain     HPI  Carlos Alberto Garcia is a 32 year old male who resents to the ER complaining of chest pain.  He tells me it started a few hours ago.  He tells me that he does get chest pain sometimes when it is cold outside, believes it is from breathing cold air and from smoking.  He then admits that his chest pain is mild, but it is there, \"a little bit.\"  He denies fever, cough, vomiting, diarrhea.  He does complain of headache which is chronic.  No new neuro complaints.    Per the chart review, he recently presented admitting to smoking methamphetamine.    Past Medical History:   Diagnosis Date     Bipolar 2 disorder (H)      Chemical abuse     cocaine, meth, cambis     Dyslipidemia      Hep C w/ coma, chronic (H)      Patient overweight      Schizoaffective disorder      Unspecified essential hypertension      Unspecified hypothyroidism        Past Surgical History:   Procedure Laterality Date     HAND SURGERY      L       No family history on file.    Social History   Substance Use Topics     Smoking status: Current Every Day Smoker     Packs/day: 1.00     Types: Cigarettes     Smokeless tobacco: Current User     Alcohol use No         I have reviewed the Medications, Allergies, Past Medical and Surgical History, and Social History in the [employer] system.    Review of Systems   Constitutional: Negative for fever.   Respiratory: Negative for cough and shortness of breath.    Cardiovascular: Positive for chest pain.   Gastrointestinal: Negative for abdominal pain.   Neurological: Positive for headaches.   All other systems reviewed and are negative.      Physical Exam   BP: 145/89  Pulse: 69  Temp: 98  F (36.7  C)  Resp: 18  SpO2: 100 %      Physical Exam   Constitutional: No distress.   HENT:   Head: Atraumatic.   Mouth/Throat: Oropharynx is clear and moist. No oropharyngeal exudate.   Eyes: No scleral icterus.   Cardiovascular: Normal heart sounds and " "intact distal pulses.    Pulmonary/Chest: Breath sounds normal. No respiratory distress.   Abdominal: Soft. There is no tenderness.   Musculoskeletal: He exhibits no edema or tenderness.   Skin: Skin is warm. No rash noted. He is not diaphoretic.       ED Course     ED Course     Procedures             Critical Care time:  none             Labs Ordered and Resulted from Time of ED Arrival Up to the Time of Departure from the ED - No data to display         Assessments & Plan (with Medical Decision Making)   This is the patient's sixth visit in the last 3-1/2 days to this emergency department.  He is homeless.  When I asked him if he was here to get out of the cold he said, \"pretty much.\"  He states that his hands get cold when outside, and that is the main reason why he is here.  He admitted that he was not really having much chest pain, though states he is having, \"maybe a little bit.\"  He is malingering, and essentially admits to this.  A couple of days ago, 1 of my colleagues spent some time with the  getting him set up for shoulder this week.  He states he, \"did not show up on time,\" and they no longer had a bed available for him.  He states that he finds the shelters in the area, \"nasty,\" and that is why he does not go to them.  He did not attempt to find placement in a shelter tonight.    My suspicion for serious underlying medical emergency is very low.  He admits that his symptoms are minimal, and he relates them to smoking and breathing cold air outside.  I do not have significant concern underlying cardiac etiology, PE, infection, etc.  He has had a large number of evaluations for similar symptoms, as well as other symptoms, over the past month.  He admits that he is malingering tonight.  I did express to him that he is abusing the system.  I have strongly encouraged him to seek shelter at any of the community shelters, and he states he is aware of where they are.  He is instructed to " follow-up primary care.  He is instructed he may return with true medical emergencies.  He verbalizes understanding.    Dictation Disclaimer: Some of this Note has been completed with voice-recognition dictation software. Although errors are generally corrected real-time, there is the potential for a rare error to be present in the completed chart.      I have reviewed the nursing notes.    I have reviewed the findings, diagnosis, plan and need for follow up with the patient.    Discharge Medication List as of 1/5/2018  4:34 AM          Final diagnoses:   Malingering   Substance abuse       1/5/2018   [facility], [place], EMERGENCY DEPARTMENT     Ángela Mann MD  01/05/18 0502

## 2018-01-05 NOTE — ED AVS SNAPSHOT
[facility], [place], Emergency Department    [address] [place] [address]    Phone:  [phone]                                       Carlos Alberto Garcia   MRN: [id number]    Department:  [facility], Emergency Department   Date of Visit:  1/5/2018           After Visit Summary Signature Page     I have received my discharge instructions, and my questions have been answered. I have discussed any challenges I see with this plan with the nurse or doctor.    ..........................................................................................................................................  Patient/Patient Representative Signature      ..........................................................................................................................................  Patient Representative Print Name and Relationship to Patient    ..................................................               ................................................  Date                                            Time    ..........................................................................................................................................  Reviewed by Signature/Title    ...................................................              ..............................................  Date                                                            Time

## 2018-01-09 NOTE — ED PROVIDER NOTES
History     Chief Complaint   Patient presents with     Rash     HPI  Carlos Alberto Garcia is a 32 year old male with a history of schizoaffective disorder and polysubstance abuse who presents complaining of a rash.  Carlos Alberto has a history of multiple ER visits, and homelessness.  During my interview with him he reported multiple different complaints including wanting injection of his Invega, a rash that he could not locate and a sandwich.  He does not appear to be hallucinating and he is not intoxicated.  I asked him directly if he needed shelter for the night and he confirmed this.  He denies any other specific complaints.    PAST MEDICAL HISTORY:   Past Medical History:   Diagnosis Date     Bipolar 2 disorder (H)      Chemical abuse     cocaine, meth, cambis     Dyslipidemia      Hep C w/ coma, chronic (H)      Patient overweight      Schizoaffective disorder      Unspecified essential hypertension      Unspecified hypothyroidism        PAST SURGICAL HISTORY:   Past Surgical History:   Procedure Laterality Date     HAND SURGERY      L       FAMILY HISTORY: No family history on file.    SOCIAL HISTORY:   Social History   Substance Use Topics     Smoking status: Current Every Day Smoker     Packs/day: 1.00     Types: Cigarettes     Smokeless tobacco: Current User     Alcohol use No       Discharge Medication List as of 1/2/2018  5:49 AM      CONTINUE these medications which have NOT CHANGED    Details   Paliperidone Palmitate (INVEGA SUSTENNA IM) Inject 156 mg into the muscle Qmonthly injection., Historical              No Known Allergies      I have reviewed the Medications, Allergies, Past Medical and Surgical History, and Social History in the [employer] system.    Review of Systems   All other systems reviewed and are negative.      Physical Exam   BP: 123/69  Pulse: 73  Temp: 97.3  F (36.3  C)  Resp: 14  SpO2: 100 %      Physical Exam   Constitutional: He is oriented to person, place, and time. No distress.   HENT:    Head: Normocephalic and atraumatic.   Eyes: Conjunctivae are normal. Pupils are equal, round, and reactive to light.   Neck: Normal range of motion. Neck supple.   Cardiovascular: Normal rate and intact distal pulses.    Pulmonary/Chest: Effort normal. No respiratory distress. He has no wheezes. He has no rales. He exhibits no tenderness.   Abdominal: Soft. There is no tenderness. There is no rebound and no guarding.   Musculoskeletal: Normal range of motion. He exhibits no edema or tenderness.   Neurological: He is alert and oriented to person, place, and time.   Skin: Skin is warm and dry. No rash noted. He is not diaphoretic.   Psychiatric: He has a normal mood and affect. His behavior is normal. Thought content normal.   Nursing note and vitals reviewed.      ED Course     ED Course     Procedures             Critical Care time:  none             Labs Ordered and Resulted from Time of ED Arrival Up to the Time of Departure from the ED - No data to display         Assessments & Plan (with Medical Decision Making)   1.  Malingering    32-year-old with a history of schizoaffective disorder who presents with complaints of a rash.  When I directly interview however he denies this rash and states that he needs shelter for the evening.  He has no thoughts of self-harm or harming others.  He has no evidence of psychosis and is not intoxicated.  He was monitored in the ER overnight and discharged with information for local shelters.    I have reviewed the nursing notes.    I have reviewed the findings, diagnosis, plan and need for follow up with the patient.    Discharge Medication List as of 1/2/2018  5:49 AM          Final diagnoses:   Malingering       1/2/2018   [facility], [place], EMERGENCY DEPARTMENT     Merrill Peña MD  01/09/18 4823

## 2018-01-12 ENCOUNTER — HOSPITAL ENCOUNTER (EMERGENCY)
Facility: CLINIC | Age: 33
Discharge: HOME | End: 2018-01-12
Attending: EMERGENCY MEDICINE | Admitting: EMERGENCY MEDICINE
Payer: COMMERCIAL

## 2018-01-12 VITALS
BODY MASS INDEX: 32.51 KG/M2 | SYSTOLIC BLOOD PRESSURE: 154 MMHG | TEMPERATURE: 97.5 F | RESPIRATION RATE: 12 BRPM | HEART RATE: 71 BPM | DIASTOLIC BLOOD PRESSURE: 71 MMHG | OXYGEN SATURATION: 99 % | HEIGHT: 72 IN | WEIGHT: 240 LBS

## 2018-01-12 DIAGNOSIS — Z76.5 MALINGERING: ICD-10-CM

## 2018-01-12 PROCEDURE — 99281 EMR DPT VST MAYX REQ PHY/QHP: CPT | Mod: Z6 | Performed by: EMERGENCY MEDICINE

## 2018-01-12 PROCEDURE — 99282 EMERGENCY DEPT VISIT SF MDM: CPT | Performed by: EMERGENCY MEDICINE

## 2018-01-12 NOTE — ED PROVIDER NOTES
History     Chief Complaint   Patient presents with     Headache     Insomnia     HPI  Carlos Alberto Garcia is a 32 year old male with a history of schizoaffective disorder who is well-known to the emergency department and has had multiple ER visits sometimes multiple times per day.  He states that he has had insomnia recently and has been unable to sleep in the shelter.  He also reports a headache but denies any trauma.  He has no vomiting and denies any fever.  He denies any recent drug use.    PAST MEDICAL HISTORY:   Past Medical History:   Diagnosis Date     Bipolar 2 disorder (H)      Chemical abuse     cocaine, meth, cambis     Dyslipidemia      Hep C w/ coma, chronic (H)      Patient overweight      Schizoaffective disorder      Unspecified essential hypertension      Unspecified hypothyroidism        PAST SURGICAL HISTORY:   Past Surgical History:   Procedure Laterality Date     HAND SURGERY      L       FAMILY HISTORY: No family history on file.    SOCIAL HISTORY:   Social History   Substance Use Topics     Smoking status: Current Every Day Smoker     Packs/day: 1.00     Types: Cigarettes     Smokeless tobacco: Current User     Alcohol use No       Patient's Medications   New Prescriptions    No medications on file   Previous Medications    PALIPERIDONE PALMITATE (INVEGA SUSTENNA IM)    Inject 156 mg into the muscle Qmonthly injection.   Modified Medications    No medications on file   Discontinued Medications    No medications on file        No Known Allergies      I have reviewed the Medications, Allergies, Past Medical and Surgical History, and Social History in the [employer] system.    Review of Systems   All other systems reviewed and are negative.      Physical Exam   BP: 154/71  Pulse: 71  Temp: 97.5  F (36.4  C)  Resp: 12  Height: 182.9 cm (6')  Weight: 108.9 kg (240 lb)  SpO2: 99 %      Physical Exam   Constitutional: He is oriented to person, place, and time. No distress.   HENT:   Head: Normocephalic  and atraumatic.   Eyes: Conjunctivae are normal. Pupils are equal, round, and reactive to light.   Neck: Normal range of motion. Neck supple.   Cardiovascular: Normal rate and intact distal pulses.    Pulmonary/Chest: Effort normal. No respiratory distress. He has no wheezes. He has no rales. He exhibits no tenderness.   Abdominal: Soft. There is no tenderness. There is no rebound and no guarding.   Musculoskeletal: Normal range of motion. He exhibits no edema or tenderness.   Neurological: He is alert and oriented to person, place, and time. He exhibits normal muscle tone.   Skin: Skin is warm and dry. No rash noted. He is not diaphoretic.   Psychiatric: He has a normal mood and affect. His behavior is normal. Thought content normal.   Nursing note and vitals reviewed.      ED Course     ED Course     Procedures             Critical Care time:  none             Labs Ordered and Resulted from Time of ED Arrival Up to the Time of Departure from the ED - No data to display         Assessments & Plan (with Medical Decision Making)   1.  Malingering    32-year-old male with schizoaffective disorder who presents to the ER with symptoms of insomnia.  He has multiple ER visits, and is often seeking shelter in the ER.  His vital signs are normal and he is nontoxic-appearing.  I advised him that the emergency room is not a place to seek shelter, and he was told to use local shelters if he needs them.      I have reviewed the nursing notes.    I have reviewed the findings, diagnosis, plan and need for follow up with the patient.    New Prescriptions    No medications on file       Final diagnoses:   Malingering       1/12/2018   [facility], [place], EMERGENCY DEPARTMENT     Merrill Peña MD  01/12/18 0618

## 2018-01-12 NOTE — ED AVS SNAPSHOT
[facility], [place], Emergency Department    [address] [place] [address]    Phone:  [phone]                                       Carlos Alberto Garcia   MRN: [id number]    Department:  [facility], Emergency Department   Date of Visit:  1/12/2018           After Visit Summary Signature Page     I have received my discharge instructions, and my questions have been answered. I have discussed any challenges I see with this plan with the nurse or doctor.    ..........................................................................................................................................  Patient/Patient Representative Signature      ..........................................................................................................................................  Patient Representative Print Name and Relationship to Patient    ..................................................               ................................................  Date                                            Time    ..........................................................................................................................................  Reviewed by Signature/Title    ...................................................              ..............................................  Date                                                            Time

## 2018-01-12 NOTE — ED AVS SNAPSHOT
[facility], Emergency Department    [address] [address]    Phone:  [phone]                                       Carlos Alberto Garcia   MRN: [id number]    Department:  [facility], Emergency Department   Date of Visit:  1/12/2018           Patient Information     Date Of Birth          1985        Your diagnoses for this visit were:     Malingering        You were seen by Merrill Peña MD.        Discharge Instructions       Please utilize the local shelters if you are in need of shelter for the evening.       24 Hour Appointment Hotline       To make an appointment at any [facility] clinic, call [phone] ([phone]). If you don't have a family doctor or clinic, we will help you find one. [facility] clinics are conveniently located to serve the needs of you and your family.             Review of your medicines      Our records show that you are taking the medicines listed below. If these are incorrect, please call your family doctor or clinic.        Dose / Directions Last dose taken    INVEGA SUSTENNA IM   Dose:  156 mg        Inject 156 mg into the muscle Qmonthly injection.   Refills:  0                Orders Needing Specimen Collection     None      Pending Results     No orders found from 1/10/2018 to 1/13/2018.            Pending Culture Results     No orders found from 1/10/2018 to 1/13/2018.            Pending Results Instructions     If you had any lab results that were not finalized at the time of your Discharge, you can call the ED Lab Result RN at [phone]. You will be contacted by this team for any positive Lab results or changes in treatment. The nurses are available 7 days a week from 10A to 6:30P.  You can leave a message 24 hours per day and they will return your call.        Thank you for choosing [facility]       Thank you for choosing [facility] for your care. Our goal is always to provide you with excellent care. Hearing back from our patients is  "one way we can continue to improve our services. Please take a few minutes to complete the written survey that you may receive in the mail after you visit with us. Thank you!        [employer] Information     my[employer] lets you send messages to your doctor, view your test results, renew your prescriptions, schedule appointments and more. To sign up, go to [email or web address] . Click on \"Log in\" on the left side of the screen, which will take you to the Welcome page. Then click on \"Sign up Now\" on the right side of the page.     You will be asked to enter the access code listed below, as well as some personal information. Please follow the directions to create your username and password.     Your access code is: [email or web address]  Expires: 3/27/2018  6:15 AM     Your access code will  in 90 days. If you need help or a new code, please call your [facility] clinic or [phone].        Care EveryWhere ID     This is your Care EveryWhere ID. This could be used by other organizations to access your [facility] medical records  [id number]        Equal Access to Services     PINEDA MARISCAL : Hadluc Stark, roque sesay, fausto jamse. So [facility] [phone].    ATENCIÓN: Si habla español, tiene a weiner disposición servicios gratuitos de asistencia lingüística. Evert al [phone].    We comply with applicable federal civil rights laws and [place] laws. We do not discriminate on the basis of race, color, national origin, age, disability, sex, sexual orientation, or gender identity.            After Visit Summary       This is your record. Keep this with you and show to your community pharmacist(s) and doctor(s) at your next visit.                  "

## 2018-01-13 ENCOUNTER — HOSPITAL ENCOUNTER (EMERGENCY)
Facility: CLINIC | Age: 33
Discharge: HOME | End: 2018-01-13
Attending: EMERGENCY MEDICINE | Admitting: EMERGENCY MEDICINE
Payer: COMMERCIAL

## 2018-01-13 VITALS
DIASTOLIC BLOOD PRESSURE: 100 MMHG | TEMPERATURE: 97.9 F | RESPIRATION RATE: 16 BRPM | SYSTOLIC BLOOD PRESSURE: 148 MMHG | HEIGHT: 72 IN | BODY MASS INDEX: 32.51 KG/M2 | HEART RATE: 64 BPM | WEIGHT: 240 LBS | OXYGEN SATURATION: 99 %

## 2018-01-13 DIAGNOSIS — Z76.5 MALINGERING: ICD-10-CM

## 2018-01-13 DIAGNOSIS — G44.219 EPISODIC TENSION-TYPE HEADACHE, NOT INTRACTABLE: ICD-10-CM

## 2018-01-13 PROCEDURE — 25000132 ZZH RX MED GY IP 250 OP 250 PS 637: Performed by: EMERGENCY MEDICINE

## 2018-01-13 PROCEDURE — 99283 EMERGENCY DEPT VISIT LOW MDM: CPT | Performed by: EMERGENCY MEDICINE

## 2018-01-13 PROCEDURE — 99282 EMERGENCY DEPT VISIT SF MDM: CPT | Mod: Z6 | Performed by: EMERGENCY MEDICINE

## 2018-01-13 RX ORDER — IBUPROFEN 800 MG/1
800 TABLET, FILM COATED ORAL ONCE
Status: COMPLETED | OUTPATIENT
Start: 2018-01-13 | End: 2018-01-13

## 2018-01-13 RX ADMIN — IBUPROFEN 800 MG: 800 TABLET, FILM COATED ORAL at 03:08

## 2018-01-13 NOTE — ED NOTES
Pt arrived to the ER with severe headache which has been on and off for couple weeks. VSS and afebrile. Admits to using meth last night.

## 2018-01-13 NOTE — ED PROVIDER NOTES
History     Chief Complaint   Patient presents with     Headache     HPI  Carlos Alberto Garcia is a 32 year old male who who presents to emergency room with complaints of headache.  He admits smoking methamphetamine last night and since then has had a continuous headache.  He denies all other complaints here in the emergency room.  He has not tried anything to relieve his symptoms.    I have reviewed the Medications, Allergies, Past Medical and Surgical History, and Social History in the Epic system.    Review of Systems   All other systems reviewed and are negative.      Physical Exam   BP: (!) 148/100  Pulse: 61  Temp: 97.9  F (36.6  C)  Resp: 18  Height: 182.9 cm (6')  Weight: 108.9 kg (240 lb)  SpO2: 100 %      Physical Exam   Constitutional: He is oriented to person, place, and time. He appears well-developed and well-nourished. No distress.   HENT:   Head: Normocephalic and atraumatic.   Eyes: No scleral icterus.   Neck: Normal range of motion. Neck supple.   Cardiovascular: Normal rate.    Pulmonary/Chest: Effort normal.   Neurological: He is alert and oriented to person, place, and time.   Patient able to ambulate without issue   Skin: Skin is warm and dry. No rash noted. He is not diaphoretic. No erythema. No pallor.       ED Course     ED Course     Procedures             Critical Care time:  none             Labs Ordered and Resulted from Time of ED Arrival Up to the Time of Departure from the ED - No data to display         Assessments & Plan (with Medical Decision Making)   This is a 32-year-old male coming into emergency room room with complaints of headache.  This is someone that we know very well for frequent visits to the emergency room with malingering type behavior.  He has a significant past medical history of psychiatric behavior as well as methamphetamine abuse.  His physical exam was completely unremarkable and his neurologic exam was normal.  There is no concerns for any significant issues.   I believe he can be provided with Motrin here in the emergency room and discharged with no significant further workup required.    I have reviewed the nursing notes.    I have reviewed the findings, diagnosis, plan and need for follow up with the patient.    Discharge Medication List as of 1/13/2018  3:02 AM          Final diagnoses:   Malingering   Episodic tension-type headache, not intractable       1/13/2018   [facility], [place], EMERGENCY DEPARTMENT     Jose Peralta MD  01/13/18 0343

## 2018-01-13 NOTE — ED AVS SNAPSHOT
[facility], Emergency Department    [address] [facility] [address]    Phone:  [phone]                                       Carlos Alberto Garcia   MRN: [id number]    Department:  [facility], Emergency Department   Date of Visit:  1/13/2018           Patient Information     Date Of Birth          1985        Your diagnoses for this visit were:     Malingering     Episodic tension-type headache, not intractable        You were seen by Jose Peralta MD.        Discharge Instructions       Please make an appointment to follow up with Your Primary Care Provider as soon as possible if you have any concerns.      24 Hour Appointment Hotline       To make an appointment at any [facility] clinic, call [phone]). If you don't have a family doctor or clinic, we will help you find one. [facility] clinics are conveniently located to serve the needs of you and your family.             Review of your medicines      Our records show that you are taking the medicines listed below. If these are incorrect, please call your family doctor or clinic.        Dose / Directions Last dose taken    INVEGA SUSTENNA IM   Dose:  156 mg        Inject 156 mg into the muscle Qmonthly injection.   Refills:  0                Orders Needing Specimen Collection     None      Pending Results     No orders found from 1/11/2018 to 1/14/2018.            Pending Culture Results     No orders found from 1/11/2018 to 1/14/2018.            Pending Results Instructions     If you had any lab results that were not finalized at the time of your Discharge, you can call the ED Lab Result RN at [phone]. You will be contacted by this team for any positive Lab results or changes in treatment. The nurses are available 7 days a week from 10A to 6:30P.  You can leave a message 24 hours per day and they will return your call.        Thank you for choosing [facility]       Thank you for choosing [facility] for your care.  "Our goal is always to provide you with excellent care. Hearing back from our patients is one way we can continue to improve our services. Please take a few minutes to complete the written survey that you may receive in the mail after you visit with us. Thank you!        [employer] Information     [employer] lets you send messages to your doctor, view your test results, renew your prescriptions, schedule appointments and more. To sign up, go to [email or web address] . Click on \"Log in\" on the left side of the screen, which will take you to the Welcome page. Then click on \"Sign up Now\" on the right side of the page.     You will be asked to enter the access code listed below, as well as some personal information. Please follow the directions to create your username and password.     Your access code is: [email or web address]  Expires: 3/27/2018  6:15 AM     Your access code will  in 90 days. If you need help or a new code, please call your [facility] clinic or [phone].        Care EveryWhere ID     This is your Care EveryWhere ID. This could be used by other organizations to access your [facility] medical records  [id number]        Equal Access to Services     PINEDA MARISCAL : Carleen Stark, roque sesay, loreta bragg, fausto cordero. So [facility] [phone].    ATENCIÓN: Si habla español, tiene a weiner disposición servicios gratuitos de asistencia lingüística. Evert al [phone].    We comply with applicable federal civil rights laws and [place] laws. We do not discriminate on the basis of race, color, national origin, age, disability, sex, sexual orientation, or gender identity.            After Visit Summary       This is your record. Keep this with you and show to your community pharmacist(s) and doctor(s) at your next visit.                  "

## 2018-01-13 NOTE — ED AVS SNAPSHOT
[facility], [place], Emergency Department    [address] [place] [address]    Phone:  [phone]                                       Carlos Alberto Garcia   MRN: [id number]    Department:  [facility], Emergency Department   Date of Visit:  1/13/2018           After Visit Summary Signature Page     I have received my discharge instructions, and my questions have been answered. I have discussed any challenges I see with this plan with the nurse or doctor.    ..........................................................................................................................................  Patient/Patient Representative Signature      ..........................................................................................................................................  Patient Representative Print Name and Relationship to Patient    ..................................................               ................................................  Date                                            Time    ..........................................................................................................................................  Reviewed by Signature/Title    ...................................................              ..............................................  Date                                                            Time

## 2018-01-15 ENCOUNTER — HOSPITAL ENCOUNTER (EMERGENCY)
Facility: CLINIC | Age: 33
Discharge: LEFT BEFORE DISCHARGE | End: 2018-01-15
Payer: COMMERCIAL

## 2018-01-15 ENCOUNTER — HOSPITAL ENCOUNTER (EMERGENCY)
Facility: CLINIC | Age: 33
Discharge: HOME | End: 2018-01-15
Attending: EMERGENCY MEDICINE
Payer: COMMERCIAL

## 2018-01-15 NOTE — Clinical Note
Evaluated in [facility] by MD and RN, was given socks and offered  consult to get in shelter, walked out after being told he could not sleep in hallway bed for a while

## 2018-01-16 NOTE — ED NOTES
Seeking warmth, no medical complaints, was given booties to warm feet and offered social work visit in the waiting room. Left ED after evalutated by MD and RN for medical problems. No frostbite detected.  Acknowledged smoking meth

## 2018-01-19 ENCOUNTER — HOSPITAL ENCOUNTER (EMERGENCY)
Facility: CLINIC | Age: 33
Discharge: HOME | End: 2018-01-19
Attending: EMERGENCY MEDICINE | Admitting: EMERGENCY MEDICINE
Payer: COMMERCIAL

## 2018-01-19 VITALS
SYSTOLIC BLOOD PRESSURE: 120 MMHG | TEMPERATURE: 96 F | RESPIRATION RATE: 16 BRPM | DIASTOLIC BLOOD PRESSURE: 74 MMHG | WEIGHT: 220 LBS | BODY MASS INDEX: 29.84 KG/M2 | OXYGEN SATURATION: 100 % | HEART RATE: 72 BPM

## 2018-01-19 DIAGNOSIS — M79.671 PAIN IN BOTH FEET: ICD-10-CM

## 2018-01-19 DIAGNOSIS — M79.672 PAIN IN BOTH FEET: ICD-10-CM

## 2018-01-19 PROCEDURE — 99282 EMERGENCY DEPT VISIT SF MDM: CPT | Mod: Z6 | Performed by: EMERGENCY MEDICINE

## 2018-01-19 PROCEDURE — 99282 EMERGENCY DEPT VISIT SF MDM: CPT | Performed by: EMERGENCY MEDICINE

## 2018-01-19 NOTE — ED AVS SNAPSHOT
[facility], [place], Emergency Department    [address] [place] [address]    Phone:  [phone]                                       Carlos Alberto Garcia   MRN: [id number]    Department:  [facility], Emergency Department   Date of Visit:  1/19/2018           After Visit Summary Signature Page     I have received my discharge instructions, and my questions have been answered. I have discussed any challenges I see with this plan with the nurse or doctor.    ..........................................................................................................................................  Patient/Patient Representative Signature      ..........................................................................................................................................  Patient Representative Print Name and Relationship to Patient    ..................................................               ................................................  Date                                            Time    ..........................................................................................................................................  Reviewed by Signature/Title    ...................................................              ..............................................  Date                                                            Time

## 2018-01-19 NOTE — ED PROVIDER NOTES
History     Chief Complaint   Patient presents with     Foot Pain     HPI  Carlos Alberto Garcia is a 32 year old male homeless, schizoaffective, hypertension, polysubstance abuse, presenting with foot pain.    Patient notes discomfort in feet and toenails.  Has been walking around in wet socks and shoes.  Denies any specific trauma.  No drainage from his feet, or any other concerns.    No fevers or chills, chest pain shortness of breath.  No recent falls.   He is well-known to this emergency department for frequent visits.    I have reviewed the Medications, Allergies, Past Medical and Surgical History, and Social History in the Epic system.    Review of Systems   14 point review of symptoms was performed and is negative except as noted above.     Physical Exam   BP: 120/74  Pulse: 72  Heart Rate: 72  Temp: 96  F (35.6  C)  Resp: 16  Weight: 99.8 kg (220 lb)  SpO2: 100 %      Physical Exam  GEN: Well appearing, non toxic, cooperative and conversant.   HEENT: The head is normocephalic and atraumatic. Pupils are equal round and reactive to light. Extraocular motions are intact. There is no facial swelling.   CV: Regular rate   PULM: Unlabored breathing     EXT: Which she was in socks removed.  No skin breakdown, ulcers, erythema or edema. Likely chronic fungal cysts of his toenails, particularly of the large toenails bilaterally.  No evidence of maceration.   NEURO: Cranial nerves II through XII are intact and symmetric. Bilateral upper and lower extremities grossly show full range of motion without any focal deficits.     PSYCH: Calm and cooperative, interactive.     ED Course     ED Course     Procedures               Labs Ordered and Resulted from Time of ED Arrival Up to the Time of Departure from the ED - No data to display         Assessments & Plan (with Medical Decision Making)   Foot discomfort from wet shoes  No evidence of cellulitis, fungal infection/tinea, abrasion or laceration, other rash  Patient was  given clean socks and dry shoes  He is appropriate for discharge and outpatient follow-up.    - Patient agrees to our plan and is ready for discharge. Care plan, follow up plan, and reasons to return immediately to the ED were dicussed in detail and summarized as noted in the discharge instructions.      I have reviewed the nursing notes.    I have reviewed the findings, diagnosis, plan and need for follow up with the patient.    New Prescriptions    No medications on file       Final diagnoses:   Pain in both feet       1/19/2018   [facility], [place], EMERGENCY DEPARTMENT     Darnell Armenta MD  01/19/18 0136

## 2018-01-19 NOTE — DISCHARGE INSTRUCTIONS
Please make an appointment to follow up with Your Primary Care Provider in 7 days even if entirely better.  You can call to discuss the appropriate follow up timing with your doctor.     Return to the emergency department for any worsening redness, swelling, drainage, pain, fever or any other concerns as given or discussed.

## 2018-01-19 NOTE — ED AVS SNAPSHOT
[facility], Emergency Department    [address] [facility] [address]    Phone:  [phone]                                       Carlos Alberto Garcia   MRN: [id number]    Department:  [facility], Emergency Department   Date of Visit:  1/19/2018           Patient Information     Date Of Birth          1985        Your diagnoses for this visit were:     Pain in both feet        You were seen by Darnell Armenta MD.        Discharge Instructions       Please make an appointment to follow up with Your Primary Care Provider in 7 days even if entirely better.  You can call to discuss the appropriate follow up timing with your doctor.     Return to the emergency department for any worsening redness, swelling, drainage, pain, fever or any other concerns as given or discussed.         24 Hour Appointment Hotline       To make an appointment at any [facility] clinic, call [phone] ([phone]). If you don't have a family doctor or clinic, we will help you find one. [facility] clinics are conveniently located to serve the needs of you and your family.             Review of your medicines      Our records show that you are taking the medicines listed below. If these are incorrect, please call your family doctor or clinic.        Dose / Directions Last dose taken    INVEGA SUSTENNA IM   Dose:  156 mg        Inject 156 mg into the muscle Qmonthly injection.   Refills:  0                Orders Needing Specimen Collection     None      Pending Results     No orders found from 1/17/2018 to 1/20/2018.            Pending Culture Results     No orders found from 1/17/2018 to 1/20/2018.            Pending Results Instructions     If you had any lab results that were not finalized at the time of your Discharge, you can call the ED Lab Result RN at [phone]. You will be contacted by this team for any positive Lab results or changes in treatment. The nurses are available 7 days a week from 10A to 6:30P.  You  "can leave a message 24 hours per day and they will return your call.        Thank you for choosing [facility]       Thank you for choosing [facility] for your care. Our goal is always to provide you with excellent care. Hearing back from our patients is one way we can continue to improve our services. Please take a few minutes to complete the written survey that you may receive in the mail after you visit with us. Thank you!        [employer] Information     [employer] lets you send messages to your doctor, view your test results, renew your prescriptions, schedule appointments and more. To sign up, go to [email or web address] . Click on \"Log in\" on the left side of the screen, which will take you to the Welcome page. Then click on \"Sign up Now\" on the right side of the page.     You will be asked to enter the access code listed below, as well as some personal information. Please follow the directions to create your username and password.     Your access code is: [email or web address]  Expires: 3/27/2018  6:15 AM     Your access code will  in 90 days. If you need help or a new code, please call your [facility] clinic or [phone].        Care EveryWhere ID     This is your Care EveryWhere ID. This could be used by other organizations to access your [facility] medical records  [id number]        Equal Access to Services     PINEDA MARISCAL : Carleen Stark, waaxda luqadaha, qaybta kaalmada mahsa, fausto johnson . So [facility] [phone].    ATENCIÓN: Si habla español, tiene a weiner disposición servicios gratuitos de asistencia lingüística. Llame al [phone].    We comply with applicable federal civil rights laws and [place] laws. We do not discriminate on the basis of race, color, national origin, age, disability, sex, sexual orientation, or gender identity.            After Visit Summary       This is your record. Keep this with you and show to your community pharmacist(s) and " doctor(s) at your next visit.

## 2018-01-20 ENCOUNTER — HOSPITAL ENCOUNTER (EMERGENCY)
Facility: CLINIC | Age: 33
Discharge: HOME | End: 2018-01-20
Attending: EMERGENCY MEDICINE | Admitting: EMERGENCY MEDICINE
Payer: COMMERCIAL

## 2018-01-20 VITALS
TEMPERATURE: 97.6 F | BODY MASS INDEX: 29.8 KG/M2 | WEIGHT: 220 LBS | SYSTOLIC BLOOD PRESSURE: 109 MMHG | RESPIRATION RATE: 16 BRPM | DIASTOLIC BLOOD PRESSURE: 73 MMHG | HEIGHT: 72 IN | OXYGEN SATURATION: 98 %

## 2018-01-20 DIAGNOSIS — Z59.00 HOMELESS: ICD-10-CM

## 2018-01-20 DIAGNOSIS — R51.9 ACUTE NONINTRACTABLE HEADACHE, UNSPECIFIED HEADACHE TYPE: ICD-10-CM

## 2018-01-20 DIAGNOSIS — F15.10 METHAMPHETAMINE ABUSE (H): ICD-10-CM

## 2018-01-20 PROCEDURE — 25000132 ZZH RX MED GY IP 250 OP 250 PS 637: Performed by: EMERGENCY MEDICINE

## 2018-01-20 PROCEDURE — 99283 EMERGENCY DEPT VISIT LOW MDM: CPT

## 2018-01-20 RX ORDER — ACETAMINOPHEN 500 MG
1000 TABLET ORAL ONCE
Status: COMPLETED | OUTPATIENT
Start: 2018-01-20 | End: 2018-01-20

## 2018-01-20 RX ADMIN — ACETAMINOPHEN 1000 MG: 500 TABLET, FILM COATED ORAL at 03:52

## 2018-01-20 SDOH — ECONOMIC STABILITY - HOUSING INSECURITY: HOMELESSNESS UNSPECIFIED: Z59.00

## 2018-01-20 ASSESSMENT — ENCOUNTER SYMPTOMS: HEADACHES: 1

## 2018-01-20 NOTE — DISCHARGE INSTRUCTIONS
"    Understanding Methamphetamine Abuse and Addiction  Methamphetamine (also known as meth or crystal meth) is a manmade drug that affects brain function. Over time, it can change the way you think and act. Some of these changes can cause you great distress. And they can disrupt your life. But methamphetamine addiction can be treated. If you or a loved one has a drug problem, tell someone you trust. That is the first step in getting help.    What does methamphetamine do?  Some drugs slow down your system. But methamphetamine speeds it up. In fact, methamphetamine is often known as  speed,  or \"crank,\" Users have increased energy. Some may go days without food or sleep. The drug comes in many forms that users inject, smoke, inhale, or eat. Methamphetamine causes an intense rush that may last from minutes to hours.  What are the risks?  Methamphetamine triggers your brain to release large amounts of the chemical dopamine. This causes feelings of extreme well-being. It may also damage the cells that produce dopamine. This can make it harder to feel pleasure over time. Using methamphetamine may also lead to these problems:    Addiction. This means you develop a strong physical and psychological dependence on the drug. And you may not be able to stop taking it on your own. A potent form of methamphetamine known as  ice  or \"crystal meth\" is even more addictive.    Overdose. You may need more and more methamphetamine to feel good. But taking too much can lead to seizures or death.    Exposure to HIV. Using shared needles to inject methamphetamine can spread the virus that cause AIDS and hepatitis.    Hallucinations (hearing and seeing things that aren t there).    Paranoia (intense feelings of fear of other people).    Violent action    Bleeding in the brain    Severe dental problems  How can you get help?  In many cases, your healthcare provider can help. Or, check your phone book or the Internet for mental health " centers and drug treatment programs. You can also try the resources below.  Resources  Substance Abuse and Mental Health Services Helpline  249.711.8700 (HELP) http://www.samhsa.gov/treatment/   The National [place] on Drug Abuse  352.157.6545  www.drugabuse.gov/drugs-abuse   Crystal Meth Anonymous 918-657-4274 www.crystalmeth.org    Date Last Reviewed: 2/1/2017 2000-2017 [employer]. [address] [place] [address]. All rights reserved. This information is not intended as a substitute for professional medical care. Always follow your healthcare professional's instructions.         * HEADACHE [unspecified]    The cause of your headache today is not clear, but it does not appear to be the sign of any serious illness.  Under stress, some people tense the muscles of their shoulder, neck and scalp without knowing it. If this condition lasts long enough, a TENSION HEADACHE can occur.  A MIGRAINE HEADACHE is caused by changes in blood flow to the brain. It can be mild or severe. A migraine attack may be triggered by emotional stress, hormone changes during the menstrual cycle, oral contraceptives, alcohol use, certain foods containing tyramine, eye strain, weather changes, missing meals, lack of sleep or oversleeping.  Other causes of headache include a viral illness, sinus, ear or throat infection, dental pain and TMJ (jaw joint) pain.  HOME CARE:    If you were given pain medicine for this headache, do not drive yourself home. Arrange for a ride, instead. When you get home, try to sleep. You should feel much better when you wake up.    If you are having nausea or vomiting, follow a light diet until your headache is relieved.    If you have a migraine type headache, use sunglasses when in the daylight or around bright indoor lighting until symptoms improve. Bright glaring light can worsen this kind of headache.  FOLLOW UP with your doctor if the headache is not better within the next 24  hours. If you have frequent headaches you should discuss a treatment plan with your primary care doctor. By being aware of the earliest signs of headache, and starting treatment right away, you may be able to stop the pain yourself.  GET PROMPT MEDICAL ATTENTION if any of the following occur:    Worsening of your head pain or no improvement within 24 hours    Repeated vomiting (unable to keep liquids down)    Fever over 101 F (38.3 C)    Stiff neck    Extreme drowsiness, confusion or fainting    Weakness of an arm or leg or one side of the face    Difficulty with speech or vision    1392-2977 [employer]. [address] [place] [address]. All rights reserved. This information is not intended as a substitute for professional medical care. Always follow your healthcare professional's instructions.  This information has been modified by your health care provider with permission from the publisher.

## 2018-01-20 NOTE — ED NOTES
Bed: ED04  Expected date: 1/20/18  Expected time: 2:30 AM  Means of arrival: Ambulance  Comments:  HEMS 412 30M HA; ETA 3651

## 2018-01-20 NOTE — ED AVS SNAPSHOT
Emergency Department    [address] [place] [address]    Phone:  [phone]    Fax:  [phone]                                       Carlos Alberto Garcia   MRN: [id number]    Department:   Emergency Department   Date of Visit:  1/20/2018           After Visit Summary Signature Page     I have received my discharge instructions, and my questions have been answered. I have discussed any challenges I see with this plan with the nurse or doctor.    ..........................................................................................................................................  Patient/Patient Representative Signature      ..........................................................................................................................................  Patient Representative Print Name and Relationship to Patient    ..................................................               ................................................  Date                                            Time    ..........................................................................................................................................  Reviewed by Signature/Title    ...................................................              ..............................................  Date                                                            Time

## 2018-01-20 NOTE — ED AVS SNAPSHOT
"  Emergency Department    [address] [facility] [address]    Phone:  [phone]    Fax:  [phone]                                       Carlos Alberto Garcia   MRN: [id number]    Department:   Emergency Department   Date of Visit:  1/20/2018           Patient Information     Date Of Birth          1985        Your diagnoses for this visit were:     Acute nonintractable headache, unspecified headache type     Homeless     Methamphetamine abuse        You were seen by Ismael Maciel MD.      Follow-up Information     Follow up with your primary care provider In 1 week.    Why:  As needed        Follow up with  Emergency Department.    Specialty:  EMERGENCY MEDICINE    Why:  If symptoms worsen    Contact information:    [address] [facility] [address] [phone]        Discharge Instructions           Understanding Methamphetamine Abuse and Addiction  Methamphetamine (also known as meth or crystal meth) is a manmade drug that affects brain function. Over time, it can change the way you think and act. Some of these changes can cause you great distress. And they can disrupt your life. But methamphetamine addiction can be treated. If you or a loved one has a drug problem, tell someone you trust. That is the first step in getting help.    What does methamphetamine do?  Some drugs slow down your system. But methamphetamine speeds it up. In fact, methamphetamine is often known as  speed,  or \"crank,\" Users have increased energy. Some may go days without food or sleep. The drug comes in many forms that users inject, smoke, inhale, or eat. Methamphetamine causes an intense rush that may last from minutes to hours.  What are the risks?  Methamphetamine triggers your brain to release large amounts of the chemical dopamine. This causes feelings of extreme well-being. It may also damage the cells that produce dopamine. This can make it harder to feel pleasure over time. " "Using methamphetamine may also lead to these problems:    Addiction. This means you develop a strong physical and psychological dependence on the drug. And you may not be able to stop taking it on your own. A potent form of methamphetamine known as  ice  or \"crystal meth\" is even more addictive.    Overdose. You may need more and more methamphetamine to feel good. But taking too much can lead to seizures or death.    Exposure to HIV. Using shared needles to inject methamphetamine can spread the virus that cause AIDS and hepatitis.    Hallucinations (hearing and seeing things that aren t there).    Paranoia (intense feelings of fear of other people).    Violent action    Bleeding in the brain    Severe dental problems  How can you get help?  In many cases, your healthcare provider can help. Or, check your phone book or the Internet for mental health centers and drug treatment programs. You can also try the resources below.  Resources  Substance Abuse and Mental Health Services Helpline  398.119.7702 (HELP) http://www.sama.gov/treatment/   The National [place] on Drug Abuse  635.936.2953  www.drugabuse.gov/drugs-abuse   Crystal Meth Anonymous 497-354-5828 www.crystalmeth.org    Date Last Reviewed: 2/1/2017 2000-2017 [employer]. [address] [place] [address], [place] 6198467. All rights reserved. This information is not intended as a substitute for professional medical care. Always follow your healthcare professional's instructions.         * HEADACHE [unspecified]    The cause of your headache today is not clear, but it does not appear to be the sign of any serious illness.  Under stress, some people tense the muscles of their shoulder, neck and scalp without knowing it. If this condition lasts long enough, a TENSION HEADACHE can occur.  A MIGRAINE HEADACHE is caused by changes in blood flow to the brain. It can be mild or severe. A migraine attack may be triggered by emotional stress, hormone changes " during the menstrual cycle, oral contraceptives, alcohol use, certain foods containing tyramine, eye strain, weather changes, missing meals, lack of sleep or oversleeping.  Other causes of headache include a viral illness, sinus, ear or throat infection, dental pain and TMJ (jaw joint) pain.  HOME CARE:    If you were given pain medicine for this headache, do not drive yourself home. Arrange for a ride, instead. When you get home, try to sleep. You should feel much better when you wake up.    If you are having nausea or vomiting, follow a light diet until your headache is relieved.    If you have a migraine type headache, use sunglasses when in the daylight or around bright indoor lighting until symptoms improve. Bright glaring light can worsen this kind of headache.  FOLLOW UP with your doctor if the headache is not better within the next 24 hours. If you have frequent headaches you should discuss a treatment plan with your primary care doctor. By being aware of the earliest signs of headache, and starting treatment right away, you may be able to stop the pain yourself.  GET PROMPT MEDICAL ATTENTION if any of the following occur:    Worsening of your head pain or no improvement within 24 hours    Repeated vomiting (unable to keep liquids down)    Fever over 101 F (38.3 C)    Stiff neck    Extreme drowsiness, confusion or fainting    Weakness of an arm or leg or one side of the face    Difficulty with speech or vision    8737-1796 [employer]. [address] [place], [place] [address]. All rights reserved. This information is not intended as a substitute for professional medical care. Always follow your healthcare professional's instructions.  This information has been modified by your health care provider with permission from the publisher.      24 Hour Appointment Hotline       To make an appointment at any [facility], call [phone] ([phone]). If you don't have a family doctor or  clinic, we will help you find one. [facility] are conveniently located to serve the needs of you and your family.             Review of your medicines      Our records show that you are taking the medicines listed below. If these are incorrect, please call your family doctor or clinic.        Dose / Directions Last dose taken    INVEGA SUSTENNA IM   Dose:  156 mg        Inject 156 mg into the muscle Qmonthly injection.   Refills:  0                Orders Needing Specimen Collection     None      Pending Results     No orders found from 1/18/2018 to 1/21/2018.            Pending Culture Results     No orders found from 1/18/2018 to 1/21/2018.            Pending Results Instructions     If you had any lab results that were not finalized at the time of your Discharge, you can call the ED Lab Result RN at [phone]. You will be contacted by this team for any positive Lab results or changes in treatment. The nurses are available 7 days a week from 10A to 6:30P.  You can leave a message 24 hours per day and they will return your call.        Test Results From Your Hospital Stay               Clinical Quality Measure: Blood Pressure Screening     Your blood pressure was checked while you were in the emergency department today. The last reading we obtained was  BP: 109/73 . Please read the guidelines below about what these numbers mean and what you should do about them.  If your systolic blood pressure (the top number) is less than 120 and your diastolic blood pressure (the bottom number) is less than 80, then your blood pressure is normal. There is nothing more that you need to do about it.  If your systolic blood pressure (the top number) is 120-139 or your diastolic blood pressure (the bottom number) is 80-89, your blood pressure may be higher than it should be. You should have your blood pressure rechecked within a year by a primary care provider.  If your systolic blood pressure (the top number) is 140 or  "greater or your diastolic blood pressure (the bottom number) is 90 or greater, you may have high blood pressure. High blood pressure is treatable, but if left untreated over time it can put you at risk for heart attack, stroke, or kidney failure. You should have your blood pressure rechecked by a primary care provider within the next 4 weeks.  If your provider in the emergency department today gave you specific instructions to follow-up with your doctor or provider even sooner than that, you should follow that instruction and not wait for up to 4 weeks for your follow-up visit.        Thank you for choosing [facility]       Thank you for choosing [facility] for your care. Our goal is always to provide you with excellent care. Hearing back from our patients is one way we can continue to improve our services. Please take a few minutes to complete the written survey that you may receive in the mail after you visit with us. Thank you!        [employer] Information     [employer] lets you send messages to your doctor, view your test results, renew your prescriptions, schedule appointments and more. To sign up, go to [email or web address] . Click on \"Log in\" on the left side of the screen, which will take you to the Welcome page. Then click on \"Sign up Now\" on the right side of the page.     You will be asked to enter the access code listed below, as well as some personal information. Please follow the directions to create your username and password.     Your access code is: [email or web address]  Expires: 3/27/2018  6:15 AM     Your access code will  in 90 days. If you need help or a new code, please call your [facility] clinic or [phone].        Care EveryWhere ID     This is your Care EveryWhere ID. This could be used by other organizations to access your [facility] medical records  [id number]        Equal Access to Services     PINEDA MARISCAL AH: roque Marte qaybta kaalmada adeegyada, waxay " sarah johnson ah. [facility] [phone].    ATENCIÓN: Si habla español, tiene a weiner disposición servicios gratuitos de asistencia lingüística. Llame al [phone].    We comply with applicable federal civil rights laws and [place] laws. We do not discriminate on the basis of race, color, national origin, age, disability, sex, sexual orientation, or gender identity.            After Visit Summary       This is your record. Keep this with you and show to your community pharmacist(s) and doctor(s) at your next visit.

## 2018-01-22 ASSESSMENT — ENCOUNTER SYMPTOMS
CHEST TIGHTNESS: 0
NAUSEA: 0
DIZZINESS: 0
FACIAL SWELLING: 0
WOUND: 0
NECK PAIN: 0
SEIZURES: 0
HEMATURIA: 0
RHINORRHEA: 0
ARTHRALGIAS: 0
ABDOMINAL PAIN: 0
CHILLS: 0
SHORTNESS OF BREATH: 0
AGITATION: 0
COUGH: 0
CONFUSION: 0
HEADACHES: 0
FEVER: 0
FATIGUE: 0
LIGHT-HEADEDNESS: 0
EYE PAIN: 0
BACK PAIN: 0
FLANK PAIN: 0
VOMITING: 0
SORE THROAT: 0
WEAKNESS: 0
NERVOUS/ANXIOUS: 1
HALLUCINATIONS: 0
DIARRHEA: 0
BRUISES/BLEEDS EASILY: 0
SINUS PAIN: 0
SINUS PRESSURE: 0
PALPITATIONS: 0
APPETITE CHANGE: 0
CONSTITUTIONAL NEGATIVE: 1

## 2018-01-23 ENCOUNTER — HOSPITAL ENCOUNTER (EMERGENCY)
Facility: CLINIC | Age: 33
Discharge: HOME | End: 2018-01-23
Attending: EMERGENCY MEDICINE | Admitting: EMERGENCY MEDICINE
Payer: COMMERCIAL

## 2018-01-23 VITALS
DIASTOLIC BLOOD PRESSURE: 85 MMHG | RESPIRATION RATE: 18 BRPM | TEMPERATURE: 98 F | HEART RATE: 89 BPM | SYSTOLIC BLOOD PRESSURE: 142 MMHG | OXYGEN SATURATION: 100 %

## 2018-01-23 DIAGNOSIS — Z76.0 ENCOUNTER FOR MEDICATION REFILL: ICD-10-CM

## 2018-01-23 PROCEDURE — 99281 EMR DPT VST MAYX REQ PHY/QHP: CPT

## 2018-01-23 PROCEDURE — 99282 EMERGENCY DEPT VISIT SF MDM: CPT | Mod: Z6 | Performed by: EMERGENCY MEDICINE

## 2018-01-23 NOTE — ED PROVIDER NOTES
History     Chief Complaint   Patient presents with     Medication Refill     HPI  Carlos Alberto Garcia is a 32 year old male with a history of dyslipidemia, Hepatitis C, hypothyroidism, hypertension, and chemical abuse who presents to the ED today for a refill of Invega Sustenna. Patient states he was unable to get into his usual clinic ([facility]) for the refill and decided to come to the ED for his refill. Patient states he was supposed to get his Invega Sustanna injection today or sometime this week. Pt denies any other complaints.       I have reviewed the Medications, Allergies, Past Medical and Surgical History, and Social History in the [employer] system.  PAST MEDICAL HISTORY:   Past Medical History:   Diagnosis Date     Bipolar 2 disorder (H)      Chemical abuse     cocaine, meth, cambis     Dyslipidemia      Hep C w/ coma, chronic (H)      Patient overweight      Schizoaffective disorder      Unspecified essential hypertension      Unspecified hypothyroidism        PAST SURGICAL HISTORY:   Past Surgical History:   Procedure Laterality Date     HAND SURGERY      L       FAMILY HISTORY: No family history on file.    SOCIAL HISTORY:   Social History   Substance Use Topics     Smoking status: Current Every Day Smoker     Packs/day: 1.00     Types: Cigarettes     Smokeless tobacco: Current User     Alcohol use No       Patient's Medications   New Prescriptions    No medications on file   Previous Medications    PALIPERIDONE PALMITATE (INVEGA SUSTENNA IM)    Inject 156 mg into the muscle Qmonthly injection.   Modified Medications    No medications on file   Discontinued Medications    No medications on file        No Known Allergies     Review of Systems   All other systems reviewed and are negative.      Physical Exam   BP: 142/85  Pulse: 89  Temp: 98  F (36.7  C)  Resp: 18  SpO2: 100 %  Physical Exam   Constitutional: oriented to person, place, and time. appears well-developed and  well-nourished.   HENT:   Head: Normocephalic and atraumatic.   Neck: Normal range of motion.   Pulmonary/Chest: Effort normal. No respiratory distress.   Neurological: alert and oriented to person, place, and time.   Skin: Skin is warm and dry.   Psychiatric:  normal mood and affect.  behavior is normal. Thought content normal.       Physical Exam    ED Course   12:17 AM  The patient was seen and examined by Andreina Garrett MD in Room [facility].     ED Course     Procedures             Critical Care time:  none         Results for orders placed or performed during the hospital encounter of 01/03/18   EKG 12-lead, tracing only   Result Value Ref Range    Interpretation ECG Click View Image link to view waveform and result      Medications - No data to display         Labs Ordered and Resulted from Time of ED Arrival Up to the Time of Departure from the ED - No data to display  No results found for this or any previous visit (from the past 24 hour(s)).          Assessments & Plan (with Medical Decision Making)  Carlos Alberto Garcia is a 32 year old male who presents to the ER seeking a refill for medication. That medication is not currently available in the ER. Patient encouraged to follow up with his clinic for further treatment. Patient has no other medical issues. Patient is stable for discharge.           I have reviewed the nursing notes.    I have reviewed the findings, diagnosis, plan and need for follow up with the patient.    New Prescriptions    No medications on file       Final diagnoses:   Encounter for medication refill     Janee PARNELL, am serving as a trained medical scribe to document services personally performed by Andreina Garrett MD, based on the provider's statements to me.      Andreina PARNELL MD, was physically present and have reviewed and verified the accuracy of this note documented by Janee Alberts.   1/23/2018   [facility] [place], EMERGENCY DEPARTMENT     Adnreina Garrett MD  01/23/18  2337

## 2018-01-23 NOTE — ED AVS SNAPSHOT
[facility], Emergency Department    [address] [address]    Phone:  [phone]                                       Carlos Alberto Garcia   MRN: [id number]    Department:  [facility], Emergency Department   Date of Visit:  1/23/2018           Patient Information     Date Of Birth          1985        Your diagnoses for this visit were:     Encounter for medication refill        You were seen by Andreina Garrett MD.        Discharge Instructions       Please make an appointment to follow up with [facility] (phone: [phone] or Psychiatric Clinic (phone: [phone]) in 2-4 days for medication refil.      24 Hour Appointment Hotline       To make an appointment at any [facility] clinic, call [phone] ([phone]). If you don't have a family doctor or clinic, we will help you find one. [facility] clinics are conveniently located to serve the needs of you and your family.             Review of your medicines      Our records show that you are taking the medicines listed below. If these are incorrect, please call your family doctor or clinic.        Dose / Directions Last dose taken    INVEGA SUSTENNA IM   Dose:  156 mg        Inject 156 mg into the muscle Qmonthly injection.   Refills:  0                Orders Needing Specimen Collection     None      Pending Results     No orders found from 1/21/2018 to 1/24/2018.            Pending Culture Results     No orders found from 1/21/2018 to 1/24/2018.            Pending Results Instructions     If you had any lab results that were not finalized at the time of your Discharge, you can call the ED Lab Result RN at [phone]. You will be contacted by this team for any positive Lab results or changes in treatment. The nurses are available 7 days a week from 10A to 6:30P.  You can leave a message 24 hours per day and they will return your call.        Thank you for choosing [facility]       Thank you for choosing [facility]  "for your care. Our goal is always to provide you with excellent care. Hearing back from our patients is one way we can continue to improve our services. Please take a few minutes to complete the written survey that you may receive in the mail after you visit with us. Thank you!        [employer] Information     [employer] lets you send messages to your doctor, view your test results, renew your prescriptions, schedule appointments and more. To sign up, go to [email or web address] . Click on \"Log in\" on the left side of the screen, which will take you to the Welcome page. Then click on \"Sign up Now\" on the right side of the page.     You will be asked to enter the access code listed below, as well as some personal information. Please follow the directions to create your username and password.     Your access code is: [email or web address]  Expires: 3/27/2018  6:15 AM     Your access code will  in 90 days. If you need help or a new code, please call your [facility] clinic or [phone].        Care EveryWhere ID     This is your Care EveryWhere ID. This could be used by other organizations to access your [facility] medical records  [id number]        Equal Access to Services     PINEDA MARISCAL AH: Carleen Stark, roque sesay, loreta bragg, fausto cordero. So [facility] [phone].    ATENCIÓN: Si habla español, tiene a weiner disposición servicios gratuitos de asistencia lingüística. Evert al [phone].    We comply with applicable federal civil rights laws and [place] laws. We do not discriminate on the basis of race, color, national origin, age, disability, sex, sexual orientation, or gender identity.            After Visit Summary       This is your record. Keep this with you and show to your community pharmacist(s) and doctor(s) at your next visit.                  "

## 2018-01-23 NOTE — ED AVS SNAPSHOT
[facility], [place], Emergency Department    [address] [place] [address]    Phone:  [phone]                                       Carlos Alberto Garcia   MRN: [id number]    Department:  [facility], Emergency Department   Date of Visit:  1/23/2018           After Visit Summary Signature Page     I have received my discharge instructions, and my questions have been answered. I have discussed any challenges I see with this plan with the nurse or doctor.    ..........................................................................................................................................  Patient/Patient Representative Signature      ..........................................................................................................................................  Patient Representative Print Name and Relationship to Patient    ..................................................               ................................................  Date                                            Time    ..........................................................................................................................................  Reviewed by Signature/Title    ...................................................              ..............................................  Date                                                            Time

## 2018-01-23 NOTE — DISCHARGE INSTRUCTIONS
Please make an appointment to follow up with [facility] (phone: [phone] or Psychiatric Clinic (phone: [phone]) in 2-4 days for medication refil.

## 2018-01-25 ENCOUNTER — HOSPITAL ENCOUNTER (EMERGENCY)
Facility: CLINIC | Age: 33
Discharge: HOME | End: 2018-01-25
Attending: EMERGENCY MEDICINE | Admitting: EMERGENCY MEDICINE
Payer: COMMERCIAL

## 2018-01-25 VITALS
TEMPERATURE: 96.9 F | OXYGEN SATURATION: 96 % | SYSTOLIC BLOOD PRESSURE: 120 MMHG | WEIGHT: 248.2 LBS | BODY MASS INDEX: 33.66 KG/M2 | RESPIRATION RATE: 16 BRPM | DIASTOLIC BLOOD PRESSURE: 76 MMHG

## 2018-01-25 DIAGNOSIS — H53.9 VISUAL DISTURBANCE: ICD-10-CM

## 2018-01-25 DIAGNOSIS — Z76.0 ENCOUNTER FOR MEDICATION REFILL: ICD-10-CM

## 2018-01-25 PROCEDURE — 99283 EMERGENCY DEPT VISIT LOW MDM: CPT | Performed by: EMERGENCY MEDICINE

## 2018-01-25 PROCEDURE — 99283 EMERGENCY DEPT VISIT LOW MDM: CPT | Mod: Z6 | Performed by: EMERGENCY MEDICINE

## 2018-01-25 ASSESSMENT — ENCOUNTER SYMPTOMS
ABDOMINAL PAIN: 0
FEVER: 0
SHORTNESS OF BREATH: 0

## 2018-01-25 ASSESSMENT — VISUAL ACUITY
OS: 20/70
OD: 20/70

## 2018-01-25 NOTE — DISCHARGE INSTRUCTIONS
Please make an appointment to follow up with Eye Clinic (phone: [phone]) This week, as well as your regular clinic to get your Invega shot. Return with any worsening or concerns. I strongly advise you start staying at a shelter over night.

## 2018-01-25 NOTE — ED PROVIDER NOTES
History     Chief Complaint   Patient presents with     Psychiatric Evaluation     Pt states he would like a head examination, that he is seeing red in his visual field, only when he closes his eyes. Denies hallucinations.     Medication Refill     Pt would like an Invega injection. Last one was 1 month ago, usually he goes to clinic for this.     HPI  Carlos Alberto Garcia is a 32 year old male, well-known to the ER, who complains of vague visual disturbance.  He does not describe blurred vision, though states that for the last 5-7 days, when he closes his eyes, he sees a red spot behind his eyelids both eyes.  He is very vague about this.  Cannot give me really any more information.  He does not see any spots in his eyes are open, and again does not describe any blurred vision or double vision.  He does frequently report headaches but is not reporting one currently.  She denies numbness, tingling, weakness.  He denies nausea, vomiting, abdominal pain, diarrhea.  He denies shortness of breath or fevers.    He also states that he is due for his Invega shot.  He is asking to get that today.  He supposed to get that once a month.  He does not report any suicidal ideation at this time.  He is currently homeless.    Past Medical History:   Diagnosis Date     Bipolar 2 disorder (H)      Chemical abuse     cocaine, meth, cambis     Dyslipidemia      Hep C w/ coma, chronic (H)      Patient overweight      Schizoaffective disorder      Unspecified essential hypertension      Unspecified hypothyroidism        Past Surgical History:   Procedure Laterality Date     HAND SURGERY      L       No family history on file.    Social History   Substance Use Topics     Smoking status: Current Every Day Smoker     Packs/day: 1.00     Types: Cigarettes     Smokeless tobacco: Current User     Alcohol use No         I have reviewed the Medications, Allergies, Past Medical and Surgical History, and Social History in the Epic  "system.    Review of Systems   Constitutional: Negative for fever.   Eyes: Positive for visual disturbance.   Respiratory: Negative for shortness of breath.    Cardiovascular: Negative for chest pain.   Gastrointestinal: Negative for abdominal pain.   All other systems reviewed and are negative.      Physical Exam   BP: 120/76  Heart Rate: 74  Temp: 96.9  F (36.1  C)  Resp: 16  Weight: 112.6 kg (248 lb 3.2 oz)  SpO2: 96 %  Visual Acuity-Left: 20/70  Visual Acuity-Right: 20/70      Physical Exam   Constitutional: He is oriented to person, place, and time. No distress.   HENT:   Head: Atraumatic.   Mouth/Throat: Oropharynx is clear and moist. No oropharyngeal exudate.   Eyes: Pupils are equal, round, and reactive to light. No scleral icterus.   I am not able to get a good funduscopic exam in his nondilated eyes.   Neck: Neck supple.   Cardiovascular: Normal heart sounds and intact distal pulses.    Pulmonary/Chest: Breath sounds normal. No respiratory distress.   Abdominal: Soft. There is no tenderness.   Musculoskeletal: He exhibits no edema or tenderness.   Neurological: He is alert and oriented to person, place, and time. No cranial nerve deficit. He exhibits normal muscle tone. Coordination normal.   Skin: Skin is warm. No rash noted. He is not diaphoretic.       ED Course     ED Course     Procedures             Critical Care time:  none             Labs Ordered and Resulted from Time of ED Arrival Up to the Time of Departure from the ED - No data to display         Assessments & Plan (with Medical Decision Making)   Patient is a complaint of any pain today, any trauma or injuries.  He states he sees a red dot behind his eyelids when he closes his eyes.  He is very vague about whether this is constant or intermittent, but states he has it, \"a little,\" right now.  This is been going on for a number of days, certainly seem to be emergency.  His visual acuity is 20/70 in both eyes.  I did perform a neuro exam, which " is unremarkable.  I do not think he has had a stroke.  I do not think that this is emergent condition.  He is well-known to the emergency department for malingering, often to obtain shelter in the evening or nighttime, as he is homeless.  He has been to either the [facility] or [facility] emergency department nearly every day over the last 30 days with various complaints.  My concern for serious or life-threatening etiology at this time is low.  I have given the phone number for ophthalmology and have advised him to follow-up.  Also advised him that we do not give Invega injections here in the emergency department.  He needs to follow-up with his regular clinic for this.  He does not express suicidal ideation today and I think he is safe for discharge home.  I do think he is here primarily for secondary gain.    Dictation Disclaimer: Some of this Note has been completed with voice-recognition dictation software. Although errors are generally corrected real-time, there is the potential for a rare error to be present in the completed chart.      I have reviewed the nursing notes.    I have reviewed the findings, diagnosis, plan and need for follow up with the patient.    Discharge Medication List as of 1/25/2018  2:29 AM          Final diagnoses:   Visual disturbance   Encounter for medication refill       1/25/2018   [facility], [place], EMERGENCY DEPARTMENT     Ángela Mann MD  01/25/18 3804

## 2018-01-25 NOTE — ED AVS SNAPSHOT
[facility], Emergency Department    [address]    [place] [address]    Phone:  [phone]    Fax:  [phone]                                       Carlos Alberto Garcia   MRN: [id number]    Department:  [facility], Emergency Department   Date of Visit:  1/25/2018           Patient Information     Date Of Birth          1985        Your diagnoses for this visit were:     Visual disturbance     Encounter for medication refill        You were seen by Ángela Mann MD.        Discharge Instructions       Please make an appointment to follow up with [facility] (phone: [phone]) This week, as well as your regular clinic to get your Invega shot. Return with any worsening or concerns. I strongly advise you start staying at a shelter over night.        24 Hour Appointment Hotline       To make an appointment at any [facility] clinic, call [phone]). If you don't have a family doctor or clinic, we will help you find one. [facility] clinics are conveniently located to serve the needs of you and your family.             Review of your medicines      Our records show that you are taking the medicines listed below. If these are incorrect, please call your family doctor or clinic.        Dose / Directions Last dose taken    INVEGA SUSTENNA IM   Dose:  156 mg        Inject 156 mg into the muscle Qmonthly injection.   Refills:  0                Orders Needing Specimen Collection     None      Pending Results     No orders found from 1/23/2018 to 1/26/2018.            Pending Culture Results     No orders found from 1/23/2018 to 1/26/2018.            Pending Results Instructions     If you had any lab results that were not finalized at the time of your Discharge, you can call the ED Lab Result RN at [phone]. You will be contacted by this team for any positive Lab results or changes in treatment. The nurses are available 7 days a week from 10A to 6:30P.  You can leave a  "message 24 hours per day and they will return your call.        Thank you for choosing [facility]       Thank you for choosing [facility] for your care. Our goal is always to provide you with excellent care. Hearing back from our patients is one way we can continue to improve our services. Please take a few minutes to complete the written survey that you may receive in the mail after you visit with us. Thank you!        [employer] Information     [employer] lets you send messages to your doctor, view your test results, renew your prescriptions, schedule appointments and more. To sign up, go to [email or web address] . Click on \"Log in\" on the left side of the screen, which will take you to the Welcome page. Then click on \"Sign up Now\" on the right side of the page.     You will be asked to enter the access code listed below, as well as some personal information. Please follow the directions to create your username and password.     Your access code is: [email or web address]  Expires: 3/27/2018  6:15 AM     Your access code will  in 90 days. If you need help or a new code, please call your [facility] clinic or [phone].        Care EveryWhere ID     This is your Care EveryWhere ID. This could be used by other organizations to access your [facility] medical records  [id number]        Equal Access to Services     PINEDA MARISCAL AH: Hadii los barroso Soisabel, waaxda luqadaha, qaybta kaalmada mahsa, fausto johnson . So [facility] [phone].    ATENCIÓN: Si habla español, tiene a weiner disposición servicios gratuitos de asistencia lingüística. Llame al [phone].    We comply with applicable federal civil rights laws and [place] laws. We do not discriminate on the basis of race, color, national origin, age, disability, sex, sexual orientation, or gender identity.            After Visit Summary       This is your record. Keep this with you and show to your community pharmacist(s) and doctor(s) at your " next visit.

## 2018-01-25 NOTE — ED AVS SNAPSHOT
[facility], Emergency Department    [address]    [facility] [address]    Phone:  [phone]    Fax:  [phone]                                       Carlos Alberto Garcia   MRN: [id number]    Department:  [facility], Emergency Department   Date of Visit:  1/25/2018           After Visit Summary Signature Page     I have received my discharge instructions, and my questions have been answered. I have discussed any challenges I see with this plan with the nurse or doctor.    ..........................................................................................................................................  Patient/Patient Representative Signature      ..........................................................................................................................................  Patient Representative Print Name and Relationship to Patient    ..................................................               ................................................  Date                                            Time    ..........................................................................................................................................  Reviewed by Signature/Title    ...................................................              ..............................................  Date                                                            Time

## 2018-01-26 ENCOUNTER — HOSPITAL ENCOUNTER (EMERGENCY)
Facility: CLINIC | Age: 33
Discharge: HOME | End: 2018-01-26
Attending: EMERGENCY MEDICINE | Admitting: EMERGENCY MEDICINE
Payer: COMMERCIAL

## 2018-01-26 ENCOUNTER — APPOINTMENT (OUTPATIENT)
Dept: GENERAL RADIOLOGY | Facility: CLINIC | Age: 33
End: 2018-01-26
Attending: EMERGENCY MEDICINE
Payer: COMMERCIAL

## 2018-01-26 VITALS
BODY MASS INDEX: 33.91 KG/M2 | TEMPERATURE: 97.3 F | DIASTOLIC BLOOD PRESSURE: 75 MMHG | OXYGEN SATURATION: 100 % | RESPIRATION RATE: 18 BRPM | WEIGHT: 250 LBS | SYSTOLIC BLOOD PRESSURE: 129 MMHG

## 2018-01-26 DIAGNOSIS — R07.89 ATYPICAL CHEST PAIN: ICD-10-CM

## 2018-01-26 LAB
INTERPRETATION ECG - MUSE: NORMAL
TROPONIN I BLD-MCNC: 0 UG/L (ref 0–0.1)

## 2018-01-26 PROCEDURE — 99284 EMERGENCY DEPT VISIT MOD MDM: CPT | Mod: 25 | Performed by: EMERGENCY MEDICINE

## 2018-01-26 PROCEDURE — 99283 EMERGENCY DEPT VISIT LOW MDM: CPT | Mod: 25 | Performed by: EMERGENCY MEDICINE

## 2018-01-26 PROCEDURE — 93005 ELECTROCARDIOGRAM TRACING: CPT | Performed by: EMERGENCY MEDICINE

## 2018-01-26 PROCEDURE — 84484 ASSAY OF TROPONIN QUANT: CPT

## 2018-01-26 PROCEDURE — 25000125 ZZHC RX 250: Performed by: EMERGENCY MEDICINE

## 2018-01-26 PROCEDURE — 71046 X-RAY EXAM CHEST 2 VIEWS: CPT

## 2018-01-26 PROCEDURE — 93010 ELECTROCARDIOGRAM REPORT: CPT | Mod: Z6 | Performed by: EMERGENCY MEDICINE

## 2018-01-26 PROCEDURE — 25000132 ZZH RX MED GY IP 250 OP 250 PS 637: Performed by: EMERGENCY MEDICINE

## 2018-01-26 PROCEDURE — 94640 AIRWAY INHALATION TREATMENT: CPT | Performed by: EMERGENCY MEDICINE

## 2018-01-26 RX ORDER — ACETAMINOPHEN 325 MG/1
975 TABLET ORAL ONCE
Status: COMPLETED | OUTPATIENT
Start: 2018-01-26 | End: 2018-01-26

## 2018-01-26 RX ORDER — IPRATROPIUM BROMIDE AND ALBUTEROL SULFATE 2.5; .5 MG/3ML; MG/3ML
3 SOLUTION RESPIRATORY (INHALATION) ONCE
Status: COMPLETED | OUTPATIENT
Start: 2018-01-26 | End: 2018-01-26

## 2018-01-26 RX ORDER — IBUPROFEN 600 MG/1
600 TABLET, FILM COATED ORAL ONCE
Status: COMPLETED | OUTPATIENT
Start: 2018-01-26 | End: 2018-01-26

## 2018-01-26 RX ADMIN — IBUPROFEN 600 MG: 600 TABLET ORAL at 08:05

## 2018-01-26 RX ADMIN — IPRATROPIUM BROMIDE AND ALBUTEROL SULFATE 3 ML: .5; 3 SOLUTION RESPIRATORY (INHALATION) at 08:06

## 2018-01-26 RX ADMIN — ACETAMINOPHEN 975 MG: 325 TABLET, FILM COATED ORAL at 08:05

## 2018-01-26 ASSESSMENT — ENCOUNTER SYMPTOMS: CHEST TIGHTNESS: 1

## 2018-01-26 NOTE — ED AVS SNAPSHOT
[facility], Emergency Department    [address]    [facility] [address]    Phone:  [phone]    Fax:  [phone]                                       Carlos Alberto Garcia   MRN: [id number]    Department:  [facility], Emergency Department   Date of Visit:  1/26/2018           After Visit Summary Signature Page     I have received my discharge instructions, and my questions have been answered. I have discussed any challenges I see with this plan with the nurse or doctor.    ..........................................................................................................................................  Patient/Patient Representative Signature      ..........................................................................................................................................  Patient Representative Print Name and Relationship to Patient    ..................................................               ................................................  Date                                            Time    ..........................................................................................................................................  Reviewed by Signature/Title    ...................................................              ..............................................  Date                                                            Time

## 2018-01-26 NOTE — DISCHARGE INSTRUCTIONS
*CHEST PAIN, NONCARDIAC    Based on your visit today, the exact cause of your chest pain is not certain. Your condition does not seem serious and your pain does not appear to be coming from your heart. However, sometimes the signs of a serious problem take more time to appear. Therefore, please watch for the warning signs listed below.  HOME CARE:  1. Rest today and avoid strenuous activity.  2. Take any prescribed medicine as directed.  FOLLOW UP with your doctor in 1-3 days.   GET PROMPT MEDICAL ATTENTION if any of the following occur:    A change in the type of pain: if it feels different, becomes more severe, lasts longer, or begins to spread into your shoulder, arm, neck, jaw or back    Shortness of breath or increased pain with breathing    Cough with blood or dark colored sputum (phlegm)    Weakness, dizziness, or fainting    Fever over 101  F (38.3  C)    Swelling, pain or redness in one leg    3166-1097 [employer]. [address] [place] [address]. All rights reserved. This information is not intended as a substitute for professional medical care. Always follow your healthcare professional's instructions.  This information has been modified by your health care provider with permission from the publisher.       *CHEST PAIN, UNCERTAIN CAUSE    Based on your exam today, the exact cause of your chest pain is not certain. Your condition does not seem serious at this time, and your pain does not appear to be coming from your heart. However, sometimes the signs of a serious problem take more time to appear. Therefore, watch for the warning signs listed below.  HOME CARE:  3. Rest today and avoid strenuous activity.  4. Take any prescribed medicine as directed.  FOLLOW UP with your doctor in 1-3 days.   GET PROMPT MEDICAL ATTENTION if any of the following occur:    A change in the type of pain: if it feels different, becomes more severe, lasts longer, or begins to spread into your  shoulder, arm, neck, jaw or back    Shortness of breath or increased pain with breathing    Weakness, dizziness, or fainting    Cough with blood or dark colored sputum (phlegm)    Fever over 101  F (38.3  C)    Swelling, pain or redness in one leg    5559-0004 [employer]. [address] [place] [address]. All rights reserved. This information is not intended as a substitute for professional medical care. Always follow your healthcare professional's instructions.  This information has been modified by your health care provider with permission from the publisher.

## 2018-01-26 NOTE — ED AVS SNAPSHOT
[facility], Emergency Department    [address]    [place] [address]    Phone:  [phone]    Fax:  [phone]                                       Carlos Alberto Garcia   MRN: [id number]    Department:  [facility], Emergency Department   Date of Visit:  1/26/2018           Patient Information     Date Of Birth          1985        Your diagnoses for this visit were:     Atypical chest pain        You were seen by Cesar Ramirez MD.        Discharge Instructions         *CHEST PAIN, NONCARDIAC    Based on your visit today, the exact cause of your chest pain is not certain. Your condition does not seem serious and your pain does not appear to be coming from your heart. However, sometimes the signs of a serious problem take more time to appear. Therefore, please watch for the warning signs listed below.  HOME CARE:  1. Rest today and avoid strenuous activity.  2. Take any prescribed medicine as directed.  FOLLOW UP with your doctor in 1-3 days.   GET PROMPT MEDICAL ATTENTION if any of the following occur:    A change in the type of pain: if it feels different, becomes more severe, lasts longer, or begins to spread into your shoulder, arm, neck, jaw or back    Shortness of breath or increased pain with breathing    Cough with blood or dark colored sputum (phlegm)    Weakness, dizziness, or fainting    Fever over 101  F (38.3  C)    Swelling, pain or redness in one leg    6826-8135 [employer]. [address] [place] [address]. All rights reserved. This information is not intended as a substitute for professional medical care. Always follow your healthcare professional's instructions.  This information has been modified by your health care provider with permission from the publisher.       *CHEST PAIN, UNCERTAIN CAUSE    Based on your exam today, the exact cause of your chest pain is not certain. Your condition does not seem serious at this time, and your pain does not  appear to be coming from your heart. However, sometimes the signs of a serious problem take more time to appear. Therefore, watch for the warning signs listed below.  HOME CARE:  3. Rest today and avoid strenuous activity.  4. Take any prescribed medicine as directed.  FOLLOW UP with your doctor in 1-3 days.   GET PROMPT MEDICAL ATTENTION if any of the following occur:    A change in the type of pain: if it feels different, becomes more severe, lasts longer, or begins to spread into your shoulder, arm, neck, jaw or back    Shortness of breath or increased pain with breathing    Weakness, dizziness, or fainting    Cough with blood or dark colored sputum (phlegm)    Fever over 101  F (38.3  C)    Swelling, pain or redness in one leg    6961-9174 [employer]. [address] [place] [address]. All rights reserved. This information is not intended as a substitute for professional medical care. Always follow your healthcare professional's instructions.  This information has been modified by your health care provider with permission from the publisher.      24 Hour Appointment Hotline       To make an appointment at any [facility], call [phone] ([phone]). If you don't have a family doctor or clinic, we will help you find one. [facility] clinics are conveniently located to serve the needs of you and your family.             Review of your medicines      Notice     You have not been prescribed any medications.            Procedures and tests performed during your visit     EKG 12 lead    Troponin POCT    XR Chest 2 Views      Orders Needing Specimen Collection     None      Pending Results     No orders found from 1/24/2018 to 1/27/2018.            Pending Culture Results     No orders found from 1/24/2018 to 1/27/2018.            Pending Results Instructions     If you had any lab results that were not finalized at the time of your Discharge, you can call the ED Lab Result RN at  "[phone]. You will be contacted by this team for any positive Lab results or changes in treatment. The nurses are available 7 days a week from 10A to 6:30P.  You can leave a message 24 hours per day and they will return your call.        Thank you for choosing [facility]       Thank you for choosing [facility] for your care. Our goal is always to provide you with excellent care. Hearing back from our patients is one way we can continue to improve our services. Please take a few minutes to complete the written survey that you may receive in the mail after you visit with us. Thank you!        [employer] Information     [employer] lets you send messages to your doctor, view your test results, renew your prescriptions, schedule appointments and more. To sign up, go to [email or web address] . Click on \"Log in\" on the left side of the screen, which will take you to the Welcome page. Then click on \"Sign up Now\" on the right side of the page.     You will be asked to enter the access code listed below, as well as some personal information. Please follow the directions to create your username and password.     Your access code is: [email or web address]  Expires: 3/27/2018  6:15 AM     Your access code will  in 90 days. If you need help or a new code, please call your [facility] clinic or [phone].        Care EveryWhere ID     This is your Care EveryWhere ID. This could be used by other organizations to access your [facility] medical records  [id number]        Equal Access to Services     PINEDA MARISCAL : Hadii los barroso Soisabel, waaxda luqadaha, qaybta kaalmada mahsa, fausto johnson . So [facility] [phone].    ATENCIÓN: Si habla español, tiene a weiner disposición servicios gratuitos de asistencia lingüística. Llame al [phone].    We comply with applicable federal civil rights laws and [place] laws. We do not discriminate on the basis of race, color, national origin, age, disability, sex, " sexual orientation, or gender identity.            After Visit Summary       This is your record. Keep this with you and show to your community pharmacist(s) and doctor(s) at your next visit.

## 2018-01-26 NOTE — ED PROVIDER NOTES
History     Chief Complaint   Patient presents with     Chest Pain     Patient states pain began yesterday after smoking a cigarette     HPI  Carlos Alberto Garcia is a 32 year old male who is here with chest pain.  Patient states that he was smoking a cigarette when he started having chest pain.  Patient denies any shortness of breath.  No fevers or chills or nausea or vomiting.  Patient does have history of chemical abuse and he says that he does use methamphetamines.  Patient denies any history of cardiac disease.  Patient denies any abdominal pain.  No diaphoresis and no palpitations.    I have reviewed the Medications, Allergies, Past Medical and Surgical History, and Social History in the Epic system.    Review of Systems   Respiratory: Positive for chest tightness.    Cardiovascular: Positive for chest pain.   All other systems reviewed and are negative.      Physical Exam   BP: 129/75  Heart Rate: 66  Temp: 97.3  F (36.3  C)  Resp: 18  Weight: 113.4 kg (250 lb)  SpO2: 100 %      Physical Exam    ED Course     ED Course     Procedures             EKG Interpretation:      Interpreted by James Jim  Time reviewed: 650  Symptoms at time of EKG: cp  Rhythm: normal sinus   Rate: normal  Axis: normal  Ectopy: none  Conduction: normal  ST Segments/ T Waves: No ST-T wave changes  Q Waves: none  Comparison to prior: No old EKG available    Clinical Impression: normal EKG           XR Chest 2 Views (Final result) Result time: 01/26/18 08:08:42     Final result by Shorty Rogers MD (01/26/18 08:08:42)     Impression:     IMPRESSION: No acute abnormality.    SHORTY ROGERS MD     Narrative:     CHEST TWO VIEWS  1/26/2018 8:04 AM     HISTORY: Chest pain.     COMPARISON: 12/30/2017.    FINDINGS: The heart size is normal. There is a curvilinear probable  scar in the right upper lobe laterally. The lungs are otherwise clear.  No pneumothorax or pleural effusion.                  Labs Ordered and Resulted from Time of  ED Arrival Up to the Time of Departure from the ED   TROPONIN POCT            Assessments & Plan (with Medical Decision Making)   This is a 32-year-old male who is here with chest pain.  Patient states that this had occurred while he was smoking a cigarette.  No prior history of cardiac disease.  Physical exam is unremarkable with no evidence of any acute changes except for some mild wheezing throughout.  Given his history and current findings, and concern for possible ACS versus pneumothorax.  EKG was ordered and but also is unremarkable when compared with the previous one.  Chest x-ray is pending at this time.  Initial troponin at point of care was also essentially negative.  Unclear etiology of the chest pain.  Chest x-ray is also normal.  Patient to be discharged home with atypical chest pain instructions.  Patient to return if any worsening symptoms.      I have reviewed the nursing notes.    I have reviewed the findings, diagnosis, plan and need for follow up with the patient.    There are no discharge medications for this patient.      Final diagnoses:   Atypical chest pain       1/26/2018   [facility], [place], EMERGENCY DEPARTMENT     Cesar Ramirez MD  02/07/18 2013

## 2018-01-28 ENCOUNTER — HOSPITAL ENCOUNTER (EMERGENCY)
Facility: CLINIC | Age: 33
Discharge: HOME | End: 2018-01-28
Attending: EMERGENCY MEDICINE | Admitting: EMERGENCY MEDICINE
Payer: COMMERCIAL

## 2018-01-28 VITALS
TEMPERATURE: 97.4 F | RESPIRATION RATE: 14 BRPM | DIASTOLIC BLOOD PRESSURE: 58 MMHG | HEIGHT: 72 IN | HEART RATE: 65 BPM | BODY MASS INDEX: 32.51 KG/M2 | WEIGHT: 240 LBS | SYSTOLIC BLOOD PRESSURE: 93 MMHG | OXYGEN SATURATION: 93 %

## 2018-01-28 DIAGNOSIS — K92.2 GASTROINTESTINAL HEMORRHAGE, UNSPECIFIED GASTROINTESTINAL HEMORRHAGE TYPE: ICD-10-CM

## 2018-01-28 LAB
CA-I BLD-SCNC: 4.8 MG/DL (ref 4.4–5.2)
CO2 BLDCOV-SCNC: 25 MMOL/L (ref 21–28)
GLUCOSE BLD-MCNC: 100 MG/DL (ref 70–99)
HCT VFR BLD CALC: 37 %PCV (ref 40–53)
HGB BLD CALC-MCNC: 12.6 G/DL (ref 13.3–17.7)
PCO2 BLDV: 40 MM HG (ref 40–50)
PH BLDV: 7.4 PH (ref 7.32–7.43)
PO2 BLDV: 68 MM HG (ref 25–47)
POTASSIUM BLD-SCNC: 3.8 MMOL/L (ref 3.4–5.3)
SAO2 % BLDV FROM PO2: 93 %
SODIUM BLD-SCNC: 141 MMOL/L (ref 133–144)

## 2018-01-28 PROCEDURE — 40000502 ZZHCL STATISTIC GLUCOSE ED POCT

## 2018-01-28 PROCEDURE — 99283 EMERGENCY DEPT VISIT LOW MDM: CPT | Performed by: EMERGENCY MEDICINE

## 2018-01-28 PROCEDURE — 99284 EMERGENCY DEPT VISIT MOD MDM: CPT | Mod: Z6 | Performed by: EMERGENCY MEDICINE

## 2018-01-28 PROCEDURE — 40000497 ZZHCL STATISTIC SODIUM ED POCT

## 2018-01-28 PROCEDURE — 82330 ASSAY OF CALCIUM: CPT

## 2018-01-28 PROCEDURE — 82803 BLOOD GASES ANY COMBINATION: CPT

## 2018-01-28 PROCEDURE — 40000498 ZZHCL STATISTIC POTASSIUM ED POCT

## 2018-01-28 PROCEDURE — 36415 COLL VENOUS BLD VENIPUNCTURE: CPT | Performed by: EMERGENCY MEDICINE

## 2018-01-28 PROCEDURE — 40000501 ZZHCL STATISTIC HEMATOCRIT ED POCT

## 2018-01-28 NOTE — ED AVS SNAPSHOT
[facility], Emergency Department    [address] [facility] [address]    Phone:  [phone]                                       Carlos Alberto Garcia   MRN: [id number]    Department:  [facility], Emergency Department   Date of Visit:  1/28/2018           Patient Information     Date Of Birth          1985        Your diagnoses for this visit were:     Gastrointestinal hemorrhage, unspecified gastrointestinal hemorrhage type        You were seen by Merrill Peña MD.        Discharge Instructions       Please make an appointment to [facility] with [facility] (phone: [phone] as soon as possible if continued bleeding.        24 Hour Appointment Hotline       To make an appointment at any [facility] clinic, call [phone] ([phone]). If you don't have a family doctor or clinic, we will help you find one. [facility] clinics are conveniently located to serve the needs of you and your family.             Review of your medicines      Notice     You have not been prescribed any medications.            Procedures and tests performed during your visit     ISTAT CG8+ POCT (VBG, Glucose, Hct, Hb)    ISTAT gases elec ica gluc lolly POCT      Orders Needing Specimen Collection     None      Pending Results     No orders found from 1/26/2018 to 1/29/2018.            Pending Culture Results     No orders found from 1/26/2018 to 1/29/2018.            Pending Results Instructions     If you had any lab results that were not finalized at the time of your Discharge, you can call the ED Lab Result RN at [phone]. You will be contacted by this team for any positive Lab results or changes in treatment. The nurses are available 7 days a week from 10A to 6:30P.  You can leave a message 24 hours per day and they will return your call.        Thank you for choosing [facility]       Thank you for choosing [facility] for your care. Our goal is always to provide you with excellent care. Hearing back  "from our patients is one way we can continue to improve our services. Please take a few minutes to complete the written survey that you may receive in the mail after you visit with us. Thank you!        [facility] Information     [employer] lets you send messages to your doctor, view your test results, renew your prescriptions, schedule appointments and more. To sign up, go to [email or web address] . Click on \"Log in\" on the left side of the screen, which will take you to the Welcome page. Then click on \"Sign up Now\" on the right side of the page.     You will be asked to enter the access code listed below, as well as some personal information. Please follow the directions to create your username and password.     Your access code is: [email or web address]  Expires: 3/27/2018  6:15 AM     Your access code will  in 90 days. If you need help or a new code, please call your [facility] clinic or [phone].        Care EveryWhere ID     This is your Care EveryWhere ID. This could be used by other organizations to access your [facility] medical records  [id number]        Equal Access to Services     PINEDA MARISCAL : Hadluc Stark, roque sesay, loreta bragg, fausto johnson . So [facility] [phone].    ATENCIÓN: Si habla español, tiene a weiner disposición servicios gratuitos de asistencia lingüística. Evert al [phone].    We comply with applicable federal civil rights laws and [place] laws. We do not discriminate on the basis of race, color, national origin, age, disability, sex, sexual orientation, or gender identity.            After Visit Summary       This is your record. Keep this with you and show to your community pharmacist(s) and doctor(s) at your next visit.                  "

## 2018-01-28 NOTE — ED NOTES
Pt reports rectal bleeding for the past 4 months. Reports that it is dark red blood. Last BM yesterday. Denies any abdominal pain or vomiting or diarrhea.

## 2018-01-28 NOTE — DISCHARGE INSTRUCTIONS
Please make an appointment to follow [facility] with [facility] (phone: [phone] as soon as possible if continued bleeding.

## 2018-01-28 NOTE — ED AVS SNAPSHOT
[facility], [place], Emergency Department    [address] [place] [address]    Phone:  [phone]                                       Carlos Alberto Garcia   MRN: [id number]    Department:  [facility], Emergency Department   Date of Visit:  1/28/2018           After Visit Summary Signature Page     I have received my discharge instructions, and my questions have been answered. I have discussed any challenges I see with this plan with the nurse or doctor.    ..........................................................................................................................................  Patient/Patient Representative Signature      ..........................................................................................................................................  Patient Representative Print Name and Relationship to Patient    ..................................................               ................................................  Date                                            Time    ..........................................................................................................................................  Reviewed by Signature/Title    ...................................................              ..............................................  Date                                                            Time

## 2018-01-31 ENCOUNTER — APPOINTMENT (OUTPATIENT)
Dept: GENERAL RADIOLOGY | Facility: CLINIC | Age: 33
End: 2018-01-31
Attending: EMERGENCY MEDICINE
Payer: COMMERCIAL

## 2018-01-31 ENCOUNTER — HOSPITAL ENCOUNTER (EMERGENCY)
Facility: CLINIC | Age: 33
Discharge: HOME | End: 2018-01-31
Attending: EMERGENCY MEDICINE | Admitting: EMERGENCY MEDICINE
Payer: COMMERCIAL

## 2018-01-31 VITALS
OXYGEN SATURATION: 99 % | SYSTOLIC BLOOD PRESSURE: 129 MMHG | HEIGHT: 72 IN | BODY MASS INDEX: 32.51 KG/M2 | WEIGHT: 240 LBS | DIASTOLIC BLOOD PRESSURE: 89 MMHG | TEMPERATURE: 97.9 F | RESPIRATION RATE: 16 BRPM | HEART RATE: 78 BPM

## 2018-01-31 DIAGNOSIS — S93.401A SPRAIN OF RIGHT ANKLE, UNSPECIFIED LIGAMENT, INITIAL ENCOUNTER: ICD-10-CM

## 2018-01-31 PROCEDURE — 99283 EMERGENCY DEPT VISIT LOW MDM: CPT

## 2018-01-31 PROCEDURE — 73610 X-RAY EXAM OF ANKLE: CPT | Mod: RT

## 2018-01-31 PROCEDURE — 99283 EMERGENCY DEPT VISIT LOW MDM: CPT | Mod: Z6 | Performed by: EMERGENCY MEDICINE

## 2018-01-31 ASSESSMENT — ENCOUNTER SYMPTOMS
SORE THROAT: 0
HEADACHES: 0
CHEST TIGHTNESS: 0
LIGHT-HEADEDNESS: 0
SHORTNESS OF BREATH: 0
COUGH: 0
VOMITING: 0
DIZZINESS: 0
NECK PAIN: 0
BRUISES/BLEEDS EASILY: 0
WEAKNESS: 0
ABDOMINAL PAIN: 0
ARTHRALGIAS: 1
WOUND: 0
BACK PAIN: 0
NUMBNESS: 0
FATIGUE: 1
MYALGIAS: 0
NAUSEA: 0
CHILLS: 0
FEVER: 0
PALPITATIONS: 0
RHINORRHEA: 0

## 2018-01-31 NOTE — DISCHARGE INSTRUCTIONS
Treating Ankle Sprains  Treatment will depend on how bad your sprain is. For a severe sprain, healing may take 3 months or more.  Right after your injury: Use R.I.C.E.    Rest: At first, keep weight off the ankle as much as you can. You may be given crutches to help you walk without putting weight on the ankle.    Ice: Put an ice pack on the ankle for 15 minutes. Remove the pack and wait at least 30 minutes. Repeat for up to 3 days. This helps reduce swelling.    Compression: To reduce swelling and keep the joint stable, you may need to wrap the ankle with an elastic bandage. For more severe sprains, you may need an ankle brace or a cast.    Elevation: To reduce swelling, keep your ankle raised above your heart when you sit or lie down.  Medicine  Your healthcare provider may suggest oral non-steroidal anti-inflammatory medicine (NSAIDs), such as ibuprofen. This relieves the pain and helps reduce any swelling. Be sure to take your medicine as directed.  Contrast baths  After 3 days, soak your ankle in warm water for 30 seconds, then in cool water for 30 seconds. Go back and forth for 5 minutes. Doing this every 2 hours will help keep the swelling down.  Exercises    After about 2 to 3 weeks, you may be given exercises to strengthen the ligaments and muscles in the ankle. Doing these exercises will help prevent another ankle sprain. Exercises may include standing on your toes and then on your heels and doing ankle curls.  Ankle curls    Sit on the edge of a sturdy table or lie on your back.    Pull your toes toward you. Then point them away from you. Repeat for 2 to 3 minutes.   Date Last Reviewed: 9/28/2015 2000-2017 [employer]. [address] [place] [address]. All rights reserved. This information is not intended as a substitute for professional medical care. Always follow your healthcare professional's instructions.      Use air splint. Ice packs several times daily. Ibuprofen or  Tylenol if needed for pain.  Rest. Elevate leg.  Clinic recheck one week.

## 2018-01-31 NOTE — ED PROVIDER NOTES
History     Chief Complaint   Patient presents with     Foot Pain     right     HPI  Carlos Alberto Garcia is a 32 year old male with history of schizoaffective disorder, substance abuse, homelessness and malingering who presents with right ankle pain after he reports twisting his ankle tonight. No other injuries. No neurovascular symptoms. Painful to stand or walk.     I have reviewed the Medications, Allergies, Past Medical and Surgical History, and Social History in the [employer] system.  Past Medical History:   Diagnosis Date     Bipolar 2 disorder (H)      Chemical abuse     cocaine, meth, cambis     Dyslipidemia      Hep C w/ coma, chronic (H)      Patient overweight      Schizoaffective disorder      Unspecified essential hypertension      Unspecified hypothyroidism        Review of Systems   Constitutional: Positive for fatigue. Negative for chills and fever.   HENT: Negative for congestion, rhinorrhea and sore throat.    Respiratory: Negative for cough, chest tightness and shortness of breath.    Cardiovascular: Negative for chest pain and palpitations.   Gastrointestinal: Negative for abdominal pain, nausea and vomiting.   Musculoskeletal: Positive for arthralgias and gait problem. Negative for back pain, myalgias and neck pain.   Skin: Negative for rash and wound.   Allergic/Immunologic: Negative for immunocompromised state.   Neurological: Negative for dizziness, syncope, weakness, light-headedness, numbness and headaches.   Hematological: Does not bruise/bleed easily.       Physical Exam   BP: 129/89  Pulse: 78  Temp: 97.9  F (36.6  C)  Resp: 16  Height: 182.9 cm (6')  Weight: 108.9 kg (240 lb)  SpO2: 99 %      Physical Exam   Constitutional: He appears well-developed and well-nourished. No distress.   HENT:   Head: Normocephalic and atraumatic.   Mouth/Throat: Oropharynx is clear and moist.   Eyes: Conjunctivae and EOM are normal.   Neck: Normal range of motion. Neck supple.   Cardiovascular: Normal rate,  regular rhythm, normal heart sounds and intact distal pulses.    Pulmonary/Chest: Effort normal and breath sounds normal. No respiratory distress.   Abdominal: Soft. There is no tenderness.   Musculoskeletal:        Right hip: Normal.        Right knee: Normal.        Right ankle: He exhibits swelling. He exhibits normal range of motion, no deformity and normal pulse. Tenderness. Lateral malleolus tenderness found. No head of 5th metatarsal and no proximal fibula tenderness found. Achilles tendon normal.        Right foot: Normal.   Neurological: He is alert. He has normal strength and normal reflexes. No sensory deficit.   Skin: Skin is warm and dry. No rash noted.   Psychiatric: His affect is angry. He is not combative. Thought content is not paranoid. He expresses inappropriate judgment. He expresses no homicidal and no suicidal ideation. He is noncommunicative.   Nursing note and vitals reviewed.      ED Course     ED Course     Procedures             Critical Care time:  none             Labs Ordered and Resulted from Time of ED Arrival Up to the Time of Departure from the ED - No data to display         Assessments & Plan (with Medical Decision Making)   Ankle sprain. Able to ambulate. Will treat with air splint, elevation, ice packs, ibuprofen, acetaminophen, rest. Clinic recheck one week.    I have reviewed the nursing notes.    I have reviewed the findings, diagnosis, plan and need for follow up with the patient.    New Prescriptions    No medications on file       Final diagnoses:   Sprain of right ankle, unspecified ligament, initial encounter       1/31/2018   [facility], [place], EMERGENCY DEPARTMENT     Dashawn Bonilla MD  01/31/18 [id number]

## 2018-01-31 NOTE — ED AVS SNAPSHOT
[facility], [place], Emergency Department    [address] [place] [address]    Phone:  [phone]                                       Carlos Alberto Garcia   MRN: [id number]    Department:  [facility], Emergency Department   Date of Visit:  1/31/2018           After Visit Summary Signature Page     I have received my discharge instructions, and my questions have been answered. I have discussed any challenges I see with this plan with the nurse or doctor.    ..........................................................................................................................................  Patient/Patient Representative Signature      ..........................................................................................................................................  Patient Representative Print Name and Relationship to Patient    ..................................................               ................................................  Date                                            Time    ..........................................................................................................................................  Reviewed by Signature/Title    ...................................................              ..............................................  Date                                                            Time

## 2018-01-31 NOTE — ED AVS SNAPSHOT
[facility], Emergency Department    [address] [address]    Phone:  [phone]                                       Carlos Alberto Garcia   MRN: [id number]    Department:  [facility], Emergency Department   Date of Visit:  1/31/2018           Patient Information     Date Of Birth          1985        Your diagnoses for this visit were:     Sprain of right ankle, unspecified ligament, initial encounter        You were seen by Dashawn Bonilla MD.        Discharge Instructions         Treating Ankle Sprains  Treatment will depend on how bad your sprain is. For a severe sprain, healing may take 3 months or more.  Right after your injury: Use R.I.C.E.    Rest: At first, keep weight off the ankle as much as you can. You may be given crutches to help you walk without putting weight on the ankle.    Ice: Put an ice pack on the ankle for 15 minutes. Remove the pack and wait at least 30 minutes. Repeat for up to 3 days. This helps reduce swelling.    Compression: To reduce swelling and keep the joint stable, you may need to wrap the ankle with an elastic bandage. For more severe sprains, you may need an ankle brace or a cast.    Elevation: To reduce swelling, keep your ankle raised above your heart when you sit or lie down.  Medicine  Your healthcare provider may suggest oral non-steroidal anti-inflammatory medicine (NSAIDs), such as ibuprofen. This relieves the pain and helps reduce any swelling. Be sure to take your medicine as directed.  Contrast baths  After 3 days, soak your ankle in warm water for 30 seconds, then in cool water for 30 seconds. Go back and forth for 5 minutes. Doing this every 2 hours will help keep the swelling down.  Exercises    After about 2 to 3 weeks, you may be given exercises to strengthen the ligaments and muscles in the ankle. Doing these exercises will help prevent another ankle sprain. Exercises may include standing on your toes and then on your heels and doing  ankle curls.  Ankle curls    Sit on the edge of a sturdy table or lie on your back.    Pull your toes toward you. Then point them away from you. Repeat for 2 to 3 minutes.   Date Last Reviewed: 9/28/2015 2000-2017 [employer]. [address] [place] [address]. All rights reserved. This information is not intended as a substitute for professional medical care. Always follow your healthcare professional's instructions.      Use air splint. Ice packs several times daily. Ibuprofen or Tylenol if needed for pain.  Rest. Elevate leg.  Clinic recheck one week.    24 Hour Appointment Hotline       To make an appointment at any [facility], call [phone] ([phone]). If you don't have a family doctor or clinic, we will help you find one. [facility] clinics are conveniently located to serve the needs of you and your family.          ED Discharge Orders     Air Stirrup adult                    Review of your medicines      Notice     You have not been prescribed any medications.            Procedures and tests performed during your visit     Ankle XR, G/E 3 views, right      Orders Needing Specimen Collection     None      Pending Results     Date and Time Order Name Status Description    1/31/2018 0247 Ankle XR, G/E 3 views, right Preliminary             Pending Culture Results     No orders found from 1/29/2018 to 2/1/2018.            Pending Results Instructions     If you had any lab results that were not finalized at the time of your Discharge, you can call the ED Lab Result RN at [phone]. You will be contacted by this team for any positive Lab results or changes in treatment. The nurses are available 7 days a week from 10A to 6:30P.  You can leave a message 24 hours per day and they will return your call.        Thank you for choosing [facility]       Thank you for choosing [facility] for your care. Our goal is always to provide you with excellent care. Hearing back from our  "patients is one way we can continue to improve our services. Please take a few minutes to complete the written survey that you may receive in the mail after you visit with us. Thank you!        [employer] Information     [employer] lets you send messages to your doctor, view your test results, renew your prescriptions, schedule appointments and more. To sign up, go to [email or web address] . Click on \"Log in\" on the left side of the screen, which will take you to the Welcome page. Then click on \"Sign up Now\" on the right side of the page.     You will be asked to enter the access code listed below, as well as some personal information. Please follow the directions to create your username and password.     Your access code is: [email or web address]  Expires: 3/27/2018  6:15 AM     Your access code will  in 90 days. If you need help or a new code, please call your [facility] clinic or [phone].        Care EveryWhere ID     This is your Care EveryWhere ID. This could be used by other organizations to access your [facility] medical records  [id number]        Equal Access to Services     PINEDA MARISCAL : Hadluc Stark, roque sesay, loreta bragg, fausto johnson . So [facility] [phone].    ATENCIÓN: Si habla español, tiene a weiner disposición servicios gratuitos de asistencia lingüística. Evert al [phone].    We comply with applicable federal civil rights laws and [place] laws. We do not discriminate on the basis of race, color, national origin, age, disability, sex, sexual orientation, or gender identity.            After Visit Summary       This is your record. Keep this with you and show to your community pharmacist(s) and doctor(s) at your next visit.                  "

## 2018-01-31 NOTE — ED NOTES
Pt states he stepped on his right foot wrong tonight and rolled his ankle. He has swelling to his right lateral ankle.

## 2018-02-09 NOTE — ED PROVIDER NOTES
History     Chief Complaint   Patient presents with     Rectal Bleeding     HPI  Carlos Alberto Garcia is a 32 year old male with a history of schizoaffective disorder and malingering who presents with rectal bleeding.  He states that he has noted some blood on his tissue paper intermittently for several months.  He denies any hematemesis.  He has no abdominal pain.  He is not taking any anticoagulation.  He denies any melena.  He denies any significant ibuprofen or aspirin use.  He is a regular smoker, but does not use alcohol.     PAST MEDICAL HISTORY:   Past Medical History:   Diagnosis Date     Bipolar 2 disorder (H)      Chemical abuse     cocaine, meth, cambis     Dyslipidemia      Hep C w/ coma, chronic (H)      Patient overweight      Schizoaffective disorder      Unspecified essential hypertension      Unspecified hypothyroidism        PAST SURGICAL HISTORY:   Past Surgical History:   Procedure Laterality Date     HAND SURGERY      L       FAMILY HISTORY: No family history on file.    SOCIAL HISTORY:   Social History   Substance Use Topics     Smoking status: Current Every Day Smoker     Packs/day: 1.00     Types: Cigarettes     Smokeless tobacco: Current User     Alcohol use No       There are no discharge medications for this patient.       No Known Allergies      I have reviewed the Medications, Allergies, Past Medical and Surgical History, and Social History in the Epic system.    Review of Systems   All other systems reviewed and are negative.      Physical Exam   BP: 114/68  Pulse: 65  Heart Rate: 65  Temp: 97.4  F (36.3  C)  Resp: 14  Height: 182.9 cm (6')  Weight: 108.9 kg (240 lb)  SpO2: 99 %      Physical Exam   Constitutional: He is oriented to person, place, and time. No distress.   HENT:   Head: Normocephalic and atraumatic.   Right Ear: External ear normal.   Left Ear: External ear normal.   Mouth/Throat: Oropharynx is clear and moist. No oropharyngeal exudate.   Eyes: Conjunctivae are normal.  Pupils are equal, round, and reactive to light.   Neck: Normal range of motion. Neck supple.   Cardiovascular: Normal rate and intact distal pulses.    Pulmonary/Chest: Effort normal. No respiratory distress. He has no wheezes. He has no rales. He exhibits no tenderness.   Abdominal: Soft. There is no tenderness. There is no rebound and no guarding.   Musculoskeletal: Normal range of motion. He exhibits no edema or tenderness.   Neurological: He is alert and oriented to person, place, and time. He exhibits normal muscle tone.   Skin: Skin is warm and dry. No rash noted. He is not diaphoretic.   Psychiatric: He has a normal mood and affect. His behavior is normal. Thought content normal.   Nursing note and vitals reviewed.      ED Course     ED Course     Procedures             Critical Care time:  none             Labs Ordered and Resulted from Time of ED Arrival Up to the Time of Departure from the ED   ISTAT GASES ELEC ICA GLUC CORA POCT - Abnormal; Notable for the following:        Result Value    PO2 Venous 68 (*)     Glucose 100 (*)     Hemoglobin 12.6 (*)     Hematocrit - POCT 37 (*)     All other components within normal limits   ISTAT GAS OR ELECTROLYTE NURSING POCT            Assessments & Plan (with Medical Decision Making)   1.  Rectal bleeding    32-year-old male who presents with rectal bleeding.  He is hemodynamically stable and guaiac negative.  Hemoglobin is 12.6.  He had no bleeding while in the emergency department, and clinically his bleeding seems consistent with hemorrhoids.  I recommended he follow-up with his primary doctor for any continued bleeding.      I have reviewed the nursing notes.    I have reviewed the findings, diagnosis, plan and need for follow up with the patient.    There are no discharge medications for this patient.      Final diagnoses:   Gastrointestinal hemorrhage, unspecified gastrointestinal hemorrhage type       1/28/2018   [facility], [place], EMERGENCY DEPARTMENT      Merrill Peña MD  02/09/18 0703

## 2018-03-13 ENCOUNTER — TRANSFERRED RECORDS (OUTPATIENT)
Dept: HEALTH INFORMATION MANAGEMENT | Facility: CLINIC | Age: 33
End: 2018-03-13

## 2018-03-16 ENCOUNTER — MEDICAL CORRESPONDENCE (OUTPATIENT)
Dept: HEALTH INFORMATION MANAGEMENT | Facility: CLINIC | Age: 33
End: 2018-03-16

## 2018-03-23 ENCOUNTER — HOSPITAL ENCOUNTER (EMERGENCY)
Facility: CLINIC | Age: 33
Discharge: LEFT BEFORE DISCHARGE | End: 2018-03-23
Attending: EMERGENCY MEDICINE | Admitting: EMERGENCY MEDICINE
Payer: COMMERCIAL

## 2018-03-23 VITALS
BODY MASS INDEX: 32.56 KG/M2 | OXYGEN SATURATION: 99 % | SYSTOLIC BLOOD PRESSURE: 121 MMHG | HEART RATE: 85 BPM | DIASTOLIC BLOOD PRESSURE: 75 MMHG | WEIGHT: 240.08 LBS | TEMPERATURE: 97.9 F

## 2018-03-23 DIAGNOSIS — F69 ADULT BEHAVIOR PROBLEM: ICD-10-CM

## 2018-03-23 DIAGNOSIS — R51.9 FACIAL PAIN: ICD-10-CM

## 2018-03-23 LAB
AMPHETAMINES UR QL SCN: POSITIVE
BARBITURATES UR QL: NEGATIVE
BENZODIAZ UR QL: NEGATIVE
CANNABINOIDS UR QL SCN: NEGATIVE
COCAINE UR QL: NEGATIVE
ETHANOL UR QL SCN: NEGATIVE
OPIATES UR QL SCN: NEGATIVE

## 2018-03-23 PROCEDURE — 99283 EMERGENCY DEPT VISIT LOW MDM: CPT | Mod: Z6 | Performed by: EMERGENCY MEDICINE

## 2018-03-23 PROCEDURE — 80320 DRUG SCREEN QUANTALCOHOLS: CPT | Performed by: EMERGENCY MEDICINE

## 2018-03-23 PROCEDURE — 80307 DRUG TEST PRSMV CHEM ANLYZR: CPT | Performed by: EMERGENCY MEDICINE

## 2018-03-23 PROCEDURE — 99283 EMERGENCY DEPT VISIT LOW MDM: CPT

## 2018-03-23 NOTE — ED NOTES
Comes to ED with headache requesting medicaiton.   Hx of mental illness, chronic homelessness and meth abuse.  Last used meth yesterday

## 2018-03-23 NOTE — PROGRESS NOTES
SW consulted by ED MD re: restriction. Reviewed pt's chart and pt's restriction has been changed from [facility], per care plan, to [facility]. Attempted to meet w/ pt, but he left the ED.

## 2018-03-26 ENCOUNTER — HOSPITAL ENCOUNTER (EMERGENCY)
Facility: CLINIC | Age: 33
Discharge: LEFT BEFORE DISCHARGE | End: 2018-03-26
Payer: COMMERCIAL

## 2018-04-06 DIAGNOSIS — R94.5 ABNORMAL RESULTS OF LIVER FUNCTION STUDIES: Primary | ICD-10-CM

## 2018-07-09 ENCOUNTER — HOSPITAL ENCOUNTER (EMERGENCY)
Facility: CLINIC | Age: 33
Discharge: HOME | End: 2018-07-09
Attending: EMERGENCY MEDICINE | Admitting: EMERGENCY MEDICINE
Payer: COMMERCIAL

## 2018-07-09 VITALS
OXYGEN SATURATION: 97 % | TEMPERATURE: 97.8 F | RESPIRATION RATE: 16 BRPM | SYSTOLIC BLOOD PRESSURE: 144 MMHG | HEART RATE: 76 BPM | DIASTOLIC BLOOD PRESSURE: 76 MMHG

## 2018-07-09 DIAGNOSIS — F25.0 SCHIZOAFFECTIVE DISORDER, BIPOLAR TYPE (H): ICD-10-CM

## 2018-07-09 PROCEDURE — 25000132 ZZH RX MED GY IP 250 OP 250 PS 637: Performed by: EMERGENCY MEDICINE

## 2018-07-09 PROCEDURE — 99284 EMERGENCY DEPT VISIT MOD MDM: CPT | Performed by: EMERGENCY MEDICINE

## 2018-07-09 PROCEDURE — 99284 EMERGENCY DEPT VISIT MOD MDM: CPT | Mod: Z6 | Performed by: EMERGENCY MEDICINE

## 2018-07-09 PROCEDURE — 99285 EMERGENCY DEPT VISIT HI MDM: CPT | Mod: 25 | Performed by: EMERGENCY MEDICINE

## 2018-07-09 RX ORDER — OLANZAPINE 10 MG/1
10 TABLET, ORALLY DISINTEGRATING ORAL ONCE
Status: COMPLETED | OUTPATIENT
Start: 2018-07-09 | End: 2018-07-09

## 2018-07-09 RX ADMIN — OLANZAPINE 10 MG: 10 TABLET, ORALLY DISINTEGRATING ORAL at 04:26

## 2018-07-09 ASSESSMENT — ENCOUNTER SYMPTOMS
NAUSEA: 0
SLEEP DISTURBANCE: 1
WOUND: 0
ACTIVITY CHANGE: 0
SHORTNESS OF BREATH: 0
LIGHT-HEADEDNESS: 0
CHILLS: 0
RHINORRHEA: 0
NECK PAIN: 0
VOMITING: 0
CONFUSION: 0
FATIGUE: 1
SEIZURES: 0
COUGH: 0
HALLUCINATIONS: 0
BRUISES/BLEEDS EASILY: 0
FEVER: 0
APPETITE CHANGE: 0
DECREASED CONCENTRATION: 1
MYALGIAS: 0
HEADACHES: 0
ARTHRALGIAS: 0
PALPITATIONS: 0
ABDOMINAL PAIN: 0
WEAKNESS: 0
DIAPHORESIS: 0
DIZZINESS: 0
CHEST TIGHTNESS: 0
NERVOUS/ANXIOUS: 1
SORE THROAT: 0
AGITATION: 0

## 2018-07-09 NOTE — ED AVS SNAPSHOT
[facility], Emergency Department    [address]    [place] [address]    Phone:  [phone]    Fax:  [phone]                                       Carlos Alberto Garcia   MRN: [id number]    Department:  [facility], Emergency Department   Date of Visit:  7/9/2018           Patient Information     Date Of Birth          1985        Your diagnoses for this visit were:     Schizoaffective disorder, bipolar type (H)        You were seen by Dashawn Bonilla MD.        Discharge Instructions       Follow up at your psychiatry clinic for your injections.  Return if persistent symptoms.    24 Hour Appointment Hotline       To make an appointment at any [facility] clinic, call [phone] ([phone]). If you don't have a family doctor or clinic, we will help you find one. [facility] clinics are conveniently located to serve the needs of you and your family.             Review of your medicines      Notice     You have not been prescribed any medications.            Orders Needing Specimen Collection     None      Pending Results     No orders found from 7/7/2018 to 7/10/2018.            Pending Culture Results     No orders found from 7/7/2018 to 7/10/2018.            Pending Results Instructions     If you had any lab results that were not finalized at the time of your Discharge, you can call the ED Lab Result RN at [phone]. You will be contacted by this team for any positive Lab results or changes in treatment. The nurses are available 7 days a week from 10A to 6:30P.  You can leave a message 24 hours per day and they will return your call.        Thank you for choosing [facility]       Thank you for choosing [facility] for your care. Our goal is always to provide you with excellent care. Hearing back from our patients is one way we can continue to improve our services. Please take a few minutes to complete the written survey that you may receive in the mail after you visit with us. Thank  "you!        [employer] Information     [employer] lets you send messages to your doctor, view your test results, renew your prescriptions, schedule appointments and more. To sign up, go to [email or web address] . Click on \"Log in\" on the left side of the screen, which will take you to the Welcome page. Then click on \"Sign up Now\" on the right side of the page.     You will be asked to enter the access code listed below, as well as some personal information. Please follow the directions to create your username and password.     Your access code is: [email or web address]  Expires: 10/7/2018  4:40 AM     Your access code will  in 90 days. If you need help or a new code, please call your [facility] clinic or [phone].        Care EveryWhere ID     This is your Care EveryWhere ID. This could be used by other organizations to access your [facility] medical records  [id number]        Equal Access to Services     PINEDA MARISCAL : Hadluc juarezo Soisabel, waaxda luraymon, qaybta kaalmabentley bragg, fausto johnson . So [facility] [phone].    ATENCIÓN: Si habla español, tiene a weiner disposición servicios gratuitos de asistencia lingüística. Llame al [phone].    We comply with applicable federal civil rights laws and [place] laws. We do not discriminate on the basis of race, color, national origin, age, disability, sex, sexual orientation, or gender identity.            After Visit Summary       This is your record. Keep this with you and show to your community pharmacist(s) and doctor(s) at your next visit.                  "

## 2018-07-09 NOTE — ED AVS SNAPSHOT
[facility], Emergency Department    [address]    [facility] [address]    Phone:  [phone]    Fax:  [phone]                                       Carlos Alberto Garcia   MRN: [id number]    Department:  [facility], Emergency Department   Date of Visit:  7/9/2018           After Visit Summary Signature Page     I have received my discharge instructions, and my questions have been answered. I have discussed any challenges I see with this plan with the nurse or doctor.    ..........................................................................................................................................  Patient/Patient Representative Signature      ..........................................................................................................................................  Patient Representative Print Name and Relationship to Patient    ..................................................               ................................................  Date                                            Time    ..........................................................................................................................................  Reviewed by Signature/Title    ...................................................              ..............................................  Date                                                            Time

## 2018-07-09 NOTE — ED NOTES
Bed: ED17  Expected date: 7/9/18  Expected time: 3:44 AM  Means of arrival: Ambulance  Comments:  H433  32M  Crisis eval, off meds

## 2018-07-09 NOTE — ED PROVIDER NOTES
History     Chief Complaint   Patient presents with     Medication Refill     Pt c/o missing his Invega dose last month.l Pt c/o feeling hazy in the head.     HPI  Carlos Alberto Garcia is a 32 year old male with history of schizoaffective disorder, polysubstance abuse, homelessness and malingering who presents requesting injection of Invega. States he missed last dose. He frequently comes to the ED with this request. He has been told that he needs to get this at his clinic and needs to be followed by psychiatry and primary care. This is discussed with him again today. Denies recent drug or alcohol use. Denies suicidal or homicidal ideation. Denies thoughts of harming himself or others. No hallucinations or delusions. No recent illness or injury.  I have reviewed the Medications, Allergies, Past Medical and Surgical History, and Social History in the [employer] system.  Past Medical History:   Diagnosis Date     Bipolar 2 disorder (H)      Chemical abuse     cocaine, meth, cambis     Dyslipidemia      Hep C w/ coma, chronic (H)      Patient overweight      Schizoaffective disorder      Unspecified essential hypertension      Unspecified hypothyroidism        Review of Systems   Constitutional: Positive for fatigue. Negative for activity change, appetite change, chills, diaphoresis and fever.   HENT: Negative for congestion, rhinorrhea and sore throat.    Eyes: Negative for visual disturbance.   Respiratory: Negative for cough, chest tightness and shortness of breath.    Cardiovascular: Negative for chest pain and palpitations.   Gastrointestinal: Negative for abdominal pain, nausea and vomiting.   Musculoskeletal: Negative for arthralgias, gait problem, myalgias and neck pain.   Skin: Negative for rash and wound.   Allergic/Immunologic: Negative for immunocompromised state.   Neurological: Negative for dizziness, seizures, syncope, weakness, light-headedness and headaches.   Hematological: Does not bruise/bleed easily.    Psychiatric/Behavioral: Positive for decreased concentration and sleep disturbance. Negative for agitation, confusion, hallucinations, self-injury and suicidal ideas. The patient is nervous/anxious.        Physical Exam   BP: 144/76  Pulse: 76  Temp: 97.8  F (36.6  C)  Resp: 16  SpO2: 97 %      Physical Exam   Constitutional: He appears well-developed and well-nourished. No distress.   HENT:   Head: Normocephalic and atraumatic.   Mouth/Throat: Oropharynx is clear and moist.   Eyes: Conjunctivae and EOM are normal.   Neck: Normal range of motion. Neck supple.   Cardiovascular: Normal rate, regular rhythm, normal heart sounds and intact distal pulses.    Pulmonary/Chest: Effort normal and breath sounds normal. No respiratory distress.   Abdominal: Soft. There is no tenderness.   Musculoskeletal: He exhibits no tenderness.   Neurological: He is alert.   Skin: Skin is warm and dry. No rash noted.   Psychiatric: His speech is normal. Thought content normal. His affect is blunt. He is withdrawn. He is not agitated and not actively hallucinating. Thought content is not paranoid and not delusional. He expresses inappropriate judgment. He expresses no homicidal and no suicidal ideation.   Awake and alert. Cooperative. Unkempt. No distress. No suicidal or homicidal ideation.    Nursing note and vitals reviewed.      ED Course     ED Course     Procedures             Critical Care time:  none             Labs Ordered and Resulted from Time of ED Arrival Up to the Time of Departure from the ED - No data to display         Assessments & Plan (with Medical Decision Making)   As noted he was again instructed to follow up at clinic for injections or psychotropic medications. Discussed the need to follow up with psychiatry and primary care. Will give him a dose of olanzapine until he is able to get to clinic. He does not exhibit acute psychosis and denies thoughts of harming himself or others. Advised to return for persistent  symptoms.    I have reviewed the nursing notes.    I have reviewed the findings, diagnosis, plan and need for follow up with the patient.    New Prescriptions    No medications on file       Final diagnoses:   Schizoaffective disorder, bipolar type (H)       7/9/2018   [facility], [place], EMERGENCY DEPARTMENT     Dashawn Bonilla MD  07/09/18 0434

## 2018-10-01 ENCOUNTER — APPOINTMENT (OUTPATIENT)
Dept: CT IMAGING | Facility: CLINIC | Age: 33
End: 2018-10-01
Attending: EMERGENCY MEDICINE
Payer: COMMERCIAL

## 2018-10-01 ENCOUNTER — HOSPITAL ENCOUNTER (EMERGENCY)
Facility: CLINIC | Age: 33
Discharge: HOME | End: 2018-10-01
Attending: EMERGENCY MEDICINE | Admitting: EMERGENCY MEDICINE
Payer: COMMERCIAL

## 2018-10-01 VITALS
DIASTOLIC BLOOD PRESSURE: 85 MMHG | SYSTOLIC BLOOD PRESSURE: 136 MMHG | TEMPERATURE: 98.3 F | HEIGHT: 72 IN | OXYGEN SATURATION: 96 % | BODY MASS INDEX: 32.56 KG/M2 | RESPIRATION RATE: 16 BRPM | HEART RATE: 103 BPM
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
## 2018-10-01 PROCEDURE — 70450 CT HEAD/BRAIN W/O DYE: CPT

## 2018-10-01 PROCEDURE — 99284 EMERGENCY DEPT VISIT MOD MDM: CPT | Mod: 25 | Performed by: EMERGENCY MEDICINE

## 2018-10-01 PROCEDURE — 68200002 ZZH TRAUMA EVALUATION W/O CC LEVEL II: Performed by: EMERGENCY MEDICINE

## 2018-10-01 PROCEDURE — 99284 EMERGENCY DEPT VISIT MOD MDM: CPT | Mod: Z6 | Performed by: EMERGENCY MEDICINE

## 2018-10-01 ASSESSMENT — ENCOUNTER SYMPTOMS
SHORTNESS OF BREATH: 1
HEADACHES: 1
ABDOMINAL PAIN: 1
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
## 2018-10-01 NOTE — ED AVS SNAPSHOT
[facility], [place], Emergency Department    [address] [place] [address]    Phone:  [phone]                                       Carlos Alberto Garcia   MRN: [id number]    Department:  [facility], Emergency Department   Date of Visit:  10/1/2018           After Visit Summary Signature Page     I have received my discharge instructions, and my questions have been answered. I have discussed any challenges I see with this plan with the nurse or doctor.    ..........................................................................................................................................  Patient/Patient Representative Signature      ..........................................................................................................................................  Patient Representative Print Name and Relationship to Patient    ..................................................               ................................................  Date                                   Time    ..........................................................................................................................................  Reviewed by Signature/Title    ...................................................              ..............................................  Date                                               Time          22EPIC Rev 08/18

## 2018-10-02 ENCOUNTER — HOSPITAL ENCOUNTER (EMERGENCY)
Facility: CLINIC | Age: 33
Discharge: HOME | End: 2018-10-02
Attending: EMERGENCY MEDICINE | Admitting: EMERGENCY MEDICINE
Payer: COMMERCIAL

## 2018-10-02 VITALS
DIASTOLIC BLOOD PRESSURE: 65 MMHG | HEIGHT: 72 IN | BODY MASS INDEX: 34 KG/M2 | SYSTOLIC BLOOD PRESSURE: 112 MMHG | RESPIRATION RATE: 18 BRPM | TEMPERATURE: 98.1 F | WEIGHT: 251 LBS | OXYGEN SATURATION: 94 % | HEART RATE: 62 BPM

## 2018-10-02 DIAGNOSIS — G44.309 POST-TRAUMATIC HEADACHE, NOT INTRACTABLE, UNSPECIFIED CHRONICITY PATTERN: ICD-10-CM

## 2018-10-02 PROCEDURE — 99282 EMERGENCY DEPT VISIT SF MDM: CPT | Mod: Z6 | Performed by: EMERGENCY MEDICINE

## 2018-10-02 PROCEDURE — 99283 EMERGENCY DEPT VISIT LOW MDM: CPT | Performed by: EMERGENCY MEDICINE

## 2018-10-02 PROCEDURE — 25000132 ZZH RX MED GY IP 250 OP 250 PS 637: Performed by: EMERGENCY MEDICINE

## 2018-10-02 RX ORDER — ACETAMINOPHEN 325 MG/1
650 TABLET ORAL ONCE
Status: COMPLETED | OUTPATIENT
Start: 2018-10-02 | End: 2018-10-02

## 2018-10-02 RX ADMIN — ACETAMINOPHEN 650 MG: 325 TABLET, FILM COATED ORAL at 23:43
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
## 2018-10-02 NOTE — ED AVS SNAPSHOT
[facility], [place], Emergency Department    [address] [place] [address]    Phone:  [phone]                                       Carlos Alberto Garcia   MRN: [id number]    Department:  [facility], Emergency Department   Date of Visit:  10/2/2018           After Visit Summary Signature Page     I have received my discharge instructions, and my questions have been answered. I have discussed any challenges I see with this plan with the nurse or doctor.    ..........................................................................................................................................  Patient/Patient Representative Signature      ..........................................................................................................................................  Patient Representative Print Name and Relationship to Patient    ..................................................               ................................................  Date                                   Time    ..........................................................................................................................................  Reviewed by Signature/Title    ...................................................              ..............................................  Date                                               Time          22EPIC Rev 08/18

## 2018-10-03 ASSESSMENT — ENCOUNTER SYMPTOMS
EYE REDNESS: 0
FEVER: 0
CONFUSION: 0
HEADACHES: 1
ARTHRALGIAS: 0
SHORTNESS OF BREATH: 0
ABDOMINAL PAIN: 0
DIFFICULTY URINATING: 0
COLOR CHANGE: 0
NECK STIFFNESS: 0
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
## 2018-10-03 NOTE — DISCHARGE INSTRUCTIONS
Take acetaminophen or ibuprofen as needed for pain.    Please make an appointment to follow up with Your Primary Care Provider in 1 week.    Return to the emergency department if vomiting, weakness, or other concerns.

## 2018-10-04 ENCOUNTER — HOSPITAL ENCOUNTER (EMERGENCY)
Facility: CLINIC | Age: 33
Discharge: HOME | End: 2018-10-04
Payer: COMMERCIAL

## 2018-10-05 ENCOUNTER — HOSPITAL ENCOUNTER (EMERGENCY)
Facility: CLINIC | Age: 33
Discharge: HOME | End: 2018-10-05
Attending: INTERNAL MEDICINE | Admitting: INTERNAL MEDICINE
Payer: COMMERCIAL

## 2018-10-05 VITALS
HEIGHT: 72 IN | TEMPERATURE: 97.8 F | RESPIRATION RATE: 14 BRPM | BODY MASS INDEX: 34.04 KG/M2 | OXYGEN SATURATION: 100 % | DIASTOLIC BLOOD PRESSURE: 86 MMHG | HEART RATE: 99 BPM | SYSTOLIC BLOOD PRESSURE: 130 MMHG

## 2018-10-05 DIAGNOSIS — R51.9 NONINTRACTABLE EPISODIC HEADACHE, UNSPECIFIED HEADACHE TYPE: ICD-10-CM

## 2018-10-05 PROCEDURE — 99283 EMERGENCY DEPT VISIT LOW MDM: CPT | Mod: Z6 | Performed by: INTERNAL MEDICINE

## 2018-10-05 PROCEDURE — 99282 EMERGENCY DEPT VISIT SF MDM: CPT | Performed by: INTERNAL MEDICINE

## 2018-10-05 RX ORDER — NAPROXEN 500 MG/1
500 TABLET ORAL ONCE
Status: DISCONTINUED | OUTPATIENT
Start: 2018-10-05 | End: 2018-10-05 | Stop reason: HOSPADM

## 2018-10-05 ASSESSMENT — ENCOUNTER SYMPTOMS
HEADACHES: 1
NAUSEA: 0
DIFFICULTY URINATING: 0
NUMBNESS: 0
ADENOPATHY: 0
CONFUSION: 0
CHILLS: 0
VOMITING: 0
COUGH: 0
FEVER: 0
SPEECH DIFFICULTY: 0
WEAKNESS: 0

## 2018-10-05 NOTE — ED AVS SNAPSHOT
[facility], Emergency Department    [address] [address]    Phone:  [phone]                                       Carlos Alberto Garcia   MRN: [id number]    Department:  [facility], Emergency Department   Date of Visit:  10/5/2018           Patient Information     Date Of Birth          1985        Your diagnoses for this visit were:     Nonintractable episodic headache, unspecified headache type        You were seen by Jacinto Daniels MD.        Discharge Instructions       Continue your regular medications.  Follow up at [facility] clinic. [phone].    24 Hour Appointment Hotline       To make an appointment at any [facility], call [phone] ([phone]). If you don't have a family doctor or clinic, we will help you find one. [facility] clinics are conveniently located to serve the needs of you and your family.             Review of your medicines      Our records show that you are taking the medicines listed below. If these are incorrect, please call your family doctor or clinic.        Dose / Directions Last dose taken    INVEGA PO        Inject as directed every 30 days   Refills:  0                Orders Needing Specimen Collection     None      Pending Results     No orders found from 10/3/2018 to 10/6/2018.            Pending Culture Results     No orders found from 10/3/2018 to 10/6/2018.            Pending Results Instructions     If you had any lab results that were not finalized at the time of your Discharge, you can call the ED Lab Result RN at [phone]. You will be contacted by this team for any positive Lab results or changes in treatment. The nurses are available 7 days a week from 10A to 6:30P.  You can leave a message 24 hours per day and they will return your call.        Thank you for choosing [facility]       Thank you for choosing [facility] for your care. Our goal is always to provide you with excellent care. Hearing back from our patients  "is one way we can continue to improve our services. Please take a few minutes to complete the written survey that you may receive in the mail after you visit with us. Thank you!        [employer] Information     [employer] lets you send messages to your doctor, view your test results, renew your prescriptions, schedule appointments and more. To sign up, go to [email or web address] . Click on \"Log in\" on the left side of the screen, which will take you to the Welcome page. Then click on \"Sign up Now\" on the right side of the page.     You will be asked to enter the access code listed below, as well as some personal information. Please follow the directions to create your username and password.     Your access code is: [email or web address]  Expires: 10/7/2018  4:40 AM     Your access code will  in 90 days. If you need help or a new code, please call your [facility] clinic or [phone].        Care EveryWhere ID     This is your Care EveryWhere ID. This could be used by other organizations to access your [facility] medical records  [id number]        Equal Access to Services     PINEDA MARISCAL : Hadluc Stark, roque sesay, fausto james. So [facility] [phone].    ATENCIÓN: Si habla español, tiene a weiner disposición servicios gratuitos de asistencia lingüística. Evert al [phone].    We comply with applicable federal civil rights laws and [place] laws. We do not discriminate on the basis of race, color, national origin, age, disability, sex, sexual orientation, or gender identity.            After Visit Summary       This is your record. Keep this with you and show to your community pharmacist(s) and doctor(s) at your next visit.                  "

## 2018-10-05 NOTE — ED AVS SNAPSHOT
[facility], [place], Emergency Department    [address] [place] [address]    Phone:  [phone]                                       Carlos Alberto Garcia   MRN: [id number]    Department:  [facility], Emergency Department   Date of Visit:  10/5/2018           After Visit Summary Signature Page     I have received my discharge instructions, and my questions have been answered. I have discussed any challenges I see with this plan with the nurse or doctor.    ..........................................................................................................................................  Patient/Patient Representative Signature      ..........................................................................................................................................  Patient Representative Print Name and Relationship to Patient    ..................................................               ................................................  Date                                   Time    ..........................................................................................................................................  Reviewed by Signature/Title    ...................................................              ..............................................  Date                                               Time          22EPIC Rev 08/18
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
## 2018-10-06 NOTE — DISCHARGE INSTRUCTIONS
Continue your regular medications.  Follow up at [facility] clinic. [phone].

## 2018-10-08 PROCEDURE — 99281 EMR DPT VST MAYX REQ PHY/QHP: CPT | Performed by: EMERGENCY MEDICINE

## 2018-10-08 PROCEDURE — 99282 EMERGENCY DEPT VISIT SF MDM: CPT | Mod: Z6 | Performed by: EMERGENCY MEDICINE

## 2018-10-09 ENCOUNTER — HOSPITAL ENCOUNTER (EMERGENCY)
Facility: CLINIC | Age: 33
Discharge: HOME | End: 2018-10-09
Attending: EMERGENCY MEDICINE | Admitting: EMERGENCY MEDICINE
Payer: COMMERCIAL

## 2018-10-09 VITALS
HEART RATE: 78 BPM | DIASTOLIC BLOOD PRESSURE: 72 MMHG | HEIGHT: 72 IN | BODY MASS INDEX: 29.12 KG/M2 | SYSTOLIC BLOOD PRESSURE: 118 MMHG | TEMPERATURE: 97 F | WEIGHT: 215 LBS | RESPIRATION RATE: 12 BRPM | OXYGEN SATURATION: 99 %

## 2018-10-09 DIAGNOSIS — R44.3 HALLUCINATIONS: ICD-10-CM

## 2018-10-09 DIAGNOSIS — Z59.00 HOMELESS: ICD-10-CM

## 2018-10-09 SDOH — ECONOMIC STABILITY - HOUSING INSECURITY: HOMELESSNESS UNSPECIFIED: Z59.00

## 2018-10-09 ASSESSMENT — ENCOUNTER SYMPTOMS
HALLUCINATIONS: 1
ABDOMINAL PAIN: 0
FEVER: 0
WEAKNESS: 0
FLANK PAIN: 0
BRUISES/BLEEDS EASILY: 0
SHORTNESS OF BREATH: 0
SORE THROAT: 0
BACK PAIN: 0

## 2018-10-09 NOTE — ED AVS SNAPSHOT
[facility], Emergency Department    [address] [facility] [address]    Phone:  [phone]                                       Carlos Alberto Garcia   MRN: [id number]    Department:  [facility], Emergency Department   Date of Visit:  10/8/2018           Patient Information     Date Of Birth          1985        Your diagnoses for this visit were:     Hallucinations     Homeless        You were seen by Anahi Fraser MD.        Discharge Instructions       Thank you for your patience today.  Please follow-up with your regular doctor in the next 2-3 days for further evaluation and follow-up care.  Please call to schedule an appointment.  Please continue your own medications.  Please return to the ER if you develop any worsening of your current symptoms.  It was a pleasure taking care of you today.  We hope you feel better soon.      24 Hour Appointment Hotline       To make an appointment at any [facility], call [phone] ([phone]). If you don't have a family doctor or clinic, we will help you find one. [facility] clinics are conveniently located to serve the needs of you and your family.             Review of your medicines      Our records show that you are taking the medicines listed below. If these are incorrect, please call your family doctor or clinic.        Dose / Directions Last dose taken    INVEGA PO        Inject as directed every 30 days   Refills:  0                Orders Needing Specimen Collection     None      Pending Results     No orders found from 10/7/2018 to 10/10/2018.            Pending Culture Results     No orders found from 10/7/2018 to 10/10/2018.            Pending Results Instructions     If you had any lab results that were not finalized at the time of your Discharge, you can call the ED Lab Result RN at [phone]. You will be contacted by this team for any positive Lab results or changes in treatment. The nurses are available 7 days a week  "from 10A to 6:30P.  You can leave a message 24 hours per day and they will return your call.        Thank you for choosing [facility]       Thank you for choosing [facility] for your care. Our goal is always to provide you with excellent care. Hearing back from our patients is one way we can continue to improve our services. Please take a few minutes to complete the written survey that you may receive in the mail after you visit with us. Thank you!        [employer] Information     [employer] lets you send messages to your doctor, view your test results, renew your prescriptions, schedule appointments and more. To sign up, go to [email or web address] . Click on \"Log in\" on the left side of the screen, which will take you to the Welcome page. Then click on \"Sign up Now\" on the right side of the page.     You will be asked to enter the access code listed below, as well as some personal information. Please follow the directions to create your username and password.     Your access code is: [email or web address]  Expires: 2019  5:50 AM     Your access code will  in 90 days. If you need help or a new code, please call your [facility] clinic or [phone].        Care EveryWhere ID     This is your Care EveryWhere ID. This could be used by other organizations to access your [facility] medical records  [id number]        Equal Access to Services     PINEDA MARISCAL : Hadii los barroso Soisabel, waaxda luqadaha, qaybta kaalmada mahsa, fausto cordero. So [facility] [phone].    ATENCIÓN: Si habla español, tiene a weiner disposición servicios gratuitos de asistencia lingüística. Evert al [phone].    We comply with applicable federal civil rights laws and [place] laws. We do not discriminate on the basis of race, color, national origin, age, disability, sex, sexual orientation, or gender identity.            After Visit Summary       This is your record. Keep this with you and show to your community " pharmacist(s) and doctor(s) at your next visit.

## 2018-10-09 NOTE — ED PROVIDER NOTES
History     Chief Complaint   Patient presents with     Hallucinations     HPI  Carlos Alberto Garcia is a 33 year old male who has a past medical history of schizoaffective disorder, polysubstance abuse, homelessness.  Patient presents to the emergency department for evaluation of hearing voices.  Patient reports that he was hearing voices today and states he does not know what they are saying.  Patient was wondering if he should be admitted for these voices.     I have reviewed the Medications, Allergies, Past Medical and Surgical History, and Social History in the Epic system.    Review of Systems   Constitutional: Negative for fever.   HENT: Negative for sore throat.    Eyes: Negative for visual disturbance.   Respiratory: Negative for shortness of breath.    Cardiovascular: Negative for chest pain.   Gastrointestinal: Negative for abdominal pain.   Genitourinary: Negative for flank pain.   Musculoskeletal: Negative for back pain.   Skin: Negative for rash.   Allergic/Immunologic: Negative for immunocompromised state.   Neurological: Negative for weakness.   Hematological: Does not bruise/bleed easily.   Psychiatric/Behavioral: Positive for hallucinations. Negative for suicidal ideas.       Physical Exam   BP: 124/65  Pulse: 78  Temp: 97  F (36.1  C)  Resp: 12  Height: 182.9 cm (6')  Weight: 97.5 kg (215 lb)  SpO2: 99 %      Physical Exam  .Constitutional:   resting comfortably , no distress  HENT:   Head: Normocephalic and atraumatic.   Eyes: Conjunctivae are normal. Pupils are equal, round, and reactive to light.    mucous membranes are moist  Neck:   no adenopathy, no bony tenderness  Cardiovascular: regular rate and rhythm without murmurs or gallops  Pulmonary/Chest: Clear to auscultation bilaterally, with no wheezes or retractions. No respiratory distress.  GI: Soft with good bowel sounds.  Non-tender, non-distended, with no guarding, no rebound, no peritoneal signs.   Back:  No bony or CVA tenderness    Musculoskeletal:  no edema or clubbing   Skin: Skin is warm and dry. No rash noted.   Neurological: alert and oriented to person, place, and time. Nonfocal exam  Psychiatric:  normal mood, blunt affect, denies suicide ideation, homicidal ideation    ED Course     ED Course     Procedures               Labs Ordered and Resulted from Time of ED Arrival Up to the Time of Departure from the ED - No data to display         Assessments & Plan (with Medical Decision Making)   Carlos Alberto Garcia is a 33 year old male who has a past medical history of schizoaffective disorder, polysubstance abuse, homelessness.  Patient presents to the emergency department for evaluation of hearing voices. Upon arrival pt is resting comfortably, no distress. Patient is not interested in talking with me and would rather sleep. On re-evaluation in the morning patient is feeling better, denies any hallucinations, delusions, suicide ideation, homicidal ideation.  Patient requesting to go. At this time plan for discharge.  Return precautions discussed.  Patient understands and agrees the plan. .The patient is discharged home with instructions to return if their symptoms persist or worsen.  Plan for close follow-up with their primary physician.  I discussed workup, results, treatment, and plan with the patient.  Patient understands and agrees with the plan.      I have reviewed the nursing notes.    I have reviewed the findings, diagnosis, plan and need for follow up with the patient.    Discharge Medication List as of 10/9/2018  5:50 AM          Final diagnoses:   Hallucinations   Homeless       10/8/2018   [facility], [place], EMERGENCY DEPARTMENT     Anahi Fraser MD  10/09/18 0554

## 2018-10-09 NOTE — ED AVS SNAPSHOT
[facility], [place], Emergency Department    [address] [place] [address]    Phone:  [phone]                                       Carlos Alberto Garcia   MRN: [id number]    Department:  [facility], Emergency Department   Date of Visit:  10/8/2018           After Visit Summary Signature Page     I have received my discharge instructions, and my questions have been answered. I have discussed any challenges I see with this plan with the nurse or doctor.    ..........................................................................................................................................  Patient/Patient Representative Signature      ..........................................................................................................................................  Patient Representative Print Name and Relationship to Patient    ..................................................               ................................................  Date                                   Time    ..........................................................................................................................................  Reviewed by Signature/Title    ...................................................              ..............................................  Date                                               Time          22EPIC Rev 08/18

## 2018-10-11 ENCOUNTER — HOSPITAL ENCOUNTER (EMERGENCY)
Facility: CLINIC | Age: 33
Discharge: HOME | End: 2018-10-11
Admitting: EMERGENCY MEDICINE
Payer: COMMERCIAL

## 2018-10-11 VITALS
OXYGEN SATURATION: 99 % | HEIGHT: 72 IN | WEIGHT: 200 LBS | SYSTOLIC BLOOD PRESSURE: 119 MMHG | TEMPERATURE: 97.2 F | BODY MASS INDEX: 27.09 KG/M2 | DIASTOLIC BLOOD PRESSURE: 78 MMHG

## 2018-10-11 DIAGNOSIS — Z76.5 MALINGERING: ICD-10-CM

## 2018-10-11 PROCEDURE — 99281 EMR DPT VST MAYX REQ PHY/QHP: CPT | Performed by: EMERGENCY MEDICINE

## 2018-10-11 PROCEDURE — 99282 EMERGENCY DEPT VISIT SF MDM: CPT | Mod: Z6 | Performed by: EMERGENCY MEDICINE

## 2018-10-11 NOTE — ED NOTES
Triaged by RN (see triage note), MD went to bedside and pt wouldn't talk to him. MD told pt that if he wasn't going to talk to him he would have  him out. Escorted outside by security.

## 2018-10-11 NOTE — ED PROVIDER NOTES
History     Chief Complaint   Patient presents with     Headache     HPI  Carlos Alberto Garcia is a 33 year old male who is well-known to our emergency department seen, seen frequently for headaches.  He has a history of schizoaffective disorder bipolar 2, substance abuse, among other medical problems.  He was found sleeping in the waiting room, initially complained of some foot pain as well as headache.    I found the patient sleeping prone on the stretcher.    He was easy to arouse but did not want to engage.  Reported that he was here to hang out for a little while.  I asked him about suicidal ideation as this had been an issue in the past and he denies.  With regard to his headache he says it is mild but he does not want to discuss it.    Continued to refuse to engaged, asked me to leave so he could sleep.    I have reviewed the Medications, Allergies, Past Medical and Surgical History, and Social History in the Epic system.    Review of Systems  Unable/unreliable  Physical Exam   BP: 119/78  Heart Rate: 65  Temp: 97.2  F (36.2  C)  Height: 182.9 cm (6')  Weight: 90.7 kg (200 lb)  SpO2: 99 %      Physical Exam  GEN: Well appearing, non toxic, cooperative and conversant.   HEENT: The head is normocephalic and atraumatic. Pupils are equal round and reactive to light. Extraocular motions are intact. There is no facial swelling.   CV: Regular rate   PULM: Unlabored breathing     EXT: Full range of motion.  No edema.  NEURO: Cranial nerves II through XII are intact and symmetric. Bilateral upper and lower extremities grossly show full range of motion without any focal deficits.     PSYCH: Calm and cooperative, interactive.     ED Course     ED Course     Procedures               Labs Ordered and Resulted from Time of ED Arrival Up to the Time of Departure from the ED - No data to display         Assessments & Plan (with Medical Decision Making)   33-year-old male presenting to the emergency department for uncertain  chief complaint.  His headache is mild.  He has been seen for primary headaches multiple times in the past.  He has no evidence of trauma or other complaint.  Denies suicidality.    He does not appear to be intoxicated but wishes to sleep in the emergency department.    I explained to the patient that this was not appropriate.  If you wish to discuss a medical complaint I am available to him.  Otherwise, it would be appropriate for him to leave the emergency department.  Patient continued to refuse wanting to sleep.  I informed the patient he would be escorted out of the emergency department.        I have reviewed the nursing notes.    I have reviewed the findings, diagnosis, plan and need for follow up with the patient.    Discharge Medication List as of 10/11/2018  4:55 AM          Final diagnoses:   Malingering       10/11/2018   [facility], [place], EMERGENCY DEPARTMENT     Darnell Armenta MD  10/11/18 0458

## 2018-10-11 NOTE — ED TRIAGE NOTES
Found him laying on the floor of the waiting room sleeping. States he's here because he has a headache.

## 2018-10-17 ENCOUNTER — HOSPITAL ENCOUNTER (EMERGENCY)
Facility: CLINIC | Age: 33
Discharge: HOME | End: 2018-10-17
Attending: EMERGENCY MEDICINE | Admitting: EMERGENCY MEDICINE
Payer: COMMERCIAL

## 2018-10-17 VITALS
HEIGHT: 72 IN | TEMPERATURE: 99.2 F | OXYGEN SATURATION: 94 % | SYSTOLIC BLOOD PRESSURE: 122 MMHG | HEART RATE: 82 BPM | BODY MASS INDEX: 34.2 KG/M2 | RESPIRATION RATE: 18 BRPM | DIASTOLIC BLOOD PRESSURE: 79 MMHG | WEIGHT: 252.5 LBS

## 2018-10-17 DIAGNOSIS — F19.10 POLYSUBSTANCE ABUSE (H): ICD-10-CM

## 2018-10-17 DIAGNOSIS — R11.0 NAUSEA: ICD-10-CM

## 2018-10-17 LAB
ANION GAP SERPL CALCULATED.3IONS-SCNC: 8 MMOL/L (ref 3–14)
APAP SERPL-MCNC: <2 MG/L (ref 10–20)
BUN SERPL-MCNC: 12 MG/DL (ref 7–30)
CALCIUM SERPL-MCNC: 8.6 MG/DL (ref 8.5–10.1)
CHLORIDE SERPL-SCNC: 102 MMOL/L (ref 94–109)
CO2 SERPL-SCNC: 30 MMOL/L (ref 20–32)
CREAT SERPL-MCNC: 0.79 MG/DL (ref 0.66–1.25)
GFR SERPL CREATININE-BSD FRML MDRD: >90 ML/MIN/1.7M2
GLUCOSE SERPL-MCNC: 92 MG/DL (ref 70–99)
POTASSIUM SERPL-SCNC: 3.7 MMOL/L (ref 3.4–5.3)
SALICYLATES SERPL-MCNC: <2 MG/DL
SODIUM SERPL-SCNC: 140 MMOL/L (ref 133–144)

## 2018-10-17 PROCEDURE — 80329 ANALGESICS NON-OPIOID 1 OR 2: CPT | Performed by: EMERGENCY MEDICINE

## 2018-10-17 PROCEDURE — 93010 ELECTROCARDIOGRAM REPORT: CPT | Mod: Z6 | Performed by: EMERGENCY MEDICINE

## 2018-10-17 PROCEDURE — 99284 EMERGENCY DEPT VISIT MOD MDM: CPT | Mod: 25 | Performed by: EMERGENCY MEDICINE

## 2018-10-17 PROCEDURE — 93005 ELECTROCARDIOGRAM TRACING: CPT | Performed by: EMERGENCY MEDICINE

## 2018-10-17 PROCEDURE — 80048 BASIC METABOLIC PNL TOTAL CA: CPT | Performed by: EMERGENCY MEDICINE

## 2018-10-17 PROCEDURE — 25000128 H RX IP 250 OP 636: Performed by: EMERGENCY MEDICINE

## 2018-10-17 PROCEDURE — 96374 THER/PROPH/DIAG INJ IV PUSH: CPT | Performed by: EMERGENCY MEDICINE

## 2018-10-17 RX ORDER — ONDANSETRON 2 MG/ML
4 INJECTION INTRAMUSCULAR; INTRAVENOUS ONCE
Status: COMPLETED | OUTPATIENT
Start: 2018-10-17 | End: 2018-10-17

## 2018-10-17 RX ADMIN — ONDANSETRON HYDROCHLORIDE 4 MG: 2 INJECTION INTRAMUSCULAR; INTRAVENOUS at 02:23

## 2018-10-17 NOTE — ED AVS SNAPSHOT
[facility], Emergency Department    [address] [facility] [address]    Phone:  [phone]                                       Carlos Alberto Garcia   MRN: [id number]    Department:  [facility], Emergency Department   Date of Visit:  10/17/2018           Patient Information     Date Of Birth          1985        Your diagnoses for this visit were:     Polysubstance abuse (H)     Nausea        You were seen by Tonny Badillo MD.        Discharge Instructions       Please make an appointment to follow [facility] with [facility] (phone: [phone] in 2-5 days if you have any concerns.    Return to the ED if you are having worsening symptoms, or any other urgent/life-threatening concerns.       24 Hour Appointment Hotline       To make an appointment at any [facility] clinic, call [phone] ([phone]). If you don't have a family doctor or clinic, we will help you find one. [facility] clinics are conveniently located to serve the needs of you and your family.             Review of your medicines      Our records show that you are taking the medicines listed below. If these are incorrect, please call your family doctor or clinic.        Dose / Directions Last dose taken    INVEGA PO        Inject as directed every 30 days   Refills:  0                Procedures and tests performed during your visit     Acetaminophen level    Basic metabolic panel    EKG 12 lead    Salicylate level      Orders Needing Specimen Collection     None      Pending Results     No orders found from 10/15/2018 to 10/18/2018.            Pending Culture Results     No orders found from 10/15/2018 to 10/18/2018.            Pending Results Instructions     If you had any lab results that were not finalized at the time of your Discharge, you can call the ED Lab Result RN at [phone]. You will be contacted by this team for any positive Lab results or changes in treatment. The nurses are available 7 days a week  "from 10A to 6:30P.  You can leave a message 24 hours per day and they will return your call.        Thank you for choosing [facility]       Thank you for choosing [facility] for your care. Our goal is always to provide you with excellent care. Hearing back from our patients is one way we can continue to improve our services. Please take a few minutes to complete the written survey that you may receive in the mail after you visit with us. Thank you!        [employer] Information     [employer] lets you send messages to your doctor, view your test results, renew your prescriptions, schedule appointments and more. To sign up, go to [email or web address] . Click on \"Log in\" on the left side of the screen, which will take you to the Welcome page. Then click on \"Sign up Now\" on the right side of the page.     You will be asked to enter the access code listed below, as well as some personal information. Please follow the directions to create your username and password.     Your access code is: [email or web address]  Expires: 2019  5:50 AM     Your access code will  in 90 days. If you need help or a new code, please call your [facility] clinic or [phone].        Care EveryWhere ID     This is your Care EveryWhere ID. This could be used by other organizations to access your [facility] medical records  [id number]        Equal Access to Services     PINEDA MARISCAL : Hadii los barroso Soisabel, waaxda luqadaha, qaybta kaalmada mahsa, fausto cordero. So [facility] [phone].    ATENCIÓN: Si habla español, tiene a weiner disposición servicios gratuitos de asistencia lingüística. Evert al [phone].    We comply with applicable federal civil rights laws and [place] laws. We do not discriminate on the basis of race, color, national origin, age, disability, sex, sexual orientation, or gender identity.            After Visit Summary       This is your record. Keep this with you and show to your community " pharmacist(s) and doctor(s) at your next visit.

## 2018-10-17 NOTE — ED AVS SNAPSHOT
[facility], [place], Emergency Department    [address] [place] [id number]    Phone:  [phone]                                       Carlos Alberto Garcia   MRN: [id number]    Department:  [facility], Emergency Department   Date of Visit:  10/17/2018           After Visit Summary Signature Page     I have received my discharge instructions, and my questions have been answered. I have discussed any challenges I see with this plan with the nurse or doctor.    ..........................................................................................................................................  Patient/Patient Representative Signature      ..........................................................................................................................................  Patient Representative Print Name and Relationship to Patient    ..................................................               ................................................  Date                                   Time    ..........................................................................................................................................  Reviewed by Signature/Title    ...................................................              ..............................................  Date                                               Time          22EPIC Rev 08/18

## 2018-10-17 NOTE — ED TRIAGE NOTES
Patient presents ambulatory to triage with c/o nausea. Patient states he took five unknown pills and used meth a few hours ago. States he feels nauseous and sick.

## 2018-10-17 NOTE — ED PROVIDER NOTES
History     Chief Complaint   Patient presents with     Nausea     The history is provided by the patient and medical records.     Carlos Alberto Garcia is a 33 year old male with hx of bipolar 2, schizophrenia, malingering who presents with nausea after drug use. Patient reports that at approximately 10 PM he used methamphetamine and took several unknown pills recreationally.  Shortly thereafter he began feeling nauseous.  He has had no vomiting.  He has no chest pain, no abdominal pain, no difficulty breathing, no fevers recently.  Patient unsure what the pills were as he received them from someone else who was recreationally using drugs with him.      I have reviewed the Medications, Allergies, Past Medical and Surgical History, and Social History in the Epic system.    Review of Systems  A complete review of systems was performed with pertinent positives and negatives noted in the HPI, and all other systems negative.     Physical Exam   BP: 139/83  Pulse: 82  Heart Rate: 77  Temp: 98.3  F (36.8  C)  Resp: 16  Height: 182.9 cm (6')  Weight: 114.5 kg (252 lb 8 oz)  SpO2: 100 %      Physical Exam  /79  Pulse 82  Temp 99.2  F (37.3  C) (Oral)  Resp 18  Ht 1.829 m (6')  Wt 114.5 kg (252 lb 8 oz)  SpO2 94%  BMI 34.25 kg/m2  General: well-appearing, no acute distress  HENT: MMM, no oropharyngeal lesions  Eyes: PERRL, normal sclerae, no conjunctival pallor  Cardio: RRR, normal heart sounds, extremities well perfused  Resp: Normal work of breathing, clear breath sounds  Abdomen: no tenderness, non-distended, no rebound, no guarding  Neuro: alert and fully oriented. CN II-XII grossly intact. Grossly normal strength and sensation in all extremities.   MSK: no deformities.   Integumentary/Skin: no rash, normal color  Psych: normal affect, normal behavior    ED Course     ED Course     Procedures             EKG Interpretation:      Interpreted by Tonny Badillo  Time reviewed: 0104  Symptoms at time of EKG:  recent drug use, nausea   Rhythm: normal sinus   Rate: normal  Axis: normal  Ectopy: none  Conduction: normal  ST Segments/ T Waves: No ST-T wave changes  Q Waves: none  Comparison to prior: Unchanged from 1/28/2018    Clinical Impression: normal EKG    Critical Care time:  none       Labs Ordered and Resulted from Time of ED Arrival Up to the Time of Departure from the ED   BASIC METABOLIC PANEL   ACETAMINOPHEN LEVEL   SALICYLATE LEVEL            Assessments & Plan (with Medical Decision Making)   Patient presenting with nausea after ingesting 3-4 unknown pills as well as using methamphetamine recreationally.  Vitals in the ED are within normal limits.     EKG showed normal sinus rhythm with unremarkable intervals.  Patient was given ondansetron for nausea with improvement in symptoms upon reassessment.  Acetaminophen level negative, salicylate level negative, chemistry panel with without anion gap.      The complete clinical picture is most consistent with drug ingestion versus malingering. Patient without any clear toxidrome, mental status remained clear throughout 2 hour observation in the ED.  After counseling on the diagnosis, work-up, and treatment plan, the patient was discharged to home.  The patient was advised to follow-up with primary care physician in a few days and to discontinue illicit drug use.  The patient was advised to return to the ED if worsening symptoms, or if there are any urgent/life-threatening concerns.       Clinical Impression:  Polysubstance abuse  Nausea       Tonny Badillo MD  Emergency Medicine     This part of the document was transcribed by Kamla Herrera Medical Scribe.      I have reviewed the nursing notes.    I have reviewed the findings, diagnosis, plan and need for follow up with the patient.    Discharge Medication List as of 10/17/2018  2:19 AM          Final diagnoses:   Polysubstance abuse (H)   Nausea       10/17/2018   [facility], EMERGENCY DEPARTMENT      Tonny Badillo MD  10/17/18 0256

## 2018-10-17 NOTE — DISCHARGE INSTRUCTIONS
Please make an appointment to follow [facility] with [facility] (phone: [phone] in 2-5 days if you have any concerns.    Return to the ED if you are having worsening symptoms, or any other urgent/life-threatening concerns.

## 2018-10-18 LAB — INTERPRETATION ECG - MUSE: NORMAL

## 2018-10-30 ENCOUNTER — HOSPITAL ENCOUNTER (EMERGENCY)
Facility: CLINIC | Age: 33
Discharge: HOME | End: 2018-10-30
Attending: EMERGENCY MEDICINE | Admitting: EMERGENCY MEDICINE
Payer: COMMERCIAL

## 2018-10-30 VITALS
RESPIRATION RATE: 20 BRPM | TEMPERATURE: 98.8 F | WEIGHT: 255.5 LBS | HEART RATE: 88 BPM | HEIGHT: 72 IN | OXYGEN SATURATION: 95 % | SYSTOLIC BLOOD PRESSURE: 116 MMHG | DIASTOLIC BLOOD PRESSURE: 65 MMHG | BODY MASS INDEX: 34.61 KG/M2

## 2018-10-30 DIAGNOSIS — R10.32 ABDOMINAL PAIN, LEFT LOWER QUADRANT: ICD-10-CM

## 2018-10-30 DIAGNOSIS — R11.0 NAUSEA: ICD-10-CM

## 2018-10-30 PROCEDURE — 93010 ELECTROCARDIOGRAM REPORT: CPT | Mod: Z6 | Performed by: EMERGENCY MEDICINE

## 2018-10-30 PROCEDURE — 99284 EMERGENCY DEPT VISIT MOD MDM: CPT | Mod: 25 | Performed by: EMERGENCY MEDICINE

## 2018-10-30 PROCEDURE — 25000131 ZZH RX MED GY IP 250 OP 636 PS 637: Performed by: EMERGENCY MEDICINE

## 2018-10-30 PROCEDURE — 93005 ELECTROCARDIOGRAM TRACING: CPT

## 2018-10-30 PROCEDURE — 99284 EMERGENCY DEPT VISIT MOD MDM: CPT

## 2018-10-30 RX ORDER — ONDANSETRON 4 MG/1
4 TABLET, ORALLY DISINTEGRATING ORAL
Status: COMPLETED | OUTPATIENT
Start: 2018-10-30 | End: 2018-10-30

## 2018-10-30 RX ORDER — ONDANSETRON 2 MG/ML
4 INJECTION INTRAMUSCULAR; INTRAVENOUS ONCE
Status: DISCONTINUED | OUTPATIENT
Start: 2018-10-30 | End: 2018-10-30

## 2018-10-30 RX ADMIN — ONDANSETRON 4 MG: 4 TABLET, ORALLY DISINTEGRATING ORAL at 20:39

## 2018-10-30 NOTE — ED TRIAGE NOTES
Pt arrives to ED with complaints of nausea and slight abdominal pain that started this afternoon. Denies vomiting or diarrhea. Pt also complaining of headache. A&O, VSS.

## 2018-10-30 NOTE — ED AVS SNAPSHOT
[facility], Emergency Department    [address] [address]    Phone:  [phone]                                       Carlos Alberto Garcia   MRN: [id number]    Department:  [facility], Emergency Department   Date of Visit:  10/30/2018           Patient Information     Date Of Birth          1985        Your diagnoses for this visit were:     Nausea        You were seen by Jose Peralta MD.        Discharge Instructions       Please return to the ED if you start having worsening symptoms such as uncontrollable vomiting, vomiting up blood, dizziness or uncontrollable stomach pain.     24 Hour Appointment Hotline       To make an appointment at any [facility] clinic, call [phone] ([phone]). If you don't have a family doctor or clinic, we will help you find one. [facility] clinics are conveniently located to serve the needs of you and your family.             Review of your medicines      Our records show that you are taking the medicines listed below. If these are incorrect, please call your family doctor or clinic.        Dose / Directions Last dose taken    INVEGA PO        Inject as directed every 30 days   Refills:  0                Procedures and tests performed during your visit     EKG 12 lead      Orders Needing Specimen Collection     None      Pending Results     Date and Time Order Name Status Description    10/30/2018 1941 EKG 12 lead Preliminary             Pending Culture Results     No orders found from 10/28/2018 to 10/31/2018.            Pending Results Instructions     If you had any lab results that were not finalized at the time of your Discharge, you can call the ED Lab Result RN at [phone]. You will be contacted by this team for any positive Lab results or changes in treatment. The nurses are available 7 days a week from 10A to 6:30P.  You can leave a message 24 hours per day and they will return your call.        Thank you for choosing [facility]  "      Thank you for choosing [facility] for your care. Our goal is always to provide you with excellent care. Hearing back from our patients is one way we can continue to improve our services. Please take a few minutes to complete the written survey that you may receive in the mail after you visit with us. Thank you!        [employer] Information     [employer] lets you send messages to your doctor, view your test results, renew your prescriptions, schedule appointments and more. To sign up, go to [email or web address] . Click on \"Log in\" on the left side of the screen, which will take you to the Welcome page. Then click on \"Sign up Now\" on the right side of the page.     You will be asked to enter the access code listed below, as well as some personal information. Please follow the directions to create your username and password.     Your access code is: [email or web address]  Expires: 2019  5:50 AM     Your access code will  in 90 days. If you need help or a new code, please call your [facility] clinic or [phone].        Care EveryWhere ID     This is your Care EveryWhere ID. This could be used by other organizations to access your [facility] medical records  [id number]        Equal Access to Services     PINEDA MARISCAL : Carleen Stark, waedwardda lázaro, qaybta kaalmada adeghanshyam, fausto cordero. So [facility] [phone].    ATENCIÓN: Si habla español, tiene a weiner disposición servicios gratuitos de asistencia lingüística. Llame al [phone].    We comply with applicable federal civil rights laws and [place] laws. We do not discriminate on the basis of race, color, national origin, age, disability, sex, sexual orientation, or gender identity.            After Visit Summary       This is your record. Keep this with you and show to your community pharmacist(s) and doctor(s) at your next visit.                  "

## 2018-10-30 NOTE — ED AVS SNAPSHOT
[facility], [place], Emergency Department    [address] [place] [address]    Phone:  [phone]                                       Carlos Alberto Garcia   MRN: [id number]    Department:  [facility], Emergency Department   Date of Visit:  10/30/2018           After Visit Summary Signature Page     I have received my discharge instructions, and my questions have been answered. I have discussed any challenges I see with this plan with the nurse or doctor.    ..........................................................................................................................................  Patient/Patient Representative Signature      ..........................................................................................................................................  Patient Representative Print Name and Relationship to Patient    ..................................................               ................................................  Date                                   Time    ..........................................................................................................................................  Reviewed by Signature/Title    ...................................................              ..............................................  Date                                               Time          22EPIC Rev 08/18

## 2018-10-31 LAB — INTERPRETATION ECG - MUSE: NORMAL

## 2018-10-31 NOTE — DISCHARGE INSTRUCTIONS
Please return to the ED if you start having worsening symptoms such as uncontrollable vomiting, vomiting up blood, dizziness or uncontrollable stomach pain.

## 2018-10-31 NOTE — ED PROVIDER NOTES
History     Chief Complaint   Patient presents with     Nausea     HPI  Carlos Alberto Garcia is a 33 year old male with PMH of bipolar, schizophrenia, drug use, presented today with chief complaint of nausea.  Patient reports that his symptoms started acutely this afternoon and is correlated with mild 3/10 abdominal pain.  Patient cannot think of any instigating factors for his nausea and has not had vomiting since the onset of his symptoms.  He also admits to having used methamphetamine earlier today but his pain started prior to his drug use and he does not think his nausea was caused by the drug.  He reports that his last true meals were [employer] burger, muffin and beef jerky.  He does not think that his nausea started after eating.  He would like to get an IV medication for his nausea as this is worked in the past.  He denied any fever, chills, headaches, chest pain, constipation or diarrhea.    I have reviewed the Medications, Allergies, Past Medical and Surgical History, and Social History in the Epic system.    Review of Systems negative except as stated in the HPI above.    Physical Exam   BP: 130/77  Pulse: 99  Temp: 98.8  F (37.1  C)  Resp: 18  Height: 182.9 cm (6')  Weight: 115.9 kg (255 lb 8 oz)  SpO2: 94 %      Physical Exam   Constitutional: He is oriented to person, place, and time. He appears well-developed and well-nourished.   HENT:   Head: Normocephalic and atraumatic.   Eyes: EOM are normal. Pupils are equal, round, and reactive to light.   Cardiovascular: Normal rate and regular rhythm.    Pulmonary/Chest: Effort normal and breath sounds normal.   Abdominal: Soft. Bowel sounds are normal. There is tenderness.   Mild tenderness on palpation elicited of the left lower periumbilical region   Musculoskeletal: Normal range of motion.   Neurological: He is alert and oriented to person, place, and time.   Skin: Skin is warm and dry.   Psychiatric: He has a normal mood and affect.       ED Course      ED Course         Procedures       EKG Interpretation:      Interpreted by Morales Mello  Time reviewed: 2000  Symptoms at time of EKG: Nausea  Rhythm: normal sinus   Rate: normal  Axis: NORMAL  Ectopy: none  Conduction: normal  ST Segments/ T Waves: No ST-T wave changes  Q Waves: none  Comparison to prior: ECG UNCHANGED    Clinical Impression: normal EKG       Critical Care time:  none             Labs Ordered and Resulted from Time of ED Arrival Up to the Time of Departure from the ED - No data to display         Assessments & Plan (with Medical Decision Making)   33-year-old male with a past medical history of bipolar schizophrenia, malingering and met amphetamine drug use who presents with acute onset nausea of unknown etiology.  Patient reports that his nausea was mild on presentation and he had no vomiting associated with very mild abdominal tenderness elicited on physical exam.  On presentation patient was vitally stable and did not appear toxic or acutely ill.  An ECG was obtained which was unremarkable with normal QTC  and patient was given 1 dose of IV ondansetron for nausea with complete resolution of his symptoms at which point there was no need or indication for further evaluation or assessment..  He reported feeling much better and was okay with being discharged home.    Mild tenderness on physical exam  I have reviewed the nursing notes.    I have reviewed the findings, diagnosis, plan and need for follow up with the patient.  Physician Attestation   I, Jose Peralta, saw this patient with the resident and agree with the resident/fellow's findings and plan of care as documented in the note.      I personally reviewed vital signs and medications.    Key findings: Nausea    Jose Peralta MD  Date of Service (when I saw the patient): Oct 30, 2018    Discharge Medication List as of 10/30/2018  9:08 PM          Final diagnoses:   Nausea     Morales Mello,  Internal  Medicine PGY1  [phone]    10/30/2018   [facility], [place], EMERGENCY DEPARTMENT     Jose Peralta MD  10/31/18 0012

## 2018-11-07 ENCOUNTER — HOSPITAL ENCOUNTER (EMERGENCY)
Facility: CLINIC | Age: 33
Discharge: HOME | End: 2018-11-08
Attending: EMERGENCY MEDICINE | Admitting: EMERGENCY MEDICINE
Payer: COMMERCIAL

## 2018-11-07 DIAGNOSIS — G44.209 TENSION HEADACHE: ICD-10-CM

## 2018-11-07 PROCEDURE — 99283 EMERGENCY DEPT VISIT LOW MDM: CPT | Performed by: EMERGENCY MEDICINE

## 2018-11-07 PROCEDURE — 99283 EMERGENCY DEPT VISIT LOW MDM: CPT | Mod: Z6 | Performed by: EMERGENCY MEDICINE

## 2018-11-07 NOTE — ED AVS SNAPSHOT
[facility], [place], Emergency Department    [address] [place] [address]    Phone:  [phone]                                       Carlos Alberto Garcia   MRN: [id number]    Department:  [facility], Emergency Department   Date of Visit:  11/7/2018           After Visit Summary Signature Page     I have received my discharge instructions, and my questions have been answered. I have discussed any challenges I see with this plan with the nurse or doctor.    ..........................................................................................................................................  Patient/Patient Representative Signature      ..........................................................................................................................................  Patient Representative Print Name and Relationship to Patient    ..................................................               ................................................  Date                                   Time    ..........................................................................................................................................  Reviewed by Signature/Title    ...................................................              ..............................................  Date                                               Time          22EPIC Rev 08/18

## 2018-11-07 NOTE — ED AVS SNAPSHOT
[facility], Emergency Department    [address] [address]    Phone:  [phone]                                       Carlos Ablerto Garcia   MRN: [id number]    Department:  [facility], Emergency Department   Date of Visit:  11/7/2018           Patient Information     Date Of Birth          1985        Your diagnoses for this visit were:     Tension headache        You were seen by Alphonse Corcoran DO.        Discharge Instructions         Follow-up with your regular physician.  Return to emergency department for any new or worsening symptoms.    Managing Tension-type Headache Symptoms  A tension-type headache can develop slowly. Being aware of the symptoms helps you recognize a headache early. Then you can act to reduce pain and relieve tension. Methods for relieving your symptoms include self-care and medicine.    Tension-type symptoms  The earlier you recognize the symptoms of a tension-type headache, the easier it is to treat. Tension-type headaches may:    Start with fatigue, tension, or pain in the neck and shoulders    Feel like a band around the head    Be concentrated in the temple, the back of the head, behind the eyes, or in the face    Come and go, or last for days, weeks, or even longer    Involve referred pain -- this means that the area that hurts may not be where the problem starts  Self-care during a tension-type headache  When you have a tension-type headache, there are things you can do to relax, loosen muscles, and reduce the pain:    Brush your scalp lightly with a soft hairbrush.    Give yourself a massage. Knead the muscles running from your shoulders up the back of your skull. Or ask a friend to gently massage your neck and shoulders.    Use an ice pack. Wrap a thin cloth around a cold pack, a cold can of soda, or a bag of frozen vegetables. Apply this directly to the place where you feel pain (such as your neck or temples).    Use moist heat to relax your  muscles. Take a warm shower or bath. Or drape a warm, moist towel around your neck and shoulders.  Relieving pain and tension  Over-the-counter or prescription medicine can help relieve pain. Another way to reduce your pain is to use relaxation techniques to loosen tight muscles.  Medicine  Medicine used for tension-type headaches include the following:    NSAIDs (nonsteroidal anti-inflammatories), such as aspirin and ibuprofen, relieve inflammation and help block pain signals.    Acetaminophen treats pain, and some formulations contain caffeine.     Muscle relaxants can reduce painful muscle contractions.  Taking medicine safely  Be aware that:    Taking analgesics (pain relievers) or drinking too much coffee may lead to rebound headaches (frequent or severe headaches that can happen if you miss a dose of medication), so take pain medicines only as needed. If you think you have these headaches, contact your healthcare provider.    Taking too much medicine can cause sleep problems or stomach upset. Some over-the-counter headache medications may contain caffeine. These may disrupt sleep and worsen pain.  Relaxation techniques  A , class, book, or tape may help you learn these techniques. One or more of these methods may work for you:    Deep breathing. Slow, calm, deep breathing can help you relax. Breathe in for a count of 5 or more. Then slowly let the breath out.    Visualization. Imagining a peaceful, secure scene can give you a sense of control over your body and surroundings.    Progressive relaxation. This is done by tightening and then releasing muscle groups. Start at the top of your head and work your way down your body. Tighten each muscle group for 5 to 10 seconds. Then release the muscle group for the same amount of time.    Biofeedback. This is a type of training in which you learn to control certain physical functions and responses. This helps you learn to reduce muscle tension.  Date Last  Reviewed: 4/1/2018 2000-2018 [employer]. [address] [place] [address], [place] [address]. All rights reserved. This information is not intended as a substitute for professional medical care. Always follow your healthcare professional's instructions.          24 Hour Appointment Hotline       To make an appointment at any [facility], call [phone] ([phone]). If you don't have a family doctor or clinic, we will help you find one. [facility] are conveniently located to serve the needs of you and your family.             Review of your medicines      Our records show that you are taking the medicines listed below. If these are incorrect, please call your family doctor or clinic.        Dose / Directions Last dose taken    INVEGA PO        Inject as directed every 30 days   Refills:  0                Orders Needing Specimen Collection     None      Pending Results     No orders found for last 3 day(s).            Pending Culture Results     No orders found for last 3 day(s).            Pending Results Instructions     If you had any lab results that were not finalized at the time of your Discharge, you can call the ED Lab Result RN at [phone]. You will be contacted by this team for any positive Lab results or changes in treatment. The nurses are available 7 days a week from 10A to 6:30P.  You can leave a message 24 hours per day and they will return your call.        Thank you for choosing [facility]       Thank you for choosing [facility] for your care. Our goal is always to provide you with excellent care. Hearing back from our patients is one way we can continue to improve our services. Please take a few minutes to complete the written survey that you may receive in the mail after you visit with us. Thank you!        [employer] Information     [employer] lets you send messages to your doctor, view your test results, renew your prescriptions, schedule appointments and more. To sign up, go  "to [email or web address] . Click on \"Log in\" on the left side of the screen, which will take you to the Welcome page. Then click on \"Sign up Now\" on the right side of the page.     You will be asked to enter the access code listed below, as well as some personal information. Please follow the directions to create your username and password.     Your access code is: [email or web address]  Expires: 2019  4:50 AM     Your access code will  in 90 days. If you need help or a new code, please call your [facility] clinic or [phone].        Care EveryWhere ID     This is your Care EveryWhere ID. This could be used by other organizations to access your [facility] medical records  [id number]        Equal Access to Services     PINEDA MARISCAL : Carleen Stark, roque sesay, loreta bragg, fausto cordero. So [facility] [place] [phone].    ATENCIÓN: Si habla español, tiene a weiner disposición servicios gratuitos de asistencia lingüística. Evert al [phone].    We comply with applicable federal civil rights laws and [place] laws. We do not discriminate on the basis of race, color, national origin, age, disability, sex, sexual orientation, or gender identity.            After Visit Summary       This is your record. Keep this with you and show to your community pharmacist(s) and doctor(s) at your next visit.                  "

## 2018-11-08 VITALS
TEMPERATURE: 97.3 F | BODY MASS INDEX: 33.86 KG/M2 | DIASTOLIC BLOOD PRESSURE: 83 MMHG | OXYGEN SATURATION: 100 % | SYSTOLIC BLOOD PRESSURE: 145 MMHG | RESPIRATION RATE: 16 BRPM | HEIGHT: 72 IN | WEIGHT: 250 LBS

## 2018-11-08 PROCEDURE — 25000132 ZZH RX MED GY IP 250 OP 250 PS 637: Performed by: EMERGENCY MEDICINE

## 2018-11-08 RX ORDER — IBUPROFEN 600 MG/1
600 TABLET, FILM COATED ORAL ONCE
Status: COMPLETED | OUTPATIENT
Start: 2018-11-08 | End: 2018-11-08

## 2018-11-08 RX ADMIN — IBUPROFEN 600 MG: 600 TABLET ORAL at 00:08

## 2018-11-08 ASSESSMENT — ENCOUNTER SYMPTOMS: HEADACHES: 1

## 2018-11-08 NOTE — DISCHARGE INSTRUCTIONS
Follow-up with your regular physician.  Return to emergency department for any new or worsening symptoms.    Managing Tension-type Headache Symptoms  A tension-type headache can develop slowly. Being aware of the symptoms helps you recognize a headache early. Then you can act to reduce pain and relieve tension. Methods for relieving your symptoms include self-care and medicine.    Tension-type symptoms  The earlier you recognize the symptoms of a tension-type headache, the easier it is to treat. Tension-type headaches may:    Start with fatigue, tension, or pain in the neck and shoulders    Feel like a band around the head    Be concentrated in the temple, the back of the head, behind the eyes, or in the face    Come and go, or last for days, weeks, or even longer    Involve referred pain -- this means that the area that hurts may not be where the problem starts  Self-care during a tension-type headache  When you have a tension-type headache, there are things you can do to relax, loosen muscles, and reduce the pain:    Brush your scalp lightly with a soft hairbrush.    Give yourself a massage. Knead the muscles running from your shoulders up the back of your skull. Or ask a friend to gently massage your neck and shoulders.    Use an ice pack. Wrap a thin cloth around a cold pack, a cold can of soda, or a bag of frozen vegetables. Apply this directly to the place where you feel pain (such as your neck or temples).    Use moist heat to relax your muscles. Take a warm shower or bath. Or drape a warm, moist towel around your neck and shoulders.  Relieving pain and tension  Over-the-counter or prescription medicine can help relieve pain. Another way to reduce your pain is to use relaxation techniques to loosen tight muscles.  Medicine  Medicine used for tension-type headaches include the following:    NSAIDs (nonsteroidal anti-inflammatories), such as aspirin and ibuprofen, relieve inflammation and help block pain  signals.    Acetaminophen treats pain, and some formulations contain caffeine.     Muscle relaxants can reduce painful muscle contractions.  Taking medicine safely  Be aware that:    Taking analgesics (pain relievers) or drinking too much coffee may lead to rebound headaches (frequent or severe headaches that can happen if you miss a dose of medication), so take pain medicines only as needed. If you think you have these headaches, contact your healthcare provider.    Taking too much medicine can cause sleep problems or stomach upset. Some over-the-counter headache medications may contain caffeine. These may disrupt sleep and worsen pain.  Relaxation techniques  A , class, book, or tape may help you learn these techniques. One or more of these methods may work for you:    Deep breathing. Slow, calm, deep breathing can help you relax. Breathe in for a count of 5 or more. Then slowly let the breath out.    Visualization. Imagining a peaceful, secure scene can give you a sense of control over your body and surroundings.    Progressive relaxation. This is done by tightening and then releasing muscle groups. Start at the top of your head and work your way down your body. Tighten each muscle group for 5 to 10 seconds. Then release the muscle group for the same amount of time.    Biofeedback. This is a type of training in which you learn to control certain physical functions and responses. This helps you learn to reduce muscle tension.  Date Last Reviewed: 4/1/2018 2000-2018 [employer]. [address] [place] [address], [place] [address]. All rights reserved. This information is not intended as a substitute for professional medical care. Always follow your healthcare professional's instructions.

## 2018-11-08 NOTE — ED TRIAGE NOTES
Pt. presents to ED with c/o a recurring headache after using meth. Pt. A & O x 4, AVSS on RA. Independent.

## 2018-11-08 NOTE — ED PROVIDER NOTES
History     Chief Complaint   Patient presents with     Headache     HPI  Carlos Alberto Garcia is a 33 year old male with a history of hepatitis C, schizoaffective disorder, bipolar 2, chemical abuse, who presents to the Emergency Department for the evaluation of a headache. Patient states he has not taken anything for this. Patient states she has been outside a lot recently.  He denies any other symptoms.    I have reviewed the Medications, Allergies, Past Medical and Surgical History, and Social History in the [employer] system.  Past Medical History:   Diagnosis Date     Bipolar 2 disorder (H)      Chemical abuse (H)     cocaine, meth, cambis     Dyslipidemia      Hep C w/ coma, chronic (H)      Patient overweight      Schizoaffective disorder      Unspecified essential hypertension      Unspecified hypothyroidism        Past Surgical History:   Procedure Laterality Date     HAND SURGERY      L     ORTHOPEDIC SURGERY      hand       No family history on file.    Social History   Substance Use Topics     Smoking status: Current Every Day Smoker     Packs/day: 1.00     Types: Cigarettes     Smokeless tobacco: Current User     Alcohol use Yes      Comment: 2x/week       No current facility-administered medications for this encounter.      Current Outpatient Prescriptions   Medication     Paliperidone (INVEGA PO)     Facility-Administered Medications Ordered in Other Encounters   Medication     ibuprofen (ADVIL,MOTRIN) 600 MG tablet      No Known Allergies    Review of Systems   Neurological: Positive for headaches.   All other systems reviewed and are negative.      Physical Exam   BP: 151/82  Heart Rate: 65  Temp: 97.3  F (36.3  C)  Resp: 16  Height: 182.9 cm (6')  Weight: 113.4 kg (250 lb)  SpO2: 100 %      Physical Exam   Constitutional: He is oriented to person, place, and time. He appears well-developed and well-nourished. No distress.   HENT:   Head: Normocephalic and atraumatic.   Eyes: EOM are normal. No scleral  icterus.   Neck: Normal range of motion. Neck supple.   Pulmonary/Chest: No respiratory distress.   Abdominal: He exhibits no distension.   Neurological: He is alert and oriented to person, place, and time. Coordination normal.   Skin: Skin is warm and dry. No rash noted. He is not diaphoretic. No erythema. No pallor.       ED Course   12:01 AM  The patient was seen and examined by Alphonse Corcoran DO in Room 7.     ED Course     Procedures               Labs Ordered and Resulted from Time of ED Arrival Up to the Time of Departure from the ED - No data to display         Assessments & Plan (with Medical Decision Making)   33-year-old male presents with a chief complaint of headache.  He has normal vital signs and is afebrile.  He has no other complaints.  He reports that he is not taking anything over-the-counter because he has no money for this.  Patient was given some ibuprofen which helped with the symptoms.    I have reviewed the nursing notes.    I have reviewed the findings, diagnosis, plan and need for follow up with the patient.    New Prescriptions    No medications on file       Final diagnoses:   Tension headache     Robson PARNELL, am serving as a trained medical scribe to document services personally performed by Alphonse Corcoran DO, based on the provider's statements to me.   Alphonse PARNELL DO, was physically present and have reviewed and verified the accuracy of this note documented by Robson Sr.    11/7/2018   [facility], [place], EMERGENCY DEPARTMENT     Alphonse Corcoran DO  11/08/18 0044

## 2018-11-12 ENCOUNTER — HOSPITAL ENCOUNTER (EMERGENCY)
Facility: CLINIC | Age: 33
Discharge: HOME | End: 2018-11-12
Attending: EMERGENCY MEDICINE
Payer: COMMERCIAL

## 2018-11-12 VITALS
OXYGEN SATURATION: 100 % | RESPIRATION RATE: 16 BRPM | HEIGHT: 72 IN | HEART RATE: 83 BPM | TEMPERATURE: 97.8 F | SYSTOLIC BLOOD PRESSURE: 141 MMHG | BODY MASS INDEX: 33.65 KG/M2 | WEIGHT: 248.46 LBS | DIASTOLIC BLOOD PRESSURE: 83 MMHG

## 2018-11-12 NOTE — ED TRIAGE NOTES
"33 year old male with history of hepatitis C and schizoaffective disorder, presents with complaints of nausea and intermittent headaches.  Patient is disheveled and unkempt and when asked about his current living situation, he states the he is \"living outside\".  Patient has poor eye contact and is unable to answer all of questions or is very vague about his answers.   "

## 2018-11-16 ENCOUNTER — HOSPITAL ENCOUNTER (EMERGENCY)
Facility: CLINIC | Age: 33
Discharge: HOME | End: 2018-11-16
Attending: EMERGENCY MEDICINE | Admitting: EMERGENCY MEDICINE
Payer: COMMERCIAL

## 2018-11-16 VITALS
OXYGEN SATURATION: 99 % | RESPIRATION RATE: 18 BRPM | DIASTOLIC BLOOD PRESSURE: 91 MMHG | HEART RATE: 65 BPM | SYSTOLIC BLOOD PRESSURE: 142 MMHG | TEMPERATURE: 98.6 F

## 2018-11-16 DIAGNOSIS — G44.219 EPISODIC TENSION-TYPE HEADACHE, NOT INTRACTABLE: ICD-10-CM

## 2018-11-16 PROCEDURE — 99283 EMERGENCY DEPT VISIT LOW MDM: CPT | Performed by: EMERGENCY MEDICINE

## 2018-11-16 PROCEDURE — 25000132 ZZH RX MED GY IP 250 OP 250 PS 637: Performed by: EMERGENCY MEDICINE

## 2018-11-16 PROCEDURE — 99283 EMERGENCY DEPT VISIT LOW MDM: CPT | Mod: Z6 | Performed by: EMERGENCY MEDICINE

## 2018-11-16 RX ORDER — ACETAMINOPHEN 500 MG
1000 TABLET ORAL ONCE
Status: COMPLETED | OUTPATIENT
Start: 2018-11-16 | End: 2018-11-16

## 2018-11-16 RX ADMIN — ACETAMINOPHEN 1000 MG: 500 TABLET, FILM COATED ORAL at 03:33

## 2018-11-16 ASSESSMENT — ENCOUNTER SYMPTOMS: HEADACHES: 1

## 2018-11-16 NOTE — ED AVS SNAPSHOT
[facility], [place], Emergency Department    [address] [place] [address]    Phone:  [phone]                                       Carlos Alberto Garcia   MRN: [id number]    Department:  [facility], Emergency Department   Date of Visit:  11/16/2018           After Visit Summary Signature Page     I have received my discharge instructions, and my questions have been answered. I have discussed any challenges I see with this plan with the nurse or doctor.    ..........................................................................................................................................  Patient/Patient Representative Signature      ..........................................................................................................................................  Patient Representative Print Name and Relationship to Patient    ..................................................               ................................................  Date                                   Time    ..........................................................................................................................................  Reviewed by Signature/Title    ...................................................              ..............................................  Date                                               Time          22EPIC Rev 08/18

## 2018-11-16 NOTE — DISCHARGE INSTRUCTIONS
* Headache (Unspecified)    The cause of your headache today is not clear, but it does not appear to be the sign of any serious illness.  Under stress, some people tense the muscles of their shoulder, neck and scalp without knowing it. If this condition lasts long enough, a TENSION HEADACHE can occur.  A MIGRAINE HEADACHE is caused by changes in blood flow to the brain. It can be mild or severe. A migraine attack may be triggered by emotional stress, hormone changes during the menstrual cycle, oral contraceptives, alcohol use, certain foods containing tyramine, eye strain, weather changes, missing meals, lack of sleep or oversleeping.  Other causes of headache include a viral illness, sinus, ear or throat infection, dental pain and TMJ (jaw joint) pain.  Home Care:    If you were given pain medicine for this headache, do not drive yourself home. Arrange for a ride, instead. When you get home, try to sleep. You should feel much better when you wake up.    If you are having nausea or vomiting, follow a light diet until your headache is relieved.    If you have a migraine type headache, use sunglasses when in the daylight or around bright indoor lighting until symptoms improve. Bright glaring light can worsen this kind of headache.  Follow Up  with your doctor if the headache is not better within the next 24 hours. If you have frequent headaches you should discuss a treatment plan with your primary care doctor. By being aware of the earliest signs of headache, and starting treatment right away, you may be able to stop the pain yourself.  Get Prompt Medical Attention  if any of the following occur:    Worsening of your head pain or no improvement within 24 hours    Repeated vomiting (unable to keep liquids down)    Fever over 101 F (38.3 C)    Stiff neck    Extreme drowsiness, confusion or fainting    Weakness of an arm or leg or one side of the face    Difficulty with speech or vision    4090-5473 The \[facility]\""  [employer]. [address] [place] [address], [place] [address]. All rights reserved. This information is not intended as a substitute for professional medical care. Always follow your healthcare professional's instructions.  This information has been modified by your health care provider with permission from the publisher.  Modifications clinically reviewed by Dr. Doug Johnson on 7/20/18.

## 2018-11-16 NOTE — ED PROVIDER NOTES
History     Chief Complaint   Patient presents with     Headache     HPI  Carlos Alberto Garcia is a 33 year old male with a history of hepatitis C, schizoaffective disorder, bipolar 2, chemical abuse, who presents to the Emergency Department for the evaluation of a headache. Patient c/o of having a headache and he has not taken anything for the discomfort. Patient denies any fever, chills, vision changes, nausea, trauma. Patient has no other complaints.     I have reviewed the Medications, Allergies, Past Medical and Surgical History, and Social History in the Epic system.    Review of Systems   Neurological: Positive for headaches.   All other systems reviewed and are negative.      Physical Exam   BP: (!) 142/91  Pulse: 65  Temp: 98.6  F (37  C)  Resp: 18  SpO2: 99 %      Physical Exam  Constitutional: He is oriented to person, place, and time. He appears well-developed and well-nourished. No distress.   HENT:   Head: Normocephalic and atraumatic.   Eyes: EOM are normal. No scleral icterus.   Neck: Normal range of motion. Neck supple.   Pulmonary/Chest: No respiratory distress.   Abdominal: He exhibits no distension.   Neurological: He is alert and oriented to person, place, and time. Coordination normal.   Skin: Skin is warm and dry. No rash noted. He is not diaphoretic. No erythema. No pallor.   ED Course     ED Course     Procedures         Labs Ordered and Resulted from Time of ED Arrival Up to the Time of Departure from the ED - No data to display         Assessments & Plan (with Medical Decision Making)   Carlos Albreto Garcia is a 33 year old male with a history of hepatitis C, schizoaffective disorder, bipolar 2, chemical abuse, who presents to the Emergency Department for the evaluation of a headache.  Upon arrival patient is well-appearing, afebrile, no distress.  Cranial nerves II through XII intact with no focal motor, sensory, speech, gait deficit.  No red flags.  Patient with headache, history of  multiple emergency department visits, patient reports he has no money has not taken anything for the headache.  At this time will give Tylenol and reevaluate.  On reevaluation patient feeling better, ambulating with no difficulty and wanting to go home.  At this time plan for discharge home.  Recommend follow-up with his primary care physician.  Return precautions discussed..The patient is discharged home with instructions to return if their symptoms persist or worsen.  Plan for close follow-up with their primary physician.  I discussed workup, results, treatment, and plan with the patient.  Patient understands and agrees with the plan.       I have reviewed the nursing notes.    I have reviewed the findings, diagnosis, plan and need for follow up with the patient.    Discharge Medication List as of 11/16/2018  3:59 AM          Final diagnoses:   Episodic tension-type headache, not intractable       11/16/2018   [facility], [place], EMERGENCY DEPARTMENT     Anahi Fraser MD  11/16/18 0423

## 2018-11-16 NOTE — ED TRIAGE NOTES
Pt presents to ER with complaints of headache. Pt is not very willing to talk and elaborate with writer.

## 2018-11-16 NOTE — ED AVS SNAPSHOT
[facility], Emergency Department    [address] [facility] [address]    Phone:  [phone]                                       Carlos Alberto Garcia   MRN: [id number]    Department:  [facility], Emergency Department   Date of Visit:  11/16/2018           Patient Information     Date Of Birth          1985        Your diagnoses for this visit were:     Episodic tension-type headache, not intractable        You were seen by Anahi Fraser MD.        Discharge Instructions          * Headache (Unspecified)    The cause of your headache today is not clear, but it does not appear to be the sign of any serious illness.  Under stress, some people tense the muscles of their shoulder, neck and scalp without knowing it. If this condition lasts long enough, a TENSION HEADACHE can occur.  A MIGRAINE HEADACHE is caused by changes in blood flow to the brain. It can be mild or severe. A migraine attack may be triggered by emotional stress, hormone changes during the menstrual cycle, oral contraceptives, alcohol use, certain foods containing tyramine, eye strain, weather changes, missing meals, lack of sleep or oversleeping.  Other causes of headache include a viral illness, sinus, ear or throat infection, dental pain and TMJ (jaw joint) pain.  Home Care:    If you were given pain medicine for this headache, do not drive yourself home. Arrange for a ride, instead. When you get home, try to sleep. You should feel much better when you wake up.    If you are having nausea or vomiting, follow a light diet until your headache is relieved.    If you have a migraine type headache, use sunglasses when in the daylight or around bright indoor lighting until symptoms improve. Bright glaring light can worsen this kind of headache.  Follow Up  with your doctor if the headache is not better within the next 24 hours. If you have frequent headaches you should discuss a treatment plan with your primary care doctor. By  being aware of the earliest signs of headache, and starting treatment right away, you may be able to stop the pain yourself.  Get Prompt Medical Attention  if any of the following occur:    Worsening of your head pain or no improvement within 24 hours    Repeated vomiting (unable to keep liquids down)    Fever over 101 F (38.3 C)    Stiff neck    Extreme drowsiness, confusion or fainting    Weakness of an arm or leg or one side of the face    Difficulty with speech or vision    2540-9731 [employer]. [address] [place] [address]. All rights reserved. This information is not intended as a substitute for professional medical care. Always follow your healthcare professional's instructions.  This information has been modified by your health care provider with permission from the publisher.  Modifications clinically reviewed by Dr. Doug Johnson on 7/20/18.      24 Hour Appointment Hotline       To make an appointment at any [facility], call [phone] ([phone]). If you don't have a family doctor or clinic, we will help you find one. [facility] are conveniently located to serve the needs of you and your family.             Review of your medicines      Our records show that you are taking the medicines listed below. If these are incorrect, please call your family doctor or clinic.        Dose / Directions Last dose taken    INVEGA PO        Inject as directed every 30 days   Refills:  0                Orders Needing Specimen Collection     None      Pending Results     No orders found from 11/14/2018 to 11/17/2018.            Pending Culture Results     No orders found from 11/14/2018 to 11/17/2018.            Pending Results Instructions     If you had any lab results that were not finalized at the time of your Discharge, you can call the ED Lab Result RN at [phone]. You will be contacted by this team for any positive Lab results or changes in treatment. The nurses are  "available 7 days a week from 10A to 6:30P.  You can leave a message 24 hours per day and they will return your call.        Thank you for choosing [facility]       Thank you for choosing [facility] for your care. Our goal is always to provide you with excellent care. Hearing back from our patients is one way we can continue to improve our services. Please take a few minutes to complete the written survey that you may receive in the mail after you visit with us. Thank you!        [employer] Information     [employer] lets you send messages to your doctor, view your test results, renew your prescriptions, schedule appointments and more. To sign up, go to [email or web address] . Click on \"Log in\" on the left side of the screen, which will take you to the Welcome page. Then click on \"Sign up Now\" on the right side of the page.     You will be asked to enter the access code listed below, as well as some personal information. Please follow the directions to create your username and password.     Your access code is: [email or web address]  Expires: 2019  4:50 AM     Your access code will  in 90 days. If you need help or a new code, please call your [facility] clinic or [phone].        Care EveryWhere ID     This is your Care EveryWhere ID. This could be used by other organizations to access your [facility] medical records  [id number]        Equal Access to Services     PINEDA MARISCAL AH: Hadluc Stark, waaxda luqnazario, qaybta kaalfausto storey. So [facility] [place] [phone].    ATENCIÓN: Si habla español, tiene a weiner disposición servicios gratuitos de asistencia lingüística. Evert al [phone].    We comply with applicable federal civil rights laws and [place] laws. We do not discriminate on the basis of race, color, national origin, age, disability, sex, sexual orientation, or gender identity.            After Visit Summary       This is your record. Keep this with you and " show to your community pharmacist(s) and doctor(s) at your next visit.

## 2018-11-17 ENCOUNTER — HOSPITAL ENCOUNTER (EMERGENCY)
Facility: CLINIC | Age: 33
Discharge: HOME | End: 2018-11-17
Attending: EMERGENCY MEDICINE | Admitting: EMERGENCY MEDICINE
Payer: COMMERCIAL

## 2018-11-17 VITALS
WEIGHT: 258.6 LBS | RESPIRATION RATE: 16 BRPM | OXYGEN SATURATION: 99 % | DIASTOLIC BLOOD PRESSURE: 97 MMHG | BODY MASS INDEX: 35.07 KG/M2 | TEMPERATURE: 97.6 F | SYSTOLIC BLOOD PRESSURE: 143 MMHG

## 2018-11-17 VITALS
SYSTOLIC BLOOD PRESSURE: 142 MMHG | BODY MASS INDEX: 33.7 KG/M2 | DIASTOLIC BLOOD PRESSURE: 91 MMHG | HEIGHT: 72 IN | RESPIRATION RATE: 16 BRPM | TEMPERATURE: 97.2 F | OXYGEN SATURATION: 94 %

## 2018-11-17 DIAGNOSIS — R51.9 NONINTRACTABLE EPISODIC HEADACHE, UNSPECIFIED HEADACHE TYPE: ICD-10-CM

## 2018-11-17 DIAGNOSIS — Z76.5 MALINGERING: ICD-10-CM

## 2018-11-17 PROCEDURE — 99282 EMERGENCY DEPT VISIT SF MDM: CPT | Mod: Z6 | Performed by: EMERGENCY MEDICINE

## 2018-11-17 PROCEDURE — 99283 EMERGENCY DEPT VISIT LOW MDM: CPT | Performed by: EMERGENCY MEDICINE

## 2018-11-17 PROCEDURE — 25000132 ZZH RX MED GY IP 250 OP 250 PS 637: Performed by: EMERGENCY MEDICINE

## 2018-11-17 RX ORDER — ACETAMINOPHEN 500 MG
1000 TABLET ORAL ONCE
Status: COMPLETED | OUTPATIENT
Start: 2018-11-17 | End: 2018-11-17

## 2018-11-17 RX ADMIN — ACETAMINOPHEN 1000 MG: 500 TABLET, FILM COATED ORAL at 01:16

## 2018-11-17 RX ADMIN — ACETAMINOPHEN 1000 MG: 500 TABLET, FILM COATED ORAL at 20:32

## 2018-11-17 ASSESSMENT — ENCOUNTER SYMPTOMS
CONFUSION: 0
COLOR CHANGE: 0
FEVER: 0
DIFFICULTY URINATING: 0
ARTHRALGIAS: 0
HEADACHES: 1
EYE REDNESS: 0
NECK STIFFNESS: 0
ABDOMINAL PAIN: 0
SHORTNESS OF BREATH: 0

## 2018-11-17 NOTE — ED AVS SNAPSHOT
[facility], Emergency Department    [address] [facility] [address]    Phone:  [phone]                                       Carlos Alberto Garcia   MRN: [id number]    Department:  [facility], Emergency Department   Date of Visit:  11/17/2018           Patient Information     Date Of Birth          1985        Your diagnoses for this visit were:     Malingering        You were seen by Jose Peralta MD.        Discharge Instructions       Please make an appointment to follow up with Your Primary Care Provider as soon as possible.      24 Hour Appointment Hotline       To make an appointment at any [facility] clinic, call [phone] ([phone]). If you don't have a family doctor or clinic, we will help you find one. [facility] clinics are conveniently located to serve the needs of you and your family.             Review of your medicines      Our records show that you are taking the medicines listed below. If these are incorrect, please call your family doctor or clinic.        Dose / Directions Last dose taken    INVEGA PO        Inject as directed every 30 days   Refills:  0                Orders Needing Specimen Collection     None      Pending Results     No orders found from 11/15/2018 to 11/18/2018.            Pending Culture Results     No orders found from 11/15/2018 to 11/18/2018.            Pending Results Instructions     If you had any lab results that were not finalized at the time of your Discharge, you can call the ED Lab Result RN at [phone]. You will be contacted by this team for any positive Lab results or changes in treatment. The nurses are available 7 days a week from 10A to 6:30P.  You can leave a message 24 hours per day and they will return your call.        Thank you for choosing [facility]       Thank you for choosing [facility] for your care. Our goal is always to provide you with excellent care. Hearing back from our patients is one way we can continue to  "improve our services. Please take a few minutes to complete the written survey that you may receive in the mail after you visit with us. Thank you!        [employer] Information     [employer] lets you send messages to your doctor, view your test results, renew your prescriptions, schedule appointments and more. To sign up, go to [email or web address] . Click on \"Log in\" on the left side of the screen, which will take you to the Welcome page. Then click on \"Sign up Now\" on the right side of the page.     You will be asked to enter the access code listed below, as well as some personal information. Please follow the directions to create your username and password.     Your access code is: [email or web address]  Expires: 2019  4:50 AM     Your access code will  in 90 days. If you need help or a new code, please call your [facility] clinic or [phone].        Care EveryWhere ID     This is your Care EveryWhere ID. This could be used by other organizations to access your [facility] medical records  [id number]        Equal Access to Services     [facility]: Hadii los Stark, warosario sesay, qayadira eldridgealshikha bragg, fausto cordero. So [facility] [phone].    ATENCIÓN: Si habla español, tiene a weiner disposición servicios gratuitos de asistencia lingüística. Evert al [phone].    We comply with applicable federal civil rights laws and [place] laws. We do not discriminate on the basis of race, color, national origin, age, disability, sex, sexual orientation, or gender identity.            After Visit Summary       This is your record. Keep this with you and show to your community pharmacist(s) and doctor(s) at your next visit.                  "

## 2018-11-17 NOTE — ED PROVIDER NOTES
History     Chief Complaint   Patient presents with     Headache     HPI  Carlos Alberto Garcia is a 33 year old male with a history of schizoaffective disorder and homelessness who presents to the emergency department with a frontal headache.  The patient reports frequent frontal headaches.  He is seen often in the emergency department for this complaint.  He denies any recent illness.  He denies any fever.  Patient denies any photophobia.  No visual disturbance.  No nausea or vomiting.  Patient denies any weakness or numbness.  He denies any recent fall or injury.  Patient denies taking any analgesics prior to coming to the emergency department.  Patient denies any other concerns.    I have reviewed the Medications, Allergies, Past Medical and Surgical History, and Social History in the Epic system.    Review of Systems   Constitutional: Negative for fever.   HENT: Negative for congestion.    Eyes: Negative for redness.   Respiratory: Negative for shortness of breath.    Cardiovascular: Negative for chest pain.   Gastrointestinal: Negative for abdominal pain.   Genitourinary: Negative for difficulty urinating.   Musculoskeletal: Negative for arthralgias and neck stiffness.   Skin: Negative for color change.   Neurological: Positive for headaches.   Psychiatric/Behavioral: Negative for confusion.   All other systems reviewed and are negative.      Physical Exam   BP: (!) 142/91  Heart Rate: 73  Temp: 97.2  F (36.2  C)  Resp: 16  Height: 182.9 cm (6')  SpO2: 94 %      Physical Exam   Constitutional: He is oriented to person, place, and time. He appears well-developed and well-nourished. No distress.   unkempt   HENT:   Head: Normocephalic and atraumatic.   Mouth/Throat: Oropharynx is clear and moist. No oropharyngeal exudate.   Eyes: Pupils are equal, round, and reactive to light. No scleral icterus.   Neck: Normal range of motion.   Cardiovascular: Normal rate, regular rhythm, normal heart sounds and intact distal  pulses.    Pulmonary/Chest: Effort normal and breath sounds normal. No respiratory distress.   Abdominal: Soft. Bowel sounds are normal. There is no tenderness.   Musculoskeletal: Normal range of motion. He exhibits no edema or tenderness.   Neurological: He is alert and oriented to person, place, and time. He has normal strength. No cranial nerve deficit. Coordination and gait normal. GCS eye subscore is 4. GCS verbal subscore is 5. GCS motor subscore is 6.   Skin: Skin is warm and dry. No rash noted. He is not diaphoretic.   Nursing note and vitals reviewed.      ED Course     ED Course     Procedures            Critical Care time:    Medications   acetaminophen (TYLENOL) tablet 1,000 mg (1,000 mg Oral Given 11/17/18 0116)               Assessments & Plan (with Medical Decision Making)   33 year old male with history of schizoaffective disorder and homelessness who presents to the emergency department for a frontal headache.  He is seen frequently for the same.  The patient does not have any evidence for acute head injury.  He has normal vital signs and a normal physical examination.  He was given a dose of acetaminophen.  Shortly thereafter, the patient left the emergency department.    I have reviewed the nursing notes.    I have reviewed the findings, diagnosis, plan and need for follow up with the patient.    Discharge Medication List as of 11/17/2018  2:50 AM          Final diagnoses:   Nonintractable episodic headache, unspecified headache type       11/17/2018   [facility], EMERGENCY DEPARTMENT     José Watson MD  11/17/18 0407

## 2018-11-17 NOTE — ED NOTES
Patient found to be walking in halls. Patient states he wants to go outside and smoke and then leave. Patient left without being discharged.  updated.

## 2018-11-17 NOTE — ED AVS SNAPSHOT
[facility], [place], Emergency Department    [address] [place] [address]    Phone:  [phone]                                       Carlos Alberto Garcia   MRN: [id number]    Department:  [facility], Emergency Department   Date of Visit:  11/17/2018           After Visit Summary Signature Page     I have received my discharge instructions, and my questions have been answered. I have discussed any challenges I see with this plan with the nurse or doctor.    ..........................................................................................................................................  Patient/Patient Representative Signature      ..........................................................................................................................................  Patient Representative Print Name and Relationship to Patient    ..................................................               ................................................  Date                                   Time    ..........................................................................................................................................  Reviewed by Signature/Title    ...................................................              ..............................................  Date                                               Time          22EPIC Rev 08/18

## 2018-11-18 ENCOUNTER — HOSPITAL ENCOUNTER (EMERGENCY)
Facility: CLINIC | Age: 33
Discharge: HOME | End: 2018-11-19
Attending: EMERGENCY MEDICINE | Admitting: EMERGENCY MEDICINE
Payer: COMMERCIAL

## 2018-11-18 DIAGNOSIS — Z76.5 MALINGERING: ICD-10-CM

## 2018-11-18 PROCEDURE — 99283 EMERGENCY DEPT VISIT LOW MDM: CPT | Mod: Z6 | Performed by: EMERGENCY MEDICINE

## 2018-11-18 PROCEDURE — 99283 EMERGENCY DEPT VISIT LOW MDM: CPT | Performed by: EMERGENCY MEDICINE

## 2018-11-18 RX ORDER — ONDANSETRON 4 MG/1
4 TABLET, ORALLY DISINTEGRATING ORAL ONCE
Status: COMPLETED | OUTPATIENT
Start: 2018-11-19 | End: 2018-11-19

## 2018-11-18 NOTE — ED PROVIDER NOTES
"  History     Chief Complaint   Patient presents with     Headache     HPI  Carlos Alberto Garcia is a 33 year old male with a history of schizoaffective disorder, bipolar 2 disorder, HTN, hepatitis C, and meth abuse who presents for evaluation of a headache. Per triage note, patient complains of a \"meth headache\". He states he last used meth \"recently\".     I have reviewed the Medications, Allergies, Past Medical and Surgical History, and Social History in the Epic system.    Review of Systems   All other systems reviewed and are negative.      Physical Exam   BP: (!) 143/97  Heart Rate: 67  Temp: 97.6  F (36.4  C)  Resp: 16  Weight: 117.3 kg (258 lb 9.6 oz)  SpO2: 99 %      Physical Exam   Constitutional: He is oriented to person, place, and time. He appears well-developed and well-nourished. No distress.   Very disheveled.    HENT:   Head: Normocephalic and atraumatic.   Eyes: No scleral icterus.   Neck: Normal range of motion. Neck supple.   Cardiovascular: Normal rate.    Pulmonary/Chest: Effort normal. No respiratory distress.   Neurological: He is alert and oriented to person, place, and time.   Skin: Skin is warm and dry. No rash noted. He is not diaphoretic. No erythema. No pallor.       ED Course     ED Course     Procedures             Critical Care time:  none             Labs Ordered and Resulted from Time of ED Arrival Up to the Time of Departure from the ED - No data to display         Assessments & Plan (with Medical Decision Making)   This is a 33-year-old male well-known to the emergency room for frequent use and malingering.  Patient presents with a headache after using methamphetamine today.  He states that he used meth a couple hours ago prior to coming into the emergency room and developed this headache as he typically does.  He has no other complaints at this time.  On physical exam he is extremely disheveled and dirty.  He has no neurologic deficits and appears that his baseline.  I believe he " can be safely provided with Tylenol and discharged from the emergency room.    I have reviewed the nursing notes.    I have reviewed the findings, diagnosis, plan and need for follow up with the patient.    Discharge Medication List as of 11/17/2018  8:33 PM          Final diagnoses:   Malingering       11/17/2018   [facility], [place], EMERGENCY DEPARTMENT     Jose Peralta MD  11/18/18 5713

## 2018-11-18 NOTE — ED TRIAGE NOTES
"Pt arrived to the ED with c/o \"meth headache.\" Pt is alert and oriented to self and situation, disoriented to time. Per pt he last used meth \"recently.\" When asked questions pt often gives short answers or does not answer. Appears disheveled. Vitals stable, afebrile.    "

## 2018-11-18 NOTE — ED AVS SNAPSHOT
[facility], Emergency Department    [address] [address]    Phone:  [phone]                                       Carlos Alberto Garcia   MRN: [id number]    Department:  [facility], Emergency Department   Date of Visit:  11/18/2018           Patient Information     Date Of Birth          1985        Your diagnoses for this visit were:     None       You were seen by Sandhya Huang MD.        Discharge Instructions       You have been seen in the ER for nausea.  You were offered zofran to help with this symptom.  You should follow up with your primary clinic.     24 Hour Appointment Hotline       To make an appointment at any [facility] clinic, call [phone] ([phone]). If you don't have a family doctor or clinic, we will help you find one. [facility] clinics are conveniently located to serve the needs of you and your family.             Review of your medicines      Notice     You have not been prescribed any medications.            Orders Needing Specimen Collection     None      Pending Results     No orders found for last 3 day(s).            Pending Culture Results     No orders found for last 3 day(s).            Pending Results Instructions     If you had any lab results that were not finalized at the time of your Discharge, you can call the ED Lab Result RN at [phone]. You will be contacted by this team for any positive Lab results or changes in treatment. The nurses are available 7 days a week from 10A to 6:30P.  You can leave a message 24 hours per day and they will return your call.        Thank you for choosing [facility]       Thank you for choosing [facility] for your care. Our goal is always to provide you with excellent care. Hearing back from our patients is one way we can continue to improve our services. Please take a few minutes to complete the written survey that you may receive in the mail after you visit with us. Thank you!        Justin  "Information     [employer] App lets you send messages to your doctor, view your test results, renew your prescriptions, schedule appointments and more. To sign up, go to [email or web address] . Click on \"Log in\" on the left side of the screen, which will take you to the Welcome page. Then click on \"Sign up Now\" on the right side of the page.     You will be asked to enter the access code listed below, as well as some personal information. Please follow the directions to create your username and password.     Your access code is: [email or web address]  Expires: 2019  4:50 AM     Your access code will  in 90 days. If you need help or a new code, please call your [facility] clinic or [phone].        Care EveryWhere ID     This is your Care EveryWhere ID. This could be used by other organizations to access your [facility] medical records  [id number]        Equal Access to Services     [facility]: Hadii los juarezo Soisabel, waaxda lázaro, qataylorta kaalmada mahsa, fausto johnson . So [facility] [phone].    ATENCIÓN: Si habla español, tiene a weiner disposición servicios gratuitos de asistencia lingüística. Llame al [phone].    We comply with applicable federal civil rights laws and [place] laws. We do not discriminate on the basis of race, color, national origin, age, disability, sex, sexual orientation, or gender identity.            After Visit Summary       This is your record. Keep this with you and show to your community pharmacist(s) and doctor(s) at your next visit.                  "

## 2018-11-18 NOTE — ED AVS SNAPSHOT
[facility], [place], Emergency Department    [address] [place] [address]    Phone:  [phone]                                       Carlos Alberto Garcia   MRN: [id number]    Department:  [facility], Emergency Department   Date of Visit:  11/18/2018           After Visit Summary Signature Page     I have received my discharge instructions, and my questions have been answered. I have discussed any challenges I see with this plan with the nurse or doctor.    ..........................................................................................................................................  Patient/Patient Representative Signature      ..........................................................................................................................................  Patient Representative Print Name and Relationship to Patient    ..................................................               ................................................  Date                                   Time    ..........................................................................................................................................  Reviewed by Signature/Title    ...................................................              ..............................................  Date                                               Time          22EPIC Rev 08/18

## 2018-11-19 VITALS
TEMPERATURE: 97.7 F | BODY MASS INDEX: 33.86 KG/M2 | RESPIRATION RATE: 16 BRPM | SYSTOLIC BLOOD PRESSURE: 153 MMHG | OXYGEN SATURATION: 100 % | HEIGHT: 72 IN | DIASTOLIC BLOOD PRESSURE: 86 MMHG | WEIGHT: 250 LBS

## 2018-11-19 PROCEDURE — 25000131 ZZH RX MED GY IP 250 OP 636 PS 637: Performed by: EMERGENCY MEDICINE

## 2018-11-19 RX ADMIN — ONDANSETRON 4 MG: 4 TABLET, ORALLY DISINTEGRATING ORAL at 00:19

## 2018-11-19 ASSESSMENT — ENCOUNTER SYMPTOMS
NAUSEA: 1
DYSURIA: 0
FREQUENCY: 0
ABDOMINAL PAIN: 1
FEVER: 0
HEMATURIA: 0
VOMITING: 1
DIARRHEA: 0

## 2018-11-19 NOTE — DISCHARGE INSTRUCTIONS
You have been seen in the ER for nausea.  You were offered zofran to help with this symptom.  You should follow up with your primary clinic.

## 2018-11-19 NOTE — ED PROVIDER NOTES
"  History     Chief Complaint   Patient presents with     Nausea     The history is provided by the patient.     Carlos Alberto Garcia is a 33 year old male who is extremely well-known to the emergency department for frequent visits and malingering who presents to the ED today complaining of nausea.  He is lying prone on the bed in the hallway, despite the fact that he was roomed in a chair in the hallway.  He is not overly interested in talking.  He states that he has had some nausea which started today.  When asked about vomiting, his answer is \"a little bit \".  When asked if he has any abdominal pain he says \"a little bit \".  He does not have any documented fevers.  No urinary symptoms.  No diarrhea.  He last ate just earlier today.  He has had 15 emergency department visits since the beginning of October.    This part of the document was transcribed by Aniket Forte Medical Scribe.     Past Medical History:   Diagnosis Date     Bipolar 2 disorder (H)      Chemical abuse (H)     cocaine, meth, cambis     Dyslipidemia      Hep C w/ coma, chronic (H)      Patient overweight      Schizoaffective disorder      Unspecified essential hypertension      Unspecified hypothyroidism        Past Surgical History:   Procedure Laterality Date     HAND SURGERY      L     ORTHOPEDIC SURGERY      hand       No family history on file.    Social History   Substance Use Topics     Smoking status: Current Every Day Smoker     Packs/day: 1.00     Types: Cigarettes     Smokeless tobacco: Current User     Alcohol use No       No current facility-administered medications for this encounter.      No current outpatient prescriptions on file.     Facility-Administered Medications Ordered in Other Encounters   Medication     ibuprofen (ADVIL,MOTRIN) 600 MG tablet        Allergies   Allergen Reactions     Vitamin C [Ascorbate] Unknown       I have reviewed the Medications, Allergies, Past Medical and Surgical History, and Social History in the " Epic system.    Review of Systems   Constitutional: Negative for fever.   Gastrointestinal: Positive for abdominal pain, nausea and vomiting. Negative for diarrhea.   Genitourinary: Negative for dysuria, frequency, hematuria and urgency.   All other systems reviewed and are negative.      Physical Exam   BP: 153/86  Heart Rate: 116  Temp: 97.7  F (36.5  C)  Resp: 16  Height: 182.9 cm (6')  Weight: 113.4 kg (250 lb)  SpO2: 100 %      Physical Exam   Constitutional:   Lying prone on bed in hallway. Not overly interested in answering questions.  Very malodorous and unkempt.   HENT:   Head: Normocephalic.   Cardiovascular: Normal rate, regular rhythm, normal heart sounds and intact distal pulses.    No murmur heard.  Pulmonary/Chest: Effort normal and breath sounds normal. No respiratory distress. He has no wheezes. He has no rales.   Abdominal: Soft. He exhibits no distension. There is no tenderness. There is no rebound.   Psychiatric:   Flat affect.    Nursing note and vitals reviewed.      ED Course     ED Course     Procedures             Critical Care time:  none             Labs Ordered and Resulted from Time of ED Arrival Up to the Time of Departure from the ED - No data to display         Assessments & Plan (with Medical Decision Making)   Patient presents with the above complaints.  He is in no distress on exam.  He was offered Zofran for nausea. He was offered juice.  He will be discharged at this point.  I have strong suspicions that he is malingering.    The patient refused to leave the emergency department when nursing attempted to discharge him x2.  Security subsequently escorted him out of the department.    This part of the document was transcribed by Aniket Forte Medical Scribe.     I have reviewed the nursing notes.    I have reviewed the findings, diagnosis, plan and need for follow up with the patient.    There are no discharge medications for this patient.      Final diagnoses:   Malingering        11/18/2018   [facility], [place], EMERGENCY DEPARTMENT     Sandhya Huang MD  11/19/18 0145

## 2018-11-20 ENCOUNTER — HOSPITAL ENCOUNTER (EMERGENCY)
Facility: CLINIC | Age: 33
Discharge: HOME | End: 2018-11-20
Attending: EMERGENCY MEDICINE | Admitting: EMERGENCY MEDICINE
Payer: COMMERCIAL

## 2018-11-20 VITALS
OXYGEN SATURATION: 100 % | TEMPERATURE: 97.8 F | HEART RATE: 84 BPM | SYSTOLIC BLOOD PRESSURE: 90 MMHG | DIASTOLIC BLOOD PRESSURE: 52 MMHG

## 2018-11-20 DIAGNOSIS — R11.0 NAUSEA: ICD-10-CM

## 2018-11-20 PROCEDURE — 99283 EMERGENCY DEPT VISIT LOW MDM: CPT | Performed by: EMERGENCY MEDICINE

## 2018-11-20 PROCEDURE — 25000131 ZZH RX MED GY IP 250 OP 636 PS 637: Performed by: EMERGENCY MEDICINE

## 2018-11-20 PROCEDURE — 99282 EMERGENCY DEPT VISIT SF MDM: CPT | Mod: Z6 | Performed by: EMERGENCY MEDICINE

## 2018-11-20 RX ORDER — ONDANSETRON 4 MG/1
4 TABLET, ORALLY DISINTEGRATING ORAL ONCE
Status: COMPLETED | OUTPATIENT
Start: 2018-11-20 | End: 2018-11-20

## 2018-11-20 RX ADMIN — ONDANSETRON 4 MG: 4 TABLET, ORALLY DISINTEGRATING ORAL at 02:25

## 2018-11-20 ASSESSMENT — ENCOUNTER SYMPTOMS
DIFFICULTY URINATING: 0
COLOR CHANGE: 0
EYE REDNESS: 0
FEVER: 0
NECK STIFFNESS: 0
SHORTNESS OF BREATH: 0
ABDOMINAL PAIN: 0
CONFUSION: 0
ARTHRALGIAS: 0
HEADACHES: 0

## 2018-11-20 NOTE — ED PROVIDER NOTES
History     Chief Complaint   Patient presents with     Nausea     HPI  Carlos Alberto Garcia is a 33 year old male who presents with a chief complaint of nausea.  He states he has been nauseous for several hours.  He denies any vomiting.  He denies abdominal pain.  No fevers or chills.  He denies alcohol or illicit substance abuse.  He denies any chest pain.  No recent trauma.  He does report that he is currently homeless and he comments it is cold out.    I have reviewed the Medications, Allergies, Past Medical and Surgical History, and Social History in the [employer] system.  Past Medical History:   Diagnosis Date     Bipolar 2 disorder (H)      Chemical abuse (H)     cocaine, meth, cambis     Dyslipidemia      Hep C w/ coma, chronic (H)      Patient overweight      Schizoaffective disorder      Unspecified essential hypertension      Unspecified hypothyroidism        Past Surgical History:   Procedure Laterality Date     HAND SURGERY      L     ORTHOPEDIC SURGERY      hand       No family history on file.    Social History   Substance Use Topics     Smoking status: Current Every Day Smoker     Packs/day: 1.00     Types: Cigarettes     Smokeless tobacco: Current User     Alcohol use No       Review of Systems   Constitutional: Negative for fever.   HENT: Negative for congestion.    Eyes: Negative for redness.   Respiratory: Negative for shortness of breath.    Cardiovascular: Negative for chest pain.   Gastrointestinal: Negative for abdominal pain.   Genitourinary: Negative for difficulty urinating.   Musculoskeletal: Negative for arthralgias and neck stiffness.   Skin: Negative for color change.   Neurological: Negative for headaches.   Psychiatric/Behavioral: Negative for confusion.       Physical Exam   BP: (!) 145/106  Pulse: 84  Temp: 97.8  F (36.6  C)  SpO2: 98 %      Physical Exam   Constitutional: No distress.   HENT:   Head: Atraumatic.   Mouth/Throat: Oropharynx is clear and moist. No oropharyngeal exudate.    Eyes: Pupils are equal, round, and reactive to light. No scleral icterus.   Cardiovascular: Normal heart sounds.    Pulmonary/Chest: Breath sounds normal. No respiratory distress.   Abdominal: Soft. He exhibits no distension. There is no tenderness.   Musculoskeletal: He exhibits no edema or tenderness.   Neurological: He is alert.   Skin: Skin is warm. He is not diaphoretic.   Psychiatric: He has a normal mood and affect. His behavior is normal.       ED Course     ED Course     Procedures          Labs Ordered and Resulted from Time of ED Arrival Up to the Time of Departure from the ED - No data to display         Assessments & Plan (with Medical Decision Making)   33-year-old male presents with a chief complaint of nausea.  He has presented to us for days in a row with a variety of complaints.  He does note that he is homeless and it is cold out.  Patient did not vomit in her presence.  He was given a dose of Zofran and was resting comfortably during the rest of his stay.  He did not vomit while he was here.  He had no abdominal tenderness or other concerning physical exam findings.  At this point he is stable for discharge.  I have reviewed the nursing notes.    I have reviewed the findings, diagnosis, plan and need for follow up with the patient.    New Prescriptions    No medications on file       Final diagnoses:   Nausea       11/20/2018   [facility], [place], EMERGENCY DEPARTMENT     Alphonse Corcoran, DO  11/20/18 0556

## 2018-11-20 NOTE — ED AVS SNAPSHOT
[facility], [place], Emergency Department    [address] [place] [address]    Phone:  [phone]                                       Carlos Alberto Garcia   MRN: [id number]    Department:  [facility], Emergency Department   Date of Visit:  11/20/2018           After Visit Summary Signature Page     I have received my discharge instructions, and my questions have been answered. I have discussed any challenges I see with this plan with the nurse or doctor.    ..........................................................................................................................................  Patient/Patient Representative Signature      ..........................................................................................................................................  Patient Representative Print Name and Relationship to Patient    ..................................................               ................................................  Date                                   Time    ..........................................................................................................................................  Reviewed by Signature/Title    ...................................................              ..............................................  Date                                               Time          22EPIC Rev 08/18

## 2018-11-20 NOTE — ED AVS SNAPSHOT
[facility], Emergency Department    [address] [address]    Phone:  [phone]                                       Carlos Alberto Garcia   MRN: [id number]    Department:  [facility], Emergency Department   Date of Visit:  11/20/2018           Patient Information     Date Of Birth          1985        Your diagnoses for this visit were:     Nausea        You were seen by Alphonse Corcoran DO.        Discharge Instructions       Follow-up with your primary care doctor and return the emergency department as needed.    24 Hour Appointment Hotline       To make an appointment at any [facility] clinic, call [phone] ([phone]). If you don't have a family doctor or clinic, we will help you find one. [facility] clinics are conveniently located to serve the needs of you and your family.             Review of your medicines      Notice     You have not been prescribed any medications.            Orders Needing Specimen Collection     None      Pending Results     No orders found from 11/18/2018 to 11/21/2018.            Pending Culture Results     No orders found from 11/18/2018 to 11/21/2018.            Pending Results Instructions     If you had any lab results that were not finalized at the time of your Discharge, you can call the ED Lab Result RN at [phone]. You will be contacted by this team for any positive Lab results or changes in treatment. The nurses are available 7 days a week from 10A to 6:30P.  You can leave a message 24 hours per day and they will return your call.        Thank you for choosing [facility]       Thank you for choosing [facility] for your care. Our goal is always to provide you with excellent care. Hearing back from our patients is one way we can continue to improve our services. Please take a few minutes to complete the written survey that you may receive in the mail after you visit with us. Thank you!        MyChart Information     [employer] lets you  "send messages to your doctor, view your test results, renew your prescriptions, schedule appointments and more. To sign up, go to [email or web address] . Click on \"Log in\" on the left side of the screen, which will take you to the Welcome page. Then click on \"Sign up Now\" on the right side of the page.     You will be asked to enter the access code listed below, as well as some personal information. Please follow the directions to create your username and password.     Your access code is: [email or web address]  Expires: 2019  4:50 AM     Your access code will  in 90 days. If you need help or a new code, please call your [facility] clinic or [phone].        Care EveryWhere ID     This is your Care EveryWhere ID. This could be used by other organizations to access your [facility] medical records  [id number]        Equal Access to Services     PINEDA MARISCAL : Hadii los Stark, waaxda lázaro, qaybta kaalmada mahsa, fausto johnson . So [facility] [phone].    ATENCIÓN: Si habla español, tiene a weiner disposición servicios gratuitos de asistencia lingüística. Llfortino al [phone].    We comply with applicable federal civil rights laws and [place] laws. We do not discriminate on the basis of race, color, national origin, age, disability, sex, sexual orientation, or gender identity.            After Visit Summary       This is your record. Keep this with you and show to your community pharmacist(s) and doctor(s) at your next visit.                  "

## 2018-11-21 ENCOUNTER — HOSPITAL ENCOUNTER (EMERGENCY)
Facility: CLINIC | Age: 33
Discharge: HOME | End: 2018-11-21
Attending: EMERGENCY MEDICINE | Admitting: EMERGENCY MEDICINE
Payer: COMMERCIAL

## 2018-11-21 VITALS
RESPIRATION RATE: 14 BRPM | WEIGHT: 240 LBS | DIASTOLIC BLOOD PRESSURE: 92 MMHG | OXYGEN SATURATION: 99 % | BODY MASS INDEX: 32.51 KG/M2 | SYSTOLIC BLOOD PRESSURE: 142 MMHG | HEART RATE: 73 BPM | HEIGHT: 72 IN | TEMPERATURE: 98.1 F

## 2018-11-21 DIAGNOSIS — Z00.00 WELL ADULT HEALTH CHECK: ICD-10-CM

## 2018-11-21 DIAGNOSIS — Z76.5 MALINGERING: ICD-10-CM

## 2018-11-21 PROCEDURE — 99282 EMERGENCY DEPT VISIT SF MDM: CPT | Mod: Z6 | Performed by: EMERGENCY MEDICINE

## 2018-11-21 PROCEDURE — 99282 EMERGENCY DEPT VISIT SF MDM: CPT | Performed by: EMERGENCY MEDICINE

## 2018-11-21 ASSESSMENT — ENCOUNTER SYMPTOMS: ROS GI COMMENTS: RECTAL ITCHING

## 2018-11-21 NOTE — ED AVS SNAPSHOT
[facility], Emergency Department    [address] [facility] [address]    Phone:  [phone]                                       Carlos Alberto Garcia   MRN: [id number]    Department:  [facility], Emergency Department   Date of Visit:  11/21/2018           Patient Information     Date Of Birth          1985        Your diagnoses for this visit were:     Malingering     Well adult health check        You were seen by Anahi Fraser MD.      24 Hour Appointment Hotline       To make an appointment at any [facility] clinic, call [phone] ([phone]). If you don't have a family doctor or clinic, we will help you find one. [facility] clinics are conveniently located to serve the needs of you and your family.             Review of your medicines      Notice     You have not been prescribed any medications.            Orders Needing Specimen Collection     None      Pending Results     No orders found from 11/19/2018 to 11/22/2018.            Pending Culture Results     No orders found from 11/19/2018 to 11/22/2018.            Pending Results Instructions     If you had any lab results that were not finalized at the time of your Discharge, you can call the ED Lab Result RN at [phone]. You will be contacted by this team for any positive Lab results or changes in treatment. The nurses are available 7 days a week from 10A to 6:30P.  You can leave a message 24 hours per day and they will return your call.        Thank you for choosing [facility]       Thank you for choosing [facility] for your care. Our goal is always to provide you with excellent care. Hearing back from our patients is one way we can continue to improve our services. Please take a few minutes to complete the written survey that you may receive in the mail after you visit with us. Thank you!        [employer] Information     [employer] lets you send messages to your doctor, view your test results, renew your prescriptions, schedule  "appointments and more. To sign up, go to [email or web address] . Click on \"Log in\" on the left side of the screen, which will take you to the Welcome page. Then click on \"Sign up Now\" on the right side of the page.     You will be asked to enter the access code listed below, as well as some personal information. Please follow the directions to create your username and password.     Your access code is: [email or web address]  Expires: 2019  4:50 AM     Your access code will  in 90 days. If you need help or a new code, please call your [facility] clinic or [phone].        Care EveryWhere ID     This is your Care EveryWhere ID. This could be used by other organizations to access your [facility] medical records  [id number]        Equal Access to Services     PINEDA MARISCAL : Carleen Stark, roque sesay, loreta bragg, fausto cordero. So [facility] [phone].    ATENCIÓN: Si habla español, tiene a weiner disposición servicios gratuitos de asistencia lingüística. Llame al [phone].    We comply with applicable federal civil rights laws and [place] laws. We do not discriminate on the basis of race, color, national origin, age, disability, sex, sexual orientation, or gender identity.            After Visit Summary       This is your record. Keep this with you and show to your community pharmacist(s) and doctor(s) at your next visit.                  "

## 2018-11-21 NOTE — ED PROVIDER NOTES
History     Chief Complaint   Patient presents with     Rectal Bleeding     bright red     HPI  Carlos Alberto Garcia is a 33 year old male with a history of schizoaffective disorder, bipolar 2 disorder, HTN, hepatitis C, and meth abuse who presents for evaluation of rectal bleeding.  Patient reports that when he had a bowel movement today he noticed some blood.  Patient states that he did not have pain with bowel movement and states he has been having normal bowel movements.  Patient does report that his skin is dry and he has been itching his bottom and rectal region a lot.  Patient denies any other complaints.  No other history.    I have reviewed the Medications, Allergies, Past Medical and Surgical History, and Social History in the [employer] system.    Review of Systems   Gastrointestinal:        Rectal itching   All other systems reviewed and are negative.      Physical Exam   BP: (!) 142/92  Pulse: 73  Temp: 98.1  F (36.7  C)  Resp: 14  Height: 182.9 cm (6')  Weight: 108.9 kg (240 lb)  SpO2: 99 %      Physical Exam  Constitutional:   well nourished, well developed, resting comfortably   HENT:   Head: Normocephalic and atraumatic.   Eyes: Conjunctivae are normal. Pupils are equal, round, and reactive to light.    mucous membranes are moist  Neck:   no adenopathy, no bony tenderness  Cardiovascular: regular rate and rhythm without murmurs or gallops  Pulmonary/Chest: Clear to auscultation bilaterally, with no wheezes or retractions. No respiratory distress.  GI: Soft with good bowel sounds.  Non-tender, non-distended, with no guarding, no rebound, no peritoneal signs. Patient refusing rectal examination  Back:  No bony or CVA tenderness   Musculoskeletal:  no edema or clubbing   Skin: Skin is warm and dry. No rash noted.   Neurological: alert and oriented to person, place, and time. Nonfocal exam  Psychiatric:  normal mood and affect.     ED Course     ED Course     Procedures          Labs Ordered and Resulted from  Time of ED Arrival Up to the Time of Departure from the ED - No data to display         Assessments & Plan (with Medical Decision Making)   Carlos Alberto Garcia is a 33 year old male with a history of schizoaffective disorder, bipolar 2 disorder, HTN, hepatitis C, and meth abuse who presents for evaluation of rectal bleeding.  Upon arrival patient is well-appearing, afebrile, no distress.  Patient hemodynamically stable.  Patient reports an episode of bleeding during his bowel movement today along with dry and itchy rectal region.  Patient refusing a rectal examination and will not let me look at his rectal area.  Patient has a history of multiple emergency department visits and malingering.  At this time no emergent need for further evaluation or treatment.  Recommend following up with his  primary care physician and return precautions discussed.  Patient understands and agrees the plan. .The patient is discharged home with instructions to return if their symptoms persist or worsen.  Plan for close follow-up with their primary physician.  I discussed workup, results, treatment, and plan with the patient.  Patient understands and agrees with the plan.      I have reviewed the nursing notes.    I have reviewed the findings, diagnosis, plan and need for follow up with the patient.    New Prescriptions    No medications on file       Final diagnoses:   Malingering   Well adult health check       11/21/2018   [facility], [place], EMERGENCY DEPARTMENT     Anahi Fraser MD  11/21/18 3456

## 2018-11-21 NOTE — ED TRIAGE NOTES
Pt states he had a bowel movement last night and he saw bright red blood in it, came in to see what he should do. Denies nausea/vomiting

## 2018-11-21 NOTE — ED AVS SNAPSHOT
[facility], [place], Emergency Department    [address] [place] [address]    Phone:  [phone]                                       Carlos Alberto Garcia   MRN: [id number]    Department:  [facility], Emergency Department   Date of Visit:  11/21/2018           After Visit Summary Signature Page     I have received my discharge instructions, and my questions have been answered. I have discussed any challenges I see with this plan with the nurse or doctor.    ..........................................................................................................................................  Patient/Patient Representative Signature      ..........................................................................................................................................  Patient Representative Print Name and Relationship to Patient    ..................................................               ................................................  Date                                   Time    ..........................................................................................................................................  Reviewed by Signature/Title    ...................................................              ..............................................  Date                                               Time          22EPIC Rev 08/18

## 2018-11-25 ENCOUNTER — HOSPITAL ENCOUNTER (EMERGENCY)
Facility: CLINIC | Age: 33
Discharge: HOME | End: 2018-11-26
Attending: EMERGENCY MEDICINE | Admitting: EMERGENCY MEDICINE
Payer: COMMERCIAL

## 2018-11-25 VITALS
OXYGEN SATURATION: 100 % | DIASTOLIC BLOOD PRESSURE: 84 MMHG | HEART RATE: 91 BPM | TEMPERATURE: 97.4 F | SYSTOLIC BLOOD PRESSURE: 125 MMHG | RESPIRATION RATE: 18 BRPM

## 2018-11-25 DIAGNOSIS — R11.0 NAUSEA: ICD-10-CM

## 2018-11-25 PROCEDURE — 99283 EMERGENCY DEPT VISIT LOW MDM: CPT | Performed by: EMERGENCY MEDICINE

## 2018-11-25 PROCEDURE — 99283 EMERGENCY DEPT VISIT LOW MDM: CPT | Mod: Z6 | Performed by: EMERGENCY MEDICINE

## 2018-11-25 RX ORDER — ONDANSETRON 4 MG/1
4 TABLET, ORALLY DISINTEGRATING ORAL ONCE
Status: COMPLETED | OUTPATIENT
Start: 2018-11-25 | End: 2018-11-26

## 2018-11-25 ASSESSMENT — ENCOUNTER SYMPTOMS
NAUSEA: 1
DIARRHEA: 1
VOMITING: 1
FEVER: 0
CHILLS: 1

## 2018-11-25 NOTE — ED AVS SNAPSHOT
[facility], [place], Emergency Department    [address] [place] [address]    Phone:  [phone]                                       Carlos Alberto Garcia   MRN: [id number]    Department:  [facility], Emergency Department   Date of Visit:  11/25/2018           After Visit Summary Signature Page     I have received my discharge instructions, and my questions have been answered. I have discussed any challenges I see with this plan with the nurse or doctor.    ..........................................................................................................................................  Patient/Patient Representative Signature      ..........................................................................................................................................  Patient Representative Print Name and Relationship to Patient    ..................................................               ................................................  Date                                   Time    ..........................................................................................................................................  Reviewed by Signature/Title    ...................................................              ..............................................  Date                                               Time          22EPIC Rev 08/18

## 2018-11-26 PROCEDURE — 25000131 ZZH RX MED GY IP 250 OP 636 PS 637: Performed by: EMERGENCY MEDICINE

## 2018-11-26 RX ADMIN — ONDANSETRON 4 MG: 4 TABLET, ORALLY DISINTEGRATING ORAL at 00:00

## 2018-11-26 ASSESSMENT — ENCOUNTER SYMPTOMS
ABDOMINAL PAIN: 0
SHORTNESS OF BREATH: 0

## 2018-11-26 NOTE — ED TRIAGE NOTES
Pt presents to ER with complaints of nausea, vomiting, and diarrhea for the last 3 days. No medication taken at home.

## 2018-11-26 NOTE — ED PROVIDER NOTES
History     Chief Complaint   Patient presents with     Nausea, Vomiting, & Diarrhea     HPI  Carlos Alberto Garcia is a 33 year old male with a history of schizoaffective disorder, bipolar 2 disorder, HTN, hepatitis C, meth abuse, and frequent ED visits who presents to the Emergency Department today for evaluation of nausea, vomiting, and diarrhea. The patient states that for the past few days he has been having nausea, vomiting, and diarrhea. He reports chills and subjective fevers, however, is afebrile in our ED. He has not been taking medication for his symptoms. Denies any recent alcohol use. He was here yesterday for reported rectal bleeding however refused a rectal exam.  He was also here 2 days ago with similar complaints of nausea and vomiting.  He has been here once or twice a week for the past several months.    I have reviewed the Medications, Allergies, Past Medical and Surgical History, and Social History in the [employer] system.    Past Medical History:   Diagnosis Date     Bipolar 2 disorder (H)      Chemical abuse (H)     cocaine, meth, cambis     Dyslipidemia      Hep C w/ coma, chronic (H)      Patient overweight      Schizoaffective disorder      Unspecified essential hypertension      Unspecified hypothyroidism      Past Surgical History:   Procedure Laterality Date     HAND SURGERY      L     ORTHOPEDIC SURGERY      hand     No family history on file.    Social History   Substance Use Topics     Smoking status: Current Every Day Smoker     Packs/day: 1.00     Types: Cigarettes     Smokeless tobacco: Current User     Alcohol use No     No current facility-administered medications for this encounter.      No current outpatient prescriptions on file.     Facility-Administered Medications Ordered in Other Encounters   Medication     ibuprofen (ADVIL,MOTRIN) 600 MG tablet     Allergies   Allergen Reactions     Vitamin C [Ascorbate] Unknown      Review of Systems   Constitutional: Positive for chills.  Negative for fever.   Respiratory: Negative for shortness of breath.    Cardiovascular: Negative for chest pain.   Gastrointestinal: Positive for diarrhea, nausea and vomiting. Negative for abdominal pain.   Skin: Negative for rash.      ROS: 10 point ROS neg other than the symptoms noted above in the HPI.     Physical Exam   BP: 125/84  Pulse: 91  Temp: 97.4  F (36.3  C)  Resp: 18  SpO2: 100 %    Physical Exam   Constitutional: He is oriented to person, place, and time. He appears well-developed and well-nourished. No distress.   HENT:   Head: Normocephalic and atraumatic.   Eyes: Conjunctivae are normal.   Neck: Normal range of motion.   Cardiovascular: Normal rate.    Pulmonary/Chest: Effort normal.   Abdominal: Soft. There is no tenderness.   Musculoskeletal: Normal range of motion.   Neurological: He is alert and oriented to person, place, and time.   Skin: Skin is warm and dry.       ED Course   11:31 PM  The patient was seen and examined by Dr. Harris in Room [facility].    ED Course     Procedures             Critical Care time:  none             Labs Ordered and Resulted from Time of ED Arrival Up to the Time of Departure from the ED - No data to display         Assessments & Plan (with Medical Decision Making)   Patient is a well-appearing 33-year-old who reports 3 days of nausea, vomiting, and diarrhea.  He is here frequently with similar complaints.  Abdomen is soft and nontender and his vitals are within normal limits.  Low suspicion of surgical pathology.  Will treat with Zofran x1 and p.o. challenge, likely discharge  Socorro Harris MD on 11/26/2018 at 12:20 AM     Patient tolerated PO and was discharged.  Socorro Harris MD on 11/26/2018 at 12:45 AM     I have reviewed the nursing notes.    I have reviewed the findings, diagnosis, plan and need for follow up with the patient.    New Prescriptions    No medications on file       Final diagnoses:   None   I, Alex Garza, am serving as a  trained medical scribe to document services personally performed by Socorro Harris MD, based on the provider's statements to me.   I, Socorro Harris MD, was physically present and have reviewed and verified the accuracy of this note documented by Alex Garza.     11/25/2018   [facility], [place], EMERGENCY DEPARTMENT     Socorro Harris MD  11/26/18 0020       Socorro Harris MD  11/26/18 0045

## 2018-11-26 NOTE — DISCHARGE INSTRUCTIONS
Remain well-hydrated with 1-2 L of water daily  [place] diet (rice, bananas, bread) as tolerated  Return to the emergency department with any new or worsening symptoms.    Please make an appointment to [facility] (phone: [phone] in 5-7 days.

## 2018-12-08 ENCOUNTER — HOSPITAL ENCOUNTER (EMERGENCY)
Facility: CLINIC | Age: 33
Discharge: HOME | End: 2018-12-08
Attending: EMERGENCY MEDICINE | Admitting: EMERGENCY MEDICINE
Payer: COMMERCIAL

## 2018-12-08 VITALS
OXYGEN SATURATION: 100 % | WEIGHT: 250 LBS | SYSTOLIC BLOOD PRESSURE: 148 MMHG | RESPIRATION RATE: 12 BRPM | HEART RATE: 55 BPM | DIASTOLIC BLOOD PRESSURE: 90 MMHG | BODY MASS INDEX: 33.86 KG/M2 | HEIGHT: 72 IN

## 2018-12-08 DIAGNOSIS — R51.9 NONINTRACTABLE EPISODIC HEADACHE, UNSPECIFIED HEADACHE TYPE: ICD-10-CM

## 2018-12-08 PROCEDURE — 25000132 ZZH RX MED GY IP 250 OP 250 PS 637: Performed by: EMERGENCY MEDICINE

## 2018-12-08 PROCEDURE — 99283 EMERGENCY DEPT VISIT LOW MDM: CPT | Mod: Z6 | Performed by: EMERGENCY MEDICINE

## 2018-12-08 PROCEDURE — 99283 EMERGENCY DEPT VISIT LOW MDM: CPT | Performed by: EMERGENCY MEDICINE

## 2018-12-08 RX ORDER — ACETAMINOPHEN 500 MG
1000 TABLET ORAL ONCE
Status: COMPLETED | OUTPATIENT
Start: 2018-12-08 | End: 2018-12-08

## 2018-12-08 RX ADMIN — ACETAMINOPHEN 1000 MG: 500 TABLET, FILM COATED ORAL at 05:52

## 2018-12-08 NOTE — ED NOTES
Patient remained in bed after discharge. This RN asked patient to leave x2. Security called to escort patient out of department.

## 2018-12-08 NOTE — ED AVS SNAPSHOT
[facility], [place], Emergency Department    [address] [place] [address]    Phone:  [phone]                                       Carlos Alberto Garcia   MRN: [id number]    Department:  [facility], Emergency Department   Date of Visit:  12/8/2018           After Visit Summary Signature Page     I have received my discharge instructions, and my questions have been answered. I have discussed any challenges I see with this plan with the nurse or doctor.    ..........................................................................................................................................  Patient/Patient Representative Signature      ..........................................................................................................................................  Patient Representative Print Name and Relationship to Patient    ..................................................               ................................................  Date                                   Time    ..........................................................................................................................................  Reviewed by Signature/Title    ...................................................              ..............................................  Date                                               Time          22EPIC Rev 08/18

## 2018-12-08 NOTE — ED AVS SNAPSHOT
[facility], Emergency Department    [address] [facility] [address]    Phone:  [phone]                                       Carlos Alberto Garcia   MRN: [id number]    Department:  [facility], Emergency Department   Date of Visit:  12/8/2018           Patient Information     Date Of Birth          1985        Your diagnoses for this visit were:     Nonintractable episodic headache, unspecified headache type        You were seen by Darnell Armenta MD.        Discharge Instructions       Please make an appointment to follow up with Your Primary Care Provider in 7 days even if entirely better.  You can call to discuss the appropriate follow up timing with your doctor.         Return to the emergency department if you develop return of severe headache, vomiting, numbness, weakness or tingling, fever, neck stiffness, rash, or any other concerns as given or discussed.       24 Hour Appointment Hotline       To make an appointment at any [facility] clinic, call [phone] ([phone]). If you don't have a family doctor or clinic, we will help you find one. [facility] clinics are conveniently located to serve the needs of you and your family.             Review of your medicines      Notice     You have not been prescribed any medications.            Orders Needing Specimen Collection     None      Pending Results     No orders found from 12/6/2018 to 12/9/2018.            Pending Culture Results     No orders found from 12/6/2018 to 12/9/2018.            Pending Results Instructions     If you had any lab results that were not finalized at the time of your Discharge, you can call the ED Lab Result RN at [phone]. You will be contacted by this team for any positive Lab results or changes in treatment. The nurses are available 7 days a week from 10A to 6:30P.  You can leave a message 24 hours per day and they will return your call.        Thank you for choosing [facility]       Thank you for  "choosing [facility] for your care. Our goal is always to provide you with excellent care. Hearing back from our patients is one way we can continue to improve our services. Please take a few minutes to complete the written survey that you may receive in the mail after you visit with us. Thank you!        [employer] Information     [employer] lets you send messages to your doctor, view your test results, renew your prescriptions, schedule appointments and more. To sign up, go to [email or web address] . Click on \"Log in\" on the left side of the screen, which will take you to the Welcome page. Then click on \"Sign up Now\" on the right side of the page.     You will be asked to enter the access code listed below, as well as some personal information. Please follow the directions to create your username and password.     Your access code is: [email or web address]  Expires: 2019  4:50 AM     Your access code will  in 90 days. If you need help or a new code, please call your [facility] clinic or [phone].        Care EveryWhere ID     This is your Care EveryWhere ID. This could be used by other organizations to access your [facility] medical records  [id number]        Equal Access to Services     PINEDA MARISCAL : Carleen Stark, roque sesay, loreta bragg, fausto cordero. So [facility] [phone].    ATENCIÓN: Si habla español, tiene a weiner disposición servicios gratuitos de asistencia lingüística. Llame al [phone].    We comply with applicable federal civil rights laws and [place] laws. We do not discriminate on the basis of race, color, national origin, age, disability, sex, sexual orientation, or gender identity.            After Visit Summary       This is your record. Keep this with you and show to your community pharmacist(s) and doctor(s) at your next visit.                  "

## 2018-12-08 NOTE — DISCHARGE INSTRUCTIONS
Please make an appointment to follow up with Your Primary Care Provider in 7 days even if entirely better.  You can call to discuss the appropriate follow up timing with your doctor.         Return to the emergency department if you develop return of severe headache, vomiting, numbness, weakness or tingling, fever, neck stiffness, rash, or any other concerns as given or discussed.

## 2018-12-08 NOTE — ED PROVIDER NOTES
History     Chief Complaint   Patient presents with     Headache     HPI  Carlos Alberto Garcia is a 33 year old male history of chronic intermittent headaches, schizoaffective disorder, bipolar, substance abuse, among other medical problems who presents with headache.  Headache onset several hours ago following methamphetamine use.  Otherwise denies visual changes weakness numbness, difficulty walking, chest pain shortness of breath, abdominal pain or any other complaints.    He is not nauseous or vomiting.      I have reviewed the Medications, Allergies, Past Medical and Surgical History, and Social History in the Epic system.    Review of Systems   14 point review of symptoms was performed and is negative except as noted above.     Physical Exam   BP: 148/90  Pulse: 55  Resp: 12  Height: 182.9 cm (6')  Weight: 113.4 kg (250 lb)  SpO2: 100 %      Physical Exam  GEN: Well appearing, cooperative and conversant.  No psychomotor agitation at time of my examination.  HEENT: The head is normocephalic and atraumatic. Pupils are equal round and reactive to light. Extraocular motions are intact. There is no facial swelling. The neck is nontender and supple.   CV: Regular rate and rhythm without murmurs rubs or gallops. 2+ radial pulses bilaterally.  PULM: Clear to auscultation bilaterally.  ABD: Soft, nontender, nondistended.   EXT: Full range of motion.  No edema.  NEURO: Cranial nerves II through XII are intact and symmetric. Bilateral upper and lower extremities grossly show full range of motion without any focal deficits.   SKIN: No rashes, ecchymosis, or lacerations  PSYCH: Calm and cooperative, interactive.     ED Course     ED Course     Procedures               Labs Ordered and Resulted from Time of ED Arrival Up to the Time of Departure from the ED - No data to display         Assessments & Plan (with Medical Decision Making)   33-year-old male with past medical history as noted above presenting with onset of  headache following methamphetamine use.    Differential diagnosis is broad including Migraine among other primary headache etiologies, secondary headache etiologies including intracranial hemorrhage, dural venous sinus thrombosis, cavernous sinus thrombosis, mass, infectious etiologies including meningitis or encephalitis among others, Bell's palsy variant, trigeminal neuralgia, cva (unlikely), among other causes    The patient has been seen frequently for headache in the past.  This headache is typical of his prior headaches which was further qualified-it is diffuse and initiated after drug use.  The patient does endorse methamphetamine use recently.    He has however completely calm cooperative and not agitated.  Able to respond appropriately to my questions.  This is inconsistent with acute methamphetamine intoxication.    Overall the patient is well-appearing, my suspicion for secondary headache etiology is low.  Patient was given Tylenol for headache and discharged with plan for outpatient follow-up.  In general he has a poor history of compliance, but my suspicion for an emergent process is low.  Strict ED precautions given and discussed.    - Patient agrees to our plan and is ready and eager for discharge. Care plan, follow up plan, and reasons to return immediately to the ED were dicussed in detail and summarized as noted in the discharge instructions.      I have reviewed the nursing notes.    I have reviewed the findings, diagnosis, plan and need for follow up with the patient.    New Prescriptions    No medications on file       Final diagnoses:   Nonintractable episodic headache, unspecified headache type       12/8/2018   [facility], [place], EMERGENCY DEPARTMENT     Darnell Armenta MD  12/08/18 0579

## 2018-12-11 ENCOUNTER — HOSPITAL ENCOUNTER (EMERGENCY)
Facility: CLINIC | Age: 33
Discharge: HOME | End: 2018-12-11
Attending: EMERGENCY MEDICINE | Admitting: EMERGENCY MEDICINE
Payer: COMMERCIAL

## 2018-12-11 VITALS
HEIGHT: 72 IN | TEMPERATURE: 98 F | HEART RATE: 87 BPM | WEIGHT: 252.3 LBS | OXYGEN SATURATION: 99 % | BODY MASS INDEX: 34.17 KG/M2 | SYSTOLIC BLOOD PRESSURE: 142 MMHG | DIASTOLIC BLOOD PRESSURE: 84 MMHG | RESPIRATION RATE: 18 BRPM

## 2018-12-11 DIAGNOSIS — Z59.00 HOMELESS: ICD-10-CM

## 2018-12-11 PROCEDURE — 99281 EMR DPT VST MAYX REQ PHY/QHP: CPT

## 2018-12-11 PROCEDURE — 99281 EMR DPT VST MAYX REQ PHY/QHP: CPT | Mod: Z6 | Performed by: EMERGENCY MEDICINE

## 2018-12-11 SDOH — ECONOMIC STABILITY - HOUSING INSECURITY: HOMELESSNESS UNSPECIFIED: Z59.00

## 2018-12-11 ASSESSMENT — ENCOUNTER SYMPTOMS
CHILLS: 0
FEVER: 0

## 2018-12-11 ASSESSMENT — MIFFLIN-ST. JEOR: SCORE: 2137.43

## 2018-12-11 NOTE — ED TRIAGE NOTES
"Triage Assessment & Note:    /84   Pulse 87   Temp 98  F (36.7  C) (Oral)   Resp 18   Ht 1.829 m (6')   Wt 114.4 kg (252 lb 4.8 oz)   SpO2 99%   BMI 34.22 kg/m      Patient presents with: c/o \"needing psych injection.\"     Home Treatments/Remedies: None    Febrile / Afebrile? Afebrile    Duration of C/o:  Marvin Palumbo  December 11, 2018      "

## 2018-12-11 NOTE — ED PROVIDER NOTES
History     Chief Complaint   Patient presents with     Psychiatric Problem     The history is provided by the patient.     Carlos Alberto Garcia is a 33 year old male who presents to the Emergency Department wanting to get an Invega injection. The patient says he normally gets one from his psychiatric nurse, but he has not done so in some time.  He denies any other complaints. The patient says he wants some time to sleep. The patient denies any new pain. The patient is calm and cooperative.    I have reviewed the Medications, Allergies, Past Medical and Surgical History, and Social History in the [employer] system.    Past Medical History:   Diagnosis Date     Bipolar 2 disorder (H)      Chemical abuse (H)     cocaine, meth, cambis     Dyslipidemia      Hep C w/ coma, chronic (H)      Patient overweight      Schizoaffective disorder      Unspecified essential hypertension      Unspecified hypothyroidism        Past Surgical History:   Procedure Laterality Date     HAND SURGERY      L     ORTHOPEDIC SURGERY      hand       No family history on file.    Social History     Tobacco Use     Smoking status: Current Every Day Smoker     Packs/day: 1.00     Types: Cigarettes     Smokeless tobacco: Current User   Substance Use Topics     Alcohol use: No       No current facility-administered medications for this encounter.      No current outpatient medications on file.     Facility-Administered Medications Ordered in Other Encounters   Medication     ibuprofen (ADVIL,MOTRIN) 600 MG tablet        Allergies   Allergen Reactions     Vitamin C [Ascorbate] Unknown       Review of Systems   Constitutional: Negative for chills and fever.   All other systems reviewed and are negative.      Physical Exam   BP: 142/84  Pulse: 87  Temp: 98  F (36.7  C)  Resp: 18  Height: 182.9 cm (6')  Weight: 114.4 kg (252 lb 4.8 oz)  SpO2: 99 %      Physical Exam   Constitutional: He is oriented to person, place, and time. He appears well-developed and  well-nourished.   HENT:   Head: Normocephalic and atraumatic.   Neck: Normal range of motion. Neck supple.   Cardiovascular: Normal rate, regular rhythm and normal heart sounds.   Pulmonary/Chest: Effort normal. No respiratory distress. He has no wheezes.   Abdominal: Soft. He exhibits no distension. There is no tenderness. There is no rebound.   Neurological: He is alert and oriented to person, place, and time.   Skin: Skin is warm.   Psychiatric: He has a normal mood and affect. His behavior is normal. Thought content normal.       ED Course        Procedures             Critical Care time:  none             Labs Ordered and Resulted from Time of ED Arrival Up to the Time of Departure from the ED - No data to display         Assessments & Plan (with Medical Decision Making)  Well appearing 33 year old male presents to the Emergency Department wanting to do an Invega injection. I informed him that we do not do these injections in the Emergency Department, he needs to follow up with his psychiatrist. The patient agrees with this plan. Per review of the patient's medical chart, he has been in the Emergency Department, about 40 times in 2018. He has come many times for malingering. The patient at this time was informed that he needs to follow up, and he agrees with this plan. The patient will be allowed to sleep in the Emergency Department, for a few hours and will then be discharged in stable condition.       I have reviewed the nursing notes.    I have reviewed the findings, diagnosis, plan and need for follow up with the patient.    There are no discharge medications for this patient.      Final diagnoses:   Homeless   SOHEILA, Jose Moncada, am serving as a trained medical scribe to document services personally performed by Andreina Garrett MD, based on the provider's statements to me.      Andreina PARNELL MD, was physically present and have reviewed and verified the accuracy of this note documented by Jose ALCANTARA  Coral.     12/11/2018   [facility], [place], EMERGENCY DEPARTMENT     Andreina Garrett MD  12/11/18 6245

## 2018-12-12 ENCOUNTER — HOSPITAL ENCOUNTER (EMERGENCY)
Facility: CLINIC | Age: 33
Discharge: HOME | End: 2018-12-12
Attending: EMERGENCY MEDICINE | Admitting: EMERGENCY MEDICINE
Payer: COMMERCIAL

## 2018-12-12 VITALS
SYSTOLIC BLOOD PRESSURE: 120 MMHG | TEMPERATURE: 97.5 F | DIASTOLIC BLOOD PRESSURE: 78 MMHG | OXYGEN SATURATION: 97 % | RESPIRATION RATE: 16 BRPM | HEART RATE: 93 BPM | BODY MASS INDEX: 34.91 KG/M2 | WEIGHT: 257.4 LBS

## 2018-12-12 DIAGNOSIS — R10.13 ABDOMINAL PAIN, EPIGASTRIC: ICD-10-CM

## 2018-12-12 PROCEDURE — 99284 EMERGENCY DEPT VISIT MOD MDM: CPT | Mod: Z6 | Performed by: EMERGENCY MEDICINE

## 2018-12-12 PROCEDURE — 99282 EMERGENCY DEPT VISIT SF MDM: CPT

## 2018-12-12 ASSESSMENT — ENCOUNTER SYMPTOMS
DYSURIA: 0
VOMITING: 0
FEVER: 0
ABDOMINAL PAIN: 1
SHORTNESS OF BREATH: 0
COUGH: 0

## 2018-12-12 NOTE — ED PROVIDER NOTES
"  History     Chief Complaint   Patient presents with     Gastrophageal Reflux     The history is provided by the patient and medical records.     Carlos Alberto Garcia is a 33 year old male with a history of schizoaffective disorder, bipolar 2 disorder, substance abuse, hepatitis C, HTN, and malingering (12 visits over the last month), who presents to the Emergency Department for evaluation of epigastric pain. The patient reports his symptoms have been present for 8 years.  The patient describes his pain as, \"my stomach is burning\" and indicated he wanted to learn about \"acid reflux\".  The patient endorses compliance to his mental health medications (Invega). The patient states he is, \"getting the injections\", however, per chart review the Invega is listed as po.  Patient denies fever, cough, chest pain, shortness of breath, nausea, vomiting, diarrhea.  He states that occasionally eating food makes the pain better.    Per chart review, the patient was last seen here yesterday, 12/11/2018, seeking an Invega injection. The patient was discharged with instructions to follow up with his psychiatrist, as Invega injections are not performed in the Emergency Department.    I have reviewed the Medications, Allergies, Past Medical and Surgical History, and Social History in the [employer] system.    Past Medical History:   Diagnosis Date     Bipolar 2 disorder (H)      Chemical abuse (H)     cocaine, meth, cambis     Dyslipidemia      Hep C w/ coma, chronic (H)      Patient overweight      Schizoaffective disorder      Unspecified essential hypertension      Unspecified hypothyroidism        Past Surgical History:   Procedure Laterality Date     HAND SURGERY      L     ORTHOPEDIC SURGERY      hand       No family history on file.    Social History     Tobacco Use     Smoking status: Current Every Day Smoker     Packs/day: 1.00     Types: Cigarettes     Smokeless tobacco: Current User   Substance Use Topics     Alcohol use: No "       Current Facility-Administered Medications   Medication     lidocaine VISCOUS (XYLOCAINE) 2 % 15 mL, alum & mag hydroxide-simethicone (MYLANTA ES/MAALOX  ES) 15 mL GI Cocktail     Current Outpatient Medications   Medication     Paliperidone (INVEGA PO)     ranitidine (ZANTAC) 150 MG tablet     Facility-Administered Medications Ordered in Other Encounters   Medication     ibuprofen (ADVIL,MOTRIN) 600 MG tablet        Allergies   Allergen Reactions     Vitamin C [Ascorbate] Unknown       Review of Systems   Constitutional: Negative for fever.   Respiratory: Negative for cough and shortness of breath.    Cardiovascular: Negative for chest pain.   Gastrointestinal: Positive for abdominal pain (epigastric ). Negative for vomiting.   Genitourinary: Negative for dysuria.   All other systems reviewed and are negative.      Physical Exam   BP: 120/78  Pulse: 93  Temp: 97.5  F (36.4  C)  Resp: 16  Weight: 116.8 kg (257 lb 6.4 oz)  SpO2: 97 %      Physical Exam  GEN:  Well developed, no acute distress  HEENT:  EOMI, Mucous membranes are moist.   Cardio:  RRR, no murmur, radial pulses equal bilaterally  PULM:  Lungs clear, good air movement, no wheezes, rales,   Abd:  Soft, normal bowel sounds, no focal tenderness, no percussion tenderness  Musculoskeletal:  normal range of motion, no lower extremity swelling or calf tenderness  Neuro:  Alert and oriented X3, Follows commands, moving all extremities spontaneously   Skin:  Warm, dry    ED Course   9:53 AM  The patient was seen and examined by Dr. Acuna in Room ED03.     Procedures           Critical Care time:  none  We discussed the ED plan with the patient.  I was going to give him a GI cocktail and a prescription for Zantac.  Patient appeared to be agreeable with this, however he eloped from the emergency department before getting the GI cocktail and without the prescription.    Labs Ordered and Resulted from Time of ED Arrival Up to the Time of Departure from the ED -  No data to display         Assessments & Plan (with Medical Decision Making)   Patient presents with epigastric pain that appears to be chronic.  He had no tenderness at all on exam, and I do not suspect acute pancreatitis, cholecystitis, intra-abdominal surgical emergency.  Unfortunately, he left the emergency department without any treatment and without his prescription.    I have reviewed the nursing notes.    I have reviewed the findings, diagnosis, plan and need for follow up with the patient.    Current Discharge Medication List          Final diagnoses:   Abdominal pain, epigastric   I, Jose Moncada, am serving as a trained medical scribe to document services personally performed by Morenita Acuna MD, based on the provider's statements to me.      I, Morenita Acuna MD, was physically present and have reviewed and verified the accuracy of this note documented by Jose Moncada.      12/12/2018   [facility], [place], EMERGENCY DEPARTMENT     Morenita Acuna MD  12/12/18 1043

## 2018-12-12 NOTE — ED NOTES
RN went into pts room to give GI cocktail, at that time pt was walking out of the ED. Per triage RN pt often elopes. Notified MD.

## 2018-12-12 NOTE — DISCHARGE INSTRUCTIONS
Please make an appointment to follow up with Your Primary Care Provider in 7 days for continued care.

## 2018-12-17 ENCOUNTER — HOSPITAL ENCOUNTER (EMERGENCY)
Facility: CLINIC | Age: 33
Discharge: HOME | End: 2018-12-17
Attending: EMERGENCY MEDICINE | Admitting: EMERGENCY MEDICINE
Payer: COMMERCIAL

## 2018-12-17 VITALS
HEART RATE: 71 BPM | DIASTOLIC BLOOD PRESSURE: 84 MMHG | WEIGHT: 257.5 LBS | OXYGEN SATURATION: 100 % | RESPIRATION RATE: 18 BRPM | BODY MASS INDEX: 34.88 KG/M2 | SYSTOLIC BLOOD PRESSURE: 120 MMHG | TEMPERATURE: 97.6 F | HEIGHT: 72 IN

## 2018-12-17 DIAGNOSIS — Z87.891 PERSONAL HISTORY OF TOBACCO USE, PRESENTING HAZARDS TO HEALTH: ICD-10-CM

## 2018-12-17 DIAGNOSIS — R05.9 COUGH: ICD-10-CM

## 2018-12-17 PROCEDURE — 99282 EMERGENCY DEPT VISIT SF MDM: CPT | Performed by: EMERGENCY MEDICINE

## 2018-12-17 PROCEDURE — 99282 EMERGENCY DEPT VISIT SF MDM: CPT | Mod: Z6 | Performed by: EMERGENCY MEDICINE

## 2018-12-17 ASSESSMENT — MIFFLIN-ST. JEOR: SCORE: 2161.01

## 2018-12-17 ASSESSMENT — ENCOUNTER SYMPTOMS: COUGH: 1

## 2018-12-17 NOTE — DISCHARGE INSTRUCTIONS
Please follow-up with your primary care physician in the next 2-3 days for further evaluation and follow-up.  Please call to schedule an appointment.  Please try to stop smoking or at least cut back on the amount of cigarettes per day.  Please return to the emergency room if you develop high fever, difficulty breathing, or any worsening of symptoms.  We hope you feel better soon.

## 2018-12-17 NOTE — ED AVS SNAPSHOT
[facility], [place], Emergency Department  [address] [place] [address]  Phone:  [phone]                                    Carlos Alberto Garcia   MRN: [id number]    Department:  [facility], Emergency Department   Date of Visit:  12/17/2018           After Visit Summary Signature Page    I have received my discharge instructions, and my questions have been answered. I have discussed any challenges I see with this plan with the nurse or doctor.    ..........................................................................................................................................  Patient/Patient Representative Signature      ..........................................................................................................................................  Patient Representative Print Name and Relationship to Patient    ..................................................               ................................................  Date                                   Time    ..........................................................................................................................................  Reviewed by Signature/Title    ...................................................              ..............................................  Date                                               Time          22EPIC Rev 08/18

## 2018-12-17 NOTE — ED TRIAGE NOTES
Patient arrived to triage ambulatory with concerns of an ongoing cough, patient unable to tell writer when the cough began. Patient denies shortness of breath and pain. VSS during triage, 100% on room air. No cough observed during triage.    Paroxysmal a-fib

## 2018-12-17 NOTE — ED PROVIDER NOTES
History     Chief Complaint   Patient presents with     Cough     HPI  Carlos Alberto Garcia is a 33 year old male who has a history of hepatitis C, schizoaffective disorder, bipolar, chemical abuse, tobacco abuse who presents emergency department with a complaint of cough.  Patient reports he has had an ongoing cough for a while.  Patient unable to state exactly when the cough began however he states that it seems to be related to his tobacco use.  Patient denies any aggravating or alleviating factors.  Patient denies any fever, chills, nausea, vomiting, chest pain, shortness of breath.  Patient admits to daily tobacco use.  No other complaints.    I have reviewed the Medications, Allergies, Past Medical and Surgical History, and Social History in the Epic system.    Review of Systems   Respiratory: Positive for cough.    All other systems reviewed and are negative.      Physical Exam   BP: 120/84  Pulse: 71  Temp: 97.6  F (36.4  C)  Resp: 18  Height: 182.9 cm (6')  Weight: 116.8 kg (257 lb 8 oz)  SpO2: 100 %      Physical Exam   Constitutional: He is oriented to person, place, and time. He appears well-developed. No distress.   HENT:   Head: Normocephalic and atraumatic.   Right Ear: External ear normal.   Left Ear: External ear normal.   Mouth/Throat: Oropharynx is clear and moist.   Eyes: Conjunctivae and EOM are normal. Pupils are equal, round, and reactive to light.   Neck: Normal range of motion. Neck supple.   Cardiovascular: Normal rate, regular rhythm and normal heart sounds.   Pulmonary/Chest: Effort normal and breath sounds normal. No respiratory distress. He has no wheezes. He has no rales.   Dry nonproductive cough   Abdominal: Soft. He exhibits no distension. There is no tenderness. There is no rebound and no guarding.   Musculoskeletal: Normal range of motion. He exhibits no tenderness or deformity.   Neurological: He is alert and oriented to person, place, and time. No cranial nerve deficit.  Coordination normal.   Skin: Skin is warm and dry. No rash noted.   Psychiatric: He has a normal mood and affect. His behavior is normal.       ED Course        Procedures               Labs Ordered and Resulted from Time of ED Arrival Up to the Time of Departure from the ED - No data to display         Assessments & Plan (with Medical Decision Making)   Carlos Alberto Garcia is a 33 year old male who has a history of hepatitis C, schizoaffective disorder, bipolar, chemical abuse, tobacco abuse who presents emergency department with a complaint of cough.  Upon arrival patient is well-appearing, afebrile, no distress.  On examination patient with a dry nonproductive cough, lungs clear to auscultation bilaterally no wheezing, rhonchi, rales.  Likely cough secondary to tobacco use, no signs of acute infection or clinical signs of upper respiratory infection on examination.  At this time I discussed results with patient and encouraged smoking cessation.  Return precautions discussed if high fever, difficulty breathing, or productive cough.  Recommend follow-up with his primary care physician.  Patient understands and agrees to the plan. .The patient is discharged home with instructions to return if their symptoms persist or worsen.  Plan for close follow-up with their primary physician.  I discussed workup, results, treatment, and plan with the patient.  Patient understands and agrees with the plan.    I have reviewed the nursing notes.    I have reviewed the findings, diagnosis, plan and need for follow up with the patient.       Medication List      There are no discharge medications for this visit.         Final diagnoses:   Cough   Personal history of tobacco use, presenting hazards to health       12/17/2018   [facility], EMERGENCY DEPARTMENT     Anahi Fraser MD  12/17/18 9982

## 2018-12-18 ENCOUNTER — HOSPITAL ENCOUNTER (EMERGENCY)
Facility: CLINIC | Age: 33
Discharge: HOME | End: 2018-12-18
Attending: EMERGENCY MEDICINE | Admitting: EMERGENCY MEDICINE
Payer: COMMERCIAL

## 2018-12-18 VITALS
WEIGHT: 250 LBS | SYSTOLIC BLOOD PRESSURE: 144 MMHG | TEMPERATURE: 98.2 F | HEIGHT: 72 IN | BODY MASS INDEX: 33.86 KG/M2 | RESPIRATION RATE: 20 BRPM | DIASTOLIC BLOOD PRESSURE: 72 MMHG | OXYGEN SATURATION: 98 %

## 2018-12-18 DIAGNOSIS — K08.89 PAIN, DENTAL: ICD-10-CM

## 2018-12-18 PROCEDURE — 25000132 ZZH RX MED GY IP 250 OP 250 PS 637: Performed by: EMERGENCY MEDICINE

## 2018-12-18 PROCEDURE — 99284 EMERGENCY DEPT VISIT MOD MDM: CPT | Mod: Z6 | Performed by: EMERGENCY MEDICINE

## 2018-12-18 PROCEDURE — 99283 EMERGENCY DEPT VISIT LOW MDM: CPT | Performed by: EMERGENCY MEDICINE

## 2018-12-18 RX ORDER — PENICILLIN V POTASSIUM 500 MG/1
500 TABLET, FILM COATED ORAL 2 TIMES DAILY
Qty: 20 TABLET | Refills: 0 | Status: SHIPPED | OUTPATIENT
Start: 2018-12-18 | End: 2019-02-06

## 2018-12-18 RX ORDER — IBUPROFEN 600 MG/1
600 TABLET, FILM COATED ORAL ONCE
Status: COMPLETED | OUTPATIENT
Start: 2018-12-18 | End: 2018-12-18

## 2018-12-18 RX ADMIN — IBUPROFEN 600 MG: 600 TABLET ORAL at 02:39

## 2018-12-18 ASSESSMENT — MIFFLIN-ST. JEOR: SCORE: 2126.99

## 2018-12-18 NOTE — DISCHARGE INSTRUCTIONS
Please make an appointment to follow up with [facility] (phone: [phone]) or other community dental office as soon as possible for a visit.      Return to the ED if you are having fever, significantly worsening symptoms, or any other urgent/life-threatening concerns.

## 2018-12-18 NOTE — ED AVS SNAPSHOT
[facility], [place], Emergency Department  [address] [place] [address]  Phone:  [phone]                                    Carlos Alberto Garcia   MRN: [id number]    Department:  [facility], Emergency Department   Date of Visit:  12/18/2018           After Visit Summary Signature Page    I have received my discharge instructions, and my questions have been answered. I have discussed any challenges I see with this plan with the nurse or doctor.    ..........................................................................................................................................  Patient/Patient Representative Signature      ..........................................................................................................................................  Patient Representative Print Name and Relationship to Patient    ..................................................               ................................................  Date                                   Time    ..........................................................................................................................................  Reviewed by Signature/Title    ...................................................              ..............................................  Date                                               Time          22EPIC Rev 08/18

## 2018-12-18 NOTE — ED TRIAGE NOTES
Pt arrives to triage [facility] ambulatory for tooth pain, pt states he thinks he has dental carries and is requesting a pain medication for his tooth pain.

## 2018-12-18 NOTE — ED PROVIDER NOTES
"  History     Chief Complaint   Patient presents with     Dental Pain     HPI  Carlos Alberto Garcia is a 33 year old male who presents to the ED with dental pain. Patient reports several weeks of pain, worsened tonight. Patient does not have a dentist appointment. He has not been taking medications for this thus far. He states he is \"hoping to get some pain meds or something\". No fever, no difficulty opening/closing the mouth.     I have reviewed the Medications, Allergies, Past Medical and Surgical History, and Social History in the Epic system.    Review of Systems  No recent fevers, no chest pain, no abdominal pain    Physical Exam   BP: 144/72  Heart Rate: 88  Temp: 98.2  F (36.8  C)  Resp: 20  Height: 182.9 cm (6')  Weight: 113.4 kg (250 lb)  SpO2: 98 %      Physical Exam  /72   Temp 98.2  F (36.8  C) (Oral)   Resp 20   Ht 1.829 m (6')   Wt 113.4 kg (250 lb)   SpO2 98%   BMI 33.91 kg/m    General: No acute distress. Appears stated age.   HENT: MMM, no oropharyngeal lesions. Tooth #18 with tenderness, caries. No palpable abscess, no sublingual firmness, no trismus.   Eyes: PERRL, normal sclerae   Cardio: Regular rate, extremities well perfused  Resp: Normal work of breathing, normal respiratory rate  Neuro: alert and fully oriented. CN II-XII grossly intact. Grossly normal strength and sensation in all extremities.   MSK: no deformities.   Integumentary/Skin: no rash, normal color  Psych: normal affect, normal behavior        ED Course        Procedures        Critical Care time:  none       Labs Ordered and Resulted from Time of ED Arrival Up to the Time of Departure from the ED - No data to display         Assessments & Plan (with Medical Decision Making)   Patient presenting with dental pain. Vitals in the ED wnl. No sublingual firmness to suggest Cornelio. No palpable drainable abscess. The complete clinical picture is most consistent with pain due to dental caries, potential early periapical " abscess. Recommended inferior alveolar block. Patient given ibuprofen in the ED for pain.     After counseling on the diagnosis, work-up, and treatment plan, the patient was discharged to home. Naproxen and penicillin prescription provided. The patient was advised to follow-up with dentistry as soon as he is able. The patient was advised to return to the ED if worsening symptoms, fever, or if there are any urgent/life-threatening concerns.       Clinical Impression:  Dental pain, likely early periapical abscess   Dental caries       Tonny Badillo MD  Emergency Medicine       I have reviewed the nursing notes.    I have reviewed the findings, diagnosis, plan and need for follow up with the patient.      Final diagnoses:   Pain, dental       12/18/2018   [facility], [place], EMERGENCY DEPARTMENT       Tonny Badillo MD  12/19/18 5038

## 2018-12-23 ENCOUNTER — HOSPITAL ENCOUNTER (EMERGENCY)
Facility: CLINIC | Age: 33
Discharge: HOME | End: 2018-12-23
Attending: EMERGENCY MEDICINE | Admitting: EMERGENCY MEDICINE
Payer: COMMERCIAL

## 2018-12-23 VITALS
HEIGHT: 75 IN | OXYGEN SATURATION: 100 % | WEIGHT: 215 LBS | BODY MASS INDEX: 26.73 KG/M2 | DIASTOLIC BLOOD PRESSURE: 71 MMHG | SYSTOLIC BLOOD PRESSURE: 116 MMHG | HEART RATE: 71 BPM | TEMPERATURE: 97.7 F | RESPIRATION RATE: 20 BRPM

## 2018-12-23 DIAGNOSIS — R51.9 NONINTRACTABLE HEADACHE, UNSPECIFIED CHRONICITY PATTERN, UNSPECIFIED HEADACHE TYPE: ICD-10-CM

## 2018-12-23 DIAGNOSIS — J06.9 VIRAL URI WITH COUGH: ICD-10-CM

## 2018-12-23 PROCEDURE — 99282 EMERGENCY DEPT VISIT SF MDM: CPT | Mod: Z6 | Performed by: EMERGENCY MEDICINE

## 2018-12-23 PROCEDURE — 25000132 ZZH RX MED GY IP 250 OP 250 PS 637: Performed by: EMERGENCY MEDICINE

## 2018-12-23 PROCEDURE — 99283 EMERGENCY DEPT VISIT LOW MDM: CPT | Performed by: EMERGENCY MEDICINE

## 2018-12-23 RX ORDER — IBUPROFEN 600 MG/1
600 TABLET, FILM COATED ORAL ONCE
Status: COMPLETED | OUTPATIENT
Start: 2018-12-23 | End: 2018-12-23

## 2018-12-23 RX ADMIN — IBUPROFEN 600 MG: 600 TABLET ORAL at 02:34

## 2018-12-23 ASSESSMENT — ENCOUNTER SYMPTOMS
SHORTNESS OF BREATH: 0
HEADACHES: 1
FEVER: 0
ABDOMINAL PAIN: 0
COUGH: 1

## 2018-12-23 ASSESSMENT — MIFFLIN-ST. JEOR: SCORE: 2015.86

## 2018-12-23 NOTE — ED PROVIDER NOTES
"  History     Chief Complaint   Patient presents with     Cough     Headache     HPI  Carlos Alberto Garcia is a 33 year old male who presents to the ED with complaint of headache.  Interestingly, in triage she initially mentioned headache, then said that it had resolved.  He told the triage nurse he was here to be seen for cough.  For me he complains of headache.  When asked more in his head it is he states, \"I do not know, is in there somewhere.\"  When I ask him again, he points to his frontal region.  He states his been coming and going for the last few days.  His current headache started about 2 hours ago, gradually worsening.  No blurred vision, slurred speech, numbness, weakness.  No recent head trauma, no fevers.  He has not tried anything for the headache.    He also states he has had a dry cough for about a week now.  He is a smoker and lives outside.  He denies any chest pain, abdominal pain, nausea, vomiting.    Past Medical History:   Diagnosis Date     Bipolar 2 disorder (H)      Chemical abuse (H)     cocaine, meth, cambis     Dyslipidemia      Hep C w/ coma, chronic (H)      Patient overweight      Schizoaffective disorder      Unspecified essential hypertension      Unspecified hypothyroidism        Past Surgical History:   Procedure Laterality Date     HAND SURGERY      L     ORTHOPEDIC SURGERY      hand       History reviewed. No pertinent family history.    Social History     Tobacco Use     Smoking status: Current Every Day Smoker     Packs/day: 1.00     Types: Cigarettes     Smokeless tobacco: Current User   Substance Use Topics     Alcohol use: No         I have reviewed the Medications, Allergies, Past Medical and Surgical History, and Social History in the Epic system.    Review of Systems   Constitutional: Negative for fever.   Respiratory: Positive for cough. Negative for shortness of breath.    Cardiovascular: Negative for chest pain.   Gastrointestinal: Negative for abdominal pain. " "  Neurological: Positive for headaches.   All other systems reviewed and are negative.      Physical Exam   BP: (!) 157/97  Pulse: 75  Temp: 97.7  F (36.5  C)  Resp: 20  Height: 190.5 cm (6' 3\")  Weight: 97.5 kg (215 lb)  SpO2: 99 %      Physical Exam   Constitutional: He is oriented to person, place, and time. No distress.   HENT:   Head: Atraumatic.   Mouth/Throat: Oropharynx is clear and moist.   Eyes: Pupils are equal, round, and reactive to light. No scleral icterus.   Cardiovascular: Normal heart sounds and intact distal pulses.   Pulmonary/Chest: Breath sounds normal. No respiratory distress.   Abdominal: Soft. There is no tenderness.   Musculoskeletal: He exhibits no edema or tenderness.   Neurological: He is alert and oriented to person, place, and time. No cranial nerve deficit or sensory deficit. He exhibits normal muscle tone.   Skin: Skin is warm. No rash noted. He is not diaphoretic.       ED Course        Procedures             Critical Care time:  none             Labs Ordered and Resulted from Time of ED Arrival Up to the Time of Departure from the ED - No data to display         Assessments & Plan (with Medical Decision Making)   The patient's primary complaint to me is that he has a headache, though he told the triage nurse that his headache was gone.  He told her that his primary complaint was cough, though did not even mention this to me, until he went through review of systems.  His lungs are clear, sats are normal, he has no fever and the cough is dry.  I am not concerned clinically for pneumonia.  Likewise, his headaches are coming and going, gradual in onset.  Neurologic exam is unremarkable.  I did give him ibuprofen, will discharge, and follow-up primary care.  I do suspect there is secondary gain in his visit here in the middle the night tonight, as he is homeless.  No evidence to suggest an acute bacterial infectious process or other serious etiology underlying.    Dictation Disclaimer: " Some of this Note has been completed with voice-recognition dictation software. Although errors are generally corrected real-time, there is the potential for a rare error to be present in the completed chart.      I have reviewed the nursing notes.    I have reviewed the findings, diagnosis, plan and need for follow up with the patient.       Medication List      There are no discharge medications for this visit.         Final diagnoses:   Nonintractable headache, unspecified chronicity pattern, unspecified headache type   Viral URI with cough       12/23/2018   [facility], [place], EMERGENCY DEPARTMENT     Ángela Mann MD  12/23/18 9980

## 2018-12-23 NOTE — ED AVS SNAPSHOT
[facility], [place], Emergency Department  [address] [place] [address]  Phone:  [phone]                                    Carlos Alberto Garcia   MRN: [id number]    Department:  [facility], Emergency Department   Date of Visit:  12/23/2018           After Visit Summary Signature Page    I have received my discharge instructions, and my questions have been answered. I have discussed any challenges I see with this plan with the nurse or doctor.    ..........................................................................................................................................  Patient/Patient Representative Signature      ..........................................................................................................................................  Patient Representative Print Name and Relationship to Patient    ..................................................               ................................................  Date                                   Time    ..........................................................................................................................................  Reviewed by Signature/Title    ...................................................              ..............................................  Date                                               Time          22EPIC Rev 08/18

## 2018-12-23 NOTE — DISCHARGE INSTRUCTIONS
Please make an appointment to follow up with Your Primary Care Provider next week. Return to the ER with new or worsening symptoms.

## 2018-12-23 NOTE — ED TRIAGE NOTES
"Walked for cough and cold that hs been going for weeks off and on. As soon as pt was roomed, he asked, \" can I get food right away\".  "

## 2019-01-05 ENCOUNTER — HOSPITAL ENCOUNTER (EMERGENCY)
Facility: CLINIC | Age: 34
Discharge: LEFT BEFORE DISCHARGE | End: 2019-01-05
Payer: COMMERCIAL

## 2019-01-09 ENCOUNTER — HOSPITAL ENCOUNTER (EMERGENCY)
Facility: CLINIC | Age: 34
Discharge: HOME | End: 2019-01-09
Attending: EMERGENCY MEDICINE | Admitting: EMERGENCY MEDICINE
Payer: COMMERCIAL

## 2019-01-09 VITALS
TEMPERATURE: 98 F | WEIGHT: 200 LBS | OXYGEN SATURATION: 98 % | BODY MASS INDEX: 30.31 KG/M2 | SYSTOLIC BLOOD PRESSURE: 110 MMHG | HEIGHT: 68 IN | RESPIRATION RATE: 16 BRPM | DIASTOLIC BLOOD PRESSURE: 63 MMHG | HEART RATE: 65 BPM

## 2019-01-09 DIAGNOSIS — G44.219 EPISODIC TENSION-TYPE HEADACHE, NOT INTRACTABLE: ICD-10-CM

## 2019-01-09 DIAGNOSIS — L85.3 DRY SKIN: ICD-10-CM

## 2019-01-09 PROCEDURE — 99282 EMERGENCY DEPT VISIT SF MDM: CPT | Mod: Z6 | Performed by: EMERGENCY MEDICINE

## 2019-01-09 PROCEDURE — 99281 EMR DPT VST MAYX REQ PHY/QHP: CPT

## 2019-01-09 ASSESSMENT — ENCOUNTER SYMPTOMS: HEADACHES: 1

## 2019-01-09 ASSESSMENT — MIFFLIN-ST. JEOR: SCORE: 1836.69

## 2019-01-09 NOTE — DISCHARGE INSTRUCTIONS
Thank you for your patience today.  Please follow-up with your regular doctor in the next 2-3 days for further evaluation and follow-up care.  Please call to schedule an appointment.  Please continue your own medications.  Please apply lotion to your legs to help with your dry skin. Please return to the ER if you develop any worsening of your current symptoms.  It was a pleasure taking care of you today.  We hope you feel better soon.

## 2019-01-09 NOTE — ED PROVIDER NOTES
"  History     Chief Complaint   Patient presents with     Headache     HPI  Carlos Alberto Garcia is a 33 year old male who presents the emergency department with a complaint of headache.  Patient initially told the nurse that he had a headache and stated he did not know when it started.  During my evaluation patient complained of a rash and dry skin to his lower extremities.  Patient denied any other complaints.  Denies any fever, chills, nausea, vomiting, chest pain, shortness of breath, headache.     I have reviewed the Medications, Allergies, Past Medical and Surgical History, and Social History in the Epic system.    Review of Systems   Skin: Positive for rash.   Neurological: Positive for headaches.   All other systems reviewed and are negative.      Physical Exam   BP: 130/81  Pulse: 70  Temp: 98.7  F (37.1  C)  Resp: 16  Height: 172.7 cm (5' 8\")  Weight: 90.7 kg (200 lb)  SpO2: 98 %      Physical Exam   Constitutional: He is oriented to person, place, and time. He appears well-developed. No distress.   HENT:   Head: Normocephalic and atraumatic.   Mouth/Throat: Oropharynx is clear and moist.   Eyes: Conjunctivae and EOM are normal. Pupils are equal, round, and reactive to light.   Neck: Normal range of motion. Neck supple.   Cardiovascular: Normal rate, regular rhythm and normal heart sounds.   Pulmonary/Chest: Effort normal and breath sounds normal. No respiratory distress. He has no wheezes. He has no rales.   Abdominal: Soft. He exhibits no distension. There is no tenderness. There is no rebound and no guarding.   Musculoskeletal: Normal range of motion. He exhibits no tenderness or deformity.   Neurological: He is alert and oriented to person, place, and time. No cranial nerve deficit. Coordination normal.   Skin: Skin is warm and dry.   No rash, dry skin to b/l lower extremities   Psychiatric: He has a normal mood and affect. His behavior is normal.       ED Course        Procedures              Labs " Ordered and Resulted from Time of ED Arrival Up to the Time of Departure from the ED - No data to display         Assessments & Plan (with Medical Decision Making)   Carlos Alberto Garcia is a 33 year old male who presents the emergency department with a complaint of headache and dry skin.  Upon arrival patient is well-appearing, afebrile, no distress.  Patient hemodynamically stable and resting comfortably on the stretcher.  Patient reports complete resolution of the headache since arrival and complains of a rash/dry skin to his lower extremities.  On examination patient with dry skin at this time offered the patient lotion, and encouraged him to keep hydrating with lotion especially with the cold weather.  At this time no emergent need for further evaluation plan for discharge.  Recommend close follow-up with his primary care physician.  Return precautions discussed.    I have reviewed the nursing notes.    I have reviewed the findings, diagnosis, plan and need for follow up with the patient.       Medication List      There are no discharge medications for this visit.         Final diagnoses:   Episodic tension-type headache, not intractable   Dry skin       1/9/2019   [facility], [place], EMERGENCY DEPARTMENT     Anahi Fraser MD  01/09/19 7119

## 2019-01-09 NOTE — ED AVS SNAPSHOT
[facility], [place], Emergency Department  [address] [place] [address]  Phone:  [phone]                                    Carlos Alberto Garcia   MRN: [id number]    Department:  [facility], Emergency Department   Date of Visit:  1/9/2019           After Visit Summary Signature Page    I have received my discharge instructions, and my questions have been answered. I have discussed any challenges I see with this plan with the nurse or doctor.    ..........................................................................................................................................  Patient/Patient Representative Signature      ..........................................................................................................................................  Patient Representative Print Name and Relationship to Patient    ..................................................               ................................................  Date                                   Time    ..........................................................................................................................................  Reviewed by Signature/Title    ...................................................              ..............................................  Date                                               Time          22EPIC Rev 08/18

## 2019-01-11 ENCOUNTER — HOSPITAL ENCOUNTER (EMERGENCY)
Facility: CLINIC | Age: 34
Discharge: HOME | End: 2019-01-11
Attending: EMERGENCY MEDICINE | Admitting: EMERGENCY MEDICINE
Payer: COMMERCIAL

## 2019-01-11 VITALS
RESPIRATION RATE: 18 BRPM | HEART RATE: 85 BPM | DIASTOLIC BLOOD PRESSURE: 88 MMHG | SYSTOLIC BLOOD PRESSURE: 146 MMHG | OXYGEN SATURATION: 96 % | TEMPERATURE: 97.2 F

## 2019-01-11 DIAGNOSIS — G44.209 TENSION HEADACHE: ICD-10-CM

## 2019-01-11 PROCEDURE — 25000132 ZZH RX MED GY IP 250 OP 250 PS 637: Performed by: EMERGENCY MEDICINE

## 2019-01-11 PROCEDURE — 99283 EMERGENCY DEPT VISIT LOW MDM: CPT | Mod: Z6 | Performed by: EMERGENCY MEDICINE

## 2019-01-11 PROCEDURE — 99283 EMERGENCY DEPT VISIT LOW MDM: CPT | Performed by: EMERGENCY MEDICINE

## 2019-01-11 RX ORDER — IBUPROFEN 600 MG/1
600 TABLET, FILM COATED ORAL ONCE
Status: COMPLETED | OUTPATIENT
Start: 2019-01-11 | End: 2019-01-11

## 2019-01-11 RX ADMIN — IBUPROFEN 600 MG: 600 TABLET ORAL at 05:00

## 2019-01-11 ASSESSMENT — ENCOUNTER SYMPTOMS
NECK PAIN: 0
NUMBNESS: 0
WEAKNESS: 0
FEVER: 0
HEADACHES: 1
NECK STIFFNESS: 0

## 2019-01-11 NOTE — ED AVS SNAPSHOT
[facility], [place], Emergency Department  [address] [place] [address]  Phone:  [phone]                                    Carlos Alberto Garcia   MRN: [id number]    Department:  [facility], Emergency Department   Date of Visit:  1/11/2019           After Visit Summary Signature Page    I have received my discharge instructions, and my questions have been answered. I have discussed any challenges I see with this plan with the nurse or doctor.    ..........................................................................................................................................  Patient/Patient Representative Signature      ..........................................................................................................................................  Patient Representative Print Name and Relationship to Patient    ..................................................               ................................................  Date                                   Time    ..........................................................................................................................................  Reviewed by Signature/Title    ...................................................              ..............................................  Date                                               Time          22EPIC Rev 08/18

## 2019-01-11 NOTE — DISCHARGE INSTRUCTIONS
TODAY'S VISIT:  You were seen today for headaches.  -     FOLLOW-UP:  Please make an appointment to follow up with:  - [facility] (phone: [phone]) as soon as possible to establish PCP and discuss your headaches    PRESCRIPTIONS / MEDICATIONS:  -None    OTHER INSTRUCTIONS:  -Take Tylenol ibuprofen as needed    RETURN TO THE EMERGENCY DEPARTMENT  Return to the Emergency Department at any time for new/worsening symptoms.

## 2019-01-11 NOTE — ED TRIAGE NOTES
Pt presents to ER with complaints of headache for the last 2-4 hours and rash on legs for the last month. Pt hasn't taken any medication prior to arrival

## 2019-01-11 NOTE — ED PROVIDER NOTES
"  History     Chief Complaint   Patient presents with     Headache     Rash     HPI  Carlos Alberto Garcia is a 33 year old male who presents with gradual onset headache.  Patient reports history of frequent headaches, has been seen in the ER several times for this.  States this feels like his usual headache.  He has not tried any Tylenol or ibuprofen prior to arriving to the ER.  He denies any red flag symptoms including fever, neck stiffness, photophobia.  He denies any associated neurologic complaints with this headache.  No blurred vision or vision changes.  Of note chief complaint per triage note includes rash though when I asked him about this he stated \"I think it resolved\" and showed me a patch on his upper thigh that was normal.    I have reviewed the Medications, Allergies, Past Medical and Surgical History, and Social History in the Epic system.    Review of Systems   Constitutional: Negative for fever.   Musculoskeletal: Negative for neck pain and neck stiffness.   Neurological: Positive for headaches. Negative for weakness and numbness.   All other systems reviewed and are negative.      Physical Exam   BP: 146/88  Pulse: 85  Temp: 97.2  F (36.2  C)  Resp: 18  SpO2: 98 %      Physical Exam   General: awake, alert, NAD  Head: normal cephalic  HEENT: pupils equal, conjugate gaze in tact.  Extraocular motions intact.  No photophobia.  Neck: Supple, able to flex chin down to chest without pain.  No meningismus.  CV: regular rate  Lungs: Breathing comfortably on room air  Abd: soft, non-tender, no guarding, no peritoneal signs  EXT: lower extremities without swelling or edema  Neuro: awake, answers questions appropriately.  Cranial nerves II through XII intact.  5 out of 5 strength in bilateral upper and lower extremities.  Normal heel to shin testing bilaterally.  Skin: No concerning rashes or lesions noted        ED Course        Procedures               Labs Ordered and Resulted from Time of ED Arrival Up to " the Time of Departure from the ED - No data to display         Assessments & Plan (with Medical Decision Making)   Carlos Alberto is an otherwise healthy 33-year-old male who presents with a benign sounding headache, has had several presentations for similar.  Patient has no red flag symptoms on history or physical exam.  He has not attempted any OTC treatment.  Will give ibuprofen here in the emergency department.    I repeat assessment patient stated his headache was improved with ibuprofen.  I think he is safe to discharge from the emergency department as I do not think any other emergent workup is needed in light of normal neuro exam and lack of red flag symptoms.  Provided contact information for [place] encouraged him to establish primary care and discuss his chronic headaches.    Of note Carlos Alberto had eloped from the emergency department before he had been formally discharged.    I have reviewed the nursing notes.    I have reviewed the findings, diagnosis, plan and need for follow up with the patient.       Medication List      There are no discharge medications for this visit.         Final diagnoses:   Tension headache       1/11/2019   [facility], [place], EMERGENCY DEPARTMENT     Artis Melgoza MD  01/11/19 0605

## 2019-01-16 ENCOUNTER — HOSPITAL ENCOUNTER (EMERGENCY)
Facility: CLINIC | Age: 34
Discharge: HOME | End: 2019-01-16
Attending: EMERGENCY MEDICINE | Admitting: EMERGENCY MEDICINE
Payer: COMMERCIAL

## 2019-01-16 VITALS
SYSTOLIC BLOOD PRESSURE: 124 MMHG | TEMPERATURE: 98.8 F | OXYGEN SATURATION: 98 % | RESPIRATION RATE: 18 BRPM | HEART RATE: 65 BPM | DIASTOLIC BLOOD PRESSURE: 75 MMHG

## 2019-01-16 DIAGNOSIS — J02.9 ACUTE SORE THROAT: ICD-10-CM

## 2019-01-16 DIAGNOSIS — J02.0 ACUTE STREPTOCOCCAL PHARYNGITIS: ICD-10-CM

## 2019-01-16 LAB
DEPRECATED S PYO AG THROAT QL EIA: ABNORMAL
DEPRECATED S PYO AG THROAT QL EIA: ABNORMAL
SPECIMEN SOURCE: ABNORMAL

## 2019-01-16 PROCEDURE — 87880 STREP A ASSAY W/OPTIC: CPT | Performed by: EMERGENCY MEDICINE

## 2019-01-16 PROCEDURE — 99283 EMERGENCY DEPT VISIT LOW MDM: CPT | Mod: Z6 | Performed by: EMERGENCY MEDICINE

## 2019-01-16 PROCEDURE — 99283 EMERGENCY DEPT VISIT LOW MDM: CPT

## 2019-01-16 NOTE — ED PROVIDER NOTES
History     Chief Complaint   Patient presents with     Pharyngitis     HPI  Carlos Alberto Garcia is a 33 year old male with hx of schizophrenia, bipolar 2 who presents to the ED with sore throat. Symptoms started a few days ago. No cough nor fever. He has associated congestion. Eating and drinking without difficulty.     I have reviewed the Medications, Allergies, Past Medical and Surgical History, and Social History in the Epic system.    Review of Systems  No recent fevers, no chest pain, no abdominal pain    Physical Exam   BP: 124/75  Pulse: 65  Temp: 98.8  F (37.1  C)  Resp: 18  SpO2: 98 %    Physical Exam  General: No acute distress. Appears stated age.   HENT: MMM, no oropharyngeal lesions. No tonsillar erythema nor exudate.   Neck: no pain with tracheal manipulation, no LAD  Eyes: PERRL, normal sclerae   Cardio: Regular rate, extremities well perfused  Resp: Normal work of breathing, normal respiratory rate  Neuro: alert and fully oriented. CN II-XII grossly intact. Grossly normal strength and sensation in all extremities.   MSK: no deformities.   Integumentary/Skin: no rash, normal color  Psych: normal affect, normal behavior    ED Course      Procedures        Critical Care time:  none       Labs Ordered and Resulted from Time of ED Arrival Up to the Time of Departure from the ED - No data to display         Assessments & Plan (with Medical Decision Making)   Patient presenting with sore throat. Vitals in the ED wnl. No clinical signs/symptoms of deep neck space infection.     With rapid strep in process, the patient eloped from the ED. Rapid strep was positive. Called the phone number listed for the patient in demographics tab - phone number not currently valid. Patient does not have a listed address. Should the patient present to a healthcare facility, he should be treated for strep pharyngitis if still symptomatic.       Clinical Impression:  Strep pharyngitis      Tonny Badillo MD  Emergency  Medicine     I have reviewed the nursing notes.  I have reviewed the findings, diagnosis, plan and need for follow up with the patient.  Current Discharge Medication List          Final diagnoses:   Acute sore throat   Acute streptococcal pharyngitis       1/16/2019   [facility], [place], EMERGENCY DEPARTMENT     Tonny Badillo MD  01/16/19 0708

## 2019-01-16 NOTE — DISCHARGE INSTRUCTIONS
Please make an appointment to follow up with Your Primary Care Provider as soon as possible for antibiotic prescription.     Return to the ED if you are having worsening symptoms, or any urgent/life-threatening concerns.

## 2019-01-20 ENCOUNTER — HOSPITAL ENCOUNTER (EMERGENCY)
Facility: CLINIC | Age: 34
Discharge: HOME | End: 2019-01-20
Attending: EMERGENCY MEDICINE | Admitting: EMERGENCY MEDICINE
Payer: COMMERCIAL

## 2019-01-20 VITALS
HEART RATE: 93 BPM | SYSTOLIC BLOOD PRESSURE: 140 MMHG | BODY MASS INDEX: 29.12 KG/M2 | TEMPERATURE: 97.9 F | WEIGHT: 215 LBS | OXYGEN SATURATION: 100 % | DIASTOLIC BLOOD PRESSURE: 93 MMHG | HEIGHT: 72 IN

## 2019-01-20 DIAGNOSIS — R51.9 FREQUENT HEADACHES: ICD-10-CM

## 2019-01-20 DIAGNOSIS — Z59.00 HOMELESSNESS: ICD-10-CM

## 2019-01-20 PROCEDURE — 99283 EMERGENCY DEPT VISIT LOW MDM: CPT

## 2019-01-20 PROCEDURE — 25000132 ZZH RX MED GY IP 250 OP 250 PS 637: Performed by: EMERGENCY MEDICINE

## 2019-01-20 PROCEDURE — 99283 EMERGENCY DEPT VISIT LOW MDM: CPT | Mod: Z6 | Performed by: EMERGENCY MEDICINE

## 2019-01-20 PROCEDURE — 25000131 ZZH RX MED GY IP 250 OP 636 PS 637: Performed by: EMERGENCY MEDICINE

## 2019-01-20 RX ORDER — IBUPROFEN 600 MG/1
600 TABLET, FILM COATED ORAL ONCE
Status: COMPLETED | OUTPATIENT
Start: 2019-01-20 | End: 2019-01-20

## 2019-01-20 RX ORDER — ONDANSETRON 4 MG/1
4 TABLET, ORALLY DISINTEGRATING ORAL ONCE
Status: COMPLETED | OUTPATIENT
Start: 2019-01-20 | End: 2019-01-20

## 2019-01-20 RX ADMIN — ONDANSETRON 4 MG: 4 TABLET, ORALLY DISINTEGRATING ORAL at 21:33

## 2019-01-20 RX ADMIN — IBUPROFEN 600 MG: 600 TABLET ORAL at 21:33

## 2019-01-20 SDOH — ECONOMIC STABILITY - HOUSING INSECURITY: HOMELESSNESS UNSPECIFIED: Z59.00

## 2019-01-20 ASSESSMENT — ENCOUNTER SYMPTOMS
COUGH: 1
ABDOMINAL PAIN: 1
NAUSEA: 1
SHORTNESS OF BREATH: 0
VOMITING: 0
HEADACHES: 1
SORE THROAT: 0
PALPITATIONS: 0
FEVER: 0
CONSTIPATION: 0

## 2019-01-20 ASSESSMENT — MIFFLIN-ST. JEOR: SCORE: 1968.23

## 2019-01-20 NOTE — ED AVS SNAPSHOT
[facility], [place], Emergency Department  [address] [place] [id number]  Phone:  [phone]                                    Carlos Alberto Garcia   MRN: [id number]    Department:  [facility], Emergency Department   Date of Visit:  1/20/2019           After Visit Summary Signature Page    I have received my discharge instructions, and my questions have been answered. I have discussed any challenges I see with this plan with the nurse or doctor.    ..........................................................................................................................................  Patient/Patient Representative Signature      ..........................................................................................................................................  Patient Representative Print Name and Relationship to Patient    ..................................................               ................................................  Date                                   Time    ..........................................................................................................................................  Reviewed by Signature/Title    ...................................................              ..............................................  Date                                               Time          22EPIC Rev 08/18

## 2019-01-21 NOTE — DISCHARGE INSTRUCTIONS
You have been seen in the ER for your headache.  We have treated you for this.  Follow up with your clinic.

## 2019-01-21 NOTE — ED PROVIDER NOTES
"  History     Chief Complaint   Patient presents with     Abdominal Pain     HPI  Carlos Alberto Garcia is a 33 year old male who is well-known to the emergency department for very frequent visits and malingering behavior who presents today for headache and abdominal discomfort.  He is a prior history of homelessness, schizoaffective disorder.  He reports that about an hour ago he began to experience his usual and typical headache.  No fevers.  No head trauma.  He has had a bit of a cough which is not unusual for him.  No neck pain.  He has had some nausea and vomiting though the last time he vomited was several days ago.  Denies any diarrhea.  He has had a little bit of upper abdominal discomfort.  He has not tried anything for his symptoms.  No urinary complaints.  He states that the last time he saw his primary clinic was \"a few years ago \".    He asks if he \"can sleep for just an hour to get out of the cold\".    Past Medical History:   Diagnosis Date     Bipolar 2 disorder (H)      Chemical abuse (H)     cocaine, meth, cambis     Dyslipidemia      Hep C w/ coma, chronic (H)      Patient overweight      Schizoaffective disorder      Unspecified essential hypertension      Unspecified hypothyroidism        Past Surgical History:   Procedure Laterality Date     HAND SURGERY      L     ORTHOPEDIC SURGERY      hand       No family history on file.    Social History     Tobacco Use     Smoking status: Current Every Day Smoker     Packs/day: 1.00     Types: Cigarettes     Smokeless tobacco: Current User   Substance Use Topics     Alcohol use: No         I have reviewed the Medications, Allergies, Past Medical and Surgical History, and Social History in the Epic system.    Review of Systems   Constitutional: Negative for fever.   HENT: Negative for sore throat.    Respiratory: Positive for cough. Negative for shortness of breath.    Cardiovascular: Negative for chest pain and palpitations.   Gastrointestinal: Positive for " abdominal pain and nausea. Negative for constipation and vomiting.   Neurological: Positive for headaches.   Psychiatric/Behavioral: Positive for behavioral problems.       Physical Exam   BP: (!) 156/105  Pulse: 99  Temp: 97.9  F (36.6  C)  Height: 182.9 cm (6')  Weight: 97.5 kg (215 lb)  SpO2: 99 %      Physical Exam   Constitutional: He is oriented to person, place, and time.   Disheveled appearing adult male, alert, answers questions appropriately.  Very dirty hands and clothing is unwashed and very worn.   HENT:   Head: Normocephalic.   Eyes: Pupils are equal, round, and reactive to light.   Cardiovascular: Normal rate, regular rhythm, normal heart sounds and intact distal pulses.   No murmur heard.  Pulmonary/Chest: Effort normal and breath sounds normal. No stridor. No respiratory distress.   Abdominal: Soft. He exhibits no mass. There is no tenderness. There is no guarding.   Soft, nontender to even deep palpation in all quadrants.  Somewhat hypoactive bowel sounds.    Neurological: He is alert and oriented to person, place, and time. No cranial nerve deficit. Coordination normal.   Psychiatric:   Flat affect. Poor insight   Nursing note and vitals reviewed.      ED Course        Procedures             Critical Care time:  none             Labs Ordered and Resulted from Time of ED Arrival Up to the Time of Departure from the ED - No data to display         Assessments & Plan (with Medical Decision Making)   Patient presents to the emergency department today with his usual and typical headache.  Though he was also complaining of vomiting, it is apparently been several days since he has vomited.  He was complaining of epigastric discomfort however upon deep palpation he does not seem to have any symptoms.  I strongly suspect that he is using secondary gain and seeking a bed in the emergency department to sleep.  This has historically been his pattern.  I do not see any acute medical issues at this point.  He  "was given a dose of Motrin and Zofran ODT for his symptoms.  He was given a sandwich.  He was able to tolerate this without difficulty.  He repeatedly asked \"just to sleep for an hour \".  I did tell him that it is not appropriate to use the emergency department just to sleep.  He has shelter information.    I have reviewed the nursing notes.    I have reviewed the findings, diagnosis, plan and need for follow up with the patient.       Medication List      There are no discharge medications for this visit.         Final diagnoses:   Frequent headaches   Homelessness       1/20/2019   [facility], [place], EMERGENCY DEPARTMENT     Sandhya Huang MD  01/20/19 2149    "

## 2019-02-02 ENCOUNTER — HOSPITAL ENCOUNTER (EMERGENCY)
Facility: CLINIC | Age: 34
Discharge: HOME | End: 2019-02-02
Attending: EMERGENCY MEDICINE | Admitting: EMERGENCY MEDICINE
Payer: COMMERCIAL

## 2019-02-02 VITALS
RESPIRATION RATE: 18 BRPM | HEIGHT: 72 IN | DIASTOLIC BLOOD PRESSURE: 80 MMHG | OXYGEN SATURATION: 100 % | TEMPERATURE: 98.1 F | WEIGHT: 215 LBS | SYSTOLIC BLOOD PRESSURE: 134 MMHG | BODY MASS INDEX: 29.12 KG/M2 | HEART RATE: 94 BPM

## 2019-02-02 DIAGNOSIS — L20.9 ATOPIC DERMATITIS, UNSPECIFIED TYPE: ICD-10-CM

## 2019-02-02 PROCEDURE — 99283 EMERGENCY DEPT VISIT LOW MDM: CPT | Mod: Z6 | Performed by: EMERGENCY MEDICINE

## 2019-02-02 PROCEDURE — 99282 EMERGENCY DEPT VISIT SF MDM: CPT | Performed by: EMERGENCY MEDICINE

## 2019-02-02 ASSESSMENT — ENCOUNTER SYMPTOMS
FEVER: 0
VOMITING: 1

## 2019-02-02 ASSESSMENT — MIFFLIN-ST. JEOR: SCORE: 1968.23

## 2019-02-02 NOTE — ED TRIAGE NOTES
ED TRIAGE    Medical / Trauma C/o:  33-yr male patient - presenting to ED for eval of possible post-frostbite rash to bilat inner thighs; states he was outside for the majority of the previous cold weather.    Duration of C/o:  A few days    Contributing Factors / Concerning HX:  See HX    Significant Med's / Tx's:  See med's    Febrile / Afebrile:  Afebrile    Patient Vitals for the past 24 hrs:   BP Temp Temp src Pulse Heart Rate Resp SpO2 Height Weight   02/02/19 1024 134/80 98.1  F (36.7  C) Oral 94 94 18 100 % 1.829 m (6') 97.5 kg (215 lb)       Artis Adams  February 2, 2019  10:27 AM

## 2019-02-02 NOTE — ED AVS SNAPSHOT
[facility], [place], Emergency Department  [address] [place] [id number]  Phone:  [phone]                                    Carlos Alberto Garcia   MRN: [id number]    Department:  [facility], Emergency Department   Date of Visit:  2/2/2019           After Visit Summary Signature Page    I have received my discharge instructions, and my questions have been answered. I have discussed any challenges I see with this plan with the nurse or doctor.    ..........................................................................................................................................  Patient/Patient Representative Signature      ..........................................................................................................................................  Patient Representative Print Name and Relationship to Patient    ..................................................               ................................................  Date                                   Time    ..........................................................................................................................................  Reviewed by Signature/Title    ...................................................              ..............................................  Date                                               Time          22EPIC Rev 08/18

## 2019-02-02 NOTE — ED PROVIDER NOTES
History     Chief Complaint   Patient presents with     Rash     concern for frostbite exposure     HPI  Carlos Alberto Garcia is a 33 year old homeless male with a history of hepatitis C, schizoaffective disorder, bipolar, chemical abuse, tobacco abuse, who presents to the Emergency Department for evaluation of rash. Patient reports that he is currently homeless and complains of a rash located on his bilateral inner thighs and feet. He is concerned of possible frost bite due to recent cold exposure. He denies any fevers but had a reported episode of emesis.       I have reviewed the Medications, Allergies, Past Medical and Surgical History, and Social History in the [facility] system.    Past Medical History:   Diagnosis Date     Bipolar 2 disorder (H)      Chemical abuse (H)     cocaine, meth, cambis     Dyslipidemia      Hep C w/ coma, chronic (H)      Patient overweight      Schizoaffective disorder      Unspecified essential hypertension      Unspecified hypothyroidism        Past Surgical History:   Procedure Laterality Date     HAND SURGERY      L     ORTHOPEDIC SURGERY      hand       No family history on file.    Social History     Tobacco Use     Smoking status: Current Every Day Smoker     Packs/day: 1.00     Types: Cigarettes     Smokeless tobacco: Current User   Substance Use Topics     Alcohol use: No     No current facility-administered medications for this encounter.      Current Outpatient Medications   Medication     Skin Protectants, Misc. (EUCERIN) cream     Paliperidone (INVEGA PO)     Facility-Administered Medications Ordered in Other Encounters   Medication     ibuprofen (ADVIL,MOTRIN) 600 MG tablet      No Known Allergies    Review of Systems   Constitutional: Negative for fever.   Gastrointestinal: Positive for vomiting.   Skin: Positive for rash (bilateral feet and inner thighs).   All other systems reviewed and are negative.      Physical Exam   BP: 134/80  Pulse: 94  Heart Rate: 94  Temp: 98.1   F (36.7  C)  Resp: 18  Height: 182.9 cm (6')  Weight: 97.5 kg (215 lb)  SpO2: 100 %      Physical Exam  Physical Exam   Constitutional: oriented to person, place, and time. appears well-developed and well-nourished.   HENT:   Head: Normocephalic and atraumatic.   Neck: Normal range of motion.   Pulmonary/Chest: Effort normal. No respiratory distress.   Cardiac: No murmurs, rubs, gallops. RRR.  Abdominal: Abdomen soft, nontender, nondistended. No rebound tenderness.  MSK: Long bones without deformity or evidence of trauma.  Compartments in the upper and lower extremities soft.  Neurological: alert and oriented to person, place, and time.   Skin: Skin is warm and dry. Bilateral inner thighs there is dry and scaling skin.  No erythema or color change, no ecchymosis, skin is warm and well perfused, no indication of frostbite on this area.  Skin on feet without evidence of frostbite, there is some blistering on the soles of the feet consistent more with trench foot.  Small abrasion on the medial part of the left foot.  No blistering throughout all extremities.  Psychiatric:  normal mood and affect.  behavior is normal. Thought content normal.     ED Course        Procedures          Labs Ordered and Resulted from Time of ED Arrival Up to the Time of Departure from the ED - No data to display         Assessments & Plan (with Medical Decision Making)   MDM  Patient presenting concerned with a rash on his thighs.  It does appear to be like he is having generalized dry skin versus possible atopic reaction.  Will give prescription for topical treatment for this and recommended applying this daily.  Lower extremities appear well perfused with good capillary refill.  There is no blistering or color change consistent with frostbite.  He does have intact sensation to all extremities.  Recommended staying indoors and changing socks as much as he can.  Patient will return if symptoms are worsening.  Her  will discuss  housing with the patient.    I have reviewed the nursing notes.    I have reviewed the findings, diagnosis, plan and need for follow up with the patient.       Medication List      Started    eucerin cream  Topical, PRN        ASK your doctor about these medications    naproxen 375 MG tablet  Commonly known as:  NAPROSYN  375 mg, Oral, 2 TIMES DAILY PRN  Ask about: Should I take this medication?     penicillin V 500 MG tablet  Commonly known as:  VEETID  500 mg, Oral, 2 TIMES DAILY  Ask about: Should I take this medication?     ranitidine 150 MG tablet  Commonly known as:  ZANTAC  150 mg, Oral, 2 TIMES DAILY  Ask about: Should I take this medication?            Final diagnoses:   Atopic dermatitis, unspecified type     I, Zoey Turner, am serving as a trained medical scribe to document services personally performed by Robson Rowe MD, based on the provider's statements to me.   I, Robson Rowe MD, was physically present and have reviewed and verified the accuracy of this note documented by Zoey Turner.    2/2/2019   [facility], [place], EMERGENCY DEPARTMENT     Robson Rowe MD  02/02/19 1052

## 2019-02-02 NOTE — DISCHARGE INSTRUCTIONS
Please make an appointment to follow [facility] with [facility] (phone: [phone] in 5-7 days if you have any concerns.

## 2019-02-02 NOTE — PROGRESS NOTES
"Emergency Social Work Services Note    Date of  Intervention: 02/02/19  Last Emergency Department Visit: 1/20/19, 1/16/19, 1/11/19, 1/9/19, 1/5/19. Six ED visits Dec '18, ten ED visits Nov '18  Care Plan: none although is restricted to [facility] & [facility] clinic  Collaborated with: ED MD, ED RN, chart review, patient    Data: Carlos Alberto Garcia is a 33-year-old  male who presents to [facility] ED requesting evaluation of rash.  Per chart review, Abdullahi has history of schizoaffective disorder, bipolar disorder, chemical dependency issues (meth) and frequent ED visits.    Intervention: ED SW met with Abdullahi to discuss his disposition. Abdullahi continues to be homeless reports that he does not wish to stay in the shelter system but rather stays on the streets or on public transportation during cold weather.  He reports he is followed by the [facility] team for mental health needs including monthly psychotropic medication injection and is followed by \"homeless court\".  He reports continued drug use including meth, most recent use several days ago.     ED SW Abdullahi discussed resources and his disposition.  He declined shelter placement and wishes to continue living on the streets.  He declines any type of chemical dependency treatment at this time, feels that meth use \"helps\" him.  He is agreeable to being followed by the [place] team although did not have any recent contact phone numbers for them nor does he have a cell phone for them to reach him.  He notes he typically stays in [place] and usually meets providers at the [facility].  We discussed primary care follow-up and encouraged him to use healthcare for the homeless clinics or [facility] to address his needs rather than ED unless need is urgent.     Assessment:  Chronic homelessness, mental health issues, chemical dependency, frequent ED visits    Plan:    Anticipated Disposition:  streets with [facility] " team follow-up    Barriers to d/c plan:  none    Follow Up:  Will defer to community providers for support.  ED SW provided clean clothing and resource information for healthcare for the homeless clinics.  Abdullahi gave verbal consent for this SW to contact his ACT team to leave the message with update.    [facility] (p) [phone] - spoke to Jessica who used to work with him. No longer works with patient and they discharge patient from their team > 18 months ago. She suggested connect with community court/group home liaison to inquire if they follow    lianne [facility] Team (p) [phone] -left message inquiring if anyone from their team is following patient.    RICHARD Montano, [facility]  Social Work Services, Emergency Dept [facility]  Pager: [phone] Mon-Sat 9 am - 9 pm, on-call/after hours pager [phone]

## 2019-02-04 ENCOUNTER — HOSPITAL ENCOUNTER (EMERGENCY)
Facility: CLINIC | Age: 34
Discharge: HOME | End: 2019-02-04
Payer: COMMERCIAL

## 2019-02-04 VITALS
RESPIRATION RATE: 14 BRPM | HEART RATE: 95 BPM | SYSTOLIC BLOOD PRESSURE: 121 MMHG | DIASTOLIC BLOOD PRESSURE: 83 MMHG | TEMPERATURE: 99.6 F | OXYGEN SATURATION: 96 %

## 2019-02-04 NOTE — ED TRIAGE NOTES
"Presents to ER for rash on right thigh that has been present for past week. Was seen yesterday for same issue, but was told today that \"his insurance didn't cover the cream\" and so he has returned to have the cream applied.   "

## 2019-02-05 ENCOUNTER — HOSPITAL ENCOUNTER (EMERGENCY)
Facility: CLINIC | Age: 34
Discharge: HOME | End: 2019-02-05
Payer: COMMERCIAL

## 2019-02-06 ENCOUNTER — HOSPITAL ENCOUNTER (EMERGENCY)
Facility: CLINIC | Age: 34
Discharge: HOME | End: 2019-02-06
Attending: EMERGENCY MEDICINE | Admitting: EMERGENCY MEDICINE
Payer: COMMERCIAL

## 2019-02-06 ENCOUNTER — TELEPHONE (OUTPATIENT)
Dept: CARE COORDINATION | Facility: CLINIC | Age: 34
End: 2019-02-06

## 2019-02-06 VITALS
OXYGEN SATURATION: 98 % | SYSTOLIC BLOOD PRESSURE: 163 MMHG | HEART RATE: 80 BPM | RESPIRATION RATE: 18 BRPM | TEMPERATURE: 97.7 F | DIASTOLIC BLOOD PRESSURE: 93 MMHG

## 2019-02-06 DIAGNOSIS — R51.9 NONINTRACTABLE EPISODIC HEADACHE, UNSPECIFIED HEADACHE TYPE: ICD-10-CM

## 2019-02-06 PROCEDURE — 99283 EMERGENCY DEPT VISIT LOW MDM: CPT | Mod: Z6 | Performed by: EMERGENCY MEDICINE

## 2019-02-06 PROCEDURE — 99283 EMERGENCY DEPT VISIT LOW MDM: CPT | Performed by: EMERGENCY MEDICINE

## 2019-02-06 PROCEDURE — 25000132 ZZH RX MED GY IP 250 OP 250 PS 637: Performed by: EMERGENCY MEDICINE

## 2019-02-06 RX ORDER — IBUPROFEN 600 MG/1
600 TABLET, FILM COATED ORAL ONCE
Status: COMPLETED | OUTPATIENT
Start: 2019-02-06 | End: 2019-02-06

## 2019-02-06 RX ADMIN — IBUPROFEN 600 MG: 600 TABLET ORAL at 01:18

## 2019-02-06 ASSESSMENT — ENCOUNTER SYMPTOMS
NUMBNESS: 0
ABDOMINAL PAIN: 0
NAUSEA: 1
VOMITING: 0
HEADACHES: 1
FEVER: 0
DYSURIA: 0
COUGH: 0
WEAKNESS: 0
SHORTNESS OF BREATH: 0

## 2019-02-06 ASSESSMENT — MIFFLIN-ST. JEOR: SCORE: 1968.23

## 2019-02-06 NOTE — TELEPHONE ENCOUNTER
CELESTE as of 2/6/19: patient with chronic homelessness, chemical dependency (typically meth) and chronic mental illness who has frequent ED visits.  He is followed by the [facility] team for monthly psychotropic medication injection Invega and weekly meetings in the community. They are working with patient on long-term supportive housing however this has been complicated as patient is difficult to locate and has not followed through with prior appointments.   noted that once they have an updated CADI waiver assessment and service agreement plan put together they should be able to find him long-term supportive housing.  For now, patient should use adult shelter connect for housing until long-term supportive housing placement has been finalized.  If this patient is in the ED during business hours, his  may be able to meet with him. Otherwise, we may leave them voicemail updates about ED visits.     [facility]  James Horn [phone]   CADI  - Laverne Horner [phone]    RICHARD Montano, [facility]  Social Work Services, Emergency Dept [facility]  Pager: [phone] Mon-Sat 9 am - 9 pm, on-call/after hours pager [phone]

## 2019-02-06 NOTE — ED TRIAGE NOTES
"Pt presents ambulatory to triage with c/o headache that started a couple hours ago. Pt reports \"I think I need ibuprofen\". Pt did not try any home remedies PTA.  "

## 2019-02-06 NOTE — ED PROVIDER NOTES
[place] EMERGENCY DEPARTMENT ([facility])  February 6, 2019    History     Chief Complaint   Patient presents with     Headache     HPI  Carlos Alberto Garcia is a 33 year old homeless male who is well known to the ED with history of substance abuse and bipolar 2 disorder who presents to the ED with headache.  Patient reports that his headache has been intermittent over the past few days.  It feels like his typical headache and is located in the back of his head.  Denies numbness, tingling, weakness, vision changes.  He does have sensitivity to light and some nausea but no vomiting.  Denies fever.  Has been ill with some nasal congestion.    I have reviewed the Medications, Allergies, Past Medical and Surgical History, and Social History in the Epic system.    Review of Systems   Constitutional: Negative for fever.   HENT: Positive for congestion.    Eyes: Negative for visual disturbance.   Respiratory: Negative for cough and shortness of breath.    Cardiovascular: Negative for chest pain.   Gastrointestinal: Positive for nausea. Negative for abdominal pain and vomiting.   Genitourinary: Negative for dysuria.   Neurological: Positive for headaches. Negative for weakness and numbness.   All other systems reviewed and are negative.      Physical Exam   BP: (!) 163/93  Pulse: 80  Temp: 97.7  F (36.5  C)  Resp: 18  SpO2: 98 %      Physical Exam   Constitutional: He is oriented to person, place, and time. He appears well-developed and well-nourished. No distress.   HENT:   Head: Normocephalic and atraumatic.   Right Ear: External ear normal.   Left Ear: External ear normal.   Nose: Nose normal.   Eyes: Conjunctivae and EOM are normal. Pupils are equal, round, and reactive to light. No scleral icterus.   Neck: Normal range of motion. Neck supple.   Cardiovascular: Normal rate, regular rhythm, normal heart sounds and intact distal pulses.   No murmur heard.  Pulmonary/Chest: Effort normal and breath sounds  normal. No stridor. No respiratory distress. He has no wheezes.   Abdominal: Soft. He exhibits no distension. There is no tenderness.   Musculoskeletal: Normal range of motion. He exhibits no deformity.   Neurological: He is alert and oriented to person, place, and time. No sensory deficit. He exhibits normal muscle tone.   Skin: Skin is warm and dry. No rash noted. He is not diaphoretic. No erythema. No pallor.   Psychiatric: His affect is labile.   Nursing note and vitals reviewed.      ED Course        Procedures             Critical Care time:  none             Labs Ordered and Resulted from Time of ED Arrival Up to the Time of Departure from the ED - No data to display         Assessments & Plan (with Medical Decision Making)   Carlos Alberto Garica is a 33 year old homeless male who is well known to the ED with history of substance abuse and bipolar 2 disorder who presents to the ED with headache.     Ddx: tension ha, migraine, malingering    reports symptoms are consistent with patient's recurrent intermittent headache.  No red flag symptoms such as weakness, numbness, vision changes.  Has significant head injury.  Not taken anything for the pain.  Given ibuprofen.    On reassessment, patient does report that his headache is better.  He gets agitated, mumbling, standing up from his bed, and walking out of the ED.  Asked to clarify what he was saying but keeps walking. Discharged in stable condition.      I have reviewed the nursing notes.    I have reviewed the findings, diagnosis, plan and need for follow up with the patient.       Medication List      ASK your doctor about these medications    naproxen 375 MG tablet  Commonly known as:  NAPROSYN  375 mg, Oral, 2 TIMES DAILY PRN  Ask about: Should I take this medication?     penicillin V 500 MG tablet  Commonly known as:  VEETID  500 mg, Oral, 2 TIMES DAILY  Ask about: Should I take this medication?     ranitidine 150 MG tablet  Commonly known as:  ZANTAC  150  mg, Oral, 2 TIMES DAILY  Ask about: Should I take this medication?            Final diagnoses:   Nonintractable episodic headache, unspecified headache type       2/6/2019   [facility], [facility], EMERGENCY DEPARTMENT     Leila Hansen MD  02/06/19 0212

## 2019-02-06 NOTE — ED NOTES
MD saw patient at bedside for discharge discussion, pt left prior to discharge instructions by RN, pt did not sign AVS.

## 2019-02-07 ENCOUNTER — HOSPITAL ENCOUNTER (EMERGENCY)
Facility: CLINIC | Age: 34
Discharge: HOME | End: 2019-02-07
Attending: EMERGENCY MEDICINE
Payer: COMMERCIAL

## 2019-02-07 VITALS
TEMPERATURE: 97.8 F | HEIGHT: 72 IN | WEIGHT: 215 LBS | SYSTOLIC BLOOD PRESSURE: 110 MMHG | OXYGEN SATURATION: 97 % | HEART RATE: 56 BPM | DIASTOLIC BLOOD PRESSURE: 74 MMHG | RESPIRATION RATE: 16 BRPM | BODY MASS INDEX: 29.12 KG/M2

## 2019-02-07 DIAGNOSIS — R51.9 NONINTRACTABLE EPISODIC HEADACHE, UNSPECIFIED HEADACHE TYPE: ICD-10-CM

## 2019-02-07 PROCEDURE — 25000132 ZZH RX MED GY IP 250 OP 250 PS 637: Performed by: EMERGENCY MEDICINE

## 2019-02-07 RX ORDER — IBUPROFEN 600 MG/1
600 TABLET, FILM COATED ORAL ONCE
Status: COMPLETED | OUTPATIENT
Start: 2019-02-07 | End: 2019-02-07

## 2019-02-07 RX ADMIN — IBUPROFEN 600 MG: 600 TABLET ORAL at 01:11

## 2019-02-07 ASSESSMENT — ENCOUNTER SYMPTOMS
NUMBNESS: 0
NECK PAIN: 0
SHORTNESS OF BREATH: 0
HEADACHES: 1

## 2019-02-07 NOTE — ED PROVIDER NOTES
History     Chief Complaint   Patient presents with     Headache     HPI  Carlos Alberto Garcia is a homeless 33 year old male who is well known to the ED with a history of substance abuse, bipolar 2 disorder, and  schizoaffective disorder who presents for evaluation of a headache. Notably, patient was here yesterday for a similar headache that improved after ibuprofen. He states his headache is back today but the same as his usual. Patient states his headache is pounding. He denies chest pain, shortness of breath, vision changes, neck pain, numbness, tingling, or recent head trauma. Denies drug use today. He states he does not have insurance and is unable to  prescriptions.    Past Medical History:   Diagnosis Date     Bipolar 2 disorder (H)      Chemical abuse (H)     cocaine, meth, cambis     Dyslipidemia      Hep C w/ coma, chronic (H)      Patient overweight      Schizoaffective disorder      Unspecified essential hypertension      Unspecified hypothyroidism        Past Surgical History:   Procedure Laterality Date     HAND SURGERY      L     ORTHOPEDIC SURGERY      hand       History reviewed. No pertinent family history.    Social History     Tobacco Use     Smoking status: Current Every Day Smoker     Packs/day: 1.00     Types: Cigarettes     Smokeless tobacco: Former User   Substance Use Topics     Alcohol use: No       No current facility-administered medications for this encounter.      No current outpatient medications on file.     Facility-Administered Medications Ordered in Other Encounters   Medication     ibuprofen (ADVIL,MOTRIN) 600 MG tablet      No Known Allergies    I have reviewed the Medications, Allergies, Past Medical and Surgical History, and Social History in the Epic system.    Review of Systems   Eyes: Negative for visual disturbance.   Respiratory: Negative for shortness of breath.    Cardiovascular: Negative for chest pain.   Musculoskeletal: Negative for neck pain.    Neurological: Positive for headaches. Negative for numbness.   All other systems reviewed and are negative.      Physical Exam   BP: 120/75  Pulse: 56  Heart Rate: 110  Temp: 97.8  F (36.6  C)  Resp: 16  Height: 182.9 cm (6')  Weight: 97.5 kg (215 lb)  SpO2: 96 %      Physical Exam   Constitutional: He is oriented to person, place, and time. He appears well-developed and well-nourished. No distress.   HENT:   Head: Normocephalic and atraumatic.   Nose: Nose normal.   Eyes: Conjunctivae and EOM are normal. Pupils are equal, round, and reactive to light. No scleral icterus.   Neck: Normal range of motion. Neck supple.   Cardiovascular: Normal rate, regular rhythm and normal heart sounds.   Pulmonary/Chest: Effort normal and breath sounds normal. No stridor. No respiratory distress. He has no wheezes.   Abdominal: Soft. He exhibits no distension.   Musculoskeletal: Normal range of motion. He exhibits no tenderness or deformity.   Neurological: He is alert and oriented to person, place, and time. No cranial nerve deficit or sensory deficit. He exhibits normal muscle tone.   Skin: Skin is warm and dry. He is not diaphoretic.   Psychiatric: He has a normal mood and affect. His behavior is normal.   Nursing note and vitals reviewed.      ED Course        Procedures       12:39 AM  The patient was seen and examined by Dr. Hansen in [facility]          Critical Care time:  none             Labs Ordered and Resulted from Time of ED Arrival Up to the Time of Departure from the ED - No data to display         Assessments & Plan (with Medical Decision Making)   Carlos Alberto Garcia is a homeless 33 year old male who is well known to the ED with a history of substance abuse, bipolar 2 disorder, and  schizoaffective disorder who presents for evaluation of a headache.    Ddx: tension headache, migraine headache, malingering    Given ibuprofen. HA improved and patient ready for discharge.     I have reviewed the nursing notes.    I  have reviewed the findings, diagnosis, plan and need for follow up with the patient.       Medication List      There are no discharge medications for this visit.         Final diagnoses:   Nonintractable episodic headache, unspecified headache type   I, Aniket Forte, am serving as a trained medical scribe to document services personally performed by Leila Hansen MD, based on the provider's statements to me.   I, Leila Hansen MD, was physically present and have reviewed and verified the accuracy of this note documented by Aniket Forte.    2/6/2019   [facility], [place], EMERGENCY DEPARTMENT     Leila Hansen MD  02/08/19 0701

## 2019-02-07 NOTE — ED AVS SNAPSHOT
[facility], [place], Emergency Department  [address] [place] [id number]  Phone:  [phone]                                    Carlos Alberto Garcia   MRN: [id number]    Department:  [facility], Emergency Department   Date of Visit:  2/6/2019           After Visit Summary Signature Page    I have received my discharge instructions, and my questions have been answered. I have discussed any challenges I see with this plan with the nurse or doctor.    ..........................................................................................................................................  Patient/Patient Representative Signature      ..........................................................................................................................................  Patient Representative Print Name and Relationship to Patient    ..................................................               ................................................  Date                                   Time    ..........................................................................................................................................  Reviewed by Signature/Title    ...................................................              ..............................................  Date                                               Time          22EPIC Rev 08/18

## 2019-02-07 NOTE — ED TRIAGE NOTES
"Pt presents to ED for \"same headache I had yesterday\". AVSS on RA. Pt. Falling asleep in triage. Denies drugs/ETOH today.  "

## 2019-02-08 ENCOUNTER — HOSPITAL ENCOUNTER (EMERGENCY)
Facility: CLINIC | Age: 34
Discharge: HOME | End: 2019-02-08
Attending: EMERGENCY MEDICINE | Admitting: EMERGENCY MEDICINE
Payer: COMMERCIAL

## 2019-02-08 VITALS
TEMPERATURE: 98.4 F | HEIGHT: 72 IN | HEART RATE: 89 BPM | WEIGHT: 209 LBS | SYSTOLIC BLOOD PRESSURE: 152 MMHG | RESPIRATION RATE: 16 BRPM | BODY MASS INDEX: 28.31 KG/M2 | DIASTOLIC BLOOD PRESSURE: 98 MMHG | OXYGEN SATURATION: 98 %

## 2019-02-08 DIAGNOSIS — G44.209 TENSION HEADACHE: ICD-10-CM

## 2019-02-08 PROCEDURE — 99283 EMERGENCY DEPT VISIT LOW MDM: CPT | Mod: Z6 | Performed by: EMERGENCY MEDICINE

## 2019-02-08 PROCEDURE — 25000132 ZZH RX MED GY IP 250 OP 250 PS 637: Performed by: EMERGENCY MEDICINE

## 2019-02-08 PROCEDURE — 99283 EMERGENCY DEPT VISIT LOW MDM: CPT | Performed by: EMERGENCY MEDICINE

## 2019-02-08 RX ORDER — ACETAMINOPHEN 500 MG
1000 TABLET ORAL ONCE
Status: COMPLETED | OUTPATIENT
Start: 2019-02-08 | End: 2019-02-08

## 2019-02-08 RX ADMIN — ACETAMINOPHEN 1000 MG: 500 TABLET, FILM COATED ORAL at 22:14

## 2019-02-08 ASSESSMENT — MIFFLIN-ST. JEOR: SCORE: 1941.02

## 2019-02-08 NOTE — ED AVS SNAPSHOT
[facility], [place], Emergency Department  [address] [place] [id number]  Phone:  [phone]                                    Carlos Alberto Garcia   MRN: [id number]    Department:  [facility], Emergency Department   Date of Visit:  2/8/2019           After Visit Summary Signature Page    I have received my discharge instructions, and my questions have been answered. I have discussed any challenges I see with this plan with the nurse or doctor.    ..........................................................................................................................................  Patient/Patient Representative Signature      ..........................................................................................................................................  Patient Representative Print Name and Relationship to Patient    ..................................................               ................................................  Date                                   Time    ..........................................................................................................................................  Reviewed by Signature/Title    ...................................................              ..............................................  Date                                               Time          22EPIC Rev 08/18

## 2019-02-09 NOTE — ED NOTES
After pt was discharged, he continued to sleep in his bed. Pt informed he has been discharge and he can leave.

## 2019-02-09 NOTE — ED PROVIDER NOTES
History     Chief Complaint   Patient presents with     Headache     HPI  Carlos Alberto Garcia is a 33 year old male who presents emergency room with headache.  Patient states that he gets frequent headaches after utilizing methamphetamine which he used today.  He denies all other complaints at this time.    I have reviewed the Medications, Allergies, Past Medical and Surgical History, and Social History in the Epic system.    Review of Systems   All other systems reviewed and are negative.      Physical Exam   BP: (!) 152/98  Pulse: 89  Temp: 98.4  F (36.9  C)  Resp: 16  Height: 182.9 cm (6')  Weight: 94.8 kg (209 lb)  SpO2: 98 %      Physical Exam   Constitutional: He is oriented to person, place, and time. Vital signs are normal. He appears well-developed and well-nourished. No distress.   Disheveled    HENT:   Head: Normocephalic and atraumatic.   Eyes: No scleral icterus.   Neck: Normal range of motion. Neck supple.   Neurological: He is alert and oriented to person, place, and time. No cranial nerve deficit. Coordination normal.   Skin: Skin is warm and dry. No rash noted. He is not diaphoretic.   Psychiatric: He has a normal mood and affect. Thought content normal.   Vitals reviewed.      ED Course        Procedures             Critical Care time:  none             Labs Ordered and Resulted from Time of ED Arrival Up to the Time of Departure from the ED - No data to display         Assessments & Plan (with Medical Decision Making)   This is a 33-year-old male presenting to the emergency room complaining of a headache.  Patient is well-known to the emergency room with very frequent ED visits.  On physical exam he is ambulating throughout the emergency room and no obvious distress.  He has no focal deficits on neurologic exam.  He is in his normal state of being disheveled.  He has no thoughts of homicidal suicidal ideations despite the fact they told the triage nurse that he wanted to strangle himself.  When  this was discussed with the patient he said he had no such issues and just wanted some Tylenol for his headache.  At this time I believe he can be safely discharged after being provided with Tylenol and recommend that he follow-up with his primary care physician.  Patient understands and agrees with discharge instructions and aftercare planning.    I have reviewed the nursing notes.    I have reviewed the findings, diagnosis, plan and need for follow up with the patient.       Medication List      There are no discharge medications for this visit.         Final diagnoses:   Tension headache       2/8/2019   [facility], [place], EMERGENCY DEPARTMENT     Jose Peralta MD  02/09/19 0036

## 2019-02-09 NOTE — ED NOTES
Three more attempts made to encourage pt to leave after discharge. Pt remains in bed. Security informed of pt's refusal to leave

## 2019-02-12 ENCOUNTER — HOSPITAL ENCOUNTER (EMERGENCY)
Facility: CLINIC | Age: 34
Discharge: HOME | End: 2019-02-12
Attending: EMERGENCY MEDICINE | Admitting: EMERGENCY MEDICINE
Payer: COMMERCIAL

## 2019-02-12 VITALS
WEIGHT: 254.4 LBS | OXYGEN SATURATION: 96 % | SYSTOLIC BLOOD PRESSURE: 138 MMHG | TEMPERATURE: 98.6 F | DIASTOLIC BLOOD PRESSURE: 86 MMHG | HEART RATE: 86 BPM | BODY MASS INDEX: 34.5 KG/M2

## 2019-02-12 DIAGNOSIS — F15.10 METHAMPHETAMINE ABUSE (H): ICD-10-CM

## 2019-02-12 DIAGNOSIS — G44.209 TENSION HEADACHE: ICD-10-CM

## 2019-02-12 PROCEDURE — 99283 EMERGENCY DEPT VISIT LOW MDM: CPT | Mod: Z6 | Performed by: EMERGENCY MEDICINE

## 2019-02-12 PROCEDURE — 25000132 ZZH RX MED GY IP 250 OP 250 PS 637: Performed by: EMERGENCY MEDICINE

## 2019-02-12 PROCEDURE — 99283 EMERGENCY DEPT VISIT LOW MDM: CPT | Performed by: EMERGENCY MEDICINE

## 2019-02-12 RX ORDER — ACETAMINOPHEN 325 MG/1
975 TABLET ORAL ONCE
Status: COMPLETED | OUTPATIENT
Start: 2019-02-12 | End: 2019-02-12

## 2019-02-12 RX ADMIN — ACETAMINOPHEN 975 MG: 325 TABLET, FILM COATED ORAL at 00:48

## 2019-02-12 ASSESSMENT — ENCOUNTER SYMPTOMS
COLOR CHANGE: 0
SHORTNESS OF BREATH: 0
NAUSEA: 0
EYE REDNESS: 0
FEVER: 0
ARTHRALGIAS: 0
HEADACHES: 1
DIFFICULTY URINATING: 0
VOMITING: 0
ABDOMINAL PAIN: 0
NECK STIFFNESS: 0
CONFUSION: 0

## 2019-02-12 NOTE — ED PROVIDER NOTES
History     Chief Complaint   Patient presents with     Headache     Eye Problem     HPI  Carlos Alberto Garcia is a 33 year old male with a history of bipolar 2, schizoaffective disorder, substance abuse, malingering, who presents to the Emergency Department for the evaluation of a headache and eye problem. Patient states that 2-3 hours ago he used about 1 gram of methamphetamine and has since been experiencing a headache and blurred vision. Patient states Tylenol tends to alleviate his headaches in the past. Denies nausea or vomiting.      I have reviewed the Medications, Allergies, Past Medical and Surgical History, and Social History in the [facility] system.  Past Medical History:   Diagnosis Date     Bipolar 2 disorder (H)      Chemical abuse (H)     cocaine, meth, cambis     Dyslipidemia      Hep C w/ coma, chronic (H)      Patient overweight      Schizoaffective disorder      Unspecified essential hypertension      Unspecified hypothyroidism        Past Surgical History:   Procedure Laterality Date     HAND SURGERY      L     ORTHOPEDIC SURGERY      hand       No family history on file.    Social History     Tobacco Use     Smoking status: Current Every Day Smoker     Packs/day: 1.00     Types: Cigarettes     Smokeless tobacco: Former User   Substance Use Topics     Alcohol use: No       No current facility-administered medications for this encounter.      Current Outpatient Medications   Medication     paliperidone (INVEGA SUSTENNA) 117 MG/0.75ML SUSP     Facility-Administered Medications Ordered in Other Encounters   Medication     ibuprofen (ADVIL,MOTRIN) 600 MG tablet      No Known Allergies    Review of Systems   Constitutional: Negative for fever.   HENT: Negative for congestion.    Eyes: Positive for visual disturbance (blurred vision). Negative for redness.   Respiratory: Negative for shortness of breath.    Cardiovascular: Negative for chest pain.   Gastrointestinal: Negative for abdominal pain, nausea and  vomiting.   Genitourinary: Negative for difficulty urinating.   Musculoskeletal: Negative for arthralgias and neck stiffness.   Skin: Negative for color change.   Neurological: Positive for headaches.   Psychiatric/Behavioral: Negative for confusion.   All other systems reviewed and are negative.      Physical Exam   BP: 138/86  Pulse: 86  Temp: 98.6  F (37  C)  Weight: 115.4 kg (254 lb 6.4 oz)  SpO2: 96 %      Physical Exam   Constitutional: No distress.   HENT:   Head: Atraumatic.   Mouth/Throat: Oropharynx is clear and moist. No oropharyngeal exudate.   Eyes: Conjunctivae and EOM are normal. Pupils are equal, round, and reactive to light. No scleral icterus.   Vision grossly normal   Cardiovascular: Normal heart sounds.   Pulmonary/Chest: Breath sounds normal. No respiratory distress.   Abdominal: Soft. He exhibits no distension. There is no tenderness.   Musculoskeletal: He exhibits no edema or tenderness.   Neurological: He is alert.   Skin: Skin is warm. He is not diaphoretic.   Psychiatric: He has a normal mood and affect. His behavior is normal.       ED Course   12:32 AM  The patient was seen and examined by Alphonse Corcoran DO in Room [facility].        Procedures   No results found for this or any previous visit (from the past 24 hour(s)).             Labs Ordered and Resulted from Time of ED Arrival Up to the Time of Departure from the ED - No data to display         Assessments & Plan (with Medical Decision Making)   33-year-old male presents with a chief complaint of headache and blurred vision.  Differential includes but not limited to tension headache, stimulated associated headache, migraine.  Patient states this is a common occurrence after he uses methamphetamine.  He is requesting Tylenol.  This helped with his symptoms.  Patient returned to its baseline and headache improved.  Strongly encouraged the patient to seek treatment and discontinue use of methamphetamine.  He will follow-up as needed.    I  have reviewed the nursing notes.    I have reviewed the findings, diagnosis, plan and need for follow up with the patient.       Medication List      There are no discharge medications for this visit.         Final diagnoses:   Methamphetamine abuse (H)   Tension headache     IRobson, am serving as a trained medical scribe to document services personally performed by Alphonse Corcoran DO, based on the provider's statements to me.   I, Alphonse Corcoran DO, was physically present and have reviewed and verified the accuracy of this note documented by Robson Sr.    2/12/2019   [facility], [place], EMERGENCY DEPARTMENT     Alphonse Corcoran DO  02/12/19 0515

## 2019-02-13 ENCOUNTER — HOSPITAL ENCOUNTER (EMERGENCY)
Facility: CLINIC | Age: 34
Discharge: HOME | End: 2019-02-13
Attending: EMERGENCY MEDICINE | Admitting: EMERGENCY MEDICINE
Payer: COMMERCIAL

## 2019-02-13 VITALS — HEART RATE: 64 BPM | OXYGEN SATURATION: 100 % | DIASTOLIC BLOOD PRESSURE: 80 MMHG | SYSTOLIC BLOOD PRESSURE: 133 MMHG

## 2019-02-13 DIAGNOSIS — F15.10 METHAMPHETAMINE ABUSE (H): ICD-10-CM

## 2019-02-13 DIAGNOSIS — G44.219 EPISODIC TENSION-TYPE HEADACHE, NOT INTRACTABLE: ICD-10-CM

## 2019-02-13 PROCEDURE — 99283 EMERGENCY DEPT VISIT LOW MDM: CPT | Mod: Z6 | Performed by: EMERGENCY MEDICINE

## 2019-02-13 PROCEDURE — 25000132 ZZH RX MED GY IP 250 OP 250 PS 637: Performed by: EMERGENCY MEDICINE

## 2019-02-13 PROCEDURE — 99283 EMERGENCY DEPT VISIT LOW MDM: CPT | Performed by: EMERGENCY MEDICINE

## 2019-02-13 RX ORDER — ACETAMINOPHEN 325 MG/1
650 TABLET ORAL ONCE
Status: COMPLETED | OUTPATIENT
Start: 2019-02-13 | End: 2019-02-13

## 2019-02-13 RX ADMIN — ACETAMINOPHEN 650 MG: 325 TABLET, FILM COATED ORAL at 02:37

## 2019-02-13 ASSESSMENT — ENCOUNTER SYMPTOMS: HEADACHES: 1

## 2019-02-13 NOTE — ED PROVIDER NOTES
History     Chief Complaint   Patient presents with     Headache     HPI  Carlos Alberto Garcia is a 33 year old male with a history of bipolar 2, schizoaffective disorder, substance abuse, malingering, who presents to the Emergency Department for the evaluation of a headache.  Patient is well-known to the emergency department for multiple emergency department visits.  Patient reports that he has had a headache the past few days which seems related to his methamphetamine use.  Patient states that he last used methamphetamine earlier today but states he did not use that much.  Patient denies any other complaints.  Denies any fever, chills, nausea, vomiting, weakness, paresthesias.        I have reviewed the Medications, Allergies, Past Medical and Surgical History, and Social History in the Epic system.    Review of Systems   Neurological: Positive for headaches.   Psychiatric/Behavioral:        Methamphetamine use   All other systems reviewed and are negative.      Physical Exam   BP: 133/80  Pulse: 64  SpO2: 100 %      Physical Exam   Constitutional: He is oriented to person, place, and time. He appears well-developed. No distress.   Disheveled, lying on the bed with a blanket over his head   HENT:   Head: Normocephalic and atraumatic.   Mouth/Throat: Oropharynx is clear and moist.   Eyes: Conjunctivae and EOM are normal. Pupils are equal, round, and reactive to light.   Neck: Normal range of motion. Neck supple.   Cardiovascular: Normal rate.   Pulmonary/Chest: Effort normal and breath sounds normal. No respiratory distress.   Abdominal: Soft. There is no tenderness.   Musculoskeletal: Normal range of motion.   Neurological: He is alert and oriented to person, place, and time. No cranial nerve deficit.   Skin: Skin is warm and dry. Capillary refill takes less than 2 seconds.   Psychiatric: He has a normal mood and affect. His behavior is normal.       ED Course        Procedures          Labs Ordered and Resulted  from Time of ED Arrival Up to the Time of Departure from the ED - No data to display         Assessments & Plan (with Medical Decision Making)   Carlos Alberto Garcia is a 33 year old male with a history of bipolar 2, schizoaffective disorder, substance abuse, malingering, who presents to the Emergency Department for the evaluation of a headache after methamphetamine use.  Patient is well-known to the emergency department with frequent emergency department visits who frequently presents with headache after methamphetamine use.  No red flags, no other complaints.  Patient requesting Tylenol to help with his headache.  On reevaluation patient feeling better and at this time plan for discharge home.  Patient encouraged to stop methamphetamine use.  Recommend follow-up with his primary physician.        I have reviewed the nursing notes.    I have reviewed the findings, diagnosis, plan and need for follow up with the patient.       Medication List      There are no discharge medications for this visit.         Final diagnoses:   Episodic tension-type headache, not intractable   Methamphetamine abuse (H)       2/13/2019   [facility], [place], EMERGENCY DEPARTMENT     Anahi Fraser MD  02/13/19 0229

## 2019-02-13 NOTE — ED TRIAGE NOTES
"Triage Assessment and Note    There were no vitals filed for this visit.    Reason for visit: headache \"need a brain scan\"      Background Information:  Pt states he has the same bad headache as last night. He did meth today. He says he needs a brain scan to figure it out. The tylenol he got here last night helped him. He says he does not need food or clothes.  Home remedies/Treatments:        Monae Martel RN   February 13, 2019           "

## 2019-02-13 NOTE — ED AVS SNAPSHOT
[facility], [place], Emergency Department  [address] [place] [address]  Phone:  [phone]                                    Carlos Alberto Garcia   MRN: [id number]    Department:  [facility], Emergency Department   Date of Visit:  2/13/2019           After Visit Summary Signature Page    I have received my discharge instructions, and my questions have been answered. I have discussed any challenges I see with this plan with the nurse or doctor.    ..........................................................................................................................................  Patient/Patient Representative Signature      ..........................................................................................................................................  Patient Representative Print Name and Relationship to Patient    ..................................................               ................................................  Date                                   Time    ..........................................................................................................................................  Reviewed by Signature/Title    ...................................................              ..............................................  Date                                               Time          22EPIC Rev 08/18

## 2019-02-13 NOTE — DISCHARGE INSTRUCTIONS
Please follow-up with your regular doctor in the next 2-3 days for further evaluation and follow-up care.  Please call to schedule an appointment.  Please stop using methamphetamine  Please take tylenol or ibuprofen as needed for your headaches.

## 2019-02-17 ENCOUNTER — HOSPITAL ENCOUNTER (EMERGENCY)
Facility: CLINIC | Age: 34
Discharge: LEFT BEFORE DISCHARGE | End: 2019-02-17
Payer: COMMERCIAL

## 2019-02-17 PROCEDURE — 96374 THER/PROPH/DIAG INJ IV PUSH: CPT

## 2019-02-17 PROCEDURE — 96361 HYDRATE IV INFUSION ADD-ON: CPT

## 2019-02-17 PROCEDURE — 99284 EMERGENCY DEPT VISIT MOD MDM: CPT | Mod: 25

## 2019-02-17 PROCEDURE — 96375 TX/PRO/DX INJ NEW DRUG ADDON: CPT

## 2019-02-17 PROCEDURE — 99284 EMERGENCY DEPT VISIT MOD MDM: CPT | Mod: Z6 | Performed by: EMERGENCY MEDICINE

## 2019-02-18 ENCOUNTER — HOSPITAL ENCOUNTER (EMERGENCY)
Facility: CLINIC | Age: 34
Discharge: HOME | End: 2019-02-18
Attending: EMERGENCY MEDICINE | Admitting: EMERGENCY MEDICINE
Payer: COMMERCIAL

## 2019-02-18 VITALS
OXYGEN SATURATION: 97 % | TEMPERATURE: 98.8 F | SYSTOLIC BLOOD PRESSURE: 116 MMHG | HEART RATE: 65 BPM | RESPIRATION RATE: 18 BRPM | BODY MASS INDEX: 33.86 KG/M2 | DIASTOLIC BLOOD PRESSURE: 78 MMHG | HEIGHT: 72 IN | WEIGHT: 250 LBS

## 2019-02-18 DIAGNOSIS — R51.9 HEADACHE, UNSPECIFIED HEADACHE TYPE: ICD-10-CM

## 2019-02-18 DIAGNOSIS — R11.0 NAUSEA: ICD-10-CM

## 2019-02-18 LAB
ALBUMIN SERPL-MCNC: 3.4 G/DL (ref 3.4–5)
ALP SERPL-CCNC: 67 U/L (ref 40–150)
ALT SERPL W P-5'-P-CCNC: 48 U/L (ref 0–70)
ANION GAP SERPL CALCULATED.3IONS-SCNC: 4 MMOL/L (ref 3–14)
AST SERPL W P-5'-P-CCNC: 24 U/L (ref 0–45)
BASOPHILS # BLD AUTO: 0.1 10E9/L (ref 0–0.2)
BASOPHILS NFR BLD AUTO: 1.2 %
BILIRUB SERPL-MCNC: 0.3 MG/DL (ref 0.2–1.3)
BUN SERPL-MCNC: 12 MG/DL (ref 7–30)
CALCIUM SERPL-MCNC: 8.3 MG/DL (ref 8.5–10.1)
CHLORIDE SERPL-SCNC: 104 MMOL/L (ref 94–109)
CO2 SERPL-SCNC: 31 MMOL/L (ref 20–32)
CREAT SERPL-MCNC: 0.76 MG/DL (ref 0.66–1.25)
DIFFERENTIAL METHOD BLD: NORMAL
EOSINOPHIL # BLD AUTO: 0.2 10E9/L (ref 0–0.7)
EOSINOPHIL NFR BLD AUTO: 3.4 %
ERYTHROCYTE [DISTWIDTH] IN BLOOD BY AUTOMATED COUNT: 12.3 % (ref 10–15)
GFR SERPL CREATININE-BSD FRML MDRD: >90 ML/MIN/{1.73_M2}
GLUCOSE SERPL-MCNC: 112 MG/DL (ref 70–99)
HCT VFR BLD AUTO: 42 % (ref 40–53)
HGB BLD-MCNC: 14.1 G/DL (ref 13.3–17.7)
IMM GRANULOCYTES # BLD: 0 10E9/L (ref 0–0.4)
IMM GRANULOCYTES NFR BLD: 0.2 %
LIPASE SERPL-CCNC: 148 U/L (ref 73–393)
LYMPHOCYTES # BLD AUTO: 2.6 10E9/L (ref 0.8–5.3)
LYMPHOCYTES NFR BLD AUTO: 45.1 %
MCH RBC QN AUTO: 29.9 PG (ref 26.5–33)
MCHC RBC AUTO-ENTMCNC: 33.6 G/DL (ref 31.5–36.5)
MCV RBC AUTO: 89 FL (ref 78–100)
MONOCYTES # BLD AUTO: 0.5 10E9/L (ref 0–1.3)
MONOCYTES NFR BLD AUTO: 8.5 %
NEUTROPHILS # BLD AUTO: 2.4 10E9/L (ref 1.6–8.3)
NEUTROPHILS NFR BLD AUTO: 41.6 %
NRBC # BLD AUTO: 0 10*3/UL
NRBC BLD AUTO-RTO: 0 /100
PLATELET # BLD AUTO: 226 10E9/L (ref 150–450)
POTASSIUM SERPL-SCNC: 4 MMOL/L (ref 3.4–5.3)
PROT SERPL-MCNC: 6.1 G/DL (ref 6.8–8.8)
RBC # BLD AUTO: 4.72 10E12/L (ref 4.4–5.9)
SODIUM SERPL-SCNC: 140 MMOL/L (ref 133–144)
WBC # BLD AUTO: 5.9 10E9/L (ref 4–11)

## 2019-02-18 PROCEDURE — 25000128 H RX IP 250 OP 636: Performed by: EMERGENCY MEDICINE

## 2019-02-18 PROCEDURE — 25000132 ZZH RX MED GY IP 250 OP 250 PS 637: Performed by: EMERGENCY MEDICINE

## 2019-02-18 PROCEDURE — 80053 COMPREHEN METABOLIC PANEL: CPT | Performed by: EMERGENCY MEDICINE

## 2019-02-18 PROCEDURE — 83690 ASSAY OF LIPASE: CPT | Performed by: EMERGENCY MEDICINE

## 2019-02-18 PROCEDURE — 85025 COMPLETE CBC W/AUTO DIFF WBC: CPT | Performed by: EMERGENCY MEDICINE

## 2019-02-18 RX ORDER — DIPHENHYDRAMINE HCL 50 MG
50 CAPSULE ORAL ONCE
Status: COMPLETED | OUTPATIENT
Start: 2019-02-18 | End: 2019-02-18

## 2019-02-18 RX ORDER — KETOROLAC TROMETHAMINE 15 MG/ML
15 INJECTION, SOLUTION INTRAMUSCULAR; INTRAVENOUS ONCE
Status: COMPLETED | OUTPATIENT
Start: 2019-02-18 | End: 2019-02-18

## 2019-02-18 RX ADMIN — DIPHENHYDRAMINE HYDROCHLORIDE 50 MG: 50 CAPSULE ORAL at 00:58

## 2019-02-18 RX ADMIN — PROCHLORPERAZINE EDISYLATE 10 MG: 5 INJECTION INTRAMUSCULAR; INTRAVENOUS at 00:59

## 2019-02-18 RX ADMIN — KETOROLAC TROMETHAMINE 15 MG: 15 INJECTION, SOLUTION INTRAMUSCULAR; INTRAVENOUS at 00:59

## 2019-02-18 RX ADMIN — SODIUM CHLORIDE 1000 ML: 9 INJECTION, SOLUTION INTRAVENOUS at 01:01

## 2019-02-18 ASSESSMENT — MIFFLIN-ST. JEOR: SCORE: 2126.99

## 2019-02-18 NOTE — DISCHARGE INSTRUCTIONS
Please make an appointment to follow up with Your Primary Care Provider in 2-5 days if symptoms continue.     Return to the ED if you are having worsening symptoms, or any urgent/life-threatening concerns.

## 2019-02-18 NOTE — ED PROVIDER NOTES
History     Chief Complaint   Patient presents with     Abdominal Pain     Headache     HPI  Carlos Alberto Garcia is a 33 year old male with PMH notable for schizoaffective disorder, HTN, bipolar II, malingering who presents to the ED with abdominal discomfort. Patient reports nausea and headache that started several hours ago. He endorses one episode of vomiting early in the previous day. He notes chronic diarrhea that is unchanged from baseline, about 2x per day. No fevers. Headache is gradual on onset and similar to previous headaches. No visual changes, no numbness, no weakness, no recent head injury. Patient is not taking any medication for his symptoms this far. Patient is homeless and was unable to get into the shelter tonight.   This part of the medical record was transcribed by Trina Wade, Medical Scribe, from a dictation done by Dr. Badillo.  I have reviewed the Medications, Allergies, Past Medical and Surgical History, and Social History in the [employer] system.  Past Medical History:   Diagnosis Date     Bipolar 2 disorder (H)      Chemical abuse (H)     cocaine, meth, cambis     Dyslipidemia      Hep C w/ coma, chronic (H)      Patient overweight      Schizoaffective disorder      Unspecified essential hypertension      Unspecified hypothyroidism        Past Surgical History:   Procedure Laterality Date     HAND SURGERY      L     ORTHOPEDIC SURGERY      hand       No family history on file.    Social History     Tobacco Use     Smoking status: Current Every Day Smoker     Packs/day: 1.00     Types: Cigarettes     Smokeless tobacco: Former User   Substance Use Topics     Alcohol use: No       No current facility-administered medications for this encounter.      Current Outpatient Medications   Medication     paliperidone (INVEGA SUSTENNA) 117 MG/0.75ML SUSP     Facility-Administered Medications Ordered in Other Encounters   Medication     ibuprofen (ADVIL,MOTRIN) 600 MG tablet      No Known  Allergies    Review of Systems  A complete review of systems was performed with pertinent positives and negatives noted in the HPI, and all other systems negative.     Physical Exam   BP: 122/75  Pulse: 64  Heart Rate: 67  Temp: 98.8  F (37.1  C)  Resp: 18  Height: 182.9 cm (6')  Weight: 113.4 kg (250 lb)  SpO2: 99 %    Physical Exam  General: no acute distress. Appears stated age.   HENT: MMM, no oropharyngeal lesions  Eyes: PERRL, normal sclerae  Neck: non-tender, supple  Cardio: regular rate. Regular rhythm. Extremities well perfused  Resp: Normal work of breathing, clear breath sounds  Abdomen: no tenderness, non-distended, no rebound, no guarding  Neuro: alert and fully oriented. No confusion. CN II-XII intact to testing. Strength left: 5/5 , 5/5 elbow flexion, 5/5 elbow extension, 5/5 shoulder abduction, 5/5 hip flexion, 5/5 knee flexion, 5/5 knee extension. Strength right: 5/5 , 5/5 elbow flexion, 5/5 elbow extension, 5/5 shoulder abduction, 5/5 hip flexion, 5/5 knee flexion, 5/5 knee extension. Sensation intact to soft touch in all extremities. No pronator drift. Normal tone, no clonus. Normal coordination with finger-nose.   MSK: no deformities.   Integumentary/Skin: no rash visualized, normal color  Psych: normal affect, normal behavior    ED Course      Procedures        Critical Care time:  none       Labs Ordered and Resulted from Time of ED Arrival Up to the Time of Departure from the ED   COMPREHENSIVE METABOLIC PANEL - Abnormal; Notable for the following components:       Result Value    Glucose 112 (*)     Calcium 8.3 (*)     Protein Total 6.1 (*)     All other components within normal limits   CBC WITH PLATELETS DIFFERENTIAL   LIPASE            Assessments & Plan (with Medical Decision Making)   Patient presenting with nausea, and headache. Vitals in the ED wnl. Initial differential diagnosis includes but not limited to migraine headache, tension headache, gastritis, malingering.     IV  established and labs sent. The patient was given ketorolac, dimenhydramine, compazine for headache, nausea with improvement upon reassessment. No leukocytosis nor fever to suggest significant infection. Electrolytes unremarkable. No lipase elevation to suggest pancreatitis. No LFT elevations to suggest hepatobiliary pathology.     Headache similar to previous and without red-flag signs/symptoms of dangerous intracranial pathology. Headache resolved with treatment in the ED.     The complete clinical picture is most consistent with headache and nausea. After counseling on the diagnosis, work-up, and treatment plan, the patient was discharged. The patient was advised to follow-up with his PCP in a few days if symptoms continue. The patient was advised to return to the ED if worsening symptoms, or if there are any urgent/life-threatening concerns.       Clinical Impression:  Headache  Nausea       Tonny Badillo MD  Emergency Medicine   This part of the medical record was transcribed by Trina Wade, Medical Scribe, from a dictation done by Dr. Badillo.  I have reviewed the nursing notes.  I have reviewed the findings, diagnosis, plan and need for follow up with the patient.  Current Discharge Medication List          Final diagnoses:   Headache, unspecified headache type   Nausea       2/17/2019   [facility], EMERGENCY DEPARTMENT     Tonny Badillo MD  02/18/19 0454

## 2019-02-18 NOTE — ED AVS SNAPSHOT
[facility], [place], Emergency Department  [address] [place] [address]  Phone:  [phone]                                    Carlos Alberto Garcia   MRN: [id number]    Department:  [facility], Emergency Department   Date of Visit:  2/17/2019           After Visit Summary Signature Page    I have received my discharge instructions, and my questions have been answered. I have discussed any challenges I see with this plan with the nurse or doctor.    ..........................................................................................................................................  Patient/Patient Representative Signature      ..........................................................................................................................................  Patient Representative Print Name and Relationship to Patient    ..................................................               ................................................  Date                                   Time    ..........................................................................................................................................  Reviewed by Signature/Title    ...................................................              ..............................................  Date                                               Time          22EPIC Rev 08/18

## 2019-02-18 NOTE — ED TRIAGE NOTES
Pt presents with c/o abd pain and headache x3 hours. Pt reports he is homeless and the shelter was full. Pt reports meth use yesterday. Denies fever, chills, N & V, or diarrhea.

## 2019-02-25 ENCOUNTER — HOSPITAL ENCOUNTER (EMERGENCY)
Facility: CLINIC | Age: 34
Discharge: LEFT BEFORE DISCHARGE | End: 2019-02-25
Payer: COMMERCIAL

## 2019-02-25 VITALS
HEIGHT: 72 IN | DIASTOLIC BLOOD PRESSURE: 83 MMHG | TEMPERATURE: 97.6 F | BODY MASS INDEX: 33.86 KG/M2 | WEIGHT: 250 LBS | SYSTOLIC BLOOD PRESSURE: 133 MMHG | RESPIRATION RATE: 17 BRPM | OXYGEN SATURATION: 99 %

## 2019-02-25 ASSESSMENT — MIFFLIN-ST. JEOR: SCORE: 2126.99

## 2019-03-02 ENCOUNTER — APPOINTMENT (OUTPATIENT)
Dept: GENERAL RADIOLOGY | Facility: CLINIC | Age: 34
End: 2019-03-02
Attending: EMERGENCY MEDICINE
Payer: COMMERCIAL

## 2019-03-02 ENCOUNTER — HOSPITAL ENCOUNTER (EMERGENCY)
Facility: CLINIC | Age: 34
Discharge: HOME | End: 2019-03-02
Attending: EMERGENCY MEDICINE | Admitting: EMERGENCY MEDICINE
Payer: COMMERCIAL

## 2019-03-02 ENCOUNTER — APPOINTMENT (OUTPATIENT)
Dept: CT IMAGING | Facility: CLINIC | Age: 34
End: 2019-03-02
Attending: EMERGENCY MEDICINE
Payer: COMMERCIAL

## 2019-03-02 VITALS
DIASTOLIC BLOOD PRESSURE: 82 MMHG | TEMPERATURE: 97.8 F | HEART RATE: 72 BPM | SYSTOLIC BLOOD PRESSURE: 129 MMHG | RESPIRATION RATE: 18 BRPM | OXYGEN SATURATION: 97 %
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
## 2019-03-02 LAB
ABO + RH BLD: NORMAL
ABO + RH BLD: NORMAL
ANION GAP SERPL CALCULATED.3IONS-SCNC: 6 MMOL/L (ref 3–14)
ANION GAP SERPL CALCULATED.3IONS-SCNC: 6 MMOL/L (ref 3–14)
APTT PPP: 28 SEC (ref 22–37)
B-HCG FREE SERPL-ACNC: 1.1 [IU]/L (ref 0.86–1.14)
BASOPHILS # BLD AUTO: 0.1 10E9/L (ref 0–0.2)
BASOPHILS NFR BLD AUTO: 1 %
BLD GP AB SCN SERPL QL: NORMAL
BLOOD BANK CMNT PATIENT-IMP: NORMAL
BUN SERPL-MCNC: 14 MG/DL (ref 7–30)
BUN SERPL-MCNC: 14 MG/DL (ref 7–30)
CA-I BLD-SCNC: 4.7 MG/DL (ref 4.4–5.2)
CALCIUM SERPL-MCNC: 7.6 MG/DL (ref 8.5–10.1)
CALCIUM SERPL-MCNC: 9 MG/DL (ref 8.5–10.1)
CHLORIDE SERPL-SCNC: 106 MMOL/L (ref 94–109)
CHLORIDE SERPL-SCNC: 110 MMOL/L (ref 94–109)
CO2 BLDCOV-SCNC: 27 MMOL/L (ref 21–28)
CO2 BLDCOV-SCNC: 27 MMOL/L (ref 21–28)
CO2 SERPL-SCNC: 28 MMOL/L (ref 20–32)
CO2 SERPL-SCNC: 29 MMOL/L (ref 20–32)
CREAT BLD-MCNC: 0.9 MG/DL (ref 0.66–1.25)
CREAT SERPL-MCNC: 0.83 MG/DL (ref 0.66–1.25)
CREAT SERPL-MCNC: 0.87 MG/DL (ref 0.66–1.25)
DIFFERENTIAL METHOD BLD: NORMAL
EOSINOPHIL # BLD AUTO: 0.1 10E9/L (ref 0–0.7)
EOSINOPHIL NFR BLD AUTO: 2.1 %
ERYTHROCYTE [DISTWIDTH] IN BLOOD BY AUTOMATED COUNT: 12.2 % (ref 10–15)
ERYTHROCYTE [DISTWIDTH] IN BLOOD BY AUTOMATED COUNT: 12.4 % (ref 10–15)
ETHANOL SERPL-MCNC: <0.01 G/DL
GFR SERPL CREATININE-BSD FRML MDRD: >90 ML/MIN/{1.73_M2}
GLUCOSE BLD-MCNC: 103 MG/DL (ref 70–99)
GLUCOSE SERPL-MCNC: 105 MG/DL (ref 70–99)
GLUCOSE SERPL-MCNC: 77 MG/DL (ref 70–99)
HCT VFR BLD AUTO: 36.6 % (ref 40–53)
HCT VFR BLD AUTO: 44.5 % (ref 40–53)
HCT VFR BLD CALC: 36 %PCV (ref 40–53)
HGB BLD CALC-MCNC: 12.2 G/DL (ref 13.3–17.7)
HGB BLD-MCNC: 12.2 G/DL (ref 13.3–17.7)
HGB BLD-MCNC: 14.9 G/DL (ref 13.3–17.7)
IMM GRANULOCYTES # BLD: 0 10E9/L (ref 0–0.4)
IMM GRANULOCYTES NFR BLD: 0.2 %
INR PPP: 1.05 (ref 0.86–1.14)
LACTATE BLD-SCNC: 1 MMOL/L (ref 0.7–2)
LACTATE BLD-SCNC: 1.1 MMOL/L (ref 0.7–2.1)
LYMPHOCYTES # BLD AUTO: 2 10E9/L (ref 0.8–5.3)
LYMPHOCYTES NFR BLD AUTO: 32.1 %
MCH RBC QN AUTO: 28.8 PG (ref 26.5–33)
MCH RBC QN AUTO: 29.5 PG (ref 26.5–33)
MCHC RBC AUTO-ENTMCNC: 33.3 G/DL (ref 31.5–36.5)
MCHC RBC AUTO-ENTMCNC: 33.5 G/DL (ref 31.5–36.5)
MCV RBC AUTO: 86 FL (ref 78–100)
MCV RBC AUTO: 89 FL (ref 78–100)
MONOCYTES # BLD AUTO: 0.6 10E9/L (ref 0–1.3)
MONOCYTES NFR BLD AUTO: 9.6 %
NEUTROPHILS # BLD AUTO: 3.3 10E9/L (ref 1.6–8.3)
NEUTROPHILS NFR BLD AUTO: 55 %
NRBC # BLD AUTO: 0 10*3/UL
NRBC BLD AUTO-RTO: 0 /100
PCO2 BLDV: 51 MM HG (ref 40–50)
PCO2 BLDV: 51 MM HG (ref 40–50)
PH BLDV: 7.33 PH (ref 7.32–7.43)
PH BLDV: 7.34 PH (ref 7.32–7.43)
PLATELET # BLD AUTO: 224 10E9/L (ref 150–450)
PLATELET # BLD AUTO: 282 10E9/L (ref 150–450)
PO2 BLDV: 17 MM HG (ref 25–47)
PO2 BLDV: 18 MM HG (ref 25–47)
POTASSIUM BLD-SCNC: 3.7 MMOL/L (ref 3.4–5.3)
POTASSIUM SERPL-SCNC: 3.6 MMOL/L (ref 3.4–5.3)
POTASSIUM SERPL-SCNC: 3.7 MMOL/L (ref 3.4–5.3)
RBC # BLD AUTO: 4.13 10E12/L (ref 4.4–5.9)
RBC # BLD AUTO: 5.18 10E12/L (ref 4.4–5.9)
SAO2 % BLDV FROM PO2: 22 %
SAO2 % BLDV FROM PO2: 22 %
SODIUM BLD-SCNC: 141 MMOL/L (ref 133–144)
SODIUM SERPL-SCNC: 141 MMOL/L (ref 133–144)
SODIUM SERPL-SCNC: 143 MMOL/L (ref 133–144)
SPECIMEN EXP DATE BLD: NORMAL
WBC # BLD AUTO: 4.7 10E9/L (ref 4–11)
WBC # BLD AUTO: 6.1 10E9/L (ref 4–11)

## 2019-03-02 PROCEDURE — 68200000 ZZH FULL TRAUMA W/O CC LEVEL II

## 2019-03-02 PROCEDURE — 25000128 H RX IP 250 OP 636: Performed by: EMERGENCY MEDICINE

## 2019-03-02 PROCEDURE — 90715 TDAP VACCINE 7 YRS/> IM: CPT | Performed by: EMERGENCY MEDICINE

## 2019-03-02 PROCEDURE — 83605 ASSAY OF LACTIC ACID: CPT | Performed by: EMERGENCY MEDICINE

## 2019-03-02 PROCEDURE — 85025 COMPLETE CBC W/AUTO DIFF WBC: CPT | Performed by: EMERGENCY MEDICINE

## 2019-03-02 PROCEDURE — 71275 CT ANGIOGRAPHY CHEST: CPT

## 2019-03-02 PROCEDURE — 93005 ELECTROCARDIOGRAM TRACING: CPT | Performed by: EMERGENCY MEDICINE

## 2019-03-02 PROCEDURE — 82330 ASSAY OF CALCIUM: CPT

## 2019-03-02 PROCEDURE — 86900 BLOOD TYPING SEROLOGIC ABO: CPT | Performed by: EMERGENCY MEDICINE

## 2019-03-02 PROCEDURE — 76705 ECHO EXAM OF ABDOMEN: CPT | Performed by: EMERGENCY MEDICINE

## 2019-03-02 PROCEDURE — 71045 X-RAY EXAM CHEST 1 VIEW: CPT

## 2019-03-02 PROCEDURE — 85610 PROTHROMBIN TIME: CPT

## 2019-03-02 PROCEDURE — 40000502 ZZHCL STATISTIC GLUCOSE ED POCT

## 2019-03-02 PROCEDURE — 40000826 ZZH STATISTIC TRAUMA CODE W/O ACCESS

## 2019-03-02 PROCEDURE — 82803 BLOOD GASES ANY COMBINATION: CPT

## 2019-03-02 PROCEDURE — 83605 ASSAY OF LACTIC ACID: CPT

## 2019-03-02 PROCEDURE — 99291 CRITICAL CARE FIRST HOUR: CPT | Mod: 25 | Performed by: EMERGENCY MEDICINE

## 2019-03-02 PROCEDURE — 86901 BLOOD TYPING SEROLOGIC RH(D): CPT | Performed by: EMERGENCY MEDICINE

## 2019-03-02 PROCEDURE — 85730 THROMBOPLASTIN TIME PARTIAL: CPT | Performed by: EMERGENCY MEDICINE

## 2019-03-02 PROCEDURE — 40000498 ZZHCL STATISTIC POTASSIUM ED POCT

## 2019-03-02 PROCEDURE — 80048 BASIC METABOLIC PNL TOTAL CA: CPT | Performed by: EMERGENCY MEDICINE

## 2019-03-02 PROCEDURE — 40000497 ZZHCL STATISTIC SODIUM ED POCT

## 2019-03-02 PROCEDURE — 71045 X-RAY EXAM CHEST 1 VIEW: CPT | Mod: 77

## 2019-03-02 PROCEDURE — 82565 ASSAY OF CREATININE: CPT

## 2019-03-02 PROCEDURE — 85610 PROTHROMBIN TIME: CPT | Performed by: EMERGENCY MEDICINE

## 2019-03-02 PROCEDURE — 85027 COMPLETE CBC AUTOMATED: CPT | Performed by: EMERGENCY MEDICINE

## 2019-03-02 PROCEDURE — 99285 EMERGENCY DEPT VISIT HI MDM: CPT | Mod: 25

## 2019-03-02 PROCEDURE — 90471 IMMUNIZATION ADMIN: CPT | Performed by: EMERGENCY MEDICINE

## 2019-03-02 PROCEDURE — 80320 DRUG SCREEN QUANTALCOHOLS: CPT | Performed by: EMERGENCY MEDICINE

## 2019-03-02 PROCEDURE — 40000501 ZZHCL STATISTIC HEMATOCRIT ED POCT

## 2019-03-02 PROCEDURE — 93010 ELECTROCARDIOGRAM REPORT: CPT | Mod: 59 | Performed by: EMERGENCY MEDICINE

## 2019-03-02 PROCEDURE — 86850 RBC ANTIBODY SCREEN: CPT | Performed by: EMERGENCY MEDICINE

## 2019-03-02 PROCEDURE — 93308 TTE F-UP OR LMTD: CPT | Mod: 26 | Performed by: EMERGENCY MEDICINE

## 2019-03-02 PROCEDURE — 93308 TTE F-UP OR LMTD: CPT | Mod: 59 | Performed by: EMERGENCY MEDICINE

## 2019-03-02 RX ORDER — IOPAMIDOL 755 MG/ML
100 INJECTION, SOLUTION INTRAVASCULAR ONCE
Status: COMPLETED | OUTPATIENT
Start: 2019-03-02 | End: 2019-03-02

## 2019-03-02 RX ADMIN — CLOSTRIDIUM TETANI TOXOID ANTIGEN (FORMALDEHYDE INACTIVATED), CORYNEBACTERIUM DIPHTHERIAE TOXOID ANTIGEN (FORMALDEHYDE INACTIVATED), BORDETELLA PERTUSSIS TOXOID ANTIGEN (GLUTARALDEHYDE INACTIVATED), BORDETELLA PERTUSSIS FILAMENTOUS HEMAGGLUTININ ANTIGEN (FORMALDEHYDE INACTIVATED), BORDETELLA PERTUSSIS PERTACTIN ANTIGEN, AND BORDETELLA PERTUSSIS FIMBRIAE 2/3 ANTIGEN 0.5 ML: 5; 2; 2.5; 5; 3; 5 INJECTION, SUSPENSION INTRAMUSCULAR at 01:39

## 2019-03-02 RX ADMIN — IOPAMIDOL 100 ML: 755 INJECTION, SOLUTION INTRAVENOUS at 01:50

## 2019-03-02 ASSESSMENT — ENCOUNTER SYMPTOMS
NUMBNESS: 0
WOUND: 1
ABDOMINAL PAIN: 0
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
## 2019-03-02 NOTE — DISCHARGE INSTRUCTIONS
You can take tylenol as needed for pain. The maximum daily dose is 4000 mg and the maximum single dose at a time is 1000mg. For your condition, your should take 1000mg every 6 hours as needed for pain.    You can take ibuprofen for pain. The maximum daily dose is 2400 mg and the maximum single dose as a time is 800mg. For your condition, your should take 600mg every 4 hours as needed for pain.    Change your wound dressing at least 1 x daily.     Return to the emergency department for any worsening redness, swelling, drainage, pain, fever, bleeding from wound or any other concerns as given or discussed.
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
## 2019-03-02 NOTE — ED AVS SNAPSHOT
[facility], [place], Emergency Department  [address] [place] [id number]  Phone:  [phone]                                    Carlos Alberto Garcia   MRN: [id number]    Department:  [facility], Emergency Department   Date of Visit:  3/2/2019           After Visit Summary Signature Page    I have received my discharge instructions, and my questions have been answered. I have discussed any challenges I see with this plan with the nurse or doctor.    ..........................................................................................................................................  Patient/Patient Representative Signature      ..........................................................................................................................................  Patient Representative Print Name and Relationship to Patient    ..................................................               ................................................  Date                                   Time    ..........................................................................................................................................  Reviewed by Signature/Title    ...................................................              ..............................................  Date                                               Time          22EPIC Rev 08/18

## 2019-03-03 LAB — INTERPRETATION ECG - MUSE: NORMAL

## 2019-03-04 ENCOUNTER — TELEPHONE (OUTPATIENT)
Dept: CARE COORDINATION | Facility: CLINIC | Age: 34
End: 2019-03-04
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
## 2019-03-08 ENCOUNTER — HOSPITAL ENCOUNTER (EMERGENCY)
Facility: CLINIC | Age: 34
Discharge: HOME | End: 2019-03-08
Attending: EMERGENCY MEDICINE | Admitting: EMERGENCY MEDICINE
Payer: COMMERCIAL

## 2019-03-08 VITALS
BODY MASS INDEX: 33.86 KG/M2 | RESPIRATION RATE: 16 BRPM | TEMPERATURE: 97.8 F | SYSTOLIC BLOOD PRESSURE: 116 MMHG | HEIGHT: 72 IN | HEART RATE: 68 BPM | DIASTOLIC BLOOD PRESSURE: 70 MMHG | WEIGHT: 250 LBS | OXYGEN SATURATION: 95 %

## 2019-03-08 DIAGNOSIS — R52 PAIN ASSOCIATED WITH WOUND: ICD-10-CM

## 2019-03-08 DIAGNOSIS — T14.8XXA PAIN ASSOCIATED WITH WOUND: ICD-10-CM

## 2019-03-08 PROCEDURE — 99283 EMERGENCY DEPT VISIT LOW MDM: CPT | Performed by: EMERGENCY MEDICINE

## 2019-03-08 PROCEDURE — 25000132 ZZH RX MED GY IP 250 OP 250 PS 637: Performed by: EMERGENCY MEDICINE

## 2019-03-08 PROCEDURE — 99283 EMERGENCY DEPT VISIT LOW MDM: CPT | Mod: Z6 | Performed by: EMERGENCY MEDICINE

## 2019-03-08 RX ORDER — LIDOCAINE 4 G/G
1 PATCH TOPICAL ONCE
Status: DISCONTINUED | OUTPATIENT
Start: 2019-03-08 | End: 2019-03-08 | Stop reason: HOSPADM

## 2019-03-08 RX ADMIN — LIDOCAINE 1 PATCH: 560 PATCH PERCUTANEOUS; TOPICAL; TRANSDERMAL at 01:27

## 2019-03-08 ASSESSMENT — MIFFLIN-ST. JEOR: SCORE: 2126.99
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
## 2019-03-08 NOTE — ED AVS SNAPSHOT
[facility], [place], Emergency Department  [address] [place] [id number]  Phone:  [phone]                                    Carlos Alberto Garcia   MRN: [id number]    Department:  [facility], Emergency Department   Date of Visit:  3/8/2019           After Visit Summary Signature Page    I have received my discharge instructions, and my questions have been answered. I have discussed any challenges I see with this plan with the nurse or doctor.    ..........................................................................................................................................  Patient/Patient Representative Signature      ..........................................................................................................................................  Patient Representative Print Name and Relationship to Patient    ..................................................               ................................................  Date                                   Time    ..........................................................................................................................................  Reviewed by Signature/Title    ...................................................              ..............................................  Date                                               Time          22EPIC Rev 08/18

## 2019-03-08 NOTE — DISCHARGE INSTRUCTIONS
Please make an appointment to follow up with Your Primary Care Provider as soon as possible. Keep your wound clean.
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
## 2019-03-12 ENCOUNTER — HOSPITAL ENCOUNTER (EMERGENCY)
Facility: CLINIC | Age: 34
Discharge: HOME | End: 2019-03-12
Attending: EMERGENCY MEDICINE | Admitting: EMERGENCY MEDICINE
Payer: COMMERCIAL

## 2019-03-12 VITALS
OXYGEN SATURATION: 99 % | BODY MASS INDEX: 33.86 KG/M2 | WEIGHT: 250 LBS | RESPIRATION RATE: 16 BRPM | HEART RATE: 53 BPM | DIASTOLIC BLOOD PRESSURE: 82 MMHG | SYSTOLIC BLOOD PRESSURE: 131 MMHG | HEIGHT: 72 IN | TEMPERATURE: 97.3 F

## 2019-03-12 DIAGNOSIS — M25.512 LEFT SHOULDER PAIN, UNSPECIFIED CHRONICITY: ICD-10-CM

## 2019-03-12 PROCEDURE — 99282 EMERGENCY DEPT VISIT SF MDM: CPT | Performed by: EMERGENCY MEDICINE

## 2019-03-12 PROCEDURE — 99283 EMERGENCY DEPT VISIT LOW MDM: CPT | Mod: Z6 | Performed by: EMERGENCY MEDICINE

## 2019-03-12 PROCEDURE — 99283 EMERGENCY DEPT VISIT LOW MDM: CPT | Mod: 27

## 2019-03-12 RX ORDER — LIDOCAINE 4 G/G
1 PATCH TOPICAL EVERY 24 HOURS
Qty: 12 PATCH | Refills: 0 | Status: SHIPPED | OUTPATIENT
Start: 2019-03-12 | End: 2019-04-01

## 2019-03-12 RX ORDER — NAPROXEN 500 MG/1
500 TABLET ORAL 2 TIMES DAILY PRN
Qty: 30 TABLET | Refills: 0 | Status: SHIPPED | OUTPATIENT
Start: 2019-03-12 | End: 2019-04-01

## 2019-03-12 ASSESSMENT — MIFFLIN-ST. JEOR
SCORE: 2126.99
SCORE: 2156.02

## 2019-03-12 NOTE — ED AVS SNAPSHOT
[facility], [place], Emergency Department  [address] [place] [id number]  Phone:  [phone]                                    Carlos Alberto Garcia   MRN: [id number]    Department:  [facility], Emergency Department   Date of Visit:  3/12/2019           After Visit Summary Signature Page    I have received my discharge instructions, and my questions have been answered. I have discussed any challenges I see with this plan with the nurse or doctor.    ..........................................................................................................................................  Patient/Patient Representative Signature      ..........................................................................................................................................  Patient Representative Print Name and Relationship to Patient    ..................................................               ................................................  Date                                   Time    ..........................................................................................................................................  Reviewed by Signature/Title    ...................................................              ..............................................  Date                                               Time          22EPIC Rev 08/18
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
## 2019-03-13 ENCOUNTER — HOSPITAL ENCOUNTER (EMERGENCY)
Facility: CLINIC | Age: 34
Discharge: HOME | End: 2019-03-13
Attending: EMERGENCY MEDICINE | Admitting: EMERGENCY MEDICINE
Payer: COMMERCIAL

## 2019-03-13 VITALS
BODY MASS INDEX: 34.73 KG/M2 | RESPIRATION RATE: 16 BRPM | OXYGEN SATURATION: 95 % | WEIGHT: 256.4 LBS | DIASTOLIC BLOOD PRESSURE: 77 MMHG | TEMPERATURE: 98.1 F | HEIGHT: 72 IN | SYSTOLIC BLOOD PRESSURE: 122 MMHG | HEART RATE: 63 BPM

## 2019-03-13 DIAGNOSIS — R11.0 NAUSEA: ICD-10-CM

## 2019-03-13 PROCEDURE — 25000131 ZZH RX MED GY IP 250 OP 636 PS 637: Performed by: EMERGENCY MEDICINE

## 2019-03-13 RX ORDER — ONDANSETRON 4 MG/1
4 TABLET, ORALLY DISINTEGRATING ORAL ONCE
Status: COMPLETED | OUTPATIENT
Start: 2019-03-13 | End: 2019-03-13

## 2019-03-13 RX ADMIN — ONDANSETRON 4 MG: 4 TABLET, ORALLY DISINTEGRATING ORAL at 00:56

## 2019-03-13 ASSESSMENT — ENCOUNTER SYMPTOMS
BACK PAIN: 0
RHINORRHEA: 1
FEVER: 0
NAUSEA: 1
VOMITING: 0
DYSURIA: 0
CONFUSION: 0
WEAKNESS: 0
SHORTNESS OF BREATH: 0
ABDOMINAL PAIN: 0

## 2019-03-13 NOTE — ED AVS SNAPSHOT
[facility], [place], Emergency Department  [address] [place] [address]  Phone:  [phone]                                    Carlos Alberto Garcia   MRN: [id number]    Department:  [facility], Emergency Department   Date of Visit:  3/12/2019           After Visit Summary Signature Page    I have received my discharge instructions, and my questions have been answered. I have discussed any challenges I see with this plan with the nurse or doctor.    ..........................................................................................................................................  Patient/Patient Representative Signature      ..........................................................................................................................................  Patient Representative Print Name and Relationship to Patient    ..................................................               ................................................  Date                                   Time    ..........................................................................................................................................  Reviewed by Signature/Title    ...................................................              ..............................................  Date                                               Time          22EPIC Rev 08/18
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
## 2019-03-13 NOTE — DISCHARGE INSTRUCTIONS
Please follow up and establish care with a primary care provider. Please take your medications as directed. Return if worsening symptoms.

## 2019-03-14 ENCOUNTER — HOSPITAL ENCOUNTER (EMERGENCY)
Facility: CLINIC | Age: 34
Discharge: HOME | End: 2019-03-14
Attending: EMERGENCY MEDICINE | Admitting: EMERGENCY MEDICINE
Payer: COMMERCIAL

## 2019-03-14 VITALS
HEIGHT: 72 IN | BODY MASS INDEX: 34 KG/M2 | OXYGEN SATURATION: 98 % | DIASTOLIC BLOOD PRESSURE: 87 MMHG | HEART RATE: 74 BPM | SYSTOLIC BLOOD PRESSURE: 136 MMHG | TEMPERATURE: 97.7 F | WEIGHT: 251 LBS | RESPIRATION RATE: 18 BRPM

## 2019-03-14 DIAGNOSIS — M79.671 PAIN IN BOTH FEET: ICD-10-CM

## 2019-03-14 DIAGNOSIS — M79.672 PAIN IN BOTH FEET: ICD-10-CM

## 2019-03-14 PROCEDURE — 99282 EMERGENCY DEPT VISIT SF MDM: CPT | Performed by: EMERGENCY MEDICINE

## 2019-03-14 PROCEDURE — 99282 EMERGENCY DEPT VISIT SF MDM: CPT | Mod: Z6 | Performed by: EMERGENCY MEDICINE

## 2019-03-14 ASSESSMENT — MIFFLIN-ST. JEOR: SCORE: 2131.53
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
## 2019-03-14 NOTE — ED TRIAGE NOTES
Pt arrived to the ER with c/o feet pain and pt states that he has been walking a lot in the rain. VSS and afebrile.

## 2019-03-20 ENCOUNTER — HOSPITAL ENCOUNTER (EMERGENCY)
Facility: CLINIC | Age: 34
Discharge: HOME | End: 2019-03-20
Attending: EMERGENCY MEDICINE | Admitting: EMERGENCY MEDICINE
Payer: COMMERCIAL

## 2019-03-20 VITALS
DIASTOLIC BLOOD PRESSURE: 78 MMHG | SYSTOLIC BLOOD PRESSURE: 144 MMHG | TEMPERATURE: 98.2 F | RESPIRATION RATE: 18 BRPM | OXYGEN SATURATION: 98 % | HEART RATE: 88 BPM

## 2019-03-20 DIAGNOSIS — R51.9 HEADACHE, UNSPECIFIED HEADACHE TYPE: ICD-10-CM

## 2019-03-20 PROCEDURE — 99281 EMR DPT VST MAYX REQ PHY/QHP: CPT | Mod: Z6 | Performed by: EMERGENCY MEDICINE

## 2019-03-20 PROCEDURE — 99281 EMR DPT VST MAYX REQ PHY/QHP: CPT

## 2019-03-20 NOTE — DISCHARGE INSTRUCTIONS
Please make an appointment to follow up with Your Primary Care Provider in 1-3 days.     Return to the ED if you are having fever, weakness, worsening symptoms, or any urgent/life-threatening concerns.

## 2019-03-20 NOTE — ED NOTES
Pt becoming verbally aggressive when prompted by provider to take part in physical exam. Pt refusing exam at this time.

## 2019-03-20 NOTE — ED TRIAGE NOTES
Pt arrives ambulatory to triage tonight with c/o headache.  Pt states he has had this headache all day as he has not slept much.

## 2019-03-26 ENCOUNTER — TELEPHONE (OUTPATIENT)
Dept: WOUND CARE | Facility: CLINIC | Age: 34
End: 2019-03-26

## 2019-03-26 NOTE — TELEPHONE ENCOUNTER
CLIFFORD with Anastasiia with aother appt time next week in the PM    ----- Message from Vania Ojeda RN sent at 3/26/2019  8:20 AM CDT -----  Too early appt needs to reschedule   Anastasiia [phone]

## 2019-03-28 VITALS
RESPIRATION RATE: 16 BRPM | OXYGEN SATURATION: 98 % | BODY MASS INDEX: 33.61 KG/M2 | WEIGHT: 248.1 LBS | SYSTOLIC BLOOD PRESSURE: 129 MMHG | TEMPERATURE: 97.9 F | HEART RATE: 94 BPM | HEIGHT: 72 IN | DIASTOLIC BLOOD PRESSURE: 76 MMHG

## 2019-03-28 ASSESSMENT — MIFFLIN-ST. JEOR: SCORE: 2118.37

## 2019-03-29 ENCOUNTER — HOSPITAL ENCOUNTER (EMERGENCY)
Facility: CLINIC | Age: 34
Discharge: HOME | End: 2019-03-29
Payer: COMMERCIAL
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
## 2019-04-01 ENCOUNTER — HOSPITAL ENCOUNTER (EMERGENCY)
Facility: CLINIC | Age: 34
Discharge: HOME | End: 2019-04-01
Attending: EMERGENCY MEDICINE | Admitting: EMERGENCY MEDICINE
Payer: COMMERCIAL

## 2019-04-01 VITALS
BODY MASS INDEX: 34 KG/M2 | RESPIRATION RATE: 18 BRPM | WEIGHT: 251 LBS | OXYGEN SATURATION: 97 % | HEART RATE: 57 BPM | SYSTOLIC BLOOD PRESSURE: 136 MMHG | HEIGHT: 72 IN | TEMPERATURE: 98.2 F | DIASTOLIC BLOOD PRESSURE: 91 MMHG

## 2019-04-01 DIAGNOSIS — M54.6 ACUTE LEFT-SIDED THORACIC BACK PAIN: ICD-10-CM

## 2019-04-01 PROCEDURE — 25000132 ZZH RX MED GY IP 250 OP 250 PS 637: Performed by: EMERGENCY MEDICINE

## 2019-04-01 PROCEDURE — 99284 EMERGENCY DEPT VISIT MOD MDM: CPT | Mod: Z6 | Performed by: EMERGENCY MEDICINE

## 2019-04-01 PROCEDURE — 99283 EMERGENCY DEPT VISIT LOW MDM: CPT | Performed by: EMERGENCY MEDICINE

## 2019-04-01 RX ORDER — NAPROXEN 500 MG/1
500 TABLET ORAL 2 TIMES DAILY PRN
Qty: 30 TABLET | Refills: 0 | Status: SHIPPED | OUTPATIENT
Start: 2019-04-01

## 2019-04-01 RX ORDER — IBUPROFEN 600 MG/1
600 TABLET, FILM COATED ORAL ONCE
Status: COMPLETED | OUTPATIENT
Start: 2019-04-01 | End: 2019-04-01

## 2019-04-01 RX ORDER — LIDOCAINE 4 G/G
1 PATCH TOPICAL EVERY 24 HOURS
Qty: 12 PATCH | Refills: 0 | Status: SHIPPED | OUTPATIENT
Start: 2019-04-01

## 2019-04-01 RX ADMIN — IBUPROFEN 600 MG: 600 TABLET ORAL at 01:27

## 2019-04-01 ASSESSMENT — MIFFLIN-ST. JEOR: SCORE: 2131.53

## 2019-04-01 NOTE — ED AVS SNAPSHOT
[facility], Emergency Department  [address]  [facility] [address]  Phone:  [phone]  Fax:  [phone]                                    Carlos Alberto Garcia   MRN: [id number]    Department:  [facility], Emergency Department   Date of Visit:  4/1/2019           After Visit Summary Signature Page    I have received my discharge instructions, and my questions have been answered. I have discussed any challenges I see with this plan with the nurse or doctor.    ..........................................................................................................................................  Patient/Patient Representative Signature      ..........................................................................................................................................  Patient Representative Print Name and Relationship to Patient    ..................................................               ................................................  Date                                   Time    ..........................................................................................................................................  Reviewed by Signature/Title    ...................................................              ..............................................  Date                                               Time          22EPIC Rev 08/18
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
## 2019-04-01 NOTE — DISCHARGE INSTRUCTIONS
Please make an appointment to follow up with Your Primary Care Provider in 2-5 days.     Return to the ED if you are having difficulty breathing, worsening symptoms, or any urgent/life-threatening concerns.

## 2019-05-12 ENCOUNTER — HOSPITAL ENCOUNTER (EMERGENCY)
Facility: CLINIC | Age: 34
Discharge: HOME | End: 2019-05-12
Payer: COMMERCIAL

## 2019-05-12 VITALS
HEIGHT: 72 IN | HEART RATE: 75 BPM | OXYGEN SATURATION: 99 % | RESPIRATION RATE: 16 BRPM | SYSTOLIC BLOOD PRESSURE: 150 MMHG | BODY MASS INDEX: 34.04 KG/M2 | TEMPERATURE: 97.4 F | DIASTOLIC BLOOD PRESSURE: 99 MMHG

## 2019-05-12 NOTE — ED NOTES
Patient left ER after triage without being seen by MD. Refused to sign declination of medical screening evaluation form prior to leaving AMA.

## 2019-11-03 ENCOUNTER — HOSPITAL ENCOUNTER (EMERGENCY)
Facility: CLINIC | Age: 34
Discharge: HOME | End: 2019-11-03

## 2019-11-03 ENCOUNTER — HOSPITAL ENCOUNTER (EMERGENCY)
Facility: CLINIC | Age: 34
Discharge: HOME | End: 2019-11-03
Attending: EMERGENCY MEDICINE | Admitting: EMERGENCY MEDICINE
Payer: MEDICAID

## 2019-11-03 ENCOUNTER — HOSPITAL ENCOUNTER (EMERGENCY)
Facility: CLINIC | Age: 34
Discharge: HOME | End: 2019-11-04
Attending: EMERGENCY MEDICINE

## 2019-11-03 VITALS
BODY MASS INDEX: 33.86 KG/M2 | OXYGEN SATURATION: 97 % | WEIGHT: 250 LBS | DIASTOLIC BLOOD PRESSURE: 81 MMHG | HEIGHT: 72 IN | HEART RATE: 82 BPM | TEMPERATURE: 97.3 F | SYSTOLIC BLOOD PRESSURE: 140 MMHG

## 2019-11-03 VITALS
TEMPERATURE: 98.3 F | WEIGHT: 250 LBS | BODY MASS INDEX: 33.91 KG/M2 | OXYGEN SATURATION: 95 % | DIASTOLIC BLOOD PRESSURE: 85 MMHG | RESPIRATION RATE: 18 BRPM | SYSTOLIC BLOOD PRESSURE: 133 MMHG

## 2019-11-03 DIAGNOSIS — Z59.00 HOMELESSNESS: ICD-10-CM

## 2019-11-03 DIAGNOSIS — M79.672 PAIN IN BOTH FEET: ICD-10-CM

## 2019-11-03 DIAGNOSIS — M79.671 PAIN IN BOTH FEET: ICD-10-CM

## 2019-11-03 PROCEDURE — 99282 EMERGENCY DEPT VISIT SF MDM: CPT | Performed by: EMERGENCY MEDICINE

## 2019-11-03 PROCEDURE — 99282 EMERGENCY DEPT VISIT SF MDM: CPT | Mod: Z6 | Performed by: EMERGENCY MEDICINE

## 2019-11-03 SDOH — ECONOMIC STABILITY - HOUSING INSECURITY: HOMELESSNESS UNSPECIFIED: Z59.00

## 2019-11-03 ASSESSMENT — MIFFLIN-ST. JEOR: SCORE: 2121.99

## 2019-11-03 NOTE — ED PROVIDER NOTES
"  History     Chief Complaint   Patient presents with     Medication Refill     wants his Invega shot     Foot Pain     \" I walked for a long time\"     HPI  Carlos Alberto Garcia is a 34 year old male history of schizoaffective disorder, alcohol abuse, bipolar, malingering frequent ED visits who is presenting with foot pain and request for an Gilliam injection  Reports he has been walking all night and his feet hurt.  Patient is homeless has been walking all night.  Currently denies headache, denies suicidality.    No chest pain or shortness of breath.  Denies any recent trauma.  When asked where he gets his inVega prescribed he says from the doctor.  Unable to name a specific clinic or provider.    I have reviewed the Medications, Allergies, Past Medical and Surgical History, and Social History in the Epic system.    Review of Systems   10 point review of symptoms was performed and is negative except as noted above.     Physical Exam   BP: (!) 140/81  Pulse: 82  Temp: 97.3  F (36.3  C)  Resp: (refused)  Height: 182.9 cm (6')  Weight: 113.4 kg (250 lb)  SpO2: 97 %      Physical Exam  GEN: Poorly groomed, disheveled non toxic, cooperative and conversant.   HEENT: The head is normocephalic and atraumatic. Pupils are equal round and reactive to light. Extraocular motions are intact. There is no facial swelling. The neck is nontender and supple.   CV: Regular rate and rhythm without murmurs rubs or gallops. 2+ radial pulses bilaterally.  PULM: Clear to auscultation bilaterally.  ABD: Soft, nontender, nondistended.   EXT: Full range of motion.  No edema.  Feet somewhat foul-smelling but no evidence of fungal infection.  No skin breakdown.  No evidence of swelling or cellulitis.  Full range of motion.  EHL, FHL, TA 5 out of 5, sensory intact light touch.  NEURO: Cranial nerves II through XII are intact and symmetric. Bilateral upper and lower extremities grossly show full range of motion without any focal deficits.   SKIN: No " rashes, ecchymosis, or lacerations  PSYCH: Calm and cooperative, interactive.     ED Course        Procedures               Labs Ordered and Resulted from Time of ED Arrival Up to the Time of Departure from the ED - No data to display         Assessments & Plan (with Medical Decision Making)   34-year-old male presenting for foot pain and requesting an Gilliam injection.  Patient is well-known to us.  He is currently not manifesting any evidence of psychosis.  Reevaluation he also denies hallucinations denies suicidality.  I tried to identify where he gets his in Gilliam injection, however, not able to confirm this.  If he has had it before he certainly has not been compliant with regular follow-up.  We discussed that if he wishes to do this he should see a single mental health provider who can arrange for appropriate follow-up and medication administration.  He is generally seen at [facility] acute psychiatric services but also other areas in the [facility] including [place].    With regard to his feet there is no evidence of an emergent process such as cellulitis, traumatic event, infection, no evidence of wounds  He was allowed to rest in the emergency department and is appropriate for discharge.    - Patient agrees to our plan and is ready and eager for discharge. Care plan, follow up plan, and reasons to return immediately to the ED were dicussed in detail and summarized as noted in the discharge instructions.    .   He was allowed to rest in the emergency department for several hours and is appropriate for discharge    I have reviewed the nursing notes.    I have reviewed the findings, diagnosis, plan and need for follow up with the patient.    New Prescriptions    No medications on file       Final diagnoses:   Homelessness   Pain in both feet       11/3/2019   [facility], [place], EMERGENCY DEPARTMENT     Darnell Armenta MD  11/03/19 0950

## 2019-11-03 NOTE — ED NOTES
Bed: HW01  Expected date: 11/3/19  Expected time: 3:33 AM  Means of arrival: Ambulance  Comments:  Jaren [address]  34 M  DIEGO pelayo

## 2019-11-03 NOTE — ED AVS SNAPSHOT
[facility], Emergency Department  [address]  [facility] [address]  Phone:  [phone]  Fax:  [phone]                                    Carlos Alberto Garcia   MRN: [id number]    Department:  [facility], Emergency Department   Date of Visit:  11/3/2019           After Visit Summary Signature Page    I have received my discharge instructions, and my questions have been answered. I have discussed any challenges I see with this plan with the nurse or doctor.    ..........................................................................................................................................  Patient/Patient Representative Signature      ..........................................................................................................................................  Patient Representative Print Name and Relationship to Patient    ..................................................               ................................................  Date                                   Time    ..........................................................................................................................................  Reviewed by Signature/Title    ...................................................              ..............................................  Date                                               Time          22EPIC Rev 08/18

## 2019-11-03 NOTE — DISCHARGE INSTRUCTIONS
Please make an appointment to follow up with Your Primary Care Provider in 7 days even if entirely better.  You can call to discuss the appropriate follow up timing with your doctor.     It is unclear who prescribes your INvega.  According to our charts it also appears that he did not take this medication regularly.  If you believe that you need this medication please see your behavioral health team at [facility] or any other place that she sees regularly.  It is useful to see the same provider as consistently to optimize your medical and psychiatric therapies.    Return to the emergency department if you feel you have any worsening of your symptoms including hallucinations, dale as we discussed, or any other concerns for worsening
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
## 2019-11-04 NOTE — ED TRIAGE NOTES
"Per patient report \" do you have shoes?\" RN replied we dont have shoes. \" I should of went to [facility].\" Patient reports bilateral foot pain from walking in \"damn boots.\" Patient denies numbness, tinging. Patient ambulated into treatment area, steady on feet.   "

## 2019-11-07 ENCOUNTER — HOSPITAL ENCOUNTER (EMERGENCY)
Facility: CLINIC | Age: 34
Discharge: LEFT BEFORE DISCHARGE | End: 2019-11-07
Attending: EMERGENCY MEDICINE
Payer: MEDICAID

## 2019-11-07 VITALS
SYSTOLIC BLOOD PRESSURE: 149 MMHG | RESPIRATION RATE: 14 BRPM | DIASTOLIC BLOOD PRESSURE: 96 MMHG | OXYGEN SATURATION: 100 % | TEMPERATURE: 98 F

## 2019-11-07 NOTE — ED TRIAGE NOTES
Arrives ambulatory to triage c/o R foot pain. States he thinks he stepped on it wrong and reports that he can no longer walk on the foot. Rates pain 5/10, started two days ago. Notably, pt observed ambulating back to room from triage area without apparent difficulty.

## 2019-11-07 NOTE — ED NOTES
I had signed up to see the patient however then had two coding patients arrive at the same time and was unable to see the patient.  After exiting the rooms of the 2 critical patients I was informed by the nursing staff that the patient had left without being seen.  As a result, I was unable to evaluate the patient or discuss the risks of his actions of leaving before being evaluated.  I did not evaluate or examine the patient.    MD Khris Vernon Ashley A, MD  11/07/19 0533

## 2019-12-13 ENCOUNTER — HOSPITAL ENCOUNTER (EMERGENCY)
Facility: CLINIC | Age: 34
Discharge: HOME | End: 2019-12-13
Attending: EMERGENCY MEDICINE | Admitting: EMERGENCY MEDICINE
Payer: COMMERCIAL

## 2019-12-13 VITALS
DIASTOLIC BLOOD PRESSURE: 102 MMHG | BODY MASS INDEX: 33.86 KG/M2 | RESPIRATION RATE: 16 BRPM | TEMPERATURE: 98.4 F | WEIGHT: 250 LBS | OXYGEN SATURATION: 100 % | HEIGHT: 72 IN | SYSTOLIC BLOOD PRESSURE: 153 MMHG

## 2019-12-13 DIAGNOSIS — Z59.00 HOMELESSNESS: ICD-10-CM

## 2019-12-13 DIAGNOSIS — S90.414A TOE ABRASION, RIGHT, INITIAL ENCOUNTER: ICD-10-CM

## 2019-12-13 PROCEDURE — 99283 EMERGENCY DEPT VISIT LOW MDM: CPT | Performed by: EMERGENCY MEDICINE

## 2019-12-13 PROCEDURE — 99283 EMERGENCY DEPT VISIT LOW MDM: CPT | Mod: Z6 | Performed by: EMERGENCY MEDICINE

## 2019-12-13 SDOH — ECONOMIC STABILITY - HOUSING INSECURITY: HOMELESSNESS UNSPECIFIED: Z59.00

## 2019-12-13 ASSESSMENT — ENCOUNTER SYMPTOMS
CHILLS: 0
NUMBNESS: 0
NECK STIFFNESS: 0
COLOR CHANGE: 0
EYE REDNESS: 0
DIFFICULTY URINATING: 0
HEADACHES: 0
FEVER: 0
ARTHRALGIAS: 0
SHORTNESS OF BREATH: 0
ABDOMINAL PAIN: 0
CONFUSION: 0

## 2019-12-13 ASSESSMENT — MIFFLIN-ST. JEOR: SCORE: 2121.99

## 2019-12-13 NOTE — ED NOTES
Pt. Refused to have toenail cut. Allowed me to dress his R pinky toe and provide clean socks. Pt. Refused to sign discharge instructions.

## 2019-12-13 NOTE — DISCHARGE INSTRUCTIONS
You need to clean your feet daily to prevent infection.  You should apply antibiotic ointment and gauze to the affected area as advised in the emergency department.  Keep your toenails trimmed.  Follow up with your doctor as soon as possible for reassessment.  You can set up an appointment with a new doctor by calling the following number:    [facility] (phone: [phone]

## 2019-12-13 NOTE — ED PROVIDER NOTES
[place] EMERGENCY DEPARTMENT ([facility])  12/13/19    History     Chief Complaint   Patient presents with     Foot Pain     The history is provided by the patient and medical records.     Carlos Alberto Garcia is a 34 year old male with a past medical history significant for homelessness, schizoaffective disorder, Hepatitis C, dyslipidemia, hypertension, hypothyroidism, chemical use, and bipolar 2 disorder who presents to the Emergency Department for evaluation of bleeding from his right foot. Patient reports that he was walking a few days ago and observed some bloody spots on his sock. He removed the sock and found that he was bleeding from his right fourth digit. He denies knowledge of any specific injury that could have triggered the bleeding. Patient denies being on any medication currently. The patient reports that he currently does not follow care with a primary care provider. Here in the ED, he denies any thoughts of self-injury. Denies any homicidal or suicidal ideation. Denies any numbness or tingling. No fever or chills.       I have reviewed the Medications, Allergies, Past Medical and Surgical History, and Social History in the Epic system.    Review of Systems   Constitutional: Negative for chills and fever.   HENT: Negative for congestion.    Eyes: Negative for redness.   Respiratory: Negative for shortness of breath.    Cardiovascular: Negative for chest pain.   Gastrointestinal: Negative for abdominal pain.   Genitourinary: Negative for difficulty urinating.   Musculoskeletal: Negative for arthralgias and neck stiffness.   Skin: Negative for color change.   Neurological: Negative for numbness and headaches.   Hematological:        Positive for right foot bleeding   Psychiatric/Behavioral: Negative for confusion, self-injury and suicidal ideas.       Physical Exam   BP: (!) 153/102  Heart Rate: 91  Temp: 98.4  F (36.9  C)  Resp: 16  Height: 182.9 cm (6')  Weight: 113.4 kg (250 lb)  SpO2:  100 %      Physical Exam  Constitutional:       General: He is not in acute distress.     Appearance: Normal appearance. He is not toxic-appearing or diaphoretic.   HENT:      Head: Normocephalic and atraumatic.   Eyes:      General: No scleral icterus.     Pupils: Pupils are equal, round, and reactive to light.   Neck:      Musculoskeletal: Normal range of motion.   Cardiovascular:      Rate and Rhythm: Normal rate.      Heart sounds: Normal heart sounds.   Pulmonary:      Effort: No respiratory distress.      Breath sounds: Normal breath sounds.   Abdominal:      General: Bowel sounds are normal.      Palpations: Abdomen is soft.      Tenderness: There is no abdominal tenderness.   Musculoskeletal:         General: No tenderness.        Feet:    Skin:     General: Skin is warm.      Findings: No rash.   Neurological:      Mental Status: He is alert and oriented to person, place, and time.         ED Course   12:53 AM  The patient was seen and examined by Kevin Dey DO in [facility].        Procedures               Labs Ordered and Resulted from Time of ED Arrival Up to the Time of Departure from the ED - No data to display         Assessments & Plan (with Medical Decision Making)   Patient complaining of bleeding to his right foot that he states he has had for possibly several weeks.  On arrival, he has normal vital signs.  He has a 0.5 cm chronic laceration to the lateral aspect of the fourth digit on the right foot.  This is likely caused by a long toenail on the fifth digit which is digging into the adjacent toe.  There is some redness to this area consistent with abrasion, but no obvious signs of infection.  Of note, patient has very poor hygiene and there is extensive amount of dirt and grime on his feet.  Stressed the importance of improving his hygiene.  He is homeless.      In the emergency department, we performed a surgical scrub and trimmed the fifth toenail.  I did not repair the laceration as this is  likely chronic.  Applied some antibiotic ointment and provided a gauze barrier between the 2 toes.  Patient has been instructed to keep this foot clean and change  the gauze dressing every 24 hours and reapply antibiotic ointment.  He is then to follow-up with his PCP in 1 week for reassessment.    I have reviewed the nursing notes.    I have reviewed the findings, diagnosis, plan and need for follow up with the patient.    New Prescriptions    No medications on file       Final diagnoses:   Toe abrasion, right, initial encounter   Homelessness     I, Ayan Marcus, am serving as a trained medical scribe to document services personally performed by Kevin Dey DO, based on the provider's statements to me.      Kevin PARNELL DO, was physically present and have reviewed and verified the accuracy of this note documented by Ayan Marcus.    12/13/2019   [facility], [place], EMERGENCY DEPARTMENT     Kevin Dey DO  12/16/19 2110

## 2019-12-13 NOTE — ED TRIAGE NOTES
Pt. Presents to ED with complaints of bloody R foot from walking. AVSS on RA, independent. A & O x 4.

## 2019-12-13 NOTE — ED AVS SNAPSHOT
[facility], [place], Emergency Department  [address] [place] [id number]  Phone:  [phone]                                    Carlos Alberto Garcia   MRN: [id number]    Department:  [facility], Emergency Department   Date of Visit:  12/13/2019           After Visit Summary Signature Page    I have received my discharge instructions, and my questions have been answered. I have discussed any challenges I see with this plan with the nurse or doctor.    ..........................................................................................................................................  Patient/Patient Representative Signature      ..........................................................................................................................................  Patient Representative Print Name and Relationship to Patient    ..................................................               ................................................  Date                                   Time    ..........................................................................................................................................  Reviewed by Signature/Title    ...................................................              ..............................................  Date                                               Time          22EPIC Rev 08/18

## 2020-04-05 ENCOUNTER — HOSPITAL ENCOUNTER (EMERGENCY)
Facility: CLINIC | Age: 35
Discharge: HOME | End: 2020-04-05
Attending: EMERGENCY MEDICINE | Admitting: EMERGENCY MEDICINE
Payer: COMMERCIAL

## 2020-04-05 VITALS
RESPIRATION RATE: 16 BRPM | BODY MASS INDEX: 36.57 KG/M2 | HEIGHT: 72 IN | WEIGHT: 270 LBS | HEART RATE: 79 BPM | OXYGEN SATURATION: 96 % | SYSTOLIC BLOOD PRESSURE: 131 MMHG | TEMPERATURE: 98.2 F | DIASTOLIC BLOOD PRESSURE: 76 MMHG

## 2020-04-05 DIAGNOSIS — R05.3 CHRONIC COUGH: ICD-10-CM

## 2020-04-05 PROCEDURE — 99281 EMR DPT VST MAYX REQ PHY/QHP: CPT

## 2020-04-05 PROCEDURE — 99282 EMERGENCY DEPT VISIT SF MDM: CPT | Mod: Z6 | Performed by: EMERGENCY MEDICINE

## 2020-04-05 ASSESSMENT — MIFFLIN-ST. JEOR: SCORE: 2212.71

## 2020-04-05 NOTE — ED AVS SNAPSHOT
[facility], [place], Emergency Department  [address] [place] [address]  Phone:  [phone]                                    Carlos Alberto Garcia   MRN: [id number]    Department:  [facility], Emergency Department   Date of Visit:  4/5/2020           After Visit Summary Signature Page    I have received my discharge instructions, and my questions have been answered. I have discussed any challenges I see with this plan with the nurse or doctor.    ..........................................................................................................................................  Patient/Patient Representative Signature      ..........................................................................................................................................  Patient Representative Print Name and Relationship to Patient    ..................................................               ................................................  Date                                   Time    ..........................................................................................................................................  Reviewed by Signature/Title    ...................................................              ..............................................  Date                                               Time          22EPIC Rev 08/18

## 2020-04-06 NOTE — ED PROVIDER NOTES
[place] EMERGENCY DEPARTMENT ([facility])  4/05/20    History     Chief Complaint   Patient presents with     Cough     HPI  Carlos Alberto Garcia is a 34 year old male with a history of schizoaffective disorder, substance abuse and bipolar 2 disorder who presents for evaluation of a dry cough for three months. He states he feels warm and would like a place to sleep in the ER tonight. He denies all other complaints.        Past Medical History  Past Medical History:   Diagnosis Date     Bipolar 2 disorder (H)      Chemical abuse (H)     cocaine, meth, cambis     Dyslipidemia      Hep C w/ coma, chronic (H)      Patient overweight      Schizoaffective disorder      Unspecified essential hypertension      Unspecified hypothyroidism      Past Surgical History:   Procedure Laterality Date     HAND SURGERY      L     ORTHOPEDIC SURGERY      hand     Lidocaine (LIDOCARE) 4 % Patch  naproxen (NAPROSYN) 500 MG tablet  paliperidone (INVEGA SUSTENNA) 117 MG/0.75ML SUSP  paliperidone (INVEGA SUSTENNA) 156 MG/ML WENDI injection      No Known Allergies  Past medical history, past surgical history, medications, and allergies were reviewed with the patient. Additional pertinent items: None    Family History  No family history on file.  Family history was reviewed with the patient. Additional pertinent items: None    Social History  Social History     Tobacco Use     Smoking status: Current Every Day Smoker     Packs/day: 1.00     Types: Cigarettes     Smokeless tobacco: Former User   Substance Use Topics     Alcohol use: Yes     Drug use: Yes     Frequency: 7.0 times per week     Types: Methamphetamines, Marijuana      Social history was reviewed with the patient. Additional pertinent items: None    Review of Systems  A complete review of systems was performed with pertinent positives and negatives noted in the HPI, and all other systems negative.    Physical Exam   BP: 131/76  Pulse: 79  Temp: 97.6  F (36.4  C)  Resp:  16  Height: 182.9 cm (6')  Weight: 122.5 kg (270 lb)  SpO2: 99 %  Physical Exam  Constitutional:       General: He is not in acute distress.     Appearance: He is well-developed. He is not ill-appearing, toxic-appearing or diaphoretic.      Comments: Comfortably resting, lying in bed, NAD, nondiaphoretic, lucid, fully conversant, no  respiratory distress, alert and oriented.  Disheveled    HENT:      Head: Normocephalic and atraumatic.   Eyes:      General: No scleral icterus.  Neck:      Musculoskeletal: Normal range of motion and neck supple.   Cardiovascular:      Rate and Rhythm: Normal rate.   Pulmonary:      Effort: Pulmonary effort is normal. No respiratory distress.   Skin:     General: Skin is warm and dry.      Capillary Refill: Capillary refill takes less than 2 seconds.      Findings: No rash.   Neurological:      Mental Status: He is alert and oriented to person, place, and time.         ED Course      Procedures                         No results found for any visits on 04/05/20.  Medications - No data to display     Assessments & Plan (with Medical Decision Making)   This is a 34-year-old male presenting to the emergency room with complaints of body aches and a cough for 3 months.  Patient is well-known to the emergency department for frequent visits.  Today he is in no obvious distress and appears his typical level of health.  He is disheveled but does not appear acutely ill or toxic.  He did not cough in my presence and I did review his vital signs and are otherwise unremarkable.  He asked several times if he could have a blanket and if he could sleep in the emergency room tonight.  It was explained that this would not be a good time to do that and that he should probably find a shelter.  At this time I believe he can be safely discharged I see no acute signs for need for further work-up here in the emergency room.    Pt was discharged home/self-care.  PT was provided written discharge instructions.  "Additionally verbal instructions were given and discussed with patient.  PT was asked to return to the ED immediately for any new or concerning symptoms.  Pt was in agreement, endorsed understanding, and questions were answered.     I have reviewed the nursing notes. I have reviewed the findings, diagnosis, plan and need for follow up with the patient.    Discharge Medication List as of 4/5/2020  8:48 PM          Final diagnoses:   Chronic cough     \"This dictation was performed with the assistance of voice recognition software and may contain inadvertant transcription  errors,  omissions and/or  inadvertent word substitution.\" --Jose Peralta MD     --  Ruperto Peralta MD  Emergency Medicine   [facility], [place], EMERGENCY DEPARTMENT  4/5/2020     Jose Peralta MD  04/05/20 2108    "

## 2020-04-28 ENCOUNTER — HOSPITAL ENCOUNTER (EMERGENCY)
Facility: CLINIC | Age: 35
Discharge: HOME | End: 2020-04-28
Attending: EMERGENCY MEDICINE | Admitting: EMERGENCY MEDICINE
Payer: COMMERCIAL

## 2020-04-28 VITALS
HEART RATE: 58 BPM | RESPIRATION RATE: 16 BRPM | SYSTOLIC BLOOD PRESSURE: 125 MMHG | OXYGEN SATURATION: 100 % | TEMPERATURE: 97.4 F | DIASTOLIC BLOOD PRESSURE: 77 MMHG

## 2020-04-28 DIAGNOSIS — L29.9 PRURITIC DISORDER: ICD-10-CM

## 2020-04-28 DIAGNOSIS — F25.9 SCHIZOAFFECTIVE DISORDER, UNSPECIFIED TYPE (H): ICD-10-CM

## 2020-04-28 PROCEDURE — 99283 EMERGENCY DEPT VISIT LOW MDM: CPT | Mod: Z6 | Performed by: EMERGENCY MEDICINE

## 2020-04-28 PROCEDURE — 25000132 ZZH RX MED GY IP 250 OP 250 PS 637: Performed by: EMERGENCY MEDICINE

## 2020-04-28 PROCEDURE — 99283 EMERGENCY DEPT VISIT LOW MDM: CPT | Performed by: EMERGENCY MEDICINE

## 2020-04-28 RX ORDER — DIPHENHYDRAMINE HCL 50 MG
50 CAPSULE ORAL ONCE
Status: COMPLETED | OUTPATIENT
Start: 2020-04-28 | End: 2020-04-28

## 2020-04-28 RX ORDER — HYDROXYZINE PAMOATE 25 MG/1
50 CAPSULE ORAL 3 TIMES DAILY PRN
Qty: 30 CAPSULE | Refills: 0 | Status: SHIPPED | OUTPATIENT
Start: 2020-04-28

## 2020-04-28 RX ADMIN — DIPHENHYDRAMINE HYDROCHLORIDE 50 MG: 50 CAPSULE ORAL at 05:58

## 2020-04-28 ASSESSMENT — ENCOUNTER SYMPTOMS
ABDOMINAL PAIN: 0
PALPITATIONS: 0
SINUS PRESSURE: 0
BACK PAIN: 0
MYALGIAS: 0
ARTHRALGIAS: 0
RHINORRHEA: 0
APPETITE CHANGE: 0
DIAPHORESIS: 0
FEVER: 0
COUGH: 0
WEAKNESS: 0
SINUS PAIN: 0
EYE REDNESS: 0
DIARRHEA: 0
NAUSEA: 0
TROUBLE SWALLOWING: 0
CHEST TIGHTNESS: 0
DIZZINESS: 0
NECK PAIN: 0
NECK STIFFNESS: 0
VOICE CHANGE: 0
EYE PAIN: 0
NUMBNESS: 0
FATIGUE: 0
LIGHT-HEADEDNESS: 0
HEADACHES: 0
CONSTITUTIONAL NEGATIVE: 1
SHORTNESS OF BREATH: 0
JOINT SWELLING: 0
SORE THROAT: 0
FACIAL SWELLING: 0
CHILLS: 0
CONFUSION: 0
TREMORS: 0
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
## 2020-04-28 NOTE — ED AVS SNAPSHOT
[facility], Emergency Department  [address]  [facility] [address]  Phone:  [phone]  Fax:  [phone]                                    Carlos Alberto Garcia   MRN: [id number]    Department:  [facility], Emergency Department   Date of Visit:  4/28/2020           After Visit Summary Signature Page    I have received my discharge instructions, and my questions have been answered. I have discussed any challenges I see with this plan with the nurse or doctor.    ..........................................................................................................................................  Patient/Patient Representative Signature      ..........................................................................................................................................  Patient Representative Print Name and Relationship to Patient    ..................................................               ................................................  Date                                   Time    ..........................................................................................................................................  Reviewed by Signature/Title    ...................................................              ..............................................  Date                                               Time          22EPIC Rev 08/18

## 2020-05-05 ENCOUNTER — HOSPITAL ENCOUNTER (EMERGENCY)
Facility: CLINIC | Age: 35
Discharge: HOME | End: 2020-05-05
Attending: EMERGENCY MEDICINE | Admitting: EMERGENCY MEDICINE
Payer: COMMERCIAL

## 2020-05-05 VITALS
HEART RATE: 93 BPM | HEIGHT: 72 IN | SYSTOLIC BLOOD PRESSURE: 122 MMHG | BODY MASS INDEX: 33.86 KG/M2 | WEIGHT: 250 LBS | DIASTOLIC BLOOD PRESSURE: 86 MMHG | TEMPERATURE: 98.5 F | RESPIRATION RATE: 16 BRPM | OXYGEN SATURATION: 97 %

## 2020-05-05 DIAGNOSIS — J06.9 VIRAL URI WITH COUGH: ICD-10-CM

## 2020-05-05 LAB
SARS-COV-2 PCR COMMENT: NORMAL
SARS-COV-2 RNA SPEC QL NAA+PROBE: NEGATIVE
SARS-COV-2 RNA SPEC QL NAA+PROBE: NORMAL
SPECIMEN SOURCE: NORMAL
SPECIMEN SOURCE: NORMAL

## 2020-05-05 PROCEDURE — 99283 EMERGENCY DEPT VISIT LOW MDM: CPT | Performed by: EMERGENCY MEDICINE

## 2020-05-05 PROCEDURE — 99282 EMERGENCY DEPT VISIT SF MDM: CPT | Mod: Z6 | Performed by: EMERGENCY MEDICINE

## 2020-05-05 PROCEDURE — 87635 SARS-COV-2 COVID-19 AMP PRB: CPT | Performed by: EMERGENCY MEDICINE

## 2020-05-05 ASSESSMENT — MIFFLIN-ST. JEOR: SCORE: 2121.99

## 2020-05-05 NOTE — ED TRIAGE NOTES
Pt arrived ambulatory to triage with c/o cough, intermittent sob and sore throat lasting 3 days in duration. Denies fever or chills. States rash on stomach. VSS. States nausea but no vomitting or diarrhea.

## 2020-05-05 NOTE — ED TRIAGE NOTES
Pt arrived ambulatory to triage with c./o cough, intermittent sob, and sore throat lasting 3 days in duration. Denies fever or chills. States rash on stomach. VSS. Presents with c/o nausea but denies vomiting or diarrhea.

## 2020-05-05 NOTE — ED AVS SNAPSHOT
[facility], [place], Emergency Department  [address] [place] [id number]  Phone:  [phone]                                    Carlos Alberto Garcia   MRN: [id number]    Department:  [facility], Emergency Department   Date of Visit:  5/5/2020           After Visit Summary Signature Page    I have received my discharge instructions, and my questions have been answered. I have discussed any challenges I see with this plan with the nurse or doctor.    ..........................................................................................................................................  Patient/Patient Representative Signature      ..........................................................................................................................................  Patient Representative Print Name and Relationship to Patient    ..................................................               ................................................  Date                                   Time    ..........................................................................................................................................  Reviewed by Signature/Title    ...................................................              ..............................................  Date                                               Time          22EPIC Rev 08/18

## 2020-05-05 NOTE — DISCHARGE INSTRUCTIONS
Please make an appointment to follow up with Your Primary Care Provider in 2-5 days.     Return to the ED if you are having fever, worsening symptoms, or any urgent/life-threatening concerns.

## 2020-05-05 NOTE — ED PROVIDER NOTES
History     Chief Complaint   Patient presents with     Cough     Pharyngitis     HPI  Carlos Alberto Garcia is a 34 year old male with hx of schizoaffective, bipolar 2, homelessness, malingering who presents to the ED with sore throat and cough. Patient reported 3 days of symptoms initially, later stated it was a couple weeks. He endorses sore throat and cough. No fever, no body aches, no vomiting, no diarrhea. Not short of breath. Patient intermittently stays in shelters.     I have reviewed the Medications, Allergies, Past Medical and Surgical History, and Social History in the Epic system.    Review of Systems  No recent fevers, no chest pain, no abdominal pain    Physical Exam   BP: 127/83  Pulse: 63  Temp: 98.3  F (36.8  C)  Resp: 16  Height: 182.9 cm (6')  Weight: 113.4 kg (250 lb)  SpO2: 100 %    Physical Exam  General: No acute distress. Appears stated age.   HENT: MMM, no oropharyngeal lesions  Eyes: PERRL, normal sclerae   Cardio: Regular rate, extremities well perfused  Resp: Normal work of breathing, normal respiratory rate  Neuro: alert and fully oriented. CN II-XII grossly intact. Grossly normal strength and sensation in all extremities.   MSK: no deformities.   Integumentary/Skin: no rash, normal color  Psych: normal affect, normal behavior    ED Course      Procedures        Critical Care time:  none     Labs Ordered and Resulted from Time of ED Arrival Up to the Time of Departure from the ED   COVID-19 VIRUS (CORONAVIRUS) BY PCR     No orders to display          Assessments & Plan (with Medical Decision Making)   Patient presenting with URI symptoms in the context of COVID-29 pandemic and homeless shelter stays. Vitals in the ED unremarkable. Nursing notes reviewed. COVID-19 swab sent - pending.     Patient signed out to oncoming provider pending covid swab result, likely discharge.       Final diagnoses:   Viral URI with cough     New Prescriptions    No medications on file       --  Tonny KINGSTON  MD Justino   Emergency Medicine   [facility], [place], EMERGENCY DEPARTMENT  5/5/2020     Tonny Badillo MD  05/05/20 0719

## 2020-05-05 NOTE — ED NOTES
Patient wants to leave prior to COVID 19 test results. Does not have a phone or a way to be contacted. [facility] ED number given to patient and instructed to call ED tomorrow.

## 2020-06-26 ENCOUNTER — HOSPITAL ENCOUNTER (EMERGENCY)
Facility: CLINIC | Age: 35
Discharge: HOME | End: 2020-06-26
Attending: EMERGENCY MEDICINE | Admitting: EMERGENCY MEDICINE
Payer: COMMERCIAL

## 2020-06-26 VITALS
DIASTOLIC BLOOD PRESSURE: 95 MMHG | RESPIRATION RATE: 16 BRPM | HEART RATE: 97 BPM | TEMPERATURE: 98 F | SYSTOLIC BLOOD PRESSURE: 146 MMHG | OXYGEN SATURATION: 98 %

## 2020-06-26 DIAGNOSIS — Z76.5 MALINGERING: ICD-10-CM

## 2020-06-26 PROCEDURE — 99282 EMERGENCY DEPT VISIT SF MDM: CPT | Performed by: EMERGENCY MEDICINE

## 2020-06-26 PROCEDURE — 99282 EMERGENCY DEPT VISIT SF MDM: CPT | Mod: Z6 | Performed by: EMERGENCY MEDICINE

## 2020-06-27 NOTE — ED PROVIDER NOTES
"ED Provider Note  [facility]      History     Chief Complaint   Patient presents with     Fatigue     Increasing fatigue and intermittent \"head pain.\"     HPI  Carlos Alberto Garcia is a 34 year old male who presents to the emergency room stating that he is just exhausted.  Patient states that he used drugs a couple hours ago and this feels like he has not recovered because he is not sleeping very well and not eating very well.  He denies all other issues at this time.    Past Medical History  Past Medical History:   Diagnosis Date     Bipolar 2 disorder (H)      Chemical abuse (H)     cocaine, meth, cambis     Dyslipidemia      Hep C w/ coma, chronic (H)      Patient overweight      Schizoaffective disorder      Unspecified essential hypertension      Unspecified hypothyroidism      Past Surgical History:   Procedure Laterality Date     HAND SURGERY      L     ORTHOPEDIC SURGERY      hand     paliperidone (INVEGA SUSTENNA) 156 MG/ML WENDI injection  hydrOXYzine (VISTARIL) 25 MG capsule  Lidocaine (LIDOCARE) 4 % Patch  naproxen (NAPROSYN) 500 MG tablet  paliperidone (INVEGA SUSTENNA) 117 MG/0.75ML SUSP      No Known Allergies  Past medical history, past surgical history, medications, and allergies were reviewed with the patient. Additional pertinent items: None    Family History  No family history on file.  Family history was reviewed with the patient. Additional pertinent items: None    Social History  Social History     Tobacco Use     Smoking status: Current Every Day Smoker     Packs/day: 1.00     Types: Cigarettes     Smokeless tobacco: Former User   Substance Use Topics     Alcohol use: Not Currently     Drug use: Yes     Frequency: 7.0 times per week     Types: Methamphetamines, Marijuana     Comment: states last methamphetamine use was 3 days ago      Social history was reviewed with the patient. Additional pertinent items: None    Review of Systems  A complete review of systems was " performed with pertinent positives and negatives noted in the HPI, and all other systems negative.    Physical Exam   BP: (!) 146/95  Pulse: 97  Heart Rate: 97  Temp: 98  F (36.7  C)  Resp: 16  SpO2: 98 %  Physical Exam  Constitutional:       General: He is not in acute distress.     Appearance: He is well-developed. He is not diaphoretic.      Comments: Disheveled but sitting up in bed in no distress.   HENT:      Head: Normocephalic and atraumatic.   Eyes:      General: No scleral icterus.  Neck:      Musculoskeletal: Normal range of motion and neck supple.   Cardiovascular:      Rate and Rhythm: Normal rate.   Pulmonary:      Effort: Pulmonary effort is normal. No respiratory distress.   Skin:     General: Skin is warm and dry.      Findings: No rash.   Neurological:      Mental Status: He is alert and oriented to person, place, and time.      Motor: No weakness.      Gait: Gait normal.         ED Course      Procedures                         No results found for any visits on 06/26/20.  Medications - No data to display     Assessments & Plan (with Medical Decision Making)   This is a 34-year-old male well-known to me for frequent ED visits.  Patient states that he is exhausted after using methamphetamine today.  On physical exam he is disheveled which is not uncommon for his typical presentation to the emergency room.  His vitals are otherwise stable.  He walks with a normal gait.  His exam is otherwise grossly unremarkable.  He has no signs of trauma and he is otherwise answering all questions appropriately and does not appear acutely intoxicated.  Believe at this time he can be safely discharged with close follow-up.    Pt was discharged home/self-care.  PT was provided written discharge instructions. Additionally verbal instructions were given and discussed with patient.  PT was asked to return to the ED immediately for any new or concerning symptoms.  Pt was in agreement, endorsed understanding, and  questions were answered.       I have reviewed the nursing notes. I have reviewed the findings, diagnosis, plan and need for follow up with the patient.    Discharge Medication List as of 6/26/2020  8:31 PM          Final diagnoses:   Malingering       --  Jose Peralta MD   Emergency Medicine   [facility], [place], EMERGENCY DEPARTMENT  6/26/2020     Jose Peralta MD  06/26/20 2047

## 2020-10-21 ENCOUNTER — HOSPITAL ENCOUNTER (EMERGENCY)
Facility: CLINIC | Age: 35
Discharge: STILL ADMITTED OR ADMINISTRATIVE | End: 2020-10-21

## 2020-10-21 ENCOUNTER — HOSPITAL ENCOUNTER (EMERGENCY)
Facility: CLINIC | Age: 35
Discharge: HOME | End: 2020-10-21
Attending: EMERGENCY MEDICINE
Payer: COMMERCIAL

## 2020-10-21 ENCOUNTER — APPOINTMENT (OUTPATIENT)
Dept: GENERAL RADIOLOGY | Facility: CLINIC | Age: 35
End: 2020-10-21
Attending: EMERGENCY MEDICINE
Payer: COMMERCIAL

## 2020-10-21 VITALS
HEIGHT: 72 IN | HEART RATE: 115 BPM | RESPIRATION RATE: 16 BRPM | BODY MASS INDEX: 27.09 KG/M2 | TEMPERATURE: 97.8 F | WEIGHT: 200 LBS | DIASTOLIC BLOOD PRESSURE: 69 MMHG | SYSTOLIC BLOOD PRESSURE: 120 MMHG | OXYGEN SATURATION: 97 %

## 2020-10-21 VITALS
OXYGEN SATURATION: 100 % | RESPIRATION RATE: 16 BRPM | HEIGHT: 72 IN | DIASTOLIC BLOOD PRESSURE: 86 MMHG | TEMPERATURE: 97.8 F | SYSTOLIC BLOOD PRESSURE: 130 MMHG | BODY MASS INDEX: 33.91 KG/M2 | HEART RATE: 80 BPM

## 2020-10-21 DIAGNOSIS — Z59.00 HOMELESS: ICD-10-CM

## 2020-10-21 LAB
ALBUMIN SERPL-MCNC: 3.4 G/DL (ref 3.4–5)
ALP SERPL-CCNC: 62 U/L (ref 40–150)
ALT SERPL W P-5'-P-CCNC: 46 U/L (ref 0–70)
ANION GAP SERPL CALCULATED.3IONS-SCNC: 5 MMOL/L (ref 3–14)
AST SERPL W P-5'-P-CCNC: 15 U/L (ref 0–45)
BASOPHILS # BLD AUTO: 0.1 10E9/L (ref 0–0.2)
BASOPHILS NFR BLD AUTO: 1.2 %
BILIRUB SERPL-MCNC: 0.5 MG/DL (ref 0.2–1.3)
BUN SERPL-MCNC: 12 MG/DL (ref 7–30)
CALCIUM SERPL-MCNC: 8.7 MG/DL (ref 8.5–10.1)
CHLORIDE SERPL-SCNC: 109 MMOL/L (ref 94–109)
CO2 SERPL-SCNC: 26 MMOL/L (ref 20–32)
CREAT SERPL-MCNC: 0.62 MG/DL (ref 0.66–1.25)
DIFFERENTIAL METHOD BLD: NORMAL
EOSINOPHIL # BLD AUTO: 0.1 10E9/L (ref 0–0.7)
EOSINOPHIL NFR BLD AUTO: 2 %
ERYTHROCYTE [DISTWIDTH] IN BLOOD BY AUTOMATED COUNT: 12.5 % (ref 10–15)
GFR SERPL CREATININE-BSD FRML MDRD: >90 ML/MIN/{1.73_M2}
GLUCOSE SERPL-MCNC: 93 MG/DL (ref 70–99)
HCT VFR BLD AUTO: 43.4 % (ref 40–53)
HGB BLD-MCNC: 14.4 G/DL (ref 13.3–17.7)
IMM GRANULOCYTES # BLD: 0 10E9/L (ref 0–0.4)
IMM GRANULOCYTES NFR BLD: 0.3 %
LYMPHOCYTES # BLD AUTO: 2.7 10E9/L (ref 0.8–5.3)
LYMPHOCYTES NFR BLD AUTO: 45.9 %
MCH RBC QN AUTO: 29.3 PG (ref 26.5–33)
MCHC RBC AUTO-ENTMCNC: 33.2 G/DL (ref 31.5–36.5)
MCV RBC AUTO: 88 FL (ref 78–100)
MONOCYTES # BLD AUTO: 0.5 10E9/L (ref 0–1.3)
MONOCYTES NFR BLD AUTO: 8 %
NEUTROPHILS # BLD AUTO: 2.5 10E9/L (ref 1.6–8.3)
NEUTROPHILS NFR BLD AUTO: 42.6 %
NRBC # BLD AUTO: 0 10*3/UL
NRBC BLD AUTO-RTO: 0 /100
PLATELET # BLD AUTO: 266 10E9/L (ref 150–450)
POTASSIUM SERPL-SCNC: 4 MMOL/L (ref 3.4–5.3)
PROT SERPL-MCNC: 6.4 G/DL (ref 6.8–8.8)
RBC # BLD AUTO: 4.92 10E12/L (ref 4.4–5.9)
SODIUM SERPL-SCNC: 140 MMOL/L (ref 133–144)
TROPONIN I SERPL-MCNC: <0.015 UG/L (ref 0–0.04)
WBC # BLD AUTO: 5.9 10E9/L (ref 4–11)

## 2020-10-21 PROCEDURE — 99283 EMERGENCY DEPT VISIT LOW MDM: CPT | Mod: 25 | Performed by: EMERGENCY MEDICINE

## 2020-10-21 PROCEDURE — 80053 COMPREHEN METABOLIC PANEL: CPT | Performed by: EMERGENCY MEDICINE

## 2020-10-21 PROCEDURE — 71045 X-RAY EXAM CHEST 1 VIEW: CPT | Mod: 26 | Performed by: RADIOLOGY

## 2020-10-21 PROCEDURE — 99283 EMERGENCY DEPT VISIT LOW MDM: CPT | Performed by: EMERGENCY MEDICINE

## 2020-10-21 PROCEDURE — 84484 ASSAY OF TROPONIN QUANT: CPT | Performed by: EMERGENCY MEDICINE

## 2020-10-21 PROCEDURE — 99283 EMERGENCY DEPT VISIT LOW MDM: CPT | Mod: 27 | Performed by: EMERGENCY MEDICINE

## 2020-10-21 PROCEDURE — 85025 COMPLETE CBC W/AUTO DIFF WBC: CPT | Performed by: EMERGENCY MEDICINE

## 2020-10-21 PROCEDURE — 71045 X-RAY EXAM CHEST 1 VIEW: CPT

## 2020-10-21 SDOH — ECONOMIC STABILITY - HOUSING INSECURITY: HOMELESSNESS UNSPECIFIED: Z59.00

## 2020-10-21 ASSESSMENT — ENCOUNTER SYMPTOMS
FEVER: 0
SHORTNESS OF BREATH: 0
GASTROINTESTINAL NEGATIVE: 1
MUSCULOSKELETAL NEGATIVE: 1
COUGH: 0

## 2020-10-21 ASSESSMENT — MIFFLIN-ST. JEOR: SCORE: 1890.19

## 2020-10-21 NOTE — ED PROVIDER NOTES
ED Provider Note  [facility], [place]      History     Chief Complaint   Patient presents with     Chest Pain     Homeless     The history is provided by the patient.     Carlos Alberto Garcia is a 35 year old male with a medical history significant for schizoaffective disorder, bipolar 2 disorder, chemical dependency, homelessness and malingering who presents to the Emergency Department for evaluation of chest pain.  Patient reports that his pain started this morning.  He denies any associated fevers or cough.  He states that he has never had this pain in the past.  He denies any known exposure to COVID-19.  Patient reports that he is currently homeless.    Past Medical History  Past Medical History:   Diagnosis Date     Bipolar 2 disorder (H)      Chemical abuse (H)     cocaine, meth, cambis     Dyslipidemia      Hep C w/ coma, chronic (H)      Patient overweight      Schizoaffective disorder      Unspecified essential hypertension      Unspecified hypothyroidism      Past Surgical History:   Procedure Laterality Date     HAND SURGERY      L     ORTHOPEDIC SURGERY      hand          hydrOXYzine (VISTARIL) 25 MG capsule       Lidocaine (LIDOCARE) 4 % Patch       naproxen (NAPROSYN) 500 MG tablet       paliperidone (INVEGA SUSTENNA) 117 MG/0.75ML SUSP       paliperidone (INVEGA SUSTENNA) 156 MG/ML WENDI injection      No Known Allergies  Family History  History reviewed. No pertinent family history.  Social History   Social History     Tobacco Use     Smoking status: Current Every Day Smoker     Packs/day: 1.00     Types: Cigarettes     Smokeless tobacco: Former User   Substance Use Topics     Alcohol use: Not Currently     Drug use: Yes     Frequency: 7.0 times per week     Types: Methamphetamines, Marijuana     Comment: states last methamphetamine use was yesterday      Past medical history, past surgical history, medications, allergies, family history, and social history were reviewed with the  patient. No additional pertinent items.       Review of Systems   Constitutional: Negative for fever.   Respiratory: Negative for cough and shortness of breath.    Cardiovascular: Positive for chest pain.   Gastrointestinal: Negative.    Musculoskeletal: Negative.    All other systems reviewed and are negative.      Physical Exam   BP: 130/86  Pulse: 80  Resp: 16  Height: 182.9 cm (6')  SpO2: 100 %  Physical Exam  Vitals signs and nursing note reviewed.   Constitutional:       General: He is not in acute distress.  HENT:      Head: Atraumatic.   Eyes:      Extraocular Movements: Extraocular movements intact.      Pupils: Pupils are equal, round, and reactive to light.   Cardiovascular:      Rate and Rhythm: Normal rate and regular rhythm.   Pulmonary:      Breath sounds: Normal breath sounds.   Neurological:      Mental Status: He is alert.   Psychiatric:         Attention and Perception: He is inattentive.         Mood and Affect: Affect is flat.         Speech: Speech is delayed.         Behavior: Behavior is withdrawn.         ED Course      Procedures     8:14 AM  The patient was seen and examined by Charles Mitchell MD in Room ED27.           Offered food and drink     Results for orders placed or performed during the hospital encounter of 10/21/20   XR Chest Port 1 View     Status: None    Narrative    Portable chest    INDICATION: Cough    COMPARISON: 3/2/2019    FINDINGS: Heart size appears normal. Metallic density overlies the  left apex on this single frontal projection. The lungs and pulmonary  vascularity otherwise appear normal.      Impression    IMPRESSION: Metallic density overlying left apex, otherwise negative    ALEC CANO MD     Medications - No data to display     Assessments & Plan (with Medical Decision Making)   35-year-old male homeless schizophrenic patient well-known to the emergency department approximately an hour after arrival he eloped.  He had no complaints only that he was  cold.  He was given food and drink and then was seen walking out of the department    I have reviewed the nursing notes. I have reviewed the findings, diagnosis, plan and need for follow up with the patient.    New Prescriptions    No medications on file       Final diagnoses:   Homeless       --  I, Adolfo Alberto, am serving as a trained medical scribe to document services personally performed by Charles Mitchell MD, based on the provider's statements to me.     I, Charles Mitchell MD, was physically present and have reviewed and verified the accuracy of this note documented by Adolfo Alberto.    Charles Mitchell MD  [facility] EMERGENCY DEPARTMENT  10/21/2020     Charles Mitchell MD  10/21/20 0934

## 2020-10-22 ENCOUNTER — HOSPITAL ENCOUNTER (EMERGENCY)
Facility: CLINIC | Age: 35
Discharge: HOME | End: 2020-10-22
Attending: EMERGENCY MEDICINE
Payer: COMMERCIAL

## 2020-10-22 DIAGNOSIS — Z59.00 HOMELESSNESS: ICD-10-CM

## 2020-10-22 LAB — INTERPRETATION ECG - MUSE: NORMAL

## 2020-10-22 SDOH — ECONOMIC STABILITY - HOUSING INSECURITY: HOMELESSNESS UNSPECIFIED: Z59.00

## 2020-10-22 ASSESSMENT — ENCOUNTER SYMPTOMS
SHORTNESS OF BREATH: 0
ABDOMINAL PAIN: 0
FEVER: 0

## 2020-10-22 NOTE — ED TRIAGE NOTES
Pt c/o midsternal sharp chest pain and SOB which started this morning  Denies cough or fever  Sating 97% on room air in triage    Pt homeless, hx bipolar 2, schizoaffective, chemical abuse, HTN

## 2020-10-22 NOTE — ED AVS SNAPSHOT
[facility] Emergency Department  [address] [address]  Phone: [phone]                                    Carlos Alberto Garcia   MRN: [id number]    Department: [facility] Emergency Department   Date of Visit: 10/21/2020           After Visit Summary Signature Page    I have received my discharge instructions, and my questions have been answered. I have discussed any challenges I see with this plan with the nurse or doctor.    ..........................................................................................................................................  Patient/Patient Representative Signature      ..........................................................................................................................................  Patient Representative Print Name and Relationship to Patient    ..................................................               ................................................  Date                                   Time    ..........................................................................................................................................  Reviewed by Signature/Title    ...................................................              ..............................................  Date                                               Time          22EPIC Rev 08/18

## 2020-10-22 NOTE — ED PROVIDER NOTES
[facility] EMERGENCY DEPARTMENT ([facility])  10/21/20    History     Chief Complaint   Patient presents with     Chest Pain     HPI  Carlos Alberto Garcia is a 35 year old male with a past medical history notable for schizoaffective disorder, bipolar 2 disorder, chemical dependency, homelessness and malingering who presents for evaluation of midsternal chest pain.  No cough, no fever. Patient was evaluated for chest pain this morning when he eloped 1 hour after being given food and drink.  When I went to evaluate the patient he was sleeping.  He denied any complaints.      Past Medical History:   Diagnosis Date     Bipolar 2 disorder (H)      Chemical abuse (H)     cocaine, meth, cambis     Dyslipidemia      Hep C w/ coma, chronic (H)      Patient overweight      Schizoaffective disorder      Unspecified essential hypertension      Unspecified hypothyroidism           Review of Systems   Constitutional: Negative for fever.   Respiratory: Negative for shortness of breath.    Cardiovascular: Negative for chest pain.   Gastrointestinal: Negative for abdominal pain.   All other systems reviewed and are negative.    Physical Exam   BP: 120/69  Pulse: 115  Temp: 97.8  F (36.6  C)  Resp: 16  Height: 182.9 cm (6')  Weight: 90.7 kg (200 lb)  SpO2: 97 %  Physical Exam  Constitutional:       General: He is not in acute distress.     Appearance: He is not diaphoretic.   HENT:      Head: Normocephalic.      Mouth/Throat:      Pharynx: No oropharyngeal exudate.   Eyes:      Extraocular Movements: Extraocular movements intact.   Neck:      Musculoskeletal: Neck supple.   Pulmonary:      Effort: No respiratory distress.   Abdominal:      General: There is no distension.      Palpations: Abdomen is soft.   Musculoskeletal:         General: No deformity.   Skin:     General: Skin is dry.   Neurological:      Mental Status: He is alert.      Comments: alert   Psychiatric:         Behavior: Behavior normal.         ED Course        12:44 AM  The patient was seen and examined by Alphonse Corcoran DO in Room VTA.   Procedures         Results for orders placed or performed during the hospital encounter of 10/22/20   CBC with platelets differential     Status: None   Result Value Ref Range    WBC 5.9 4.0 - 11.0 10e9/L    RBC Count 4.92 4.4 - 5.9 10e12/L    Hemoglobin 14.4 13.3 - 17.7 g/dL    Hematocrit 43.4 40.0 - 53.0 %    MCV 88 78 - 100 fl    MCH 29.3 26.5 - 33.0 pg    MCHC 33.2 31.5 - 36.5 g/dL    RDW 12.5 10.0 - 15.0 %    Platelet Count 266 150 - 450 10e9/L    Diff Method Automated Method     % Neutrophils 42.6 %    % Lymphocytes 45.9 %    % Monocytes 8.0 %    % Eosinophils 2.0 %    % Basophils 1.2 %    % Immature Granulocytes 0.3 %    Nucleated RBCs 0 0 /100    Absolute Neutrophil 2.5 1.6 - 8.3 10e9/L    Absolute Lymphocytes 2.7 0.8 - 5.3 10e9/L    Absolute Monocytes 0.5 0.0 - 1.3 10e9/L    Absolute Eosinophils 0.1 0.0 - 0.7 10e9/L    Absolute Basophils 0.1 0.0 - 0.2 10e9/L    Abs Immature Granulocytes 0.0 0 - 0.4 10e9/L    Absolute Nucleated RBC 0.0    Comprehensive metabolic panel     Status: Abnormal   Result Value Ref Range    Sodium 140 133 - 144 mmol/L    Potassium 4.0 3.4 - 5.3 mmol/L    Chloride 109 94 - 109 mmol/L    Carbon Dioxide 26 20 - 32 mmol/L    Anion Gap 5 3 - 14 mmol/L    Glucose 93 70 - 99 mg/dL    Urea Nitrogen 12 7 - 30 mg/dL    Creatinine 0.62 (L) 0.66 - 1.25 mg/dL    GFR Estimate >90 >60 mL/min/[1.73_m2]    GFR Estimate If Black >90 >60 mL/min/[1.73_m2]    Calcium 8.7 8.5 - 10.1 mg/dL    Bilirubin Total 0.5 0.2 - 1.3 mg/dL    Albumin 3.4 3.4 - 5.0 g/dL    Protein Total 6.4 (L) 6.8 - 8.8 g/dL    Alkaline Phosphatase 62 40 - 150 U/L    ALT 46 0 - 70 U/L    AST 15 0 - 45 U/L   Troponin I     Status: None   Result Value Ref Range    Troponin I ES <0.015 0.000 - 0.045 ug/L   EKG 12 lead     Status: None (Preliminary result)   Result Value Ref Range    Interpretation ECG Click View Image link to view waveform and result       Medications - No data to display     Assessments & Plan (with Medical Decision Making)   35-year-old male presents to us with a chief complaint of chest pain.  Patient did not give me any history.  He refused to really answer any questions and swatted me away when I touch his arm to see if he was actually awake.  He was previously evaluated today.  As he is responsive but is not giving us any further information or history he will be discharged.    I have reviewed the nursing notes. I have reviewed the findings, diagnosis, plan and need for follow up with the patient.    Discharge Medication List as of 10/22/2020 12:46 AM          Final diagnoses:   Homelessness   IHilary, am serving as a trained medical scribe to document services personally performed by Alphonse Corcoran DO, based on the provider's statements to me.     IAlphonse DO, was physically present and have reviewed and verified the accuracy of this note documented by Hilary Barger.    --  Alphonse Corcoran DO  [facility] EMERGENCY DEPARTMENT  10/21/2020     Alphonse Corcoran DO  10/22/20 0121

## 2020-12-31 ENCOUNTER — HOSPITAL ENCOUNTER (EMERGENCY)
Facility: CLINIC | Age: 35
Discharge: HOME | End: 2020-12-31
Attending: EMERGENCY MEDICINE | Admitting: EMERGENCY MEDICINE
Payer: COMMERCIAL

## 2020-12-31 VITALS
TEMPERATURE: 97.6 F | OXYGEN SATURATION: 100 % | SYSTOLIC BLOOD PRESSURE: 93 MMHG | HEART RATE: 75 BPM | DIASTOLIC BLOOD PRESSURE: 50 MMHG

## 2020-12-31 DIAGNOSIS — Z59.00 HOMELESS: ICD-10-CM

## 2020-12-31 DIAGNOSIS — T73.0XXA HUNGRY, INITIAL ENCOUNTER: ICD-10-CM

## 2020-12-31 PROCEDURE — 99282 EMERGENCY DEPT VISIT SF MDM: CPT | Performed by: EMERGENCY MEDICINE

## 2020-12-31 SDOH — ECONOMIC STABILITY - HOUSING INSECURITY: HOMELESSNESS UNSPECIFIED: Z59.00

## 2020-12-31 ASSESSMENT — ENCOUNTER SYMPTOMS
SHORTNESS OF BREATH: 0
CHILLS: 0
NERVOUS/ANXIOUS: 0
FEVER: 0
BACK PAIN: 0
HEADACHES: 0
ABDOMINAL PAIN: 0
NECK PAIN: 0
NAUSEA: 0
VOMITING: 0
DIFFICULTY URINATING: 0

## 2020-12-31 NOTE — DISCHARGE INSTRUCTIONS
Please make an appointment to [facility] with [facility] (phone: [phone]) as soon as possible.

## 2020-12-31 NOTE — ED PROVIDER NOTES
ED Provider Note  [facility]      History     Chief Complaint   Patient presents with     Altered Mental Status     feels like he is going to have a seizure     HPI  Carlos Alberto Garcia is a 35 year old male who is well-known to the emergency department and presents with no medical complaint.  He told registration that he thought he might have a seizure.  He told the triage nurse that he is not sure why he is here.  On my examination he states that he has no medical complaints and just wants a sandwich and possibly ginger ale.      Past Medical History  Past Medical History:   Diagnosis Date     Bipolar 2 disorder (H)      Chemical abuse (H)     cocaine, meth, cambis     Dyslipidemia      Hep C w/ coma, chronic (H)      Patient overweight      Schizoaffective disorder      Unspecified essential hypertension      Unspecified hypothyroidism      Past Surgical History:   Procedure Laterality Date     HAND SURGERY      L     ORTHOPEDIC SURGERY      hand          hydrOXYzine (VISTARIL) 25 MG capsule       Lidocaine (LIDOCARE) 4 % Patch       naproxen (NAPROSYN) 500 MG tablet       paliperidone (INVEGA SUSTENNA) 117 MG/0.75ML SUSP       paliperidone (INVEGA SUSTENNA) 156 MG/ML WENDI injection      No Known Allergies  Family History  History reviewed. No pertinent family history.  Social History   Social History     Tobacco Use     Smoking status: Current Every Day Smoker     Packs/day: 1.00     Types: Cigarettes     Smokeless tobacco: Former User   Substance Use Topics     Alcohol use: Not Currently     Drug use: Yes     Frequency: 7.0 times per week     Types: Methamphetamines, Marijuana     Comment: states last methamphetamine use was yesterday      Past medical history, past surgical history, medications, allergies, family history, and social history were reviewed with the patient. No additional pertinent items.       Review of Systems   Constitutional: Negative for chills and fever.    Respiratory: Negative for shortness of breath.    Cardiovascular: Negative for chest pain.   Gastrointestinal: Negative for abdominal pain, nausea and vomiting.   Genitourinary: Negative for difficulty urinating.   Musculoskeletal: Negative for back pain and neck pain.   Neurological: Negative for headaches.   Psychiatric/Behavioral: The patient is not nervous/anxious.      A complete review of systems was performed with pertinent positives and negatives noted in the HPI, and all other systems negative.    Physical Exam   BP: 93/50  Pulse: 75  Temp: 97.6  F (36.4  C)  SpO2: 100 %  Physical Exam  Vitals signs and nursing note reviewed.   Constitutional:       General: He is not in acute distress.     Appearance: Normal appearance.      Comments: Disheveled appearance   HENT:      Head: Normocephalic.      Nose: Nose normal.   Eyes:      Pupils: Pupils are equal, round, and reactive to light.   Neck:      Musculoskeletal: Normal range of motion.   Cardiovascular:      Rate and Rhythm: Normal rate and regular rhythm.   Pulmonary:      Effort: Pulmonary effort is normal.   Abdominal:      General: There is no distension.   Musculoskeletal: Normal range of motion.         General: No deformity.   Skin:     General: Skin is warm.   Neurological:      Mental Status: He is alert and oriented to person, place, and time.   Psychiatric:         Mood and Affect: Mood normal.         ED Course                 No results found for any visits on 12/31/20.  Medications - No data to display     Assessments & Plan (with Medical Decision Making)   Patient presents to emergency department with no medical complaint, requesting food.    A medical screening exam was performed.  Patient without any acute abnormalities.  No indication for work-up.  He continues to deny any medical symptoms.    He was given a ginger ale and some crackers and left in good condition.  He knows he can return if needed.        I have reviewed the nursing  notes. I have reviewed the findings, diagnosis, plan and need for follow up with the patient.    New Prescriptions    No medications on file       Final diagnoses:   Homeless   Hungry, initial encounter       --  Kevin Dey DO  [facility] EMERGENCY DEPARTMENT  12/31/2020     Kevin Dey DO  12/31/20 0617

## 2020-12-31 NOTE — ED NOTES
Pt left after receiving his gregg ginger ale and crackers. ED MD said it is ok for him to be discharged. Pt did not receive his paperwork as he left immediately after the ginger ale.

## 2020-12-31 NOTE — ED TRIAGE NOTES
"When asked why the patient is here in the ED he said I don't know several times. The RN asked the question in a different way and he said he feels like he is going to have a seizure. He says he has a hx of seizures for which he takes cough medicine. He says his last seizure was \"I guess when I sleep I might be stepped on\"  He denies using ilicit drugs  He says he does not drink alcohol anymore    What can we help you with today?  \"I guess I have a bad stomach I guess\"    Do you have a stomach ache?  \"I have bad digestion\"  "

## 2021-01-23 ENCOUNTER — HOSPITAL ENCOUNTER (EMERGENCY)
Facility: CLINIC | Age: 36
Discharge: HOME | End: 2021-01-23
Attending: EMERGENCY MEDICINE | Admitting: EMERGENCY MEDICINE
Payer: COMMERCIAL

## 2021-01-23 VITALS
RESPIRATION RATE: 16 BRPM | DIASTOLIC BLOOD PRESSURE: 82 MMHG | WEIGHT: 246.5 LBS | HEIGHT: 72 IN | HEART RATE: 82 BPM | OXYGEN SATURATION: 97 % | BODY MASS INDEX: 33.39 KG/M2 | SYSTOLIC BLOOD PRESSURE: 117 MMHG | TEMPERATURE: 97.1 F

## 2021-01-23 DIAGNOSIS — Z59.00 HOMELESS: ICD-10-CM

## 2021-01-23 PROCEDURE — 99283 EMERGENCY DEPT VISIT LOW MDM: CPT | Performed by: EMERGENCY MEDICINE

## 2021-01-23 SDOH — ECONOMIC STABILITY - HOUSING INSECURITY: HOMELESSNESS UNSPECIFIED: Z59.00

## 2021-01-23 ASSESSMENT — MIFFLIN-ST. JEOR: SCORE: 2101.12

## 2021-01-23 NOTE — ED NOTES
Information for shelter resources given to the pt.  Discharge information reviewed with the pt, who verbalizes understanding and has no further questions.  Discharged ambulatory with a steady gait: unaccompanied.

## 2021-01-23 NOTE — DISCHARGE INSTRUCTIONS
TODAY'S VISIT:  You were seen today for shelter resources   -   - If you had any labs or imaging/radiology tests performed today, you should also discuss these tests with your usual provider.     FOLLOW-UP:  Please make an appointment to follow up with:  - Your Primary Care Provider. If you do not have a PCP, please call the [facility] (phone: [phone] for an appointment    - Have your provider review the results from today's visit with you again to make sure no further follow-up or additional testing is needed based on those results.     RETURN TO THE EMERGENCY DEPARTMENT  Return to the Emergency Department at any time for any new or worsening symptoms or any concerns.

## 2021-01-23 NOTE — ED NOTES
Pt DENIES SI or HI.  He says that he is cold and homeless.  He tells me that we can help him by giving him an extra hoodie to stay warm.

## 2021-01-23 NOTE — ED PROVIDER NOTES
"  History     Chief Complaint   Patient presents with     Homeless     seeking help with finding shelters for him to stay in; admits to drug use but will not divulge what drugs; when asked if he drinks alcohol he stated yes and then asked when he last drank he stated, \"I guess I don't drink it\"     HPI  Carlos Alberto Garcia is a 35 year old male with a past medical history of malingering, bipolar 2 disorder, polysubstance abuse (cocaine, methamphetamine, cannabis), hepatitis C, hyperlipidemia, GERD, hypertension, schizoaffective disorder, hypothyroidism who presents to the emergency department seeking assistance finding a shelter to stay in.  The patient stated he wanted to see if we had anything to keep him warm while he was outside as he is homeless.  He requested an additional hoodie/sweatshirt if we had one available for him.  The patient does admit to drug use, but is reluctant to talk about which drugs he has used.  The patient does not report any recent alcohol use.  At the time of my evaluation, patient states he would like to leave the emergency department as soon as possible.  He would still like the resources to help him find a shelter to stay in.  He denies any suicidal or homicidal ideation.  No physical or mental health concerns at this time per patient.  He is currently calm and cooperative.    I have reviewed the Medications, Allergies, Past Medical and Surgical History, and Social History in the [employer] system.    Past Medical History:   Diagnosis Date     Bipolar 2 disorder (H)      Chemical abuse (H)     cocaine, meth, cambis     Dyslipidemia      Hep C w/ coma, chronic (H)      Patient overweight      Schizoaffective disorder      Unspecified essential hypertension      Unspecified hypothyroidism      Past Surgical History:   Procedure Laterality Date     HAND SURGERY      L     ORTHOPEDIC SURGERY      hand     No current facility-administered medications for this encounter.      Current Outpatient " Medications   Medication     hydrOXYzine (VISTARIL) 25 MG capsule     Lidocaine (LIDOCARE) 4 % Patch     naproxen (NAPROSYN) 500 MG tablet     paliperidone (INVEGA SUSTENNA) 117 MG/0.75ML SUSP     paliperidone (INVEGA SUSTENNA) 156 MG/ML WENDI injection     Facility-Administered Medications Ordered in Other Encounters   Medication     ibuprofen (ADVIL,MOTRIN) 600 MG tablet     No Known Allergies  Past medical history, past surgical history, medications, and allergies were reviewed with the patient. Additional pertinent items: None    Social History     Socioeconomic History     Marital status: Single     Spouse name: Not on file     Number of children: Not on file     Years of education: Not on file     Highest education level: Not on file   Occupational History     Not on file   Social Needs     Financial resource strain: Not on file     Food insecurity     Worry: Not on file     Inability: Not on file     Transportation needs     Medical: Not on file     Non-medical: Not on file   Tobacco Use     Smoking status: Current Every Day Smoker     Packs/day: 1.00     Types: Cigarettes     Smokeless tobacco: Former User   Substance and Sexual Activity     Alcohol use: Not Currently     Drug use: Yes     Frequency: 7.0 times per week     Types: Methamphetamines, Marijuana     Comment: states last methamphetamine use was yesterday     Sexual activity: Yes     Partners: Female     Birth control/protection: None   Lifestyle     Physical activity     Days per week: Not on file     Minutes per session: Not on file     Stress: Not on file   Relationships     Social connections     Talks on phone: Not on file     Gets together: Not on file     Attends Gnosticist service: Not on file     Active member of club or organization: Not on file     Attends meetings of clubs or organizations: Not on file     Relationship status: Not on file     Intimate partner violence     Fear of current or ex partner: Not on file     Emotionally abused:  Not on file     Physically abused: Not on file     Forced sexual activity: Not on file   Other Topics Concern     Parent/sibling w/ CABG, MI or angioplasty before 65F 55M? No   Social History Narrative     Not on file     Social history was reviewed with the patient. Additional pertinent items: None    Review of Systems  General: No fevers or chills  Skin: No rash or diaphoresis  Eyes: No eye redness or discharge  Ears/Nose/Throat: No rhinorrhea or nasal congestion  Respiratory: No cough or SOB  Cardiovascular: No chest pain or palpitations  Gastrointestinal: No nausea, vomiting, or diarrhea  Genitourinary: No urinary frequency, hematuria, or dysuria  Musculoskeletal: No arthralgias or myalgias  Neurologic: No numbness or weakness  Psychiatric: See HPI  Hematologic/Lymphatic/Immunologic: No leg swelling, no easy bruising/bleeding  Endocrine: No polyuria/polydypsia    A complete review of systems was performed with pertinent positives and negatives noted in the HPI, and all other systems negative.    Physical Exam   BP: 117/82  Pulse: 82  Temp: 97.1  F (36.2  C)  Resp: 16  Height: 182.9 cm (6')  Weight: 111.8 kg (246 lb 8 oz)  SpO2: 97 %      General: Well nourished, well developed, NAD  HEENT: EOMI, anicteric. NCAT, MMM  Neck: no jugular venous distension, supple, nl ROM  Cardiac: Regular rate, extremities appear well-perfused  Pulm: Airway patent, nonlabored breathing  Skin: Warm and dry to the touch.  No rash  Extremities: No LE edema, no cyanosis, w/w/p  Neuro: A&Ox3, no gross focal deficits  Psych: Patient denies suicidal or homicidal ideation    ED Course        Procedures                           Labs Ordered and Resulted from Time of ED Arrival Up to the Time of Departure from the ED - No data to display         No results found for this or any previous visit (from the past 24 hour(s)).    Labs, vital signs, and imaging studies were reviewed by me.    Medications - No data to display    Assessments & Plan  (with Medical Decision Making)   Carlos Alberto Garcia is a 35 year old male who presents seeking homeless shelter resources.  Patient was provided with a printout with information on local shelter resources.    I have reviewed the nursing notes.    I have reviewed the findings, diagnosis, plan and need for follow up with the patient.    Patient to be discharged. Advised to follow up with PCP. To return to ER immediately with any new/worsening symptoms. Plan of care discussed with patient who expresses understanding and agrees with plan of care.      New Prescriptions    No medications on file       Final diagnoses:   Homeless       1/23/2021   [facility] EMERGENCY DEPARTMENT     Rosenda Perdomo MD  01/23/21 4238

## 2021-05-26 ENCOUNTER — RECORDS - HEALTHEAST (OUTPATIENT)
Dept: ADMINISTRATIVE | Facility: CLINIC | Age: 36
End: 2021-05-26

## 2022-04-15 ENCOUNTER — HOSPITAL ENCOUNTER (EMERGENCY)
Facility: CLINIC | Age: 37
Discharge: HOME | End: 2022-04-15
Payer: COMMERCIAL

## 2022-04-15 NOTE — ED NOTES
While triage was asking triage questions, pt was acting aloof - looking down, not making eye contact, fidgeting, and talking quietly. Pt proceeded to pull something from pocket, which looked like a pocket knife. Pt began to play with object in hand and triage nurse stepped out of room and called security. Security called [employer], [employer] arrived to triage room and recognized pt immediately by name. [employer] placed pt under arrest because he had a warrant for his arrest.

## 2022-09-21 ENCOUNTER — HOSPITAL ENCOUNTER (EMERGENCY)
Facility: CLINIC | Age: 37
End: 2022-09-21
Payer: COMMERCIAL

## 2023-02-13 ENCOUNTER — HOSPITAL ENCOUNTER (EMERGENCY)
Facility: CLINIC | Age: 38
Discharge: HOME | End: 2023-02-13
Payer: COMMERCIAL

## 2023-03-09 ENCOUNTER — HOSPITAL ENCOUNTER (EMERGENCY)
Facility: CLINIC | Age: 38
Discharge: HOME | End: 2023-03-09
Attending: EMERGENCY MEDICINE | Admitting: EMERGENCY MEDICINE
Payer: COMMERCIAL

## 2023-03-09 VITALS
WEIGHT: 250 LBS | DIASTOLIC BLOOD PRESSURE: 96 MMHG | BODY MASS INDEX: 37.03 KG/M2 | HEART RATE: 88 BPM | HEIGHT: 69 IN | SYSTOLIC BLOOD PRESSURE: 156 MMHG | RESPIRATION RATE: 20 BRPM | TEMPERATURE: 97.7 F | OXYGEN SATURATION: 98 %

## 2023-03-09 DIAGNOSIS — R11.0 NAUSEA: ICD-10-CM

## 2023-03-09 PROCEDURE — 99283 EMERGENCY DEPT VISIT LOW MDM: CPT | Performed by: EMERGENCY MEDICINE

## 2023-03-09 PROCEDURE — 250N000011 HC RX IP 250 OP 636: Performed by: EMERGENCY MEDICINE

## 2023-03-09 RX ORDER — ONDANSETRON 4 MG/1
4 TABLET, ORALLY DISINTEGRATING ORAL ONCE
Status: COMPLETED | OUTPATIENT
Start: 2023-03-09 | End: 2023-03-09

## 2023-03-09 RX ADMIN — ONDANSETRON 4 MG: 4 TABLET, ORALLY DISINTEGRATING ORAL at 03:12

## 2023-03-09 ASSESSMENT — ACTIVITIES OF DAILY LIVING (ADL)
ADLS_ACUITY_SCORE: 35
ADLS_ACUITY_SCORE: 35

## 2023-03-09 NOTE — ED NOTES
Patient walked around the unit. Then decided he wanted to leave and left. His plan was to be discharged at 0600 after he slept in the bed for awhile per MD and then be discharged.

## 2023-03-09 NOTE — ED TRIAGE NOTES
Pt walk in with C/O nausea x 2 hours with no associating complaints.  Denies CP,HA, V/D, dizziness or vertigo.  VSS, pt is alert x 4 and in no apparent distress.     Triage Assessment     Row Name 03/09/23 0254       Triage Assessment (Adult)    Airway WDL WDL       Respiratory WDL    Respiratory WDL WDL       Skin Circulation/Temperature WDL    Skin Circulation/Temperature WDL WDL       Cardiac WDL    Cardiac WDL WDL       Peripheral/Neurovascular WDL    Peripheral Neurovascular WDL WDL       Cognitive/Neuro/Behavioral WDL    Cognitive/Neuro/Behavioral WDL WDL

## 2023-03-09 NOTE — ED PROVIDER NOTES
"ED Provider Note  [facility]      History     Chief Complaint   Patient presents with     Nausea     HPI  Carlos Alberto Garcia is a 37 year old male who presents to us with a chief complaint of nausea.  He has not been vomiting.  He denies any fevers or chills.  No chest pain or abdominal pain.  He also notes he is currently homeless.  Past Medical History  Past Medical History:   Diagnosis Date     Bipolar 2 disorder (H)      Chemical abuse (H)     cocaine, meth, cambis     Dyslipidemia      Hep C w/ coma, chronic (H)      Patient overweight      Schizoaffective disorder      Unspecified essential hypertension      Unspecified hypothyroidism      Past Surgical History:   Procedure Laterality Date     HAND SURGERY      L     ORTHOPEDIC SURGERY      hand     hydrOXYzine (VISTARIL) 25 MG capsule  Lidocaine (LIDOCARE) 4 % Patch  naproxen (NAPROSYN) 500 MG tablet  paliperidone (INVEGA SUSTENNA) 117 MG/0.75ML SUSP  paliperidone (INVEGA SUSTENNA) 156 MG/ML WENDI injection      No Known Allergies  Family History  No family history on file.  Social History   Social History     Tobacco Use     Smoking status: Every Day     Packs/day: 1.00     Types: Cigarettes     Smokeless tobacco: Former   Substance Use Topics     Alcohol use: Not Currently     Drug use: Yes     Frequency: 7.0 times per week     Types: Methamphetamines, Marijuana     Comment: states last methamphetamine use was yesterday         A medically appropriate review of systems was performed with pertinent positives and negatives noted in the HPI, and all other systems negative.    Physical Exam   BP: (!) 156/96  Pulse: 88  Temp: 97.7  F (36.5  C)  Resp: 20  Height: 175.3 cm (5' 9\")  Weight: 113.4 kg (250 lb)  SpO2: 98 %  Physical Exam  Vitals reviewed.   Constitutional:       General: He is not in acute distress.     Appearance: He is not diaphoretic.   HENT:      Head: Normocephalic and atraumatic.      Right Ear: External ear normal.    "   Left Ear: External ear normal.      Nose: Nose normal. No rhinorrhea.      Mouth/Throat:      Mouth: Mucous membranes are moist.   Eyes:      General: No scleral icterus.     Extraocular Movements: Extraocular movements intact.      Conjunctiva/sclera: Conjunctivae normal.   Cardiovascular:      Rate and Rhythm: Normal rate and regular rhythm.      Heart sounds: Normal heart sounds.   Pulmonary:      Effort: No respiratory distress.      Breath sounds: Normal breath sounds.   Abdominal:      General: Bowel sounds are normal.      Palpations: Abdomen is soft.      Tenderness: There is no abdominal tenderness.   Musculoskeletal:         General: No deformity. Normal range of motion.      Cervical back: Normal range of motion and neck supple.   Skin:     General: Skin is warm.      Findings: No rash.   Neurological:      Mental Status: Mental status is at baseline.   Psychiatric:         Mood and Affect: Mood normal.         Behavior: Behavior normal.         ED Course, Procedures, & Data      Procedures                     Results for orders placed or performed during the hospital encounter of 03/09/23   [place] Draw *Canceled*     Status: None ()    Narrative    The following orders were created for panel order [place] Draw.  Procedure                               Abnormality         Status                     ---------                               -----------         ------                       Please view results for these tests on the individual orders.     Medications   ondansetron (ZOFRAN ODT) ODT tab 4 mg (4 mg Oral $Given 3/9/23 0312)     Labs Ordered and Resulted from Time of ED Arrival to Time of ED Departure - No data to display  No orders to display          Medical Decision Making  The patient's presentation is strongly suggestive of moderate complexity (an acute illness with systemic symptoms).    The patient's evaluation involved:  review of external note(s) from 3+ sources (Most recent H&P in  addition to clinic and ED note)  review of 2 test result(s) ordered prior to this encounter (Most recent BMP and CBC)    The patient's management involved moderate risk (prescription drug management including medications given in the ED).      Assessment & Plan    37-year-old male presents to us with a chief complaint of nausea.  Vital signs today are normal and he has no abdominal tenderness.  He was given Zofran which did resolve his symptoms.  He was allowed to rest in the emergency department for several hours and discharged in the morning.    I have reviewed the nursing notes. I have reviewed the findings, diagnosis, plan and need for follow up with the patient.    New Prescriptions    No medications on file       Final diagnoses:   Nausea       Alphonse Corcoran  [facility] EMERGENCY DEPARTMENT  3/9/2023     Alphonse Corcoran DO  03/09/23 0621

## 2023-03-31 ENCOUNTER — HOSPITAL ENCOUNTER (EMERGENCY)
Facility: CLINIC | Age: 38
Discharge: HOME | End: 2023-03-31
Attending: EMERGENCY MEDICINE | Admitting: EMERGENCY MEDICINE
Payer: COMMERCIAL

## 2023-03-31 VITALS
SYSTOLIC BLOOD PRESSURE: 128 MMHG | RESPIRATION RATE: 20 BRPM | HEIGHT: 72 IN | HEART RATE: 99 BPM | BODY MASS INDEX: 31.15 KG/M2 | OXYGEN SATURATION: 97 % | DIASTOLIC BLOOD PRESSURE: 83 MMHG | TEMPERATURE: 97.9 F | WEIGHT: 230 LBS

## 2023-03-31 DIAGNOSIS — R41.3 MEMORY PROBLEM: ICD-10-CM

## 2023-03-31 PROCEDURE — 99282 EMERGENCY DEPT VISIT SF MDM: CPT

## 2023-03-31 PROCEDURE — 99282 EMERGENCY DEPT VISIT SF MDM: CPT | Performed by: EMERGENCY MEDICINE

## 2023-03-31 NOTE — ED TRIAGE NOTES
"Pt presents to triage ambulatory with steady gait. Pt is very filthy. Hand almost black with dirt. Pt states he is here for \"amnesia\" and when asked to ellaborate pt states \"I cannot remember where I live\" Previous notes state pt was homeless. Asked pt is he is homeless and he states yes. Pt unwilling to elaborate. When asked if he has used any drugs today he stated no. Asked when the last time he used was (previous notes state hx of meth use) pt states \"a whole 6 months.\" Pt noted to be talking to himself softly while in triage. No difficulty remembering if he had any allergies. Answered orientation questions accurately. Denies any other complaints at this time.      Triage Assessment       Row Name 03/31/23 0148       Triage Assessment (Adult)    Airway WDL WDL       Respiratory WDL    Respiratory WDL WDL       Skin Circulation/Temperature WDL    Skin Circulation/Temperature WDL WDL       Cardiac WDL    Cardiac WDL WDL       Peripheral/Neurovascular WDL    Peripheral Neurovascular WDL WDL       Cognitive/Neuro/Behavioral WDL    Cognitive/Neuro/Behavioral WDL X    Speech illogical       Jose Coma Scale    Best Eye Response 4-->(E4) spontaneous    Best Motor Response 6-->(M6) obeys commands    Best Verbal Response 5-->(V5) oriented    Jose Coma Scale Score 15                  "

## 2023-03-31 NOTE — DISCHARGE INSTRUCTIONS
Please make an appointment to [facility] with [facility] (phone: [phone]) as soon as possible.    Return to the Emergency Department if you have any further concerns.

## 2023-03-31 NOTE — ED PROVIDER NOTES
"ED Provider Note  [facility]      History     Chief Complaint   Patient presents with     Memory Loss     HPI  Carlos Alberto Garcia is a 37 year old male who has a past medical history of type II bipolar, polysubstance abuse, schizophrenia presenting with reports of memory problems for months to years.  He says he has a hard time remembering \"important things\" will not elaborate on this.  Denies hallucinations or voices.  Denies thoughts of harming himself or others.  Denies drug or alcohol use over the past few days.  No falls or injuries.  Denies headache, fevers, nausea, vomiting, visual disturbances, numbness tingling or weakness of his extremities.  He would like to sleep for 10 minutes.    Past Medical History  Past Medical History:   Diagnosis Date     Bipolar 2 disorder (H)      Chemical abuse (H)     cocaine, meth, cambis     Dyslipidemia      Hep C w/ coma, chronic (H)      Patient overweight      Schizoaffective disorder      Unspecified essential hypertension      Unspecified hypothyroidism      Past Surgical History:   Procedure Laterality Date     HAND SURGERY      L     ORTHOPEDIC SURGERY      hand     hydrOXYzine (VISTARIL) 25 MG capsule  Lidocaine (LIDOCARE) 4 % Patch  naproxen (NAPROSYN) 500 MG tablet  paliperidone (INVEGA SUSTENNA) 117 MG/0.75ML SUSP  paliperidone (INVEGA SUSTENNA) 156 MG/ML WENDI injection      No Known Allergies  Family History  No family history on file.  Social History   Social History     Tobacco Use     Smoking status: Every Day     Packs/day: 1.00     Types: Cigarettes     Smokeless tobacco: Former   Substance Use Topics     Alcohol use: Not Currently     Drug use: Yes     Frequency: 7.0 times per week     Types: Methamphetamines, Marijuana     Comment: states last methamphetamine use was yesterday      Past medical history, past surgical history, medications, allergies, family history, and social history were reviewed with the patient. No " additional pertinent items.      A medically appropriate review of systems was performed with pertinent positives and negatives noted in the HPI, and all other systems negative.    Physical Exam   BP: 128/83  Pulse: 99  Resp: 20  Height: 182.9 cm (6')  Weight: 104.3 kg (230 lb)  SpO2: 97 %  Physical Exam  Physical Exam   Constitutional: oriented to person, place, and time. appears well-developed and well-nourished.   HENT:   Head: Normocephalic and atraumatic.   Neck: Normal range of motion.   Pulmonary/Chest: Effort normal. No respiratory distress.   Cardiac: No murmurs, rubs, gallops. RRR.  Abdominal: Abdomen soft, nontender, nondistended. No rebound tenderness.  MSK: Long bones without deformity or evidence of trauma  Neurological: alert and oriented to person, place, and time. Stable gait.  Strength symmetric in the upper and lower extremities.  Sensation intact in all extremities.  Cranial nerves II through XII normal.  Pupils 3 mm and reactive bilaterally. No nystagmus.  Skin: Skin is warm and dry.   Psychiatric:  normal mood and affect.  behavior is normal. Thought content normal.       ED Course, Procedures, & Data      Procedures            No results found for any visits on 03/31/23.  Medications - No data to display  Labs Ordered and Resulted from Time of ED Arrival to Time of ED Departure - No data to display  No orders to display          Critical care was not performed.     Medical Decision Making  The patient's presentation was of straightforward complexity (a clearly self-limited or minor problem).    The patient's evaluation involved:  history and exam without other [facility] data elements    The patient's management necessitated only low risk treatment.      Assessment & Plan    [facility]  Patient presenting with reports of memory problem.  He is alert and oriented to person, place and date.  He seems to remember all events that have happened today.  He will not elaborate significantly about his memory loss.   Denies falls or injuries.  He has a normal neurologic exam.  Do not feel further work-up such as neuroimaging is necessary at this point as he seems to be alert and oriented appropriately without any concerning red flags.  He otherwise appears well, nontoxic, afebrile.  Low suspicion for stroke, meningitis, encephalitis, intracranial hemorrhage, metabolic reason for encephalopathy.  He appears clinically sober at this point and has no mental health needs.  He is allowed to rest for a short time upon his request and discharge.  Discussed following up with a primary care provider he agrees with this plan    I have reviewed the nursing notes. I have reviewed the findings, diagnosis, plan and need for follow up with the patient.    New Prescriptions    No medications on file       Final diagnoses:   Memory problem       Robson Rowe  [facility] EMERGENCY DEPARTMENT  3/31/2023     Robson Rowe MD  03/31/23 0209

## 2023-03-31 NOTE — ED NOTES
Attempted to review discharge instructions with pt. Called him back to triage room. Pt refused to come to room, stood up, said something under his breath and exited the ED.

## 2023-05-08 NOTE — ED PROVIDER NOTES
"  History     Chief Complaint   Patient presents with     Headache     HPI  Carlos Alberto Garcia is a 31 year old male with a history of hypertension, hyperlipidemia, bipolar 2 disorder, and depression who presents for evaluation of headache. The patient reports that a couple of hours earlier today he developed a headache in the back of his head. He denies having fallen or any trauma to his head. He requests Tylenol, stating that he has had similar headaches in the past and that these are relieved with Tylenol. He denies any change in vision, including any double or blurry vision, and he denies any numbness or weakness in his extremities. He reports that he did develop some cold symptoms of sore throat and runny nose today as well. He denies any abdominal pain, nausea or vomiting. He denies any chest pain or shortness of breath and states he has no other physical complaints at this time. The patient endorses a history of bipolar disorder and states that he believes he is to have an \"injection\" for this sometime soon. He denies any visual or auditory hallucinations or any SI or HI. He states he is homeless and is intending to seek a safe shelter after discharge.    I have reviewed the Medications, Allergies, Past Medical and Surgical History, and Social History in the Epic system.    No current facility-administered medications for this encounter.     Current Outpatient Prescriptions   Medication     melatonin 1 MG CAPS     acetaminophen (TYLENOL) 500 MG tablet     MELATONIN PO     Multiple Vitamin (TAB-A-FAZAL) TABS     haloperidol (HALDOL) 10 MG tablet     cholecalciferol (VITAMIN D-1000 MAX ST) 1000 UNITS TABS     Paliperidone Palmitate (INVEGA SUSTENNA IM)     Facility-Administered Medications Ordered in Other Encounters   Medication     ibuprofen (ADVIL,MOTRIN) 600 MG tablet     Past Medical History   Diagnosis Date     Schizoaffective disorder      Bipolar 2 disorder (H)      Unspecified essential hypertension  "     Unspecified hypothyroidism      Chemical abuse      cocaine, meth, cambis     Dyslipidemia      Patient overweight      Hep C w/ coma, chronic (H)        Past Surgical History   Procedure Laterality Date     Hand surgery       L       No family history on file.    Social History   Substance Use Topics     Smoking status: Current Every Day Smoker -- 1.00 packs/day     Types: Cigarettes     Smokeless tobacco: Former User     Alcohol Use: No      No Known Allergies    Review of Systems   HENT: Positive for rhinorrhea and sore throat.    Respiratory: Negative for shortness of breath.    Cardiovascular: Negative for chest pain.   Gastrointestinal: Negative for nausea, vomiting, abdominal pain and diarrhea.   Neurological: Positive for headaches. Negative for weakness and numbness.   Psychiatric/Behavioral: Negative for suicidal ideas, hallucinations and self-injury.   All other systems reviewed and are negative.      Physical Exam   BP: 139/76 mmHg  Pulse: 88  Temp: 97.9  F (36.6  C)  Resp: 16  SpO2: 98 %  Physical Exam    GEN:  Well developed, no acute distress  HEENT:  PERRL, EOMI, Mucous membranes are moist.   Cardio:  RRR, no murmur, radial pulses equal bilaterally  PULM:  Lungs clear, good air movement, no wheezes, rales  Abd:  Soft, normal bowel sounds, no focal tenderness  Musculoskeletal:  normal range of motion, no lower extremity swelling or calf tenderness  Neuro:  Alert and oriented X3, Follows commands, CN exam is normal, finger to nose is normal, sensation and strength is normal throughout, patellar reflexes are normal, no pronator drift   Skin:  Warm, dry    ED Course     [unfilled]  Procedures       6:23 PM  The patient was seen and examined by Morenita Acuna MD, in Room [facility].        Critical Care time:  none   He was given a dose of Tylenol in the ED.  He also asked for a prescription for malatonin.     Labs Ordered and Resulted from Time of ED Arrival Up to the Time of Departure from the ED - No  data to display    Assessments & Plan (with Medical Decision Making)   Recurrent headaches. He is asking for Tylenol. Since this headache is similar to previous headaches and he has no fever or neurologic deficits, I doubt intracranial hemorrhage or infection. He has bpolar disorder, but this appears to be stable and he is not having a mental health crisis. He was given prescriptions as shown and was discharged.     I have reviewed the nursing notes.    I have reviewed the findings, diagnosis, plan and need for follow up with the patient.    New Prescriptions    ACETAMINOPHEN (TYLENOL) 500 MG TABLET    Take 1-2 tablets (500-1,000 mg) by mouth every 6 hours as needed for mild pain    MELATONIN 1 MG CAPS    Take 2 mg by mouth nightly as needed       Final diagnoses:   Nonintractable episodic headache, unspecified headache type     I, Tirso Cerna, am serving as a trained medical scribe to document services personally performed by Morenita Acuna MD, based on the provider's statements to me.      I, Morenita Acuna MD, was physically present and have reviewed and verified the accuracy of this note documented by Tirso Cerna.    1/18/2017   [facility], [place], EMERGENCY DEPARTMENT      Morenita Acuna MD  01/18/17 1954   73

## 2023-05-25 NOTE — ED NOTES
"Pt c/o headache at triage after smoking meth, triage slip stated \"I want Tylenol.\"  " Lip Wedge Excision Repair Text: Given the location of the defect and the proximity to free margins a full thickness wedge repair was deemed most appropriate.  Using a sterile surgical marker, the appropriate repair was drawn incorporating the defect and placing the expected incisions perpendicular to the vermilion border.  The vermilion border was also meticulously outlined to ensure appropriate reapproximation during the repair.  The area thus outlined was incised through and through with a #15 scalpel blade.  The muscularis and dermis were reaproximated with deep sutures following hemostasis. Care was taken to realign the vermilion border before proceeding with the superficial closure.  Once the vermilion was realigned the superfical and mucosal closure was finished.

## 2023-08-01 NOTE — ED TRIAGE NOTES
"Patient took zoloft tonight and states his \"head feels hollow and wants to be checked out.\"  " Yes...

## 2023-08-04 ENCOUNTER — LAB REQUISITION (OUTPATIENT)
Dept: LAB | Facility: CLINIC | Age: 38
End: 2023-08-04
Payer: COMMERCIAL

## 2023-08-04 DIAGNOSIS — Z00.00 ENCOUNTER FOR GENERAL ADULT MEDICAL EXAMINATION WITHOUT ABNORMAL FINDINGS: ICD-10-CM

## 2023-08-04 DIAGNOSIS — B19.20 UNSPECIFIED VIRAL HEPATITIS C WITHOUT HEPATIC COMA: ICD-10-CM

## 2023-08-04 DIAGNOSIS — F25.9 SCHIZOAFFECTIVE DISORDER, UNSPECIFIED (H): ICD-10-CM

## 2023-08-04 LAB
ALBUMIN SERPL BCG-MCNC: 4.5 G/DL (ref 3.5–5.2)
ALP SERPL-CCNC: 88 U/L (ref 40–129)
ALT SERPL W P-5'-P-CCNC: 35 U/L (ref 0–70)
ANION GAP SERPL CALCULATED.3IONS-SCNC: 12 MMOL/L (ref 7–15)
AST SERPL W P-5'-P-CCNC: 24 U/L (ref 0–45)
BILIRUB SERPL-MCNC: 0.4 MG/DL
BUN SERPL-MCNC: 8.9 MG/DL (ref 6–20)
CALCIUM SERPL-MCNC: 9.5 MG/DL (ref 8.6–10)
CHLORIDE SERPL-SCNC: 101 MMOL/L (ref 98–107)
CHOLEST SERPL-MCNC: 170 MG/DL
CREAT SERPL-MCNC: 0.75 MG/DL (ref 0.67–1.17)
DEPRECATED HCO3 PLAS-SCNC: 24 MMOL/L (ref 22–29)
GFR SERPL CREATININE-BSD FRML MDRD: >90 ML/MIN/1.73M2
GLUCOSE SERPL-MCNC: 90 MG/DL (ref 70–99)
HAV IGG SER QL IA: REACTIVE
HBV CORE AB SERPL QL IA: NONREACTIVE
HBV SURFACE AB SERPL IA-ACNC: 62.29 M[IU]/ML
HBV SURFACE AB SERPL IA-ACNC: REACTIVE M[IU]/ML
HBV SURFACE AG SERPL QL IA: NONREACTIVE
HCV AB SERPL QL IA: REACTIVE
HDLC SERPL-MCNC: 40 MG/DL
HIV 1+2 AB+HIV1 P24 AG SERPL QL IA: NONREACTIVE
INR PPP: 1.05 (ref 0.85–1.15)
LDLC SERPL CALC-MCNC: 105 MG/DL
NONHDLC SERPL-MCNC: 130 MG/DL
POTASSIUM SERPL-SCNC: 4.2 MMOL/L (ref 3.4–5.3)
PROT SERPL-MCNC: 7.2 G/DL (ref 6.4–8.3)
SODIUM SERPL-SCNC: 137 MMOL/L (ref 136–145)
TRIGL SERPL-MCNC: 124 MG/DL

## 2023-08-04 PROCEDURE — 86704 HEP B CORE ANTIBODY TOTAL: CPT | Mod: ORL | Performed by: INTERNAL MEDICINE

## 2023-08-04 PROCEDURE — 86803 HEPATITIS C AB TEST: CPT | Mod: ORL | Performed by: INTERNAL MEDICINE

## 2023-08-04 PROCEDURE — 87340 HEPATITIS B SURFACE AG IA: CPT | Mod: ORL | Performed by: INTERNAL MEDICINE

## 2023-08-04 PROCEDURE — 80061 LIPID PANEL: CPT | Mod: ORL | Performed by: INTERNAL MEDICINE

## 2023-08-04 PROCEDURE — 86706 HEP B SURFACE ANTIBODY: CPT | Mod: ORL | Performed by: INTERNAL MEDICINE

## 2023-08-04 PROCEDURE — 87902 NFCT AGT GNTYP ALYS HEP C: CPT | Mod: ORL | Performed by: INTERNAL MEDICINE

## 2023-08-04 PROCEDURE — 86708 HEPATITIS A ANTIBODY: CPT | Mod: ORL | Performed by: INTERNAL MEDICINE

## 2023-08-04 PROCEDURE — 87522 HEPATITIS C REVRS TRNSCRPJ: CPT | Mod: ORL | Performed by: INTERNAL MEDICINE

## 2023-08-04 PROCEDURE — 87389 HIV-1 AG W/HIV-1&-2 AB AG IA: CPT | Mod: ORL | Performed by: INTERNAL MEDICINE

## 2023-08-04 PROCEDURE — 85610 PROTHROMBIN TIME: CPT | Mod: ORL | Performed by: INTERNAL MEDICINE

## 2023-08-04 PROCEDURE — 80053 COMPREHEN METABOLIC PANEL: CPT | Mod: ORL | Performed by: INTERNAL MEDICINE

## 2023-08-05 LAB
HCV RNA SERPL NAA+PROBE-ACNC: ABNORMAL IU/ML
HCV RNA SERPL NAA+PROBE-LOG IU: 6.1 {LOG_IU}/ML

## 2023-08-14 LAB — HCV GENTYP SERPL NAA+PROBE: NORMAL

## 2024-05-12 NOTE — ED NOTES
Pt arrived to the ER with c/o headache. Pt was seen here yesterday and states that his headache is still there. VSS and afebrile.    per dr [facility] no need for blood cx before iv abx.

## 2024-05-24 ENCOUNTER — HOSPITAL ENCOUNTER (EMERGENCY)
Facility: CLINIC | Age: 39
Discharge: HOME | End: 2024-05-24
Attending: EMERGENCY MEDICINE | Admitting: EMERGENCY MEDICINE
Payer: COMMERCIAL

## 2024-05-24 VITALS
HEART RATE: 70 BPM | BODY MASS INDEX: 31.14 KG/M2 | HEIGHT: 72 IN | TEMPERATURE: 97 F | SYSTOLIC BLOOD PRESSURE: 106 MMHG | OXYGEN SATURATION: 98 % | RESPIRATION RATE: 18 BRPM | DIASTOLIC BLOOD PRESSURE: 57 MMHG | WEIGHT: 229.94 LBS

## 2024-05-24 DIAGNOSIS — Z00.8 ENCOUNTER FOR MEDICAL ASSESSMENT: ICD-10-CM

## 2024-05-24 PROCEDURE — 99283 EMERGENCY DEPT VISIT LOW MDM: CPT | Performed by: EMERGENCY MEDICINE

## 2024-05-24 PROCEDURE — 99282 EMERGENCY DEPT VISIT SF MDM: CPT | Performed by: EMERGENCY MEDICINE

## 2024-05-24 ASSESSMENT — ACTIVITIES OF DAILY LIVING (ADL)
ADLS_ACUITY_SCORE: 35

## 2024-05-24 ASSESSMENT — COLUMBIA-SUICIDE SEVERITY RATING SCALE - C-SSRS
2. HAVE YOU ACTUALLY HAD ANY THOUGHTS OF KILLING YOURSELF IN THE PAST MONTH?: NO
6. HAVE YOU EVER DONE ANYTHING, STARTED TO DO ANYTHING, OR PREPARED TO DO ANYTHING TO END YOUR LIFE?: NO
1. IN THE PAST MONTH, HAVE YOU WISHED YOU WERE DEAD OR WISHED YOU COULD GO TO SLEEP AND NOT WAKE UP?: NO

## 2024-05-24 NOTE — ED PROVIDER NOTES
"6    [facility] EMERGENCY DEPARTMENT ([facility])    5/24/24       ED PROVIDER NOTE    History     Chief Complaint   Patient presents with    Mental Health Problem     HPI  Carlos Alberto Garcia is a 38 year old male with PMH notable for schizoaffective/bipolar disorder and polysubstance use disorder who presents to the ED for mental health evaluation.  Patient initially presented to triage requesting brain scan to \"see if other people are normal.\".  Patient also reports being 20 feet tall.  Here patient notes slight nausea.  He denies any substance use.  No suicidal or homicidal ideation.  No recent illness.  No other symptoms noted.    Past Medical History  Past Medical History:   Diagnosis Date    Bipolar 2 disorder (H)     Chemical abuse (H)     cocaine, meth, cambis    Dyslipidemia     Hep C w/ coma, chronic (H)     Patient overweight     Schizoaffective disorder     Unspecified essential hypertension     Unspecified hypothyroidism      Past Surgical History:   Procedure Laterality Date    HAND SURGERY      L    ORTHOPEDIC SURGERY      hand     hydrOXYzine (VISTARIL) 25 MG capsule  Lidocaine (LIDOCARE) 4 % Patch  naproxen (NAPROSYN) 500 MG tablet  paliperidone (INVEGA SUSTENNA) 117 MG/0.75ML SUSP  paliperidone (INVEGA SUSTENNA) 156 MG/ML WENDI injection      No Known Allergies  Family History  No family history on file.  Social History   Social History     Tobacco Use    Smoking status: Every Day     Current packs/day: 1.00     Types: Cigarettes    Smokeless tobacco: Former   Substance Use Topics    Alcohol use: Not Currently    Drug use: Yes     Frequency: 7.0 times per week     Types: Methamphetamines, Marijuana     Comment: states last methamphetamine use was yesterday      Past medical history, past surgical history, medications, allergies, family history, and social history were reviewed with the patient. No additional pertinent items.     A complete review of systems was performed with pertinent " "positives and negatives noted in the HPI, and all other systems negative.    Physical Exam   BP: 114/78  Pulse: 68  Temp: 98  F (36.7  C)  Resp: 18  Height: 182.9 cm (6' 0.01\")  Weight: 104.3 kg (229 lb 15 oz)  SpO2: 98 %  Physical Exam  Vitals and nursing note reviewed.   Constitutional:       General: He is not in acute distress.     Appearance: Normal appearance. He is not toxic-appearing.   HENT:      Head: Atraumatic.   Eyes:      General: No scleral icterus.     Conjunctiva/sclera: Conjunctivae normal.   Cardiovascular:      Rate and Rhythm: Normal rate.      Heart sounds: Normal heart sounds.   Pulmonary:      Effort: Pulmonary effort is normal. No respiratory distress.      Breath sounds: Normal breath sounds.   Abdominal:      Palpations: Abdomen is soft.      Tenderness: There is no abdominal tenderness.   Musculoskeletal:         General: No deformity.      Cervical back: Neck supple.   Skin:     General: Skin is warm.   Neurological:      Mental Status: He is alert.   Psychiatric:         Behavior: Behavior is withdrawn. Behavior is cooperative.         Thought Content: Thought content does not include homicidal or suicidal ideation.           ED Course, Procedures, & Data      Procedures                No results found for any visits on 05/24/24.  Medications - No data to display  Labs Ordered and Resulted from Time of ED Arrival to Time of ED Departure - No data to display  No orders to display              Assessment & Plan    This 38-year-old male with a history of distant affective bipolar disorder who presents the due to mental health concern.  Patient initially was making nonsensical statements.  He is cooperative, denies drug or alcohol use and denies homicidal or suicidal ideation.  DEC assessment was ordered and is pending at this time.  Patient was signed out to incoming physician pending results of DEC assessment.    I have reviewed the nursing notes. I have reviewed the findings, diagnosis, " plan and need for follow up with the patient.    New Prescriptions    No medications on file       Final diagnoses:   None       Paul Rodriguez DO  [facility] EMERGENCY DEPARTMENT  5/24/2024     Paul Rodriguez DO  05/24/24 0445

## 2024-05-24 NOTE — ED NOTES
5/24/2024  Carlos Alberto Garcia 1985     Writer consulted with ED on this date at  3:00 AM. It was determined that pt would not benefit from assessment at this time due to Pt unable able to participate in assessment    ED will call DEC at [phone] when pt is ready and able to participate in assessment.     Zeus Weller

## 2024-05-24 NOTE — ED PROVIDER NOTES
7 AM -signout from overnight attending pending DEC assessment.  Patient noted to have baseline schizoaffective and bipolar disorder as well as polysubstance use but had unclear reason for emergency department presentation and had been sleeping all night.    1015 AM reassessment -patient alert awake and does not want to see DEC  as he does not have any acute mental health need.  I performed a face-to-face and discussed why a DEC assessment might be useful however patient denies suicidal or homicidal ideation or any other acute concerns such as visual or auditory hallucination.  Patient denies fever chills or other medical complaints.  Will discharge patient with return precautions and cancel DEC assessment     Charles Quiles MD  05/24/24 [id number]

## 2024-05-24 NOTE — DISCHARGE INSTRUCTIONS
Please call today to schedule a follow-up appointment with your primary medical doctor in 3-5 days for reevaluation, which is important after today's emergency department visit.   Please return to emergency department for fever>104F, persistent vomiting, worsening chest pain, shortness of breath, mental health concerns

## 2024-05-24 NOTE — PROGRESS NOTES
Gave patient discharge orders and walked him out to [facility].  Patient had all of his belongings.  Patient had no IV or lines in place.

## 2024-05-24 NOTE — ED TRIAGE NOTES
"Pt ambulatory to triage to for brain scan to \"see if other people are normal\". reports being 20ft tall      Triage Assessment (Adult)       Row Name 05/24/24 0021          Triage Assessment    Airway WDL WDL        Respiratory WDL    Respiratory WDL WDL        Skin Circulation/Temperature WDL    Skin Circulation/Temperature WDL WDL        Cardiac WDL    Cardiac WDL WDL        Peripheral/Neurovascular WDL    Peripheral Neurovascular WDL WDL        Cognitive/Neuro/Behavioral WDL    Cognitive/Neuro/Behavioral WDL WDL                     "

## 2024-05-27 ENCOUNTER — HOSPITAL ENCOUNTER (EMERGENCY)
Facility: CLINIC | Age: 39
Discharge: HOME | End: 2024-05-27
Attending: EMERGENCY MEDICINE | Admitting: EMERGENCY MEDICINE
Payer: COMMERCIAL

## 2024-05-27 VITALS
TEMPERATURE: 97 F | RESPIRATION RATE: 16 BRPM | DIASTOLIC BLOOD PRESSURE: 88 MMHG | HEART RATE: 86 BPM | SYSTOLIC BLOOD PRESSURE: 128 MMHG | OXYGEN SATURATION: 100 %

## 2024-05-27 DIAGNOSIS — R10.12 ABDOMINAL PAIN, LEFT UPPER QUADRANT: ICD-10-CM

## 2024-05-27 DIAGNOSIS — Z59.00 HOMELESSNESS: ICD-10-CM

## 2024-05-27 LAB
ALBUMIN SERPL BCG-MCNC: 4.4 G/DL (ref 3.5–5.2)
ALP SERPL-CCNC: 70 U/L (ref 40–150)
ALT SERPL W P-5'-P-CCNC: 41 U/L (ref 0–70)
ANION GAP SERPL CALCULATED.3IONS-SCNC: 11 MMOL/L (ref 7–15)
AST SERPL W P-5'-P-CCNC: 34 U/L (ref 0–45)
BASOPHILS # BLD AUTO: 0.1 10E3/UL (ref 0–0.2)
BASOPHILS NFR BLD AUTO: 1 %
BILIRUB SERPL-MCNC: 0.8 MG/DL
BUN SERPL-MCNC: 9.1 MG/DL (ref 6–20)
CALCIUM SERPL-MCNC: 9.1 MG/DL (ref 8.6–10)
CHLORIDE SERPL-SCNC: 101 MMOL/L (ref 98–107)
CREAT SERPL-MCNC: 0.76 MG/DL (ref 0.67–1.17)
DEPRECATED HCO3 PLAS-SCNC: 24 MMOL/L (ref 22–29)
EGFRCR SERPLBLD CKD-EPI 2021: >90 ML/MIN/1.73M2
EOSINOPHIL # BLD AUTO: 0.1 10E3/UL (ref 0–0.7)
EOSINOPHIL NFR BLD AUTO: 2 %
ERYTHROCYTE [DISTWIDTH] IN BLOOD BY AUTOMATED COUNT: 12.5 % (ref 10–15)
GLUCOSE SERPL-MCNC: 90 MG/DL (ref 70–99)
HCT VFR BLD AUTO: 43.6 % (ref 40–53)
HGB BLD-MCNC: 14.9 G/DL (ref 13.3–17.7)
IMM GRANULOCYTES # BLD: 0 10E3/UL
IMM GRANULOCYTES NFR BLD: 0 %
LIPASE SERPL-CCNC: 24 U/L (ref 13–60)
LYMPHOCYTES # BLD AUTO: 2 10E3/UL (ref 0.8–5.3)
LYMPHOCYTES NFR BLD AUTO: 30 %
MCH RBC QN AUTO: 29.5 PG (ref 26.5–33)
MCHC RBC AUTO-ENTMCNC: 34.2 G/DL (ref 31.5–36.5)
MCV RBC AUTO: 86 FL (ref 78–100)
MONOCYTES # BLD AUTO: 0.6 10E3/UL (ref 0–1.3)
MONOCYTES NFR BLD AUTO: 9 %
NEUTROPHILS # BLD AUTO: 3.9 10E3/UL (ref 1.6–8.3)
NEUTROPHILS NFR BLD AUTO: 58 %
NRBC # BLD AUTO: 0 10E3/UL
NRBC BLD AUTO-RTO: 0 /100
PLATELET # BLD AUTO: 236 10E3/UL (ref 150–450)
POTASSIUM SERPL-SCNC: 4 MMOL/L (ref 3.4–5.3)
PROT SERPL-MCNC: 6.8 G/DL (ref 6.4–8.3)
RBC # BLD AUTO: 5.05 10E6/UL (ref 4.4–5.9)
SODIUM SERPL-SCNC: 136 MMOL/L (ref 135–145)
WBC # BLD AUTO: 6.8 10E3/UL (ref 4–11)

## 2024-05-27 PROCEDURE — 99284 EMERGENCY DEPT VISIT MOD MDM: CPT | Mod: 25 | Performed by: EMERGENCY MEDICINE

## 2024-05-27 PROCEDURE — 80053 COMPREHEN METABOLIC PANEL: CPT | Performed by: EMERGENCY MEDICINE

## 2024-05-27 PROCEDURE — 85025 COMPLETE CBC W/AUTO DIFF WBC: CPT | Performed by: EMERGENCY MEDICINE

## 2024-05-27 PROCEDURE — 250N000013 HC RX MED GY IP 250 OP 250 PS 637: Performed by: EMERGENCY MEDICINE

## 2024-05-27 PROCEDURE — 96374 THER/PROPH/DIAG INJ IV PUSH: CPT | Performed by: EMERGENCY MEDICINE

## 2024-05-27 PROCEDURE — 36415 COLL VENOUS BLD VENIPUNCTURE: CPT | Performed by: EMERGENCY MEDICINE

## 2024-05-27 PROCEDURE — 250N000011 HC RX IP 250 OP 636: Performed by: EMERGENCY MEDICINE

## 2024-05-27 PROCEDURE — 83690 ASSAY OF LIPASE: CPT | Performed by: EMERGENCY MEDICINE

## 2024-05-27 PROCEDURE — 99284 EMERGENCY DEPT VISIT MOD MDM: CPT | Performed by: EMERGENCY MEDICINE

## 2024-05-27 RX ORDER — MAGNESIUM HYDROXIDE/ALUMINUM HYDROXICE/SIMETHICONE 120; 1200; 1200 MG/30ML; MG/30ML; MG/30ML
30 SUSPENSION ORAL ONCE
Status: COMPLETED | OUTPATIENT
Start: 2024-05-27 | End: 2024-05-27

## 2024-05-27 RX ORDER — ONDANSETRON 2 MG/ML
4 INJECTION INTRAMUSCULAR; INTRAVENOUS ONCE
Status: COMPLETED | OUTPATIENT
Start: 2024-05-27 | End: 2024-05-27

## 2024-05-27 RX ADMIN — ONDANSETRON 4 MG: 2 INJECTION INTRAMUSCULAR; INTRAVENOUS at 04:30

## 2024-05-27 SDOH — ECONOMIC STABILITY - HOUSING INSECURITY: HOMELESSNESS UNSPECIFIED: Z59.00

## 2024-05-27 ASSESSMENT — COLUMBIA-SUICIDE SEVERITY RATING SCALE - C-SSRS
6. HAVE YOU EVER DONE ANYTHING, STARTED TO DO ANYTHING, OR PREPARED TO DO ANYTHING TO END YOUR LIFE?: NO
1. IN THE PAST MONTH, HAVE YOU WISHED YOU WERE DEAD OR WISHED YOU COULD GO TO SLEEP AND NOT WAKE UP?: NO
2. HAVE YOU ACTUALLY HAD ANY THOUGHTS OF KILLING YOURSELF IN THE PAST MONTH?: NO

## 2024-05-27 NOTE — DISCHARGE INSTRUCTIONS
Instructions from your doctor today:  Emergency Department (ED) testing is focused on the potential causes of your symptoms that are the most dangerous possibilities, and cannot cover every possibility. Based on the evaluation, it was deemed sufficiently safe to discharge and continue management through the clinics. Thus, follow-up is very important to assess for improvement/worsening, potential further testing, and potential treatment adjustments. If you were given opioid pain medications or other medications that can make you drowsy while in the ED, you should not drive for at least several hours and not until you feel completely back to normal.     Please make an appointment to follow up with:  - Your Primary Care Provider as soon as possible  - If you do not have a primary care provider, you can be seen in follow-up and establish care by calling any of the clinics below:     - [facility] (phone: [phone])     - Primary Care / [facility] (phone: [phone])   - Have your clinic provider review the results from today's visit with you again, including any potential follow-up or additional testing that may be needed based on the results. Occasionally, incidental findings are found on later review by radiologists that may need follow-up.     Return to the Emergency Department immediately if you have worsening symptoms, or any other urgent health concerns.     [facility] homeless assistance ([facility])  provides housing services for people experiencing homelessness   - for single adults:   Amanda Huff DBA [facility] at [place] [address] [place] [address]  - Wednesdays from 8:00am-2:00pm  - Thursdays from 10:30am-2:00pm .   *These are drop-in hours and available on a first come, first serve basis. To schedule an appointment outside these times, contact [employer]  certified assessors: Frandy ([phone]) or Roselyn  ([phone]).  - for families:  Call [facility]  at [phone] for access to the family assessors.  - for victims of domestic violence:   Call Rochelle Ward at [phone] ext. [phone] at the [employer] Abuse [employer].   OR   [facility] 24hr crisis hotline, 1-496.196.5375 (text option 774-527-7211)  - for youth (up to age 24):   [facility] https://ysnmn.org/#/shelter     EMERGENCY HOUSING  [facility] Hotline:  1-572.479.7222  The [facility] Hotline operates through a toll-free number, 1-245.280.7400, to link homeless callers to shelter information, 24 hours a day, seven days a week. Callers will then access specific information about shelter and transitional housing programs in the [facility] from which they are calling. The hotline helps ensure that up-to-date information is available to homeless persons quickly and easily  The [facility] Hotline gives information on specific admission requirements and where and how to receive pre-approval in their county. This helps people seeking shelter avoid multiple phone calls--and the hotline is available even during evening hours when regular offices are closed    [facility] Emergency Housing:  Adult Shelter Connect: [phone]  Individuals will need to visit the Adult Shelter Connect (ASC) for an assessment and placement at one of the five [place] shelters and referrals to other services    ASC Direct Line:  [phone]  Location:   [address] [facility]  Hours:  Monday-Friday: 9:00 AM- 5:30 PM  Saturday & Sunday: 1:00 PM- 5:30 PM  After Hours: [phone]    [facility] Emergency Housing:  [facility] (for battered women and their children) - [phone]  [facility] (single adults) - [phone]  [facility] (family shelter) - [phone]    [facility] Emergency Housing:   For Families:  Shelter space is reserved for [facility] families with minor children. To  determine eligibility, call the [facility] at [phone].  For those experiencing domestic violence, [facility] may be able to help find a safe place for families while fleeing abuse. Call [facility] at 1-162.650.9263  Families who are sleeping in a place not meant for human habilitation (streets, car, camping, public transit, etc.) or staying at a domestic violence shelter and are looking for supportive housing can call [phone]  Space for [facility] homeless family emergency shelter is limited and beds are not immediately available. Completing an intake with [facility] shelter staff is the only way to be placed on the shelter waitlist.  For Youth:  Anyone age 24 or younger can connect with a variety of resources by visiting the website. This provides UP TO DATE information:    [email or web address]  For Singles:  Single adults who are 25 years or older and seeking immediate shelter can call the [facility] at [phone].  Single adults who are 25 years or older, currently experiencing homelessness and not staying in a shelter, will need to complete a housing assessment to determine long-term housing options. Contact a housing  at [phone].  For Veterans:  Any  in need of housing assistance or resources can contact [facility]'s [facility] Services at [phone].    [facility] Emergency Housing:  [facility] for Homeless Households  [employer]inated [employer] is a new resource to help those who are homeless (or within days of eviction) access appropriate housing and services. Please keep in mind that, due to limited resources, we may not be able to assist you in finding an immediate resource.    If you are currently receiving case management services of any kind from [facility], please start by contacting your county .    [employer] services are provided by three different agencies, depending on the household needing help: single  adults, families with children, or unaccompanied youth.  Youth under age 24: Visit [facility]'s website:  www.ysnmn.org  Families with children: Call [facility] at [phone]  Single adults/couples: Call [facility] at [phone]

## 2024-05-27 NOTE — ED TRIAGE NOTES
Pt arrives to ED with c/o abdominal pain and vomiting that started 2 days ago. Pt denies diarrhea. Pt afebrile in triage. VSS.

## 2024-05-27 NOTE — ED PROVIDER NOTES
"  History     Chief Complaint   Patient presents with    Abdominal Pain     HPI  Carlos Alberto Garcia is a 38 year old male with PMH notable for GI bleed, polysubstance use disorder, Bipolar 2, malingering  who presents to the ED with abdominal pain.  Patient reports to this provider that pain started this past morning.  He endorses nausea, denies vomiting, denies diarrhea.  To triage nurse, patient had reported 2 days of symptoms and vomiting.  Patient points to LUQ as location.  Patient states \"I just need some food to get better\".  Patient gives variable answers when reasked questions during history.    Physical Exam   BP: 128/88  Pulse: 86  Temp: 97  F (36.1  C)  Resp: 16  SpO2: 100 %    Physical Exam  General: no acute distress. Appears stated age.   HENT: MMM, no oropharyngeal lesions  Eyes: PERRL, normal sclerae   Cardio: Regular rate. Regular rhythm. Extremities well perfused  Resp: Normal work of breathing, Normal respiratory rate.   Abdomen: no tenderness, non-distended, no rebound, no guarding  Neuro: alert without signs of confusion. CN II-XII grossly intact. Grossly normal strength and sensation in all extremities.   Psych: normal affect, normal behavior      ED Course      Procedures              Labs Ordered and Resulted from Time of ED Arrival to Time of ED Departure   CBC WITH PLATELETS AND DIFFERENTIAL       Result Value    WBC Count 6.8      RBC Count 5.05      Hemoglobin 14.9      Hematocrit 43.6      MCV 86      MCH 29.5      MCHC 34.2      RDW 12.5      Platelet Count 236      % Neutrophils 58      % Lymphocytes 30      % Monocytes 9      % Eosinophils 2      % Basophils 1      % Immature Granulocytes 0      NRBCs per 100 WBC 0      Absolute Neutrophils 3.9      Absolute Lymphocytes 2.0      Absolute Monocytes 0.6      Absolute Eosinophils 0.1      Absolute Basophils 0.1      Absolute Immature Granulocytes 0.0      Absolute NRBCs 0.0     COMPREHENSIVE METABOLIC PANEL   LIPASE     No orders to " display        Medical Decision Making  The patient's presentation was of low complexity (an acute and uncomplicated illness or injury).    The patient's evaluation involved:  ordering and/or review of 3+ test(s) in this encounter (see separate area of note for details)    The patient's management necessitated moderate risk (prescription drug management including medications given in the ED).      Assessments & Plan   Patient presenting with LUQ abdominal pain and nausea, requesting food. Vitals in the ED unremarkable. Nursing notes reviewed. Initial differential diagnosis includes but not limited to gastritis, hunger pain, pancreatitis, malingering.     CBC, CMP, and lipase ordered to evaluate patient's symptoms.  In the ED, the patient's symptoms were managed with ondansetron, with improvement in symptoms upon reassessment.     Patient repeatedly requesting food.  Recommended against eating initially until evaluation is complete.  Patient then requested to leave the ED. Discussed the risks, benefits, indications, and alternatives of staying until labs result vs discharge with the patient. The patient demonstrated understanding and capacity and elected to leave the ED AMA. Discharge paperwork prepared with recommendation for primary care follow-up and with homelessness resources, but the patient walked out prior to getting it.       Final diagnoses:   Abdominal pain, left upper quadrant   Homelessness     Discharge Medication List as of 5/27/2024  4:59 AM        --  Tonny Badillo MD   Emergency Medicine   [facility] EMERGENCY DEPARTMENT  5/27/2024       Tonny Badillo MD  05/27/24 5998

## 2024-07-20 ENCOUNTER — HOSPITAL ENCOUNTER (EMERGENCY)
Facility: CLINIC | Age: 39
Discharge: HOME | End: 2024-07-20
Payer: COMMERCIAL

## 2024-07-20 ASSESSMENT — ACTIVITIES OF DAILY LIVING (ADL)
ADLS_ACUITY_SCORE: 33

## 2025-01-21 ENCOUNTER — HOSPITAL ENCOUNTER (EMERGENCY)
Facility: CLINIC | Age: 40
Discharge: HOME | End: 2025-01-21
Attending: EMERGENCY MEDICINE | Admitting: EMERGENCY MEDICINE
Payer: COMMERCIAL

## 2025-01-21 VITALS
HEIGHT: 72 IN | SYSTOLIC BLOOD PRESSURE: 107 MMHG | DIASTOLIC BLOOD PRESSURE: 75 MMHG | WEIGHT: 195.1 LBS | HEART RATE: 55 BPM | OXYGEN SATURATION: 99 % | TEMPERATURE: 98.1 F | BODY MASS INDEX: 26.43 KG/M2 | RESPIRATION RATE: 16 BRPM

## 2025-01-21 DIAGNOSIS — B34.9 VIRAL ILLNESS: ICD-10-CM

## 2025-01-21 LAB
FLUAV RNA SPEC QL NAA+PROBE: NEGATIVE
FLUBV RNA RESP QL NAA+PROBE: NEGATIVE
RSV RNA SPEC NAA+PROBE: NEGATIVE
SARS-COV-2 RNA RESP QL NAA+PROBE: NEGATIVE

## 2025-01-21 PROCEDURE — 87637 SARSCOV2&INF A&B&RSV AMP PRB: CPT | Performed by: EMERGENCY MEDICINE

## 2025-01-21 PROCEDURE — 99283 EMERGENCY DEPT VISIT LOW MDM: CPT | Performed by: EMERGENCY MEDICINE

## 2025-01-21 ASSESSMENT — ACTIVITIES OF DAILY LIVING (ADL)
ADLS_ACUITY_SCORE: 41

## 2025-01-21 ASSESSMENT — COLUMBIA-SUICIDE SEVERITY RATING SCALE - C-SSRS
2. HAVE YOU ACTUALLY HAD ANY THOUGHTS OF KILLING YOURSELF IN THE PAST MONTH?: NO
1. IN THE PAST MONTH, HAVE YOU WISHED YOU WERE DEAD OR WISHED YOU COULD GO TO SLEEP AND NOT WAKE UP?: NO
6. HAVE YOU EVER DONE ANYTHING, STARTED TO DO ANYTHING, OR PREPARED TO DO ANYTHING TO END YOUR LIFE?: NO

## 2025-01-21 NOTE — ED TRIAGE NOTES
Triage Assessment (Adult)       Row Name 01/21/25 0238          Triage Assessment    Airway WDL WDL        Respiratory WDL    Respiratory WDL WDL

## 2025-01-21 NOTE — ED PROVIDER NOTES
"ED Provider Note  [facility]      History     Chief Complaint   Patient presents with    Flu Symptoms     \"I got the flu.\"     HPI  Carlos Alberto Garcia is a 39 year old male who presents to the ED with complaint of \"I got the flu\".  When asked his symptoms he denies any cough, sore throat, or bodyaches.  He does endorse nausea.  He does not want any medical workup he does admit that he would like to be able to sleep in the waiting room as it is -20 outside.    Past Medical History  Past Medical History:   Diagnosis Date    Bipolar 2 disorder (H)     Chemical abuse (H)     cocaine, meth, cambis    Dyslipidemia     Hep C w/ coma, chronic     Patient overweight     Schizoaffective disorder     Unspecified essential hypertension     Unspecified hypothyroidism      Past Surgical History:   Procedure Laterality Date    HAND SURGERY      L    ORTHOPEDIC SURGERY      hand     hydrOXYzine (VISTARIL) 25 MG capsule  Lidocaine (LIDOCARE) 4 % Patch  naproxen (NAPROSYN) 500 MG tablet  paliperidone (INVEGA SUSTENNA) 117 MG/0.75ML SUSP  paliperidone (INVEGA SUSTENNA) 156 MG/ML WENDI injection      No Known Allergies  Family History  History reviewed. No pertinent family history.  Social History   Social History     Tobacco Use    Smoking status: Every Day     Current packs/day: 1.00     Types: Cigarettes    Smokeless tobacco: Former   Substance Use Topics    Alcohol use: Not Currently    Drug use: Not Currently     Frequency: 7.0 times per week     Comment: states last methamphetamine use was yesterday      A medically appropriate review of systems was performed with pertinent positives and negatives noted in the HPI, and all other systems negative.    Physical Exam   BP: (!) 181/135  Pulse: 50  Temp: 97.4  F (36.3  C)  Resp: 16  Height: 182.9 cm (6')  Weight: 88.5 kg (195 lb 1.6 oz)  SpO2: 100 %  Physical Exam  Vitals and nursing note reviewed.   Constitutional:       General: He is not in acute " distress.     Appearance: Normal appearance.      Comments: Patient sleeping on a blanket in the waiting room.  Overall disheveled appearance.   HENT:      Head: Normocephalic.      Nose: Nose normal.   Eyes:      Pupils: Pupils are equal, round, and reactive to light.   Cardiovascular:      Rate and Rhythm: Normal rate and regular rhythm.   Pulmonary:      Effort: Pulmonary effort is normal.   Abdominal:      General: There is no distension.   Musculoskeletal:         General: No deformity. Normal range of motion.      Cervical back: Normal range of motion.   Skin:     General: Skin is warm.   Neurological:      Mental Status: He is alert and oriented to person, place, and time.   Psychiatric:         Mood and Affect: Mood normal.         ED Course, Procedures, & Data             Results for orders placed or performed during the hospital encounter of 01/21/25   Influenza A/B, RSV and SARS-CoV2 PCR (COVID-19) Nose     Status: Normal    Specimen: Nose; Swab   Result Value Ref Range    Influenza A PCR Negative Negative    Influenza B PCR Negative Negative    RSV PCR Negative Negative    SARS CoV2 PCR Negative Negative    Narrative    Testing was performed using the Xpert Xpress CoV2/Flu/RSV Assay on the [employer] GeneXpert Instrument. This test should be ordered for the detection of SARS-CoV2, influenza, and RSV viruses in individuals with signs and symptoms of respiratory tract infection. This test is for in vitro diagnostic use under the [place] FDA for laboratories certified under CLIA to perform high or moderate complexity testing. This test has been [place] FDA cleared. A negative result does not rule out the presence of PCR inhibitors in the specimen or target RNA in concentration below the limit of detection for the assay. If only one viral target is positive but coinfection with multiple targets is suspected, the sample should be re-tested with another FDA cleared, approved, or authorized test, if coninfection would  change clinical management. This test was validated by the [facility]. These laboratories are certified under the Clinical Laboratory Improvement Amendments of 1988 (CLIA-88) as qualified to perfom high complexity laboratory testing.     Medications - No data to display  Labs Ordered and Resulted from Time of ED Arrival to Time of ED Departure   INFLUENZA A/B, RSV AND SARS-COV2 PCR - Normal       Result Value    Influenza A PCR Negative      Influenza B PCR Negative      RSV PCR Negative      SARS CoV2 PCR Negative       No orders to display          Critical care was not performed.     Medical Decision Making  The patient's presentation was of low complexity (an acute and uncomplicated illness or injury).    The patient's evaluation involved:  ordering and/or review of 1 test(s) in this encounter (see separate area of note for details)    The patient's management necessitated moderate risk (limitations due to social determinants of health (housing insecurity)).    Assessment & Plan    On arrival, patient is normal vital signs.  He reported nausea to the triage nurse but denies any symptoms to me.  He is not want/need any medications.  His influenza/COVID swabs are negative.  He was allowed to sleep in the waiting room overnight as it is -20 outside.  Discharge in the morning.  He has adequate footwear/jacket/blanket.    I have reviewed the nursing notes. I have reviewed the findings, diagnosis, plan and need for follow up with the patient.    Discharge Medication List as of 1/21/2025  6:07 AM          Final diagnoses:   Viral illness       Kevin Dey DO  [facility] EMERGENCY DEPARTMENT  1/21/2025     Kevin Dey DO  01/22/25 0057

## 2025-02-06 ENCOUNTER — HOSPITAL ENCOUNTER (EMERGENCY)
Facility: CLINIC | Age: 40
End: 2025-02-06
Attending: EMERGENCY MEDICINE
Payer: COMMERCIAL

## 2025-02-06 VITALS
DIASTOLIC BLOOD PRESSURE: 85 MMHG | OXYGEN SATURATION: 100 % | RESPIRATION RATE: 15 BRPM | HEART RATE: 62 BPM | SYSTOLIC BLOOD PRESSURE: 123 MMHG

## 2025-02-06 DIAGNOSIS — Z59.00 HOMELESSNESS: ICD-10-CM

## 2025-02-06 PROCEDURE — 99284 EMERGENCY DEPT VISIT MOD MDM: CPT | Performed by: EMERGENCY MEDICINE

## 2025-02-06 PROCEDURE — 99283 EMERGENCY DEPT VISIT LOW MDM: CPT | Performed by: EMERGENCY MEDICINE

## 2025-02-06 PROCEDURE — 87637 SARSCOV2&INF A&B&RSV AMP PRB: CPT | Performed by: EMERGENCY MEDICINE

## 2025-02-06 SDOH — ECONOMIC STABILITY - HOUSING INSECURITY: HOMELESSNESS UNSPECIFIED: Z59.00

## 2025-02-06 ASSESSMENT — ACTIVITIES OF DAILY LIVING (ADL)
ADLS_ACUITY_SCORE: 41
ADLS_ACUITY_SCORE: 41

## 2025-02-07 NOTE — ED TRIAGE NOTES
Pt ambulatory to triage with c/o 3 days of flu like symptoms.      Triage Assessment (Adult)       Row Name 02/06/25 1708          Triage Assessment    Airway WDL WDL        Respiratory WDL    Respiratory WDL X;cough     Cough Frequency frequent        Skin Circulation/Temperature WDL    Skin Circulation/Temperature WDL WDL        Cardiac WDL    Cardiac WDL WDL        Peripheral/Neurovascular WDL    Peripheral Neurovascular WDL WDL        Cognitive/Neuro/Behavioral WDL    Cognitive/Neuro/Behavioral WDL WDL

## 2025-02-07 NOTE — DISCHARGE INSTRUCTIONS
[place] [address]  [phone]  [email or web address]    Are you in need of a safe place to stay? We provide a bed and supportive resources to 34 men and 6 women every night of the year. The men's and women's sleeping and shower areas are located in separate, secure areas of the [facility].   Single adults, age 18+   Beds for 34 men and 6 women 365 nights a year   We maintain a safe, sober, and respectful community living environment for all.   Shelter hours are 6pm-7am, daily. All guests must leave by 7am.   Evening meal is served at 7pm.    Our [facility] combines with [facility] and [facility] for a weekly lottery held at [facility]: [address] [place] [address]   Lottery beds are good for a 28 day stay with potential extension of up to 90 days.   You do not need an ID.   You must arrive completely sober and will be breathalyzed at the door.   If you cannot attend the lottery due to work, hospitalization, or restriction from [facility], call [phone] by lottery start time to have your name entered as a call-in. Currently, call-ins are eligible for a spot on our wait list or for a nightly bed at [place] and [facility].   If you have previously received a lottery bed, you must wait 28 days between lotteries before entering again and 60 days to return to [facility].    Men's Lottery  Monday  Doors at 6pm, starts at 6:30pm  Enter through [address] door    Women's Lottery  Wednesday   Doors at 3pm, starts at 3:30pm  Enter through [address] door    If you do not win a bed at the Monday night lottery, you can call us at [phone] and leave a message each day before 5:45pm throughout the week. You will be placed on a waiting list based on your eligibility. Call again after 6pm (at 6pm is best) of the same day to see if there is an open bed. We keep 2 separate waiting lists--28-day beds  and week-long beds. These lists start new every Monday.    Other services provided   Caring staff providing individualized support as you work towards your goals   A hot dinner and self-serve breakfast each day and a sack lunch, as available   A clean, wheelchair accessible facility with security cameras and controlled entrance   A welcoming living environment with semi-private, shared bedrooms   Personal storage space where you can leave belongings throughout the day   Showers, laundry facilities, recreational space, and computer lab   Regular, in-shelter visits from various community providers: medical, mental health, acupuncture, yoga, veterans services, [facility] , and rapid exit worker   A health and fitness program and art therapy program (as available)   [place] [facility] [place] [address]  [phone]  [email or web address]  [facility] is part of [facility], which offers shelter, housing, employment, and systems change for those who are homeless.    [facility] offers sober shelter to 45 adult men every night.     Our comprehensive shelter program includes facilities such as laundry and showers, as well as an employment and savings programs and intensive advocacy to help guests attain housing.  Location and Hours    [address] [place] [address]    5:30 p.m. - 7:00 a.m. Monday-Friday  3:00 p.m. - 7:00 a.m. Saturday  3:00 p.m. - 9:[place] [address]  [phone]  [email or web address].[facility].[employer]  Our mission is to house, support and advocate for people experiencing homelessness.    We know that with close, individual advocacy and support, men, women and families experiencing homelessness achieve housing stability. We are committed to Housing.  First: house people quickly, then provide services as needed.    We provide a full continuum of supportive housing  services tot those experiencing homelessness, which is unique in the [facility].     Overnight shelter   Every night in our overnight shelter, [facility] offers a bed, a warm meal, a hot shower, laundry services, advocacy and the dignity of being addressed by name to 66 people experiencing homelessness.   Overnight Shelter   [address] [place] [address]   [phone]   office hours: Mon thru Fri, 3 to 5 pm     Family Housing   The [facility] program provides subsidized, supportive rental housing for families with children. Families receive assistance in finding housing and support in developing the skills to maintain stability more     Single Adult Housing   Our Single Adult programs work with long-term homeless adults who are referred to us by local shelters. Through our five Collaboration of Housing Resources (COHR) teams, we serve over 200 additional people. [facility] also participates in the [facility], an initiative to house 150 adults experiencing homelessness, who have disabilities       [place] [address]  [phone]  www.[facility]oples[facility].org  [facility]'s ultimate goal is to permanently end a family's homelessness with one visit to the shelter. The Family Transformation Support Services (FTSS)  is directly aimed at accomplishing this ambitious goal.    [facility] has provided emergency shelter for more than two decades and our staff has seen firsthand that making the transition from homelessness to stable housing is not easy. Participants are selected for the  through a screening process that targets families who are likely to be underserved by existing resources within [facility] [place] and at risk for returning to shelter. [facility]'s FTSS program staff will provide the additional support these families need to make a successful and long-term transition from homelessness to maintaining stable housing in the community.     Supportive Housing  In  addition to providing 99 units of emergency shelter for homeless families, [facility] has the opportunity to help families through longer-term contact with our Supportive Housing Program. [facility] has 10 two-bedroom apartments with open-ended leases for families with multi-level barriers. Residents of these apartments pay reduced-rate rents for their space and can avail themselves of the services offered by the on-site collaborative agencies. In addition, Supportive Housing residents are provided free telephone service, laundry facilities, utilities, furniture and appliances, and are able to provide and cook their own meals and provide supplies necessary for personal needs and apartment upkeep. The purpose of the Supportive Housing Program is to help families establish a solid foundation of good rental history, employment, education, and [place] [address]  [phone]  [email or web address]    We offer a wide range of basic needs and rehabilitation services to anyone in need, without discrimination. Our shelter is [place]'s largest homeless adult outreach facility and includes a clinical treatment program for men working to beat chemical dependency.    This secure shelter offers a safe place to spend the night. The program is free and available on a first-come, first-served basis. A free hot meal is served at 6 p.m. and warm showers are available.    The emergency housing program provides up to three balanced meals each day and personal hygiene supplies for men and women who are eligible for [facility] shelter. Case workers provide referrals to subsidized housing, substance abuse recovery and employment services. A medical respite unit is available for those with medical needs.    Men and women seeking a sober living environment may live at one of our transitional housing facilities in the  [place] for up to two years. Residents receive case management and gain independent living experience. Program fees are payable on a monthly basis.     Twenty of our transitional housing units are set aside for [place] [address]  [phone]  Hours: Daily, 4 pm - 9am.  Open 365 days a year, the emergency homeless shelter near [facility] offers a warm place to rest and spend nighttime hours. A light dinner and breakfast are served and shower facilities are available    [address] [facility]   See more details       [facility] [address]  [phone]  [facility] offers transitional apartments for homeless families with children. This building was named after and dedicated to the [employer]. This beautiful complex was born out of love, compassion, and concern for women and children in poverty. [facility] was built entirely with pr  See more details       [place], [place]   [facility] [place] [place] [address]  [phone]  The [facility], located only a few blocks from [facility], offers most of our day-to-day services for the poor and homeless. At the Day Services Center we offer hot meals, a Food and Clothing Shelf, Dental and Medical Clinics, and assistance with a variety   See more details       [facility] [place] [address]  6.12 miles from [facility]  [phone]  CTI is an empirically supported, time-limited case management model designed to prevent homelessness and other adverse outcomes in people with mental illness following discharge from hospitals, prisons and other institutions. CTI is designed to be most effective when individuals are housed in the co  See more details       [facility] [place] [address]  8.54 miles from [facility]  [place]  [phone]  Programs for single women.   See more details       [place] Velas[place]   [facility]  [place] [address]  11.19 miles from [employer]  [phone]  Supportive Services at the [facility] is the parent that most residents never had or at least didn t take advantage of when they had the chance. It is impossible to quantify the value of an encouraging word when a mom ensures her child is getting to school every day on time or a  See more details

## 2025-02-07 NOTE — ED NOTES
Charge RN spoke with writer and informed that report need not be given to [facility] as Charge RN already spoke with [facility] staff regarding transfer. Pt in lobby awaiting ride.

## 2025-02-07 NOTE — ED PROVIDER NOTES
[place] EMERGENCY DEPARTMENT ([facility])    2/06/25         History     Chief Complaint   Patient presents with    Flu Symptoms     HPI  Carlos Alberto Garcia is a 39 year old male who states that he is homeless and lives on the street but came in to the ER for evaluation of nonproductive cough and some congestion that he has had lately.  The patient states he has been sick for the last 2 weeks.  He has had no vomiting, no diarrhea, no melena or bright blood per rectum.  He denies any shortness of breath, any chest pain or abdominal pain.  Patient states he last ate this evening dinner and had fish for a meal and that he does have a shelter to go to.    This part of the medical record was transcribed by ALAN LEWIS, Medical Scribe, from a dictation done by Fabrice Fraser MD.      Past Medical History  Past Medical History:   Diagnosis Date    Bipolar 2 disorder (H)     Chemical abuse (H)     cocaine, meth, cambis    Dyslipidemia     Hep C w/ coma, chronic     Patient overweight     Schizoaffective disorder     Unspecified essential hypertension     Unspecified hypothyroidism      Past Surgical History:   Procedure Laterality Date    HAND SURGERY      L    ORTHOPEDIC SURGERY      hand     hydrOXYzine (VISTARIL) 25 MG capsule  Lidocaine (LIDOCARE) 4 % Patch  naproxen (NAPROSYN) 500 MG tablet  paliperidone (INVEGA SUSTENNA) 117 MG/0.75ML SUSP  paliperidone (INVEGA SUSTENNA) 156 MG/ML WENDI injection      No Known Allergies    Family History  No family history on file.    Social History   Social History     Tobacco Use    Smoking status: Every Day     Current packs/day: 1.00     Types: Cigarettes    Smokeless tobacco: Former   Substance Use Topics    Alcohol use: Not Currently    Drug use: Not Currently     Frequency: 7.0 times per week     Comment: states last methamphetamine use was yesterday      Past medical history, past surgical history, medications, allergies, family history, and social  history were reviewed with the patient. No additional pertinent items.   A complete review of systems was performed with pertinent positives and negatives noted in the HPI, and all other systems negative.    Physical Exam   BP: 123/85  Pulse: 62  Temp:  (Unable to get in triage)  Resp: 15  SpO2: 100 %  Physical Exam  Vitals and nursing note reviewed.   Constitutional:       Appearance: He is not ill-appearing or diaphoretic.   HENT:      Head: Atraumatic.      Comments: Sinuses nontender to percussion     Right Ear: Tympanic membrane normal.      Left Ear: Tympanic membrane normal.   Eyes:      Extraocular Movements: Extraocular movements intact.      Pupils: Pupils are equal, round, and reactive to light.   Cardiovascular:      Rate and Rhythm: Regular rhythm.   Pulmonary:      Breath sounds: Normal breath sounds.   Musculoskeletal:         General: No deformity.      Cervical back: Neck supple.   Lymphadenopathy:      Cervical: No cervical adenopathy.   Neurological:      General: No focal deficit present.      Mental Status: He is alert and oriented to person, place, and time.   Psychiatric:      Comments: Cooperative           ED Course, Procedures, & Data      Procedures        Results for orders placed or performed during the hospital encounter of 02/06/25   Influenza A/B, RSV and SARS-CoV2 PCR (COVID-19) Nasopharyngeal     Status: Normal    Specimen: Nasopharyngeal; Swab   Result Value Ref Range    Influenza A PCR Negative Negative    Influenza B PCR Negative Negative    RSV PCR Negative Negative    SARS CoV2 PCR Negative Negative    Narrative    Testing was performed using the Xpert Xpress CoV2/Flu/RSV Assay on the Cepheid GeneXpert Instrument. This test should be ordered for the detection of SARS-CoV2, influenza, and RSV viruses in individuals with signs and symptoms of respiratory tract infection. This test is for in vitro diagnostic use under the [place] FDA for laboratories certified under CLIA to perform  high or moderate complexity testing. This test has been [place] FDA cleared. A negative result does not rule out the presence of PCR inhibitors in the specimen or target RNA in concentration below the limit of detection for the assay. If only one viral target is positive but coinfection with multiple targets is suspected, the sample should be re-tested with another FDA cleared, approved, or authorized test, if coninfection would change clinical management. This test was validated by the [facility]. These laboratories are certified under the Clinical Laboratory Improvement Amendments of 1988 (CLIA-88) as qualified to perfom high complexity laboratory testing.           Assessment & Plan      I have reviewed the nursing notes.     Patient at this time will be transferred over to the [place] ER on the [facility] for DEC assessment in the morning and possible crisis bed placement.  Discussed with Dr. Rodriguez on the [facility] campus    I have reviewed the findings, diagnosis, and plan with the patient.        Working diagnoses:   Homelessness       Fabrice Fraser MD.  [facility] EMERGENCY DEPARTMENT  2/6/2025     Fabrice Fraser MD  02/06/25 1852

## 2025-02-07 NOTE — ED NOTES
EMS arrived to bring patient to [facility] and patient decided he no longer wanted to go even though we had a bed for him lined up for the night.